# Patient Record
Sex: FEMALE | Race: WHITE | Employment: OTHER | URBAN - METROPOLITAN AREA
[De-identification: names, ages, dates, MRNs, and addresses within clinical notes are randomized per-mention and may not be internally consistent; named-entity substitution may affect disease eponyms.]

---

## 2018-08-13 ENCOUNTER — OFFICE VISIT (OUTPATIENT)
Dept: ORTHOPEDIC SURGERY | Age: 63
End: 2018-08-13

## 2018-08-13 VITALS
SYSTOLIC BLOOD PRESSURE: 131 MMHG | HEART RATE: 75 BPM | BODY MASS INDEX: 36.65 KG/M2 | HEIGHT: 65 IN | DIASTOLIC BLOOD PRESSURE: 86 MMHG | WEIGHT: 220 LBS

## 2018-08-13 DIAGNOSIS — S83.242A TEAR OF MEDIAL MENISCUS OF LEFT KNEE, UNSPECIFIED TEAR TYPE, UNSPECIFIED WHETHER OLD OR CURRENT TEAR, INITIAL ENCOUNTER: ICD-10-CM

## 2018-08-13 DIAGNOSIS — M25.562 LEFT KNEE PAIN, UNSPECIFIED CHRONICITY: Primary | ICD-10-CM

## 2018-08-13 PROCEDURE — 99204 OFFICE O/P NEW MOD 45 MIN: CPT | Performed by: ORTHOPAEDIC SURGERY

## 2018-08-13 PROCEDURE — G8417 CALC BMI ABV UP PARAM F/U: HCPCS | Performed by: ORTHOPAEDIC SURGERY

## 2018-08-13 PROCEDURE — 3017F COLORECTAL CA SCREEN DOC REV: CPT | Performed by: ORTHOPAEDIC SURGERY

## 2018-08-13 PROCEDURE — 4004F PT TOBACCO SCREEN RCVD TLK: CPT | Performed by: ORTHOPAEDIC SURGERY

## 2018-08-13 PROCEDURE — G8427 DOCREV CUR MEDS BY ELIG CLIN: HCPCS | Performed by: ORTHOPAEDIC SURGERY

## 2018-08-13 ASSESSMENT — ENCOUNTER SYMPTOMS
SORE THROAT: 1
ROS SKIN COMMENTS: BASAL CELL CANCER
BACK PAIN: 1
SINUS PAIN: 1

## 2018-08-13 NOTE — PROGRESS NOTES
Social History    Marital status:      Spouse name: N/A    Number of children: N/A    Years of education: N/A     Social History Main Topics    Smoking status: Current Some Day Smoker     Packs/day: 1.00     Types: Cigarettes    Smokeless tobacco: Never Used    Alcohol use No    Drug use: No    Sexual activity: Not Asked     Other Topics Concern    None     Social History Narrative    None       Current Outpatient Prescriptions   Medication Sig Dispense Refill    fluticasone (FLONASE) 50 MCG/ACT nasal spray USE 2 SPRAYS IN EACH NOSTRIL DAILY 1 Bottle 3    azithromycin (ZITHROMAX) 250 MG tablet Dispense one 5 day supply pack and take as directed 1 packet 0    methylPREDNISolone (MEDROL DOSEPACK) 4 MG tablet Take by mouth as directed. 1 Package 0    carvedilol (COREG) 12.5 MG tablet Take 12.5 mg by mouth 2 times daily (with meals).  amLODIPine (NORVASC) 5 MG tablet Take 5 mg by mouth daily.  ezetimibe (ZETIA) 10 MG tablet Take 10 mg by mouth daily.  omeprazole (PRILOSEC) 40 MG capsule Take 40 mg by mouth daily.  aspirin 81 MG tablet Take 81 mg by mouth daily.  S-Adenosylmethionine (EFREN-E) 400 MG TABS Take  by mouth.  naproxen (NAPROSYN) 375 MG tablet Take 1 tablet by mouth 3 times daily (with meals) for 7 days. 21 tablet 0     No current facility-administered medications for this visit. Allergies   Allergen Reactions    Codeine     Penicillins     Sulfa Antibiotics        Vital signs:  /86   Pulse 75   Ht 5' 5\" (1.651 m)   Wt 220 lb (99.8 kg)   BMI 36.61 kg/m²        Neuro: Alert & oriented x 3,  normal,  no focal deficits noted. Normal affect. Eyes: sclera clear  Ears: Normal external ear  Mouth:  No perioral lesions  Pulm: Respirations unlabored and regular  Pulse: Regular rate    Skin: Warm, well perfused        Left knee exam    Gait: No use of assistive devices. No antalgic gait. Alignment: Alignment appreciated.      Inspection/skin: AND left lateral    Impression: left knee mild patellofemoral osteoarthritis      Assessment: 57-year-old female with probable left knee medial meniscus tear. Plan: Diagnosis and treatment options were reviewed with the patient. At this time we're recommending an MRI to rule out a left knee medial meniscus tear and help guide our treatment plan. Follow up for MRI results and/or as needed. Luba Savage is in agreement with this plan. All questions were answered to patient's satisfaction and was encouraged to call with any further questions. Orders Placed This Encounter   Procedures    XR KNEE LEFT (MIN 4 VIEWS)     [id number]     Order Specific Question:   Reason for exam:     Answer:   Pain    XR KNEE RIGHT (3 VIEWS)     Order Specific Question:   Reason for exam:     Answer:   pain    MRI KNEE LEFT WO CONTRAST     Standing Status:   Future     Standing Expiration Date:   8/13/2019     Order Specific Question:   Reason for exam:     Answer:   MRI LEFT KNEE W/3D  R/O MENISCUS TEAR     Order Specific Question:   Reason for exam:     Answer:   [place] 8/16               Yanna Angel, [address]  Date:    8/13/2018      During this examination, Yanna GOLDSTEIN PA-C, functioned as a scribe for Dr. Bernardo Leal. The history taking and physical examination were performed by Dr. Bernardo Leal. All counseling during the appointment was performed between the patient and Dr. Bernardo Leal. 8/13/2018 1:31 PM      This dictation was performed with a verbal recognition program (DRAGON) and it was checked for errors. It is possible that there are still dictated errors within this office note. If so, please bring any errors to my attention for an addendum. All efforts were made to ensure that this office note is accurate.

## 2018-08-15 NOTE — PROGRESS NOTES
Date:  8/15/2018    Name:  Debby Ledezma  Address:  [address] [place] [address] [facility]    :  1955      Age:   57 y.o.    SSN:  [id number]      Medical Record Number:  [id number]    Reason for Visit:    Chief Complaint    Knee Pain (np left knee pain for 1 week)      DOS:2018     HPI: Debby Ledezma is a 57 y.o. female here today for evaluation of left knee. Previous patient of Dr. Marley Galarza. On 2018 was going from seated to standing or vise versa when she felt a sharp pain in the medial aspect of left knee. Since the initial onset has had progressively worsening left knee pain, but no locking or catching. Last night she twisted her left knee eliciting a sharp pain in the medial aspect. The pain wakes her up at night and stops her from doing her daily activities. Treatment has included ice, essential oil, and 800mg Advil q4h. No numbness or tingling in lower extremity. Multiple bilateral knee surgeries in the past including one for right knee mensicus tear. Pain Assessment  Location of Pain: Knee  Location Modifiers: Left  Severity of Pain: 6  Quality of Pain: Sharp, Dull, Aching (heavy)  Duration of Pain: Persistent  Frequency of Pain: Constant  Date Pain First Started: 18  Aggravating Factors: Walking (sitting, lifting)  Limiting Behavior: Yes  Relieving Factors: Rest  Result of Injury: No  Are there other pain locations you wish to document?: No  ROS: All systems reviewed on patient intake form. Pertinent items are noted in HPI. Past Medical History:   Diagnosis Date    Arthritis     CAD (coronary artery disease)     Cancer ([facility] [address]     skin    Hyperlipidemia     Hypertension         Past Surgical History:   Procedure Laterality Date    APPENDECTOMY      CHOLECYSTECTOMY      FOOT SURGERY      bilateral    HYSTERECTOMY      KNEE SURGERY      bilateral    LIVER SURGERY      NECK SURGERY      URETHRA SURGERY         No family history on file.     Social History Social History    Marital status:      Spouse name: N/A    Number of children: N/A    Years of education: N/A     Social History Main Topics    Smoking status: Current Some Day Smoker     Packs/day: 1.00     Types: Cigarettes    Smokeless tobacco: Never Used    Alcohol use No    Drug use: No    Sexual activity: Not Asked     Other Topics Concern    None     Social History Narrative    None       Current Outpatient Prescriptions   Medication Sig Dispense Refill    fluticasone (FLONASE) 50 MCG/ACT nasal spray USE 2 SPRAYS IN EACH NOSTRIL DAILY 1 Bottle 3    azithromycin (ZITHROMAX) 250 MG tablet Dispense one 5 day supply pack and take as directed 1 packet 0    methylPREDNISolone (MEDROL DOSEPACK) 4 MG tablet Take by mouth as directed. 1 Package 0    carvedilol (COREG) 12.5 MG tablet Take 12.5 mg by mouth 2 times daily (with meals).  amLODIPine (NORVASC) 5 MG tablet Take 5 mg by mouth daily.  ezetimibe (ZETIA) 10 MG tablet Take 10 mg by mouth daily.  omeprazole (PRILOSEC) 40 MG capsule Take 40 mg by mouth daily.  aspirin 81 MG tablet Take 81 mg by mouth daily.  S-Adenosylmethionine (EFREN-E) 400 MG TABS Take  by mouth.  naproxen (NAPROSYN) 375 MG tablet Take 1 tablet by mouth 3 times daily (with meals) for 7 days. 21 tablet 0     No current facility-administered medications for this visit. Allergies   Allergen Reactions    Codeine     Penicillins     Sulfa Antibiotics        Vital signs:  /86   Pulse 75   Ht 5' 5\" (1.651 m)   Wt 220 lb (99.8 kg)   BMI 36.61 kg/m²        Neuro: Alert & oriented x 3,  normal,  no focal deficits noted. Normal affect. Eyes: sclera clear  Ears: Normal external ear  Mouth:  No perioral lesions  Pulm: Respirations unlabored and regular  Pulse: Regular rate    Skin: Warm, well perfused        Left knee exam    Gait: No use of assistive devices. No antalgic gait. Alignment: Alignment appreciated.      Inspection/skin: AND left lateral    Impression: left knee mild patellofemoral osteoarthritis      Assessment: 20-year-old female with probable left knee medial meniscus tear. Plan: Diagnosis and treatment options were reviewed with the patient. At this time we're recommending an MRI to rule out a left knee medial meniscus tear and help guide our treatment plan. Follow up for MRI results and/or as needed. Raimundo Walter is in agreement with this plan. All questions were answered to patient's satisfaction and was encouraged to call with any further questions. Orders Placed This Encounter   Procedures    XR KNEE LEFT (MIN 4 VIEWS)     [id number]     Order Specific Question:   Reason for exam:     Answer:   Pain    XR KNEE RIGHT (3 VIEWS)     Order Specific Question:   Reason for exam:     Answer:   pain    MRI KNEE LEFT WO CONTRAST     Standing Status:   Future     Standing Expiration Date:   8/13/2019     Order Specific Question:   Reason for exam:     Answer:   MRI LEFT KNEE W/3D  R/O MENISCUS TEAR     Order Specific Question:   Reason for exam:     Answer:   Marybeth Daily 8/16               Demetrius Oliver, [address]  Date:    8/15/2018      During this examination, IDemetrius PA-C, functioned as a scribe for Dr. Cayetano Ortiz. The history taking and physical examination were performed by Dr. Cayetano Ortiz. All counseling during the appointment was performed between the patient and Dr. Cayetano Ortiz. 8/15/2018 1:32 PM      This dictation was performed with a verbal recognition program (DRAGON) and it was checked for errors. It is possible that there are still dictated errors within this office note. If so, please bring any errors to my attention for an addendum. All efforts were made to ensure that this office note is accurate. Greater than 50% of the visit was spent counseling the patient.     I personally reviewed the patient's pain scale, review of systems, family

## 2018-08-20 ENCOUNTER — OFFICE VISIT (OUTPATIENT)
Dept: ORTHOPEDIC SURGERY | Age: 63
End: 2018-08-20

## 2018-08-20 VITALS
BODY MASS INDEX: 36.65 KG/M2 | HEART RATE: 96 BPM | HEIGHT: 65 IN | SYSTOLIC BLOOD PRESSURE: 148 MMHG | WEIGHT: 220 LBS | DIASTOLIC BLOOD PRESSURE: 89 MMHG

## 2018-08-20 DIAGNOSIS — M17.12 PATELLOFEMORAL ARTHRITIS OF LEFT KNEE: Primary | ICD-10-CM

## 2018-08-20 DIAGNOSIS — T14.8XXA BONE BRUISE: ICD-10-CM

## 2018-08-20 PROCEDURE — G8427 DOCREV CUR MEDS BY ELIG CLIN: HCPCS | Performed by: ORTHOPAEDIC SURGERY

## 2018-08-20 PROCEDURE — L3170 FOOT PLAS HEEL STABI PRE OTS: HCPCS | Performed by: ORTHOPAEDIC SURGERY

## 2018-08-20 PROCEDURE — 3017F COLORECTAL CA SCREEN DOC REV: CPT | Performed by: ORTHOPAEDIC SURGERY

## 2018-08-20 PROCEDURE — G8417 CALC BMI ABV UP PARAM F/U: HCPCS | Performed by: ORTHOPAEDIC SURGERY

## 2018-08-20 PROCEDURE — 99214 OFFICE O/P EST MOD 30 MIN: CPT | Performed by: ORTHOPAEDIC SURGERY

## 2018-08-20 PROCEDURE — 4004F PT TOBACCO SCREEN RCVD TLK: CPT | Performed by: ORTHOPAEDIC SURGERY

## 2018-08-20 ASSESSMENT — ENCOUNTER SYMPTOMS
BACK PAIN: 1
SINUS PAIN: 1
ROS SKIN COMMENTS: BASAL CELL CANCER
SORE THROAT: 1

## 2018-08-20 NOTE — LETTER
Patient Name: Chandler Edward MRN: [id number]  DOS: 8/20/2018   Diagnosis:   1. Patellofemoral arthritis of left knee    2. Bone bruise                           Goal:  []Decrease Pain and/or Swelling [x]Increase ROM and/or Flexibility     [x]Increase Function                           [x]Increase Strength and/or Endurance   []Other   Evaluation:  [x]Evaluation and Treatment []KT-1000   []Isokinetic Exam   []Preoperative Eval    Recommended Modalities:  [x]Modalities of Choice      []HCVS            []Electrical Stimulation     [] Remove Dressing  []Ultrasound        []TENS/TNS    [] Lumbar Traction           [] Cervical Traction   []Phonophoresis         []Hot Pack/Cold Pack   []PT Treatment, Unlisted []Other:  Therapeutic Exercises:    []Isometrics    []Range of Motion []Progressive Exer. []Balance Coordination   []Flexibility  []ROM Limited  []Total Hip Replacement   []Passive  []ROM Full   []Total Knee Replacement  []Active Assisted    []Shoulder Impingement Prog  []Active   []Tennis Elbow Program   []Capsular Shift Regular        []Isokinetics      []Spine Program   []Straight Leg Raises  [] Gait    []Fixation                   [] Supine                                              [] Running   [] Extension   [] Prone   [] Throwing   [] Stabilization   [] AB    [] Swiss Opal Roller   [] AD      [] Spine Eval   [] Cervical Eval  [] Conditioning   [] Lumbar  [] Stationary Bike   [] Elbe Track   [] Lumbar Exer.  [] Stairmaster         [] Treadmill   [] Functional Cap [] Aquatic Prog.      [] Return to work    Treatment Program:  Frequency: [] 1x  [x] 2x  [] 3x  [] 4x  [] 5x week/month  Duration: [] 1  [] 2  [] 3  [x] 4  [] 5 week/month  Weight Bearing: [] Non  [] 1/4  [] 1/2  [] 3/4  [] Full  ROM: [] Restricted  [] Full  [] Follow established:        [x] Other: patella program

## 2018-08-21 RX ORDER — DICLOFENAC SODIUM 75 MG/1
75 TABLET, DELAYED RELEASE ORAL 2 TIMES DAILY
Qty: 60 TABLET | Refills: 2 | Status: SHIPPED | OUTPATIENT
Start: 2018-08-21 | End: 2019-03-01 | Stop reason: SDUPTHER

## 2018-08-21 NOTE — PROGRESS NOTES
negative hyperflexion test.  No bursal swelling is present. There is no pretibial edema. Pulses are symmetric. Sensation is intact to light-touch. The skin is warm dry and well perfused. No adenopathy is present. Right Knee Exam:       Alignment:      Normal      Patella tracking:  Normal     Inspection/Skin:     Normal    Effusion:      None. Palpation:     Minimal crepitus. Nontender. Range of Motion:      Full    Strength:      Normal    Ligamentous Testing:      Stable     Neurologic:      Sensation intact to light touch    Vascular:      Skin warm and well-perfused. Additional findings: Calf soft nontender    I reviewed the patient's MRI films as well as report. They show      CONCLUSION:    1. Microtrabecular contusion of peripheral medial tibial plateau. A 1 x 2cm area of partial    thickness chondral delamination of weightbearing medial femoral condyle. No meniscal tear. 2. Grade 4 chondromalacia of lateral trochlea with full thickness chondral loss. 3. Suspicious chondrocalcinosis.          Patient has a microtrabecular contusion/fracture of the medial tibial plateau associated with patellofemoral and medial compartment arthritis. Impression: Patellofemoral and medial compartment osteoarthritis with microtrabecular fracture medial tibial plateau and chondrocalcinosis      The etiology, natural history and treatment options for this disorder were discussed. The roles of activity modification, anti-inflammatory use, injections, bracing, physical therapy and surgical interventions were all described to the patient and questions were answered. Treatment today will be shoe insert, diclofenac 75 mg b.i.d. and physical therapy oral.    The patient was advised that NSAID-type medications have several potential side effects that include: gastrointestinal irritation including hemorrhage, renal injury, as well as an increased risk for heart attack and stroke.  The patient was asked to take the medication with food and to stop if there is any symptoms of GI upset, including heartburn, nausea, increased gas or diarrhea. I asked the patient to contact their medical provider for vomiting, abdominal pain or black/bloody stools. The patient should have renal function testing per his medical provider periodically if the medication is taken on a regular basis. The patient should be alert for any swelling in the lower extremities and should stop taking the medication immediately and contact their medical provider should this occur. In addition, the patient should stop taking the medication immediately and contact their medical provider should there be any shortness of breath, fatigue and be evaluated in an emergency facility for any chest pain. The patient expresses understanding of these issues and questions were answered. Follow-up evaluation in 1 month the patient may symptomatically. Patient's blood pressure was noted to be elevated on 2 separate measurements. Patient was directed to contact PCP for follow-up. Greater than 50% of the visit was spent counseling the patient. I personally reviewed the patient's pain scale, review of systems, family history, social history, past medical history, allergies and medications. 13 point review of systems was collected today and is included in the medical record. Letha Casarez MD  Sports Medicine, Knee and Shoulder Surgery    This dictation was performed with a verbal recognition program [facility]) and it was checked for errors. It is possible that there are still dictated errors within this office note. If so, please bring any errors to my attention for an addendum. All efforts were made to ensure that this office note is accurate.

## 2018-08-31 ENCOUNTER — HOSPITAL ENCOUNTER (OUTPATIENT)
Dept: PHYSICAL THERAPY | Age: 63
Setting detail: THERAPIES SERIES
Discharge: HOME | End: 2018-08-31
Payer: COMMERCIAL

## 2018-08-31 PROCEDURE — 97112 NEUROMUSCULAR REEDUCATION: CPT

## 2018-08-31 PROCEDURE — 97161 PT EVAL LOW COMPLEX 20 MIN: CPT

## 2018-08-31 PROCEDURE — G8978 MOBILITY CURRENT STATUS: HCPCS

## 2018-08-31 PROCEDURE — 97110 THERAPEUTIC EXERCISES: CPT

## 2018-08-31 PROCEDURE — G8979 MOBILITY GOAL STATUS: HCPCS

## 2018-08-31 ASSESSMENT — PAIN SCALES - GENERAL: PAINLEVEL_OUTOF10: 0

## 2018-08-31 ASSESSMENT — PAIN DESCRIPTION - LOCATION: LOCATION: KNEE

## 2018-08-31 ASSESSMENT — PAIN DESCRIPTION - PAIN TYPE: TYPE: ACUTE PAIN;CHRONIC PAIN

## 2018-08-31 NOTE — FLOWSHEET NOTE
Physical Therapy Daily Treatment Note    Date:  2018    Patient Name:  Flako Horton    :  1955  MRN: [id number]  Restrictions/Precautions:    Medical/Treatment Diagnosis Information:  · Diagnosis: L knee pain, bone bruise  Insurance/Certification information:  PT Insurance Information: [employer] [place]  Physician Information:  Referring Practitioner: Caleb Wu MD  Plan of care signed (Y/N):  N  Visit# / total visits:       G-Code (if applicable):         PT G-Codes  Functional Assessment Tool Used: LEFS  Score: 33/80  Functional Limitation: Mobility: Walking and moving around  Mobility: Walking and Moving Around Current Status (): At least 40 percent but less than 60 percent impaired, limited or restricted  Mobility: Walking and Moving Around Goal Status ():  At least 20 percent but less than 40 percent impaired, limited or restricted    Medicare Cap (if applicable):  n/a = total amount used, updated 2018    Time in:   8:00      Timed Treatment: 25 Total Treatment Time:  60  ________________________________________________________________________________________    Pain Level:    /10  SUBJECTIVE:  See initial eval    OBJECTIVE:     Exercise/Equipment Resistance/Repetitions Other comments   TG   NV    QS - all 3 phases   10x5\" B HEP   Bridge  10x HEP   clamshell 10x5\" B HEP   Prone triple extension NV    Prone hip IR/ER neuro re-ed NV    rockerboard NV    SLS with stacking NV    multihip machine NV                                                                                Other Therapeutic Activities:  Education regarding POC including demonstration with models of anatomy and physiology in order to maximize benefits of treatment; total neuro re-ed 10 minutes    Manual Treatments:     -- Consider STM/neural tracing L medial leg NV    Modalities:  --    Test/Measurements:  See initial eval       ASSESSMENT:   See initial eval      Treatment/Activity Tolerance:   [x] Patient

## 2018-08-31 NOTE — PROGRESS NOTES
Physical Therapy  Initial Assessment  Date: 2018  Patient Name: Alyssa Zamora  MRN: [id number]  : 1955          Restrictions   none per patient    Subjective   General  Chart Reviewed: Yes  Family / Caregiver Present: No  Referring Practitioner: Ariana Villegas MD  Referral Date : 18  Diagnosis: L knee pain, bone bruise  Follows Commands: Within Functional Limits  PT Visit Information  Onset Date: 18  PT Insurance Information: [employer] [place]  Subjective  Subjective: Pt reports that her knee pain started couple months ago, but then got worse on  when she went to a wedding and was standing up sitting down at [place]. Started hurting the evening after the wedding when she tried to go down the stairs and she couldn't. Went to orthopedic MD who ordered MRI which found hairline fracture and bone bruise and patellafemoral chondromalacia. She has had 4 knee surgeries (2 on each knee) to help with lateral dislocation, as well as 8+ surgeries on her feet. Knee hurts primarily with weight bearing, sitting into a chair (the act of moving), going down steps worse than up, but both are bad. Sarted taking Diclofenac a couple weeks ago and her pain and swelling has decreased. Prior to starting medication, pain would ache into thigh and down into her ankle. FROYLAN GARCIA feels weak occasionally as if it could give out on her.    Pain Screening  Patient Currently in Pain: Yes  Pain Assessment  Pain Assessment: 0-10  Pain Level: 0 (5/10 with going down stairs )  Pain Type: Acute pain;Chronic pain  Pain Location: Knee  Vital Signs  Patient Currently in Pain: Yes    Social/Functional History  Social/Functional History  Lives With: Spouse  Type of Home: House  Home Layout: One level  Home Access: Ramped entrance  ADL Assistance: [address]: Independent  Homemaking Responsibilities: Yes  Ambulation Assistance: Independent  Transfer Assistance: Independent  Active : Yes  Mode of Transportation: Car  Occupation: Retired  Leisure & Hobbies: Spends majority of her time devoted to her mother who is in a nursing home. PLOF - no regular exercise program   Objective     Observation/Palpation  Posture: Fair  Palpation: increased TTP along medial leg (thigh and calf), medial joint line  Observation: B hip ER in standing (with toe out), increased lumbar lordosis and poor abdominal support in standing; during ambulation, pt remains with relative hip ER, but with hip ADD/IR moment during stance phase B, B knee valgus during stance and femoral ambulation  Body Mechanics: (-) Trendelenberg B in SLS, but poor ankle support and knee IR/ADD valgus moment B     AROM RLE (degrees)  RLE AROM: WNL  AROM LLE (degrees)  LLE AROM : WNL    Strength RLE  Strength RLE: Exception  R Hip Flexion: 4-/5  R Hip Extension: 3+/5  R Hip ADduction: 4/5  R Hip Internal Rotation: 4-/5  R Hip External Rotation: 4-/5  R Knee Flexion: 4/5 (with pain)  R Knee Extension: 4+/5  R Ankle Dorsiflexion: 5/5  R Ankle Inversion: 5/5  R Ankle Eversion: 5/5  Strength LLE  Strength LLE: Exception  L Hip Flexion: 4-/5  L Hip Extension: 3+/5  L Hip ADduction: 4-/5  L Hip Internal Rotation: 3+/5  L Hip External Rotation: 3+/5  L Knee Flexion: 4/5  L Knee Extension: 4-/5 (with pain)  L Ankle Dorsiflexion: 4+/5  L Ankle Inversion: 4+/5  L Ankle Eversion: 4+/5  Strength Other  Other: lower abdominals 1/5 (trace)                    Balance  Sitting - Static: Good  Sitting - Dynamic: Fair;+  Standing - Static: Fair;+  Standing - Dynamic: Fair;-              Assessment   Conditions Requiring Skilled Therapeutic Intervention  Body structures, Functions, Activity limitations: Decreased ROM; Decreased strength;Decreased high-level IADLs;Decreased balance  Assessment: Pt is a 58 y/o female who presents with L knee pain secondary to impaired mechanics and muscle activation.  Recommend skilled, outpatient PT to address deficits and to attain below-stated

## 2018-09-06 ENCOUNTER — HOSPITAL ENCOUNTER (OUTPATIENT)
Dept: PHYSICAL THERAPY | Age: 63
Setting detail: THERAPIES SERIES
Discharge: HOME | End: 2018-09-06
Payer: COMMERCIAL

## 2018-09-06 PROCEDURE — 97110 THERAPEUTIC EXERCISES: CPT

## 2018-09-06 NOTE — PROGRESS NOTES
Physical Therapy  Cancellation/No-show Note  Patient Name:  Luba Savage  :  1955   Date:  2018  Cancels to date: 0  No-shows to date: 1    For today's appointment patient:  [] Cancelled  [] Rescheduled appointment  [x] No-show     Reason given by patient:  [] Patient ill  [] Conflicting appointment  [] No transportation    [] Conflict with work  [] No reason given  [] Other:     Comments:      Electronically signed by:  Rachael Momin PT

## 2018-09-11 ENCOUNTER — HOSPITAL ENCOUNTER (OUTPATIENT)
Dept: PHYSICAL THERAPY | Age: 63
Setting detail: THERAPIES SERIES
Discharge: HOME | End: 2018-09-11
Payer: COMMERCIAL

## 2018-09-17 ENCOUNTER — OFFICE VISIT (OUTPATIENT)
Dept: ORTHOPEDIC SURGERY | Age: 63
End: 2018-09-17

## 2018-09-17 VITALS
DIASTOLIC BLOOD PRESSURE: 89 MMHG | HEIGHT: 65 IN | BODY MASS INDEX: 36.65 KG/M2 | HEART RATE: 90 BPM | WEIGHT: 220 LBS | SYSTOLIC BLOOD PRESSURE: 130 MMHG

## 2018-09-17 DIAGNOSIS — M17.12 PATELLOFEMORAL ARTHRITIS OF LEFT KNEE: Primary | ICD-10-CM

## 2018-09-17 PROCEDURE — 3017F COLORECTAL CA SCREEN DOC REV: CPT | Performed by: ORTHOPAEDIC SURGERY

## 2018-09-17 PROCEDURE — 4004F PT TOBACCO SCREEN RCVD TLK: CPT | Performed by: ORTHOPAEDIC SURGERY

## 2018-09-17 PROCEDURE — G8417 CALC BMI ABV UP PARAM F/U: HCPCS | Performed by: ORTHOPAEDIC SURGERY

## 2018-09-17 PROCEDURE — G8427 DOCREV CUR MEDS BY ELIG CLIN: HCPCS | Performed by: ORTHOPAEDIC SURGERY

## 2018-09-17 PROCEDURE — 99213 OFFICE O/P EST LOW 20 MIN: CPT | Performed by: ORTHOPAEDIC SURGERY

## 2018-09-17 ASSESSMENT — ENCOUNTER SYMPTOMS
SORE THROAT: 1
ROS SKIN COMMENTS: BASAL CELL CANCER
BACK PAIN: 1
SINUS PAIN: 1

## 2018-09-17 NOTE — PROGRESS NOTES
54yo female comes in today for f/u of left knee medical compartment OA. She is still doing PT and taking the diclofenac. She states she is feeling better. There is mild crepitus bilaterally on exam today.      OA, left knee    -con't diclofenac  -con't PT  -f/u PRN
(ZITHROMAX) 250 MG tablet Dispense one 5 day supply pack and take as directed 1 packet 0    methylPREDNISolone (MEDROL DOSEPACK) 4 MG tablet Take by mouth as directed. 1 Package 0    carvedilol (COREG) 12.5 MG tablet Take 12.5 mg by mouth 2 times daily (with meals).  amLODIPine (NORVASC) 5 MG tablet Take 5 mg by mouth daily.  ezetimibe (ZETIA) 10 MG tablet Take 10 mg by mouth daily.  omeprazole (PRILOSEC) 40 MG capsule Take 40 mg by mouth daily.  aspirin 81 MG tablet Take 81 mg by mouth daily.  S-Adenosylmethionine (EFREN-E) 400 MG TABS Take  by mouth.  naproxen (NAPROSYN) 375 MG tablet Take 1 tablet by mouth 3 times daily (with meals) for 7 days. 21 tablet 0     No current facility-administered medications for this visit. Allergies   Allergen Reactions    Codeine     Penicillins     Sulfa Antibiotics        Vital signs:  /89   Pulse 90   Ht 5' 5\" (1.651 m)   Wt 220 lb (99.8 kg)   BMI 36.61 kg/m²        Neuro: Alert & oriented x 3,  normal,  no focal deficits noted. Normal affect. Eyes: sclera clear  Ears: Normal external ear  Mouth:  No perioral lesions  Pulm: Respirations unlabored and regular  Pulse: Regular rate and rhythm   Skin: Warm, well perfused      Left knee exam    Examination of the left knee shows normal alignment and full range of motion. The quadriceps are well-developed. Trace effusion is present. No soft tissue swelling is noted. The patient has no tenderness to palpation about the joint. There is a negative Geogrina sign. The Lachman sign is negative. The anterior and posterior drawer signs are negative. The cruciate and collateral ligaments are intact. The patient has a negative hyperflexion test.  No bursal swelling is present. There is mild crepitus. There is slight pain with compression of the patella. The patella apprehension sign is negative. Q angles are within normal limits. There is no pretibial edema.   Pulses are

## 2018-09-18 ENCOUNTER — HOSPITAL ENCOUNTER (OUTPATIENT)
Dept: PHYSICAL THERAPY | Age: 63
Setting detail: THERAPIES SERIES
Discharge: HOME | End: 2018-09-18
Payer: COMMERCIAL

## 2018-09-18 NOTE — PROGRESS NOTES
Physical Therapy  Cancellation/No-show Note  Patient Name:  Debra Garvin  :  1955   Date:  2018  Cancels to date: 2  No-shows to date: 1    For today's appointment patient:  [x] Cancelled  [] Rescheduled appointment  [] No-show     Reason given by patient:  [x] Patient ill  [] Conflicting appointment  [] No transportation    [] Conflict with work  [] No reason given  [] Other:     Comments:      Electronically signed by:  Anjali Russell PT

## 2018-09-20 ENCOUNTER — HOSPITAL ENCOUNTER (OUTPATIENT)
Dept: PHYSICAL THERAPY | Age: 63
Setting detail: THERAPIES SERIES
Discharge: HOME | End: 2018-09-20
Payer: COMMERCIAL

## 2018-09-20 NOTE — PROGRESS NOTES
Physical Therapy  Cancellation/No-show Note  Patient Name:  Gorge Rosales  :  1955   Date:  2018  Cancels to date: 3  No-shows to date: 1    For today's appointment patient:  [x] Cancelled  [] Rescheduled appointment  [] No-show     Reason given by patient:  [] Patient ill  [] Conflicting appointment  [] No transportation    [] Conflict with work  [] No reason given  [x] Other:     Comments:  Pt called to cancel remaining appointments due to her mother becoming very ill.      Electronically signed by:  Cyril Payan PT

## 2018-09-25 ENCOUNTER — APPOINTMENT (OUTPATIENT)
Dept: PHYSICAL THERAPY | Age: 63
End: 2018-09-25
Payer: COMMERCIAL

## 2018-09-27 ENCOUNTER — APPOINTMENT (OUTPATIENT)
Dept: PHYSICAL THERAPY | Age: 63
End: 2018-09-27
Payer: COMMERCIAL

## 2019-03-01 ENCOUNTER — OFFICE VISIT (OUTPATIENT)
Dept: ORTHOPEDIC SURGERY | Age: 64
End: 2019-03-01
Payer: MEDICARE

## 2019-03-01 VITALS
HEART RATE: 85 BPM | HEIGHT: 65 IN | WEIGHT: 230 LBS | BODY MASS INDEX: 38.32 KG/M2 | SYSTOLIC BLOOD PRESSURE: 139 MMHG | DIASTOLIC BLOOD PRESSURE: 85 MMHG

## 2019-03-01 DIAGNOSIS — M25.561 RIGHT KNEE PAIN, UNSPECIFIED CHRONICITY: Primary | ICD-10-CM

## 2019-03-01 PROCEDURE — G8400 PT W/DXA NO RESULTS DOC: HCPCS | Performed by: PHYSICIAN ASSISTANT

## 2019-03-01 PROCEDURE — 4040F PNEUMOC VAC/ADMIN/RCVD: CPT | Performed by: PHYSICIAN ASSISTANT

## 2019-03-01 PROCEDURE — 1101F PT FALLS ASSESS-DOCD LE1/YR: CPT | Performed by: PHYSICIAN ASSISTANT

## 2019-03-01 PROCEDURE — 1090F PRES/ABSN URINE INCON ASSESS: CPT | Performed by: PHYSICIAN ASSISTANT

## 2019-03-01 PROCEDURE — 3017F COLORECTAL CA SCREEN DOC REV: CPT | Performed by: PHYSICIAN ASSISTANT

## 2019-03-01 PROCEDURE — 4004F PT TOBACCO SCREEN RCVD TLK: CPT | Performed by: PHYSICIAN ASSISTANT

## 2019-03-01 PROCEDURE — G8417 CALC BMI ABV UP PARAM F/U: HCPCS | Performed by: PHYSICIAN ASSISTANT

## 2019-03-01 PROCEDURE — G8484 FLU IMMUNIZE NO ADMIN: HCPCS | Performed by: PHYSICIAN ASSISTANT

## 2019-03-01 PROCEDURE — G8427 DOCREV CUR MEDS BY ELIG CLIN: HCPCS | Performed by: PHYSICIAN ASSISTANT

## 2019-03-01 PROCEDURE — 1123F ACP DISCUSS/DSCN MKR DOCD: CPT | Performed by: PHYSICIAN ASSISTANT

## 2019-03-01 PROCEDURE — 99213 OFFICE O/P EST LOW 20 MIN: CPT | Performed by: PHYSICIAN ASSISTANT

## 2019-03-01 RX ORDER — DICLOFENAC SODIUM 75 MG/1
75 TABLET, DELAYED RELEASE ORAL 2 TIMES DAILY
Qty: 60 TABLET | Refills: 2 | Status: SHIPPED | OUTPATIENT
Start: 2019-03-01 | End: 2019-04-22

## 2019-03-01 ASSESSMENT — ENCOUNTER SYMPTOMS
ROS SKIN COMMENTS: BASAL CELL CANCER
BACK PAIN: 1

## 2019-03-05 ENCOUNTER — OFFICE VISIT (OUTPATIENT)
Dept: ORTHOPEDIC SURGERY | Age: 64
End: 2019-03-05
Payer: MEDICARE

## 2019-03-05 VITALS
HEART RATE: 78 BPM | WEIGHT: 230 LBS | BODY MASS INDEX: 38.32 KG/M2 | HEIGHT: 65 IN | SYSTOLIC BLOOD PRESSURE: 136 MMHG | DIASTOLIC BLOOD PRESSURE: 89 MMHG

## 2019-03-05 DIAGNOSIS — M17.11 PRIMARY OSTEOARTHRITIS OF RIGHT KNEE: ICD-10-CM

## 2019-03-05 DIAGNOSIS — M17.12 PATELLOFEMORAL ARTHRITIS OF LEFT KNEE: ICD-10-CM

## 2019-03-05 DIAGNOSIS — M25.562 LEFT KNEE PAIN, UNSPECIFIED CHRONICITY: Primary | ICD-10-CM

## 2019-03-05 PROCEDURE — G8484 FLU IMMUNIZE NO ADMIN: HCPCS | Performed by: ORTHOPAEDIC SURGERY

## 2019-03-05 PROCEDURE — 1090F PRES/ABSN URINE INCON ASSESS: CPT | Performed by: ORTHOPAEDIC SURGERY

## 2019-03-05 PROCEDURE — G8400 PT W/DXA NO RESULTS DOC: HCPCS | Performed by: ORTHOPAEDIC SURGERY

## 2019-03-05 PROCEDURE — 20610 DRAIN/INJ JOINT/BURSA W/O US: CPT | Performed by: ORTHOPAEDIC SURGERY

## 2019-03-05 PROCEDURE — 1101F PT FALLS ASSESS-DOCD LE1/YR: CPT | Performed by: ORTHOPAEDIC SURGERY

## 2019-03-05 PROCEDURE — G8417 CALC BMI ABV UP PARAM F/U: HCPCS | Performed by: ORTHOPAEDIC SURGERY

## 2019-03-05 PROCEDURE — 1123F ACP DISCUSS/DSCN MKR DOCD: CPT | Performed by: ORTHOPAEDIC SURGERY

## 2019-03-05 PROCEDURE — 3017F COLORECTAL CA SCREEN DOC REV: CPT | Performed by: ORTHOPAEDIC SURGERY

## 2019-03-05 PROCEDURE — 4004F PT TOBACCO SCREEN RCVD TLK: CPT | Performed by: ORTHOPAEDIC SURGERY

## 2019-03-05 PROCEDURE — 99213 OFFICE O/P EST LOW 20 MIN: CPT | Performed by: ORTHOPAEDIC SURGERY

## 2019-03-05 PROCEDURE — G8427 DOCREV CUR MEDS BY ELIG CLIN: HCPCS | Performed by: ORTHOPAEDIC SURGERY

## 2019-03-05 PROCEDURE — 4040F PNEUMOC VAC/ADMIN/RCVD: CPT | Performed by: ORTHOPAEDIC SURGERY

## 2019-03-05 ASSESSMENT — ENCOUNTER SYMPTOMS
ROS SKIN COMMENTS: BASAL CELL CANCER
BACK PAIN: 1

## 2019-03-12 ENCOUNTER — TELEPHONE (OUTPATIENT)
Dept: ORTHOPEDIC SURGERY | Age: 64
End: 2019-03-12

## 2019-03-12 DIAGNOSIS — M17.11 PRIMARY OSTEOARTHRITIS OF RIGHT KNEE: Primary | ICD-10-CM

## 2019-03-29 ENCOUNTER — HOSPITAL ENCOUNTER (OUTPATIENT)
Dept: PHYSICAL THERAPY | Age: 64
Setting detail: THERAPIES SERIES
Discharge: HOME | End: 2019-03-29
Payer: MEDICARE

## 2019-03-29 PROCEDURE — 97530 THERAPEUTIC ACTIVITIES: CPT

## 2019-03-29 PROCEDURE — 97162 PT EVAL MOD COMPLEX 30 MIN: CPT

## 2019-03-29 PROCEDURE — 97110 THERAPEUTIC EXERCISES: CPT

## 2019-04-01 ENCOUNTER — OFFICE VISIT (OUTPATIENT)
Dept: ORTHOPEDIC SURGERY | Age: 64
End: 2019-04-01
Payer: MEDICARE

## 2019-04-01 ENCOUNTER — HOSPITAL ENCOUNTER (OUTPATIENT)
Dept: PHYSICAL THERAPY | Age: 64
Setting detail: THERAPIES SERIES
Discharge: HOME | End: 2019-04-01
Payer: MEDICARE

## 2019-04-01 VITALS
HEIGHT: 65 IN | BODY MASS INDEX: 38.32 KG/M2 | HEART RATE: 85 BPM | WEIGHT: 230 LBS | DIASTOLIC BLOOD PRESSURE: 89 MMHG | SYSTOLIC BLOOD PRESSURE: 133 MMHG

## 2019-04-01 DIAGNOSIS — S83.241A ACUTE MEDIAL MENISCUS TEAR, RIGHT, INITIAL ENCOUNTER: ICD-10-CM

## 2019-04-01 DIAGNOSIS — M17.11 PRIMARY OSTEOARTHRITIS OF RIGHT KNEE: Primary | ICD-10-CM

## 2019-04-01 PROCEDURE — G8400 PT W/DXA NO RESULTS DOC: HCPCS | Performed by: ORTHOPAEDIC SURGERY

## 2019-04-01 PROCEDURE — 3017F COLORECTAL CA SCREEN DOC REV: CPT | Performed by: ORTHOPAEDIC SURGERY

## 2019-04-01 PROCEDURE — 97116 GAIT TRAINING THERAPY: CPT

## 2019-04-01 PROCEDURE — G8427 DOCREV CUR MEDS BY ELIG CLIN: HCPCS | Performed by: ORTHOPAEDIC SURGERY

## 2019-04-01 PROCEDURE — 4040F PNEUMOC VAC/ADMIN/RCVD: CPT | Performed by: ORTHOPAEDIC SURGERY

## 2019-04-01 PROCEDURE — 99213 OFFICE O/P EST LOW 20 MIN: CPT | Performed by: ORTHOPAEDIC SURGERY

## 2019-04-01 PROCEDURE — 1123F ACP DISCUSS/DSCN MKR DOCD: CPT | Performed by: ORTHOPAEDIC SURGERY

## 2019-04-01 PROCEDURE — 97110 THERAPEUTIC EXERCISES: CPT

## 2019-04-01 PROCEDURE — 1090F PRES/ABSN URINE INCON ASSESS: CPT | Performed by: ORTHOPAEDIC SURGERY

## 2019-04-01 PROCEDURE — G8417 CALC BMI ABV UP PARAM F/U: HCPCS | Performed by: ORTHOPAEDIC SURGERY

## 2019-04-01 PROCEDURE — 4004F PT TOBACCO SCREEN RCVD TLK: CPT | Performed by: ORTHOPAEDIC SURGERY

## 2019-04-01 ASSESSMENT — ENCOUNTER SYMPTOMS
ROS SKIN COMMENTS: BASAL CELL CANCER
BACK PAIN: 1

## 2019-04-01 NOTE — FLOWSHEET NOTE
Physical Therapy Daily Treatment Note    Date:  2019    Patient Name:  Jenaro Scott    :  1955  MRN: [id number]  Restrictions/Precautions:  universal  Medical/Treatment Diagnosis Information:  · Diagnosis: M17.11 OA of (R) knee  Insurance/Certification information:  PT Insurance Information: Medicare  Physician Information:  Referring Practitioner: Serina Chavez MD  Plan of care signed (Y/N): Y  Visit# / total visits:       G-Code (if applicable):         PT G-Codes  Functional Assessment Tool Used: LEFS  Score: ; 86% disability    Medicare Cap (if applicable):  $931 = total amount used, updated 2019    Time in:  7:25am     Timed Treatment: 40min. Total Treatment Time: 40min.  ________________________________________________________________________________________    Pain Level:    410 (R) knee  SUBJECTIVE:  Patient reports \"I had a lot of pain over the weekend, I bought some of that tape and a compression brace\". Reports compliance with HEP. OBJECTIVE:     Exercise/Equipment Resistance/Repetitions Other comments   TG warm up   x5min Level 4          Hook-lying TA   Until Achieved w/ BP cuff  [id number]   HEP   Bridge x10 HEP   SLR w/ quad set HEP   Supine Heel Slide HEP          Clamshell level 1   x6B           Hip 3 way     x10B 1#    Mini-squat x10                           Gait training w/ cane   x10min           TG   x5min      Other Therapeutic Activities:  role of PT, physiology of injury, plan of care, goals, HEP; patient verbalized and demonstrated good understanding      Manual Treatments:     --      Test/Measurements:  See eval       ASSESSMENT:   The patient performed above TE well with minimal cues. Difficulty with TA achievement. Adapted nicely to new TE added today. Patient reported at end of PT session \"It does feel better\". Treatment/Activity Tolerance:   ?Patient tolerated treatment well ? Patient limited by fatique  ? Patient limited by pain ?

## 2019-04-01 NOTE — PROGRESS NOTES
 Smokeless tobacco: Never Used   Substance and Sexual Activity    Alcohol use: No    Drug use: No    Sexual activity: None   Lifestyle    Physical activity:     Days per week: None     Minutes per session: None    Stress: None   Relationships    Social connections:     Talks on phone: None     Gets together: None     Attends Nondenominational service: None     Active member of club or organization: None     Attends meetings of clubs or organizations: None     Relationship status: None    Intimate partner violence:     Fear of current or ex partner: None     Emotionally abused: None     Physically abused: None     Forced sexual activity: None   Other Topics Concern    None   Social History Narrative    None       Current Outpatient Medications   Medication Sig Dispense Refill    diclofenac (VOLTAREN) 75 MG EC tablet Take 1 tablet by mouth 2 times daily 1 PO Q BID WITH FOOD 60 tablet 2    fluticasone (FLONASE) 50 MCG/ACT nasal spray USE 2 SPRAYS IN EACH NOSTRIL DAILY 1 Bottle 3    azithromycin (ZITHROMAX) 250 MG tablet Dispense one 5 day supply pack and take as directed 1 packet 0    methylPREDNISolone (MEDROL DOSEPACK) 4 MG tablet Take by mouth as directed. 1 Package 0    carvedilol (COREG) 12.5 MG tablet Take 12.5 mg by mouth 2 times daily (with meals).  amLODIPine (NORVASC) 5 MG tablet Take 5 mg by mouth daily.  ezetimibe (ZETIA) 10 MG tablet Take 10 mg by mouth daily.  omeprazole (PRILOSEC) 40 MG capsule Take 40 mg by mouth daily.  aspirin 81 MG tablet Take 81 mg by mouth daily.  S-Adenosylmethionine (EFREN-E) 400 MG TABS Take  by mouth.  naproxen (NAPROSYN) 375 MG tablet Take 1 tablet by mouth 3 times daily (with meals) for 7 days. 21 tablet 0     No current facility-administered medications for this visit.         Allergies   Allergen Reactions    Codeine     Penicillins     Sulfa Antibiotics        Vital signs:  /89   Pulse 85   Ht 5' 5\" (1.651 m)   Wt 230 lb (104.3 kg)   BMI 38.27 kg/m²        Neuro: Alert & oriented x 3,  normal,  no focal deficits noted. Normal affect. Eyes: sclera clear  Ears: Normal external ear  Mouth:  No perioral lesions  Pulm: Respirations unlabored and regular  Pulse: Regular rate and rhythm   Skin: Warm, well perfused      Constitutional: In no apparent distress. Normal affect. Alert and oriented X3 and is cooperative. RIGHT Knee Exam:    Gait: No use of assistive devices. No antalgic gait. Alignment: normal alignment. Inspection/skin: Skin is intact without erythema or ecchymosis. No gross deformity. Palpation: Mild patella crepitus. Marked medial joint line tenderness present. Mild lateral joint line tenderness present. Range of Motion: There is full range of motion. Strength: Normal quadriceps development. Effusion: No effusion or swelling present. Ligamentous stability: No cruciate or collateral ligament instability. Neurologic and vascular: Skin is warm and well-perfused. Sensation is intact to light-touch. Special tests: Negative Georgina sign. LEFT Knee Exam:    Gait: No use of assistive devices. No antalgic gait. Alignment: normal alignment. Inspection/skin: Skin is intact without erythema or ecchymosis. No gross deformity. Palpation: Mild patella crepitus. Mild medial and lateral joint line tenderness present. Range of Motion: There is full range of motion. Strength: Normal quadriceps development. Effusion: No effusion or swelling present. Ligamentous stability: No cruciate or collateral ligament instability. Neurologic and vascular: Skin is warm and well-perfused. Sensation is intact to light-touch. Special tests: Negative Georgina sign. Radiology:     No new imaging was obtained during today's visit. Radiographs from 3/1/2019 were re-reviewed today. MRI images from 3/7/2019 were re-reviewed today. Impression:   CONCLUSION:   1.  Trizonal radial flap tear posterior horn-body junction medial meniscus.  The tear may have    been debrided/repaired.  No unstable fragment. 2. Tricompartment osteoarthropathy.  Intermediate to high grade chondromalacia predominates    lateral patellar facet and trochlea.  Minimal marrow reaction. 3. Capsulosynovitis. 4. Enchondroma proximal fibula. Posterior endosteal scalloping. No pathologic fracture. 5. Please see above. Good preservation of joint space appreciated on radiographs. Assessment :  Primary osteoarthritis with medial meniscus tear, right knee. Impression:  Encounter Diagnoses   Name Primary?  Primary osteoarthritis of right knee Yes    Acute medial meniscus tear, right, initial encounter        Office Procedures:  No orders of the defined types were placed in this encounter. Plan: The patient has not seen improvement with the cortisone injection, activity modification, oral medications or physical therapy. Therefore, I think she would benefit from a right knee arthroscopy, medial menisectomy, synovectomy and chondroplasty. We will get her set up with this at her convenience. Ranjan Simpson is in agreement with this plan. All questions were answered to patient's satisfaction and was encouraged to call with any further questions. Neva Barry ATC, am scribing for and in the presence of Dr. Laina Delgado. 04/01/19 3:20 PM Artemio Richards ATC        I personally reviewed the patient's pain scale, review of systems, family history, social history, past medical history, allergies and medications. 13 point review of systems was collected and reviewed today. It is noncontributory. I personally performed the services described in this documentation and scribed by Artemio Richards ATC. Sheyla Dumont MD  Sports Medicine, Arthroscopic Knee and Shoulder Surgery    This dictation was performed with a verbal recognition program [facility]) and it was checked for errors.   It is possible that there are still dictated errors within this office note. If so, please bring any errors to my attention for an addendum. All efforts were made to ensure that this office note is accurate.

## 2019-04-03 ENCOUNTER — TELEPHONE (OUTPATIENT)
Dept: ORTHOPEDIC SURGERY | Age: 64
End: 2019-04-03

## 2019-04-16 ENCOUNTER — TELEPHONE (OUTPATIENT)
Dept: ORTHOPEDIC SURGERY | Age: 64
End: 2019-04-16

## 2019-04-18 NOTE — PROGRESS NOTES
[facility] ([facility]) [address] [facility], [address]    Acknowledgment of Informed Consent for Surgical or Medical Procedure and Sedation  I agree to allow doctor(s) Asia Troncoso and his/her associates or assistants, including residents and/or other qualified medical practitioner to perform the following medical treatment or procedure and to administer or direct the administration of sedation as necessary:  Procedure(s): RIGHT KNEE ARTHROSCOPY, MEDIAL MENISCUS EXCISION VERSUS REPAIR, SYNOVECTOMY CHONDROPLASTY  My doctor has explained the following regarding the proposed procedure:   the explanation of the procedure   the benefits of the procedure   the potential problems that might occur during recuperation   the risks and side effects of the procedure which could include but are not limited to severe blood loss, infection, stroke or death   the benefits, risks and side effect of alternative procedures including the consequences of declining this procedure or any alternative procedures   the likelihood of achieving satisfactory results. I acknowledge no guarantee or assurance has been made to me regarding the results. I understand that during the course of this treatment/procedure, unforeseen conditions can occur which require an additional or different procedure. I agree to allow my physician or assistants to perform such extension of the original procedure as they may find necessary. I understand that sedation will often result in temporary impairment of memory and fine motor skills and that sedation can occasionally progress to a state of deep sedation or general anesthesia. I understand the risks of anesthesia for surgery include, but are not limited to, sore throat, hoarseness, injury to face, mouth, or teeth; nausea; headache; injury to blood vessels or nerves; death, brain damage, or paralysis.     I understand that if I have a Limitation of Treatment order in effect during my hospitalization, the order may or may not be in effect during this procedure. I give my doctor permission to give me blood or blood products. I understand that there are risks with receiving blood such as hepatitis, AIDS, fever, or allergic reaction. I acknowledge that the risks, benefits, and alternatives of this treatment have been explained to me and that no express or implied warranty has been given by the hospital, any blood bank, or any person or entity as to the blood or blood components transfused. At the discretion of my doctor, I agree to allow observers, equipment/product representatives and allow photographing, and/or televising of the procedure, provided my name or identity is maintained confidentially. I agree the hospital may dispose of or use for scientific or educational purposes any tissue, fluid, or body parts which may be removed.     ________________________________Date________Time______ am/pm  ([employer] [facility])  Patient or Signature of Closest Relative or Legal Guardian    ________________________________Date________Time______am/pm      Page 1 of  1  Witness

## 2019-04-22 ENCOUNTER — TELEPHONE (OUTPATIENT)
Dept: ORTHOPEDIC SURGERY | Age: 64
End: 2019-04-22

## 2019-04-22 NOTE — PROGRESS NOTES
option #2 if you have not been preregistered yet. On the day of your procedure bring your insurance card and photo ID. You will be registered at your bedside once brought back to your room. 5. DO NOT EAT ANYTHING eight hours prior to surgery. May have 8 ounces of water 4 hours prior to surgery. 6. MEDICATIONS    Take the following medications with a SMALL sip of water: Norvasc, Coreg, Prilosec   Use your usual dose of inhalers the morning of surgery. BRING your rescue inhaler with you to hospital.    Anesthesia does NOT want you to take insulin the morning of surgery. They will control your blood sugar while you are at the hospital. Please contact your ordering physician for instructions regarding your insulin the night before your procedure. If you have an insulin pump, please keep it set on basal rate. 7. Do not swallow water when brushing teeth. No gum, candy, mints or ice chips. Refrain from smoking or at least decrease the amount. 8. Dress in loose, comfortable clothing appropriate for redressing after your procedure. Do not wear jewelry (including body piercings), make-up (especially NO eye make-up), fingernail polish (NO toenail polish if foot/leg surgery), lotion, powders or metal hairclips. 9. Dentures, glasses, or contacts will need to be removed before your procedure. Bring cases for your glasses, contacts, dentures, or hearing aids to protect them while you are in surgery. 10. If you use a CPAP, please bring it with you on the day of your procedure. 11. We recommend that valuable personal  belongings such as cash, cell phones, e-tablets or jewelry, be left at home during your stay. The hospital will not be responsible for valuables that are not secured in the hospital safe. However, if your insurance requires a co-pay, you may want to bring a method of payment, i.e. Check or credit card, if you wish to pay your co-pay the day of surgery.       12. If you are to stay overnight, you may bring a bag with personal items. Please have any large items you may need brought in by your family after your arrival to your hospital room. 15. If you have a Living Will or Durable Power of , please bring a copy on the day of your procedure. 15. With your permission, one family member may accompany you while you are being prepared for surgery. Once you are ready, additional family members may join you. HOW WE KEEP YOU SAFE and WORK TO PREVENT SURGICAL SITE INFECTIONS:  1. Health care workers should always check your ID bracelet to verify your name and birth date. You will be asked many times to state your name, date of birth, and allergies. 2. Health care workers should always clean their hands with soap or alcohol gel before providing care to you. It is okay to ask anyone if they cleaned their hands before they touch you. 3. You will be actively involved in verifying the type of procedure you are having and ensuring the correct surgical site. This will be confirmed multiple times prior to your procedure. Do NOT radha your surgery site UNLESS instructed to by your surgeon. 4. Do not shave or wax for 72 hours prior to procedure near your operative site. Shaving with a razor can irritate your skin and make it easier to develop an infection. On the day of your procedure, any hair that needs to be removed near the surgical site will be clipped by a healthcare worker using a special clippers designed to avoid skin irritation. 5. When you are in the operating room, your surgical site will be cleansed with a special soap, and in most cases, you will be given an antibiotic before the surgery begins. What to expect AFTER YOUR PROCEDURE:  1. Immediately following your procedure, your will be taken to the PACU for the first phase of your recovery.   Your nurse will help you recover from any potential side effects of anesthesia, such as extreme drowsiness, changes in your

## 2019-04-23 ENCOUNTER — ANESTHESIA EVENT (OUTPATIENT)
Dept: OPERATING ROOM | Age: 64
End: 2019-04-23
Payer: MEDICARE

## 2019-04-24 ENCOUNTER — APPOINTMENT (OUTPATIENT)
Dept: PHYSICAL THERAPY | Age: 64
End: 2019-04-24
Payer: MEDICARE

## 2019-04-24 ENCOUNTER — HOSPITAL ENCOUNTER (OUTPATIENT)
Age: 64
Setting detail: OUTPATIENT SURGERY
Discharge: HOME | End: 2019-04-24
Attending: ORTHOPAEDIC SURGERY | Admitting: ORTHOPAEDIC SURGERY
Payer: MEDICARE

## 2019-04-24 ENCOUNTER — ANESTHESIA (OUTPATIENT)
Dept: OPERATING ROOM | Age: 64
End: 2019-04-24
Payer: MEDICARE

## 2019-04-24 VITALS
RESPIRATION RATE: 16 BRPM | HEART RATE: 74 BPM | HEIGHT: 65 IN | WEIGHT: 230 LBS | BODY MASS INDEX: 38.32 KG/M2 | DIASTOLIC BLOOD PRESSURE: 87 MMHG | OXYGEN SATURATION: 94 % | TEMPERATURE: 97.3 F | SYSTOLIC BLOOD PRESSURE: 137 MMHG

## 2019-04-24 VITALS
RESPIRATION RATE: 11 BRPM | SYSTOLIC BLOOD PRESSURE: 100 MMHG | DIASTOLIC BLOOD PRESSURE: 51 MMHG | TEMPERATURE: 34.2 F | OXYGEN SATURATION: 93 %

## 2019-04-24 PROCEDURE — 3700000001 HC ADD 15 MINUTES (ANESTHESIA): Performed by: ORTHOPAEDIC SURGERY

## 2019-04-24 PROCEDURE — 6360000002 HC RX W HCPCS: Performed by: ANESTHESIOLOGY

## 2019-04-24 PROCEDURE — 3600000004 HC SURGERY LEVEL 4 BASE: Performed by: ORTHOPAEDIC SURGERY

## 2019-04-24 PROCEDURE — 6360000002 HC RX W HCPCS: Performed by: NURSE ANESTHETIST, CERTIFIED REGISTERED

## 2019-04-24 PROCEDURE — 3700000000 HC ANESTHESIA ATTENDED CARE: Performed by: ORTHOPAEDIC SURGERY

## 2019-04-24 PROCEDURE — 3600000014 HC SURGERY LEVEL 4 ADDTL 15MIN: Performed by: ORTHOPAEDIC SURGERY

## 2019-04-24 PROCEDURE — 2580000003 HC RX 258: Performed by: ORTHOPAEDIC SURGERY

## 2019-04-24 PROCEDURE — 2500000003 HC RX 250 WO HCPCS: Performed by: ORTHOPAEDIC SURGERY

## 2019-04-24 PROCEDURE — 7100000001 HC PACU RECOVERY - ADDTL 15 MIN: Performed by: ORTHOPAEDIC SURGERY

## 2019-04-24 PROCEDURE — 7100000011 HC PHASE II RECOVERY - ADDTL 15 MIN: Performed by: ORTHOPAEDIC SURGERY

## 2019-04-24 PROCEDURE — 6360000002 HC RX W HCPCS: Performed by: ORTHOPAEDIC SURGERY

## 2019-04-24 PROCEDURE — 2580000003 HC RX 258: Performed by: ANESTHESIOLOGY

## 2019-04-24 PROCEDURE — 7100000010 HC PHASE II RECOVERY - FIRST 15 MIN: Performed by: ORTHOPAEDIC SURGERY

## 2019-04-24 PROCEDURE — 2709999900 HC NON-CHARGEABLE SUPPLY: Performed by: ORTHOPAEDIC SURGERY

## 2019-04-24 PROCEDURE — 7100000000 HC PACU RECOVERY - FIRST 15 MIN: Performed by: ORTHOPAEDIC SURGERY

## 2019-04-24 PROCEDURE — 2720000010 HC SURG SUPPLY STERILE: Performed by: ORTHOPAEDIC SURGERY

## 2019-04-24 RX ORDER — ONDANSETRON 2 MG/ML
INJECTION INTRAMUSCULAR; INTRAVENOUS PRN
Status: DISCONTINUED | OUTPATIENT
Start: 2019-04-24 | End: 2019-04-24 | Stop reason: SDUPTHER

## 2019-04-24 RX ORDER — LIDOCAINE HYDROCHLORIDE 20 MG/ML
INJECTION, SOLUTION INTRAVENOUS PRN
Status: DISCONTINUED | OUTPATIENT
Start: 2019-04-24 | End: 2019-04-24 | Stop reason: SDUPTHER

## 2019-04-24 RX ORDER — DEXAMETHASONE SODIUM PHOSPHATE 4 MG/ML
INJECTION, SOLUTION INTRA-ARTICULAR; INTRALESIONAL; INTRAMUSCULAR; INTRAVENOUS; SOFT TISSUE PRN
Status: DISCONTINUED | OUTPATIENT
Start: 2019-04-24 | End: 2019-04-24 | Stop reason: SDUPTHER

## 2019-04-24 RX ORDER — MIDAZOLAM HYDROCHLORIDE 1 MG/ML
INJECTION INTRAMUSCULAR; INTRAVENOUS PRN
Status: DISCONTINUED | OUTPATIENT
Start: 2019-04-24 | End: 2019-04-24 | Stop reason: SDUPTHER

## 2019-04-24 RX ORDER — CLINDAMYCIN PHOSPHATE 900 MG/50ML
900 INJECTION INTRAVENOUS
Status: COMPLETED | OUTPATIENT
Start: 2019-04-24 | End: 2019-04-24

## 2019-04-24 RX ORDER — LABETALOL HYDROCHLORIDE 5 MG/ML
5 INJECTION, SOLUTION INTRAVENOUS EVERY 10 MIN PRN
Status: DISCONTINUED | OUTPATIENT
Start: 2019-04-24 | End: 2019-04-24 | Stop reason: HOSPADM

## 2019-04-24 RX ORDER — OXYCODONE HYDROCHLORIDE AND ACETAMINOPHEN 5; 325 MG/1; MG/1
2 TABLET ORAL PRN
Status: DISCONTINUED | OUTPATIENT
Start: 2019-04-24 | End: 2019-04-24 | Stop reason: HOSPADM

## 2019-04-24 RX ORDER — OXYCODONE HYDROCHLORIDE AND ACETAMINOPHEN 5; 325 MG/1; MG/1
1 TABLET ORAL PRN
Status: DISCONTINUED | OUTPATIENT
Start: 2019-04-24 | End: 2019-04-24 | Stop reason: HOSPADM

## 2019-04-24 RX ORDER — ROPIVACAINE HYDROCHLORIDE 5 MG/ML
INJECTION, SOLUTION EPIDURAL; INFILTRATION; PERINEURAL PRN
Status: DISCONTINUED | OUTPATIENT
Start: 2019-04-24 | End: 2019-04-24 | Stop reason: ALTCHOICE

## 2019-04-24 RX ORDER — SODIUM CHLORIDE, SODIUM LACTATE, POTASSIUM CHLORIDE, CALCIUM CHLORIDE 600; 310; 30; 20 MG/100ML; MG/100ML; MG/100ML; MG/100ML
INJECTION, SOLUTION INTRAVENOUS CONTINUOUS
Status: DISCONTINUED | OUTPATIENT
Start: 2019-04-24 | End: 2019-04-24 | Stop reason: HOSPADM

## 2019-04-24 RX ORDER — FENTANYL CITRATE 50 UG/ML
25 INJECTION, SOLUTION INTRAMUSCULAR; INTRAVENOUS EVERY 5 MIN PRN
Status: DISCONTINUED | OUTPATIENT
Start: 2019-04-24 | End: 2019-04-24 | Stop reason: HOSPADM

## 2019-04-24 RX ORDER — SODIUM CHLORIDE, SODIUM LACTATE, POTASSIUM CHLORIDE, AND CALCIUM CHLORIDE .6; .31; .03; .02 G/100ML; G/100ML; G/100ML; G/100ML
IRRIGANT IRRIGATION PRN
Status: DISCONTINUED | OUTPATIENT
Start: 2019-04-24 | End: 2019-04-24 | Stop reason: ALTCHOICE

## 2019-04-24 RX ORDER — FENTANYL CITRATE 50 UG/ML
INJECTION, SOLUTION INTRAMUSCULAR; INTRAVENOUS PRN
Status: DISCONTINUED | OUTPATIENT
Start: 2019-04-24 | End: 2019-04-24 | Stop reason: SDUPTHER

## 2019-04-24 RX ORDER — PROPOFOL 10 MG/ML
INJECTION, EMULSION INTRAVENOUS PRN
Status: DISCONTINUED | OUTPATIENT
Start: 2019-04-24 | End: 2019-04-24 | Stop reason: SDUPTHER

## 2019-04-24 RX ORDER — ONDANSETRON 2 MG/ML
4 INJECTION INTRAMUSCULAR; INTRAVENOUS
Status: DISCONTINUED | OUTPATIENT
Start: 2019-04-24 | End: 2019-04-24 | Stop reason: HOSPADM

## 2019-04-24 RX ORDER — IBUPROFEN 800 MG/1
800 TABLET ORAL PRN
COMMUNITY
Start: 2017-12-29 | End: 2020-10-16

## 2019-04-24 RX ORDER — FENTANYL CITRATE 50 UG/ML
50 INJECTION, SOLUTION INTRAMUSCULAR; INTRAVENOUS EVERY 5 MIN PRN
Status: DISCONTINUED | OUTPATIENT
Start: 2019-04-24 | End: 2019-04-24 | Stop reason: HOSPADM

## 2019-04-24 RX ORDER — HYDRALAZINE HYDROCHLORIDE 20 MG/ML
5 INJECTION INTRAMUSCULAR; INTRAVENOUS EVERY 10 MIN PRN
Status: DISCONTINUED | OUTPATIENT
Start: 2019-04-24 | End: 2019-04-24 | Stop reason: HOSPADM

## 2019-04-24 RX ADMIN — HYDROMORPHONE HYDROCHLORIDE 0.25 MG: 1 INJECTION, SOLUTION INTRAMUSCULAR; INTRAVENOUS; SUBCUTANEOUS at 10:40

## 2019-04-24 RX ADMIN — MIDAZOLAM HYDROCHLORIDE 2 MG: 2 INJECTION, SOLUTION INTRAMUSCULAR; INTRAVENOUS at 08:25

## 2019-04-24 RX ADMIN — DEXAMETHASONE SODIUM PHOSPHATE 4 MG: 4 INJECTION, SOLUTION INTRAMUSCULAR; INTRAVENOUS at 08:38

## 2019-04-24 RX ADMIN — SODIUM CHLORIDE, SODIUM LACTATE, POTASSIUM CHLORIDE, AND CALCIUM CHLORIDE: 600; 310; 30; 20 INJECTION, SOLUTION INTRAVENOUS at 09:32

## 2019-04-24 RX ADMIN — SODIUM CHLORIDE, SODIUM LACTATE, POTASSIUM CHLORIDE, AND CALCIUM CHLORIDE: 600; 310; 30; 20 INJECTION, SOLUTION INTRAVENOUS at 07:45

## 2019-04-24 RX ADMIN — LIDOCAINE HYDROCHLORIDE 50 MG: 20 INJECTION, SOLUTION INTRAVENOUS at 08:29

## 2019-04-24 RX ADMIN — HYDROMORPHONE HYDROCHLORIDE 0.25 MG: 1 INJECTION, SOLUTION INTRAMUSCULAR; INTRAVENOUS; SUBCUTANEOUS at 10:55

## 2019-04-24 RX ADMIN — FENTANYL CITRATE 50 MCG: 50 INJECTION INTRAMUSCULAR; INTRAVENOUS at 09:06

## 2019-04-24 RX ADMIN — ONDANSETRON 4 MG: 2 INJECTION INTRAMUSCULAR; INTRAVENOUS at 08:38

## 2019-04-24 RX ADMIN — CLINDAMYCIN PHOSPHATE 900 MG: 18 INJECTION, SOLUTION INTRAVENOUS at 08:26

## 2019-04-24 RX ADMIN — FENTANYL CITRATE 50 MCG: 50 INJECTION INTRAMUSCULAR; INTRAVENOUS at 08:29

## 2019-04-24 RX ADMIN — PROPOFOL 10 MG: 10 INJECTION, EMULSION INTRAVENOUS at 08:29

## 2019-04-24 ASSESSMENT — PULMONARY FUNCTION TESTS
PIF_VALUE: 11
PIF_VALUE: 0
PIF_VALUE: 11
PIF_VALUE: 11
PIF_VALUE: 3
PIF_VALUE: 11
PIF_VALUE: 11
PIF_VALUE: 8
PIF_VALUE: 19
PIF_VALUE: 12
PIF_VALUE: 14
PIF_VALUE: 11
PIF_VALUE: 20
PIF_VALUE: 19
PIF_VALUE: 20
PIF_VALUE: 11
PIF_VALUE: 20
PIF_VALUE: 11
PIF_VALUE: 5
PIF_VALUE: 12
PIF_VALUE: 11
PIF_VALUE: 20
PIF_VALUE: 11
PIF_VALUE: 12
PIF_VALUE: 3
PIF_VALUE: 11
PIF_VALUE: 11
PIF_VALUE: 6
PIF_VALUE: 21
PIF_VALUE: 11
PIF_VALUE: 13
PIF_VALUE: 10
PIF_VALUE: 13
PIF_VALUE: 11
PIF_VALUE: 2
PIF_VALUE: 11
PIF_VALUE: 12
PIF_VALUE: 10
PIF_VALUE: 5
PIF_VALUE: 2
PIF_VALUE: 2
PIF_VALUE: 10
PIF_VALUE: 21
PIF_VALUE: 11
PIF_VALUE: 8
PIF_VALUE: 2
PIF_VALUE: 13
PIF_VALUE: 20
PIF_VALUE: 10
PIF_VALUE: 12
PIF_VALUE: 11
PIF_VALUE: 11
PIF_VALUE: 10
PIF_VALUE: 2
PIF_VALUE: 11
PIF_VALUE: 10
PIF_VALUE: 11
PIF_VALUE: 10
PIF_VALUE: 11
PIF_VALUE: 4
PIF_VALUE: 11
PIF_VALUE: 2
PIF_VALUE: 9
PIF_VALUE: 10
PIF_VALUE: 11
PIF_VALUE: 15
PIF_VALUE: 1
PIF_VALUE: 4
PIF_VALUE: 13
PIF_VALUE: 19
PIF_VALUE: 20
PIF_VALUE: 11
PIF_VALUE: 6
PIF_VALUE: 11
PIF_VALUE: 11

## 2019-04-24 ASSESSMENT — PAIN DESCRIPTION - DESCRIPTORS
DESCRIPTORS: ACHING;CONSTANT;HEADACHE
DESCRIPTORS: ACHING;THROBBING
DESCRIPTORS: ACHING;THROBBING

## 2019-04-24 ASSESSMENT — PAIN SCALES - GENERAL
PAINLEVEL_OUTOF10: 0
PAINLEVEL_OUTOF10: 5
PAINLEVEL_OUTOF10: 6
PAINLEVEL_OUTOF10: 3

## 2019-04-24 ASSESSMENT — LIFESTYLE VARIABLES: SMOKING_STATUS: 0

## 2019-04-24 ASSESSMENT — PAIN - FUNCTIONAL ASSESSMENT
PAIN_FUNCTIONAL_ASSESSMENT: 0-10
PAIN_FUNCTIONAL_ASSESSMENT: PREVENTS OR INTERFERES SOME ACTIVE ACTIVITIES AND ADLS

## 2019-04-24 ASSESSMENT — PAIN DESCRIPTION - PAIN TYPE
TYPE: ACUTE PAIN;SURGICAL PAIN
TYPE: ACUTE PAIN;SURGICAL PAIN

## 2019-04-24 ASSESSMENT — PAIN DESCRIPTION - ORIENTATION
ORIENTATION: RIGHT
ORIENTATION: RIGHT

## 2019-04-24 ASSESSMENT — PAIN DESCRIPTION - PROGRESSION: CLINICAL_PROGRESSION: GRADUALLY WORSENING

## 2019-04-24 ASSESSMENT — PAIN DESCRIPTION - LOCATION
LOCATION: KNEE
LOCATION: KNEE

## 2019-04-24 ASSESSMENT — PAIN DESCRIPTION - ONSET: ONSET: GRADUAL

## 2019-04-24 ASSESSMENT — PAIN DESCRIPTION - FREQUENCY: FREQUENCY: CONTINUOUS

## 2019-04-24 NOTE — OP NOTE
[address]                [address]                                OPERATIVE REPORT    PATIENT NAME: Annalise Zambrano                   :        1955  MED REC NO:   [id number]                          ROOM:  ACCOUNT NO:   [de-identified]                           ADMIT DATE: 2019  PROVIDER:     Mary Ellen Bay MD    DATE OF PROCEDURE:  2019    OPERATIONS:  Right knee arthroscopy, removal of multiple cartilaginous  loose bodies, major synovectomy, medial and lateral anterior  compartments, abrasion chondroplasty, intercondylar notch, medial  femoral condyle, partial medial and lateral meniscectomies. SURGEON:  Mary Ellen Bay MD.    ASSISTANT:  [employer]ile [employer] and [address], [place]. ANESTHESIA:  General.    POSTOPERATIVE DIAGNOSES:  Osteoarthritis, multiple loose bodies,  synovitis, medial and lateral meniscus tears. POSTOPERATIVE DIAGNOSES:  Osteoarthritis, multiple loose bodies,  synovitis, medial and lateral meniscus tears. PREPARATION:  Chloraprep. INDICATIONS:  This is a 63-year-lady with right knee pain, which has  been progressive. She had associated locking and catching episodes, and  MRI evidence of arthritis with loose bodies and synovitis, as well as  osteophyte formation. She presents for arthroscopic evaluation and  debridement. The risks and benefits of surgery, as well as nonsurgical  alternatives were discussed in detail with the patient. Because of her  mechanical symptoms, we agreed to proceed with the surgery. OPERATIVE PROCEDURE:   The patient's leg was marked in the holding area. She was taken to the operating room, and after induction of general  anesthesia, a tourniquet was placed on her right thigh. The right leg  was prepped and draped in a sterile fashion. A timeout was performed  and the OR team agreed that the right knee was the operative site.   The  leg was

## 2019-04-24 NOTE — BRIEF OP NOTE
Brief Postoperative Note  ______________________________________________________________    Patient: Gris Peraza  YOB: 1955  MRN: [id number]  Date of Procedure: 4/24/2019    Pre-Op Diagnosis: MEDIAL MENISCUS TEAR    Post-Op Diagnosis: Same       Procedure(s):  RIGHT KNEE ARTHROSCOPY, MEDIAL AND LATERAL MENISCUS EXCISION, SYNOVECTOMY CHONDROPLASTY, REMOVAL OF LOOSE BODIES    Anesthesia: General    Surgeon(s):   Teri Willett MD    Assistant: Shoaib Rivers SA; June Chand PA-C    Estimated Blood Loss (mL): less than 50     Complications: None    Specimens:   * No specimens in log *    Implants:  * No implants in log *      Drains: * No LDAs found *    Findings: please see operative note    Kathryn Garcia PA-C  Date: 4/24/2019  Time: 8:33 AM

## 2019-04-24 NOTE — PROGRESS NOTES
PACU Transfer to [facility]    Vitals:    04/24/19 1121   BP: 136/69   Pulse: 71   Resp: 15   Temp: 97 °F (36.1 °C)   SpO2: 93%         Intake/Output Summary (Last 24 hours) at 4/24/2019 1131  Last data filed at 4/24/2019 1121  Gross per 24 hour   Intake 1279 ml   Output --   Net 1279 ml       Pain assessment:  present - adequately treated  Pain Level: 3    Patient transferred to care of ARPAN RN.    4/24/2019 11:31 AM

## 2019-04-24 NOTE — FLOWSHEET NOTE
On bedpan, attempting to void without success. Taken via stretcher to BR. Able to ambulate with assistance to toilet. Voided large amount without issue.

## 2019-04-24 NOTE — ANESTHESIA PRE PROCEDURE
Department of Anesthesiology  Preprocedure Note       Name:  Pipe Gregory   Age:  70 y.o.  :  1955                                          MRN:  [id number]         Date:  2019      Surgeon: Yair Mcqueen): Odette Pineda MD    Procedure: RIGHT KNEE ARTHROSCOPY, MEDIAL MENISCUS EXCISION VERSUS REPAIR, SYNOVECTOMY CHONDROPLASTY (Right )    Medications prior to admission:   Prior to Admission medications    Medication Sig Start Date End Date Taking? Authorizing Provider   Multiple Vitamins-Minerals (MULTIVITAMIN ADULT PO) Take 1 tablet by mouth daily   Yes Historical Provider, MD   ibuprofen (ADVIL;MOTRIN) 800 MG tablet Take 800 mg by mouth as needed 17  Yes Historical Provider, MD   carvedilol (COREG) 12.5 MG tablet Take 12.5 mg by mouth 2 times daily (with meals). Yes Historical Provider, MD   amLODIPine (NORVASC) 5 MG tablet Take 5 mg by mouth daily. Yes Historical Provider, MD   omeprazole (PRILOSEC) 40 MG capsule Take 40 mg by mouth daily. Yes Historical Provider, MD   S-Adenosylmethionine (EFREN-E) 400 MG TABS Take  by mouth. Yes Historical Provider, MD   fluticasone (FLONASE) 50 MCG/ACT nasal spray USE 2 SPRAYS IN [facility] NOSTRIL DAILY 5/22/15   Nazia Booth MD   aspirin 81 MG tablet Take 81 mg by mouth daily. Historical Provider, MD       Current medications:    Current Facility-Administered Medications   Medication Dose Route Frequency Provider Last Rate Last Dose    clindamycin (CLEOCIN) 900 mg in dextrose 5 % 50 mL IVPB  900 mg Intravenous On Call to Charlotte Cantu MD        lactated ringers infusion   Intravenous Continuous Blaze Russell MD           Allergies:     Allergies   Allergen Reactions    Latex Rash    Adhesive Tape      Band aids blisters skin    Codeine     Nitrofurantoin Itching    Penicillins Swelling     \"Throat closes\"    Sulfa Antibiotics     Tramadol Nausea Only       Problem List:    Patient Active Problem List   Diagnosis Code    RDW 15.0 01/11/2013     01/11/2013       CMP:   Lab Results   Component Value Date     01/11/2013    K 3.8 01/11/2013     01/11/2013    CO2 24 01/11/2013    BUN 18 01/11/2013    CREATININE 1.6 01/11/2013    GFRAA 42 01/11/2013    AGRATIO 1.3 01/11/2013    GLUCOSE 88 01/11/2013    PROT 7.5 01/11/2013    CALCIUM 10.2 01/11/2013    BILITOT 0.30 01/11/2013    ALKPHOS 83 01/11/2013    AST 23 01/11/2013    ALT 17 01/11/2013       POC Tests: No results for input(s): POCGLU, POCNA, POCK, POCCL, POCBUN, POCHEMO, POCHCT in the last 72 hours. Coags:   Lab Results   Component Value Date    PROTIME 10.9 01/11/2013    INR 0.95 01/11/2013    APTT 33.1 01/11/2013       HCG (If Applicable): No results found for: PREGTESTUR, PREGSERUM, HCG, HCGQUANT     ABGs: No results found for: PHART, PO2ART, DXN7CNM, FUA9EYO, BEART, L0XDFMKM     Type & Screen (If Applicable):  No results found for: LABABO, LABRH    Anesthesia Evaluation    Airway: Mallampati: II  TM distance: >3 FB   Neck ROM: full  Mouth opening: > = 3 FB Dental: normal exam         Pulmonary: breath sounds clear to auscultation      (-) COPD, asthma, sleep apnea and not a current smoker                          ROS comment: Quit smoking 1 month ago  Denies cough   Cardiovascular:    (+) hypertension:, past MI: no interval change, CAD:,     (-) CABG/stent, dysrhythmias,  angina,  CHF, orthopnea, PND and  MONDRAGON    ECG reviewed  Rhythm: regular  Rate: normal           Beta Blocker:  Dose within 24 Hrs      ROS comment: Cardiologist last seen in december     Neuro/Psych:      (-) seizures, TIA and CVA           GI/Hepatic/Renal:   (+) morbid obesity     (-) GERD, hepatitis, liver disease and no renal disease       Endo/Other:    (+) no malignancy/cancer.     (-) diabetes mellitus, hyperthyroidism, blood dyscrasia, no malignancy/cancer                ROS comment: Basal cell and squamous skin ca Abdominal:           Vascular:     - DVT and PE. Anesthesia Plan      general     ASA 3       Induction: intravenous. MIPS: Postoperative opioids intended and Prophylactic antiemetics administered. Anesthetic plan and risks discussed with patient. Plan discussed with CRNA.                   Netta Rangel MD   4/24/2019

## 2019-04-24 NOTE — H&P
Nuria Meredith    [id number]    [facility] [employer] Same Day Surgery Update H & P  Department of General Surgery   Surgical Service   Pre-operative History and Physical  Last H & P within the last 30 days. DIAGNOSIS:   MEDIAL MENISCUS TEAR    PROCEDURE:  VT KNEE SCOPE,MED OR LAT MENIS REPAIR [00202] (RIGHT KNEE ARTHROSCOPY, MEDIAL MENISCUS EXCISION VERSUS REPAIR, SYNOVECTOMY CHONDROPLASTY)     HISTORY OF PRESENT ILLNESS:    Patient with chronic right knee pain, swelling and instability. The symptoms have been recalcitrant to conservative treatment and the patient presents today for the above procedure.      Past Medical History:        Diagnosis Date    Arthritis     CAD (coronary artery disease)     Cancer ([facility] [address]     skin    Hyperlipidemia     Hypertension     MI, old      Past Surgical History:        Procedure Laterality Date    APPENDECTOMY      CHOLECYSTECTOMY      FOOT SURGERY      bilateral    HYSTERECTOMY      KNEE SURGERY      bilateral    LIVER SURGERY      cyst removal    NECK SURGERY      URETHRA SURGERY       Past Social History:  Social History     Socioeconomic History    Marital status:      Spouse name: None    Number of children: None    Years of education: None    Highest education level: None   Occupational History    None   Social Needs    Financial resource strain: None    Food insecurity:     Worry: None     Inability: None    Transportation needs:     Medical: None     Non-medical: None   Tobacco Use    Smoking status: Current Some Day Smoker     Packs/day: 0.25     Types: Cigarettes    Smokeless tobacco: Never Used    Tobacco comment: quit March 4, 2019   Substance and Sexual Activity    Alcohol use: No    Drug use: No    Sexual activity: None   Lifestyle    Physical activity:     Days per week: None     Minutes per session: None    Stress: None   Relationships    Social connections:     Talks on phone: None     Gets together: None     Attends Latter day service: None     Active member of club or organization: None     Attends meetings of clubs or organizations: None     Relationship status: None    Intimate partner violence:     Fear of current or ex partner: None     Emotionally abused: None     Physically abused: None     Forced sexual activity: None   Other Topics Concern    None   Social History Narrative    None         Medications Prior to Admission:      Prior to Admission medications    Medication Sig Start Date End Date Taking? Authorizing Provider   carvedilol (COREG) 12.5 MG tablet Take 12.5 mg by mouth 2 times daily (with meals). Yes Historical Provider, MD   amLODIPine (NORVASC) 5 MG tablet Take 5 mg by mouth daily. Yes Historical Provider, MD   omeprazole (PRILOSEC) 40 MG capsule Take 40 mg by mouth daily. Yes Historical Provider, MD   S-Adenosylmethionine (EFREN-E) 400 MG TABS Take  by mouth. Yes Historical Provider, MD   fluticasone (FLONASE) 50 MCG/ACT nasal spray USE 2 SPRAYS IN EACH NOSTRIL DAILY 5/22/15   Koko Em MD   ezetimibe (ZETIA) 10 MG tablet Take 10 mg by mouth daily. Historical Provider, MD   aspirin 81 MG tablet Take 81 mg by mouth daily. Historical Provider, MD         Allergies:  Codeine; Penicillins; and Sulfa antibiotics    PHYSICAL EXAM:      /85   Pulse 72   Temp 97.7 °F (36.5 °C) (Oral)   Resp 18   Ht 5' 5\" (1.651 m)   Wt 230 lb (104.3 kg)   SpO2 96%   BMI 38.27 kg/m²      Heart:  Regular rate and rhythm, No murmur noted    Lungs: No increased work of breathing, good air exchange, clear to ausculation bilaterally     Abdomen:  Soft, non-distended, non-tender, normal active bowel sounds, no masses palpated    ASSESSMENT AND PLAN:    1. Patient seen and focused exam done today- no new changes since last physical exam on 4/2/19    2. Access to ancillary services are available per request of the provider.     HUBERT Webber - CNP     4/24/2019

## 2019-04-24 NOTE — PROGRESS NOTES
Patient received from the OR to pAcu #8 posr5 right knee surgery of Dr. Ivis Ye. Placed on PACU monitoring equipment. Report given per CRNA. Per report, patient was stable during the procedure. On arrival, OA removed, following commands, denies pain.

## 2019-04-24 NOTE — PROGRESS NOTES
Ambulatory Surgery/Procedure Discharge Note    Vitals:    04/24/19 1140   BP: (!) 176/87   Pulse: 74   Resp: 16   Temp: 97.3 °F (36.3 °C)   SpO2: 94%       In: 1279 [P.O.:120; I.V.:1159]  Out: -     Restroom use offered before discharge. Yes    Pain assessment:  present - adequately treated  Pain Level: 3  Returned to sds post pacu fully alert and awake right leg dressing clean dry and intact with ice bag in place written discharge instructions given to pt and family with verbal explsanstion        Patient discharged to home/self care.  Patient discharged via wheel chair by transporter to waiting family/S.O.       4/24/2019 12:29 PM

## 2019-04-26 ENCOUNTER — APPOINTMENT (OUTPATIENT)
Dept: PHYSICAL THERAPY | Age: 64
End: 2019-04-26
Payer: MEDICARE

## 2019-04-26 ENCOUNTER — OFFICE VISIT (OUTPATIENT)
Dept: ORTHOPEDIC SURGERY | Age: 64
End: 2019-04-26

## 2019-04-26 ENCOUNTER — HOSPITAL ENCOUNTER (OUTPATIENT)
Dept: PHYSICAL THERAPY | Age: 64
Setting detail: THERAPIES SERIES
Discharge: HOME | End: 2019-04-26
Payer: MEDICARE

## 2019-04-26 VITALS
SYSTOLIC BLOOD PRESSURE: 122 MMHG | DIASTOLIC BLOOD PRESSURE: 82 MMHG | BODY MASS INDEX: 38.32 KG/M2 | HEIGHT: 65 IN | WEIGHT: 230 LBS | HEART RATE: 80 BPM

## 2019-04-26 DIAGNOSIS — Z98.890 S/P ARTHROSCOPIC KNEE SURGERY: Primary | ICD-10-CM

## 2019-04-26 PROCEDURE — 97161 PT EVAL LOW COMPLEX 20 MIN: CPT

## 2019-04-26 PROCEDURE — 97110 THERAPEUTIC EXERCISES: CPT

## 2019-04-26 PROCEDURE — 99024 POSTOP FOLLOW-UP VISIT: CPT | Performed by: ORTHOPAEDIC SURGERY

## 2019-04-26 PROCEDURE — 97016 VASOPNEUMATIC DEVICE THERAPY: CPT

## 2019-04-26 ASSESSMENT — ENCOUNTER SYMPTOMS
BACK PAIN: 1
ROS SKIN COMMENTS: BASAL CELL CANCER

## 2019-04-26 NOTE — PROGRESS NOTES
Chief Complaint  Post-Op Check (right knee. s/p 1 day knee scope. )      History of Present Illness:  Ranjan Simpson is a pleasant 70 y.o. female 1 day status post right knee arthroscopy - removal of multiple cartilaginous loose bodies, major synovectomy, medial and lateral anterior compartments, abrasion chondroplasty, intercondylar notch, medial femoral condyle, partial medial and lateral meniscectomies on 4/24/2019. She is doing well. She reports she doesn't need the pain medication anymore and ibuprofen works well. No fever or chills reports. No setbacks reported. Medical History:  Patient's medications, allergies, past medical, surgical, social and family histories were reviewed and updated as appropriate. Pertinent items are noted in HPI  Review of systems reviewed from Patient History Form dated on 4/26/2019 and available in the patient's chart under the Media tab. Vital Signs:  Vitals:    04/26/19 1203   BP: 122/82   Pulse: 80         Neuro: Alert & oriented x 3,  normal,  no focal deficits noted. Normal affect. Eyes: sclera clear  Ears: Normal external ear  Mouth:  No perioral lesions  Pulm: Respirations unlabored and regular  Pulse: Regular rate and rhythm   Skin: Warm, well perfused      Constitutional: In no apparent distress. Normal affect. Alert and oriented X3 and is cooperative. RIGHT knee exam    Gait: No use of assistive devices. No antalgic gait. Alignment: normal alignment. Inspection/skin: Sutures intact. Incisions healing well. No indication of infection. No dehiscence. No drainage. No diffuse erythema. Palpation: Non tender to light touch    Range of Motion: There is full range of motion. Strength: Normal quadriceps development. Effusion: Minimal effusion    Ligamentous stability: No gross instability. Neurologic and vascular:  Calf soft and nontender. Skin is warm and well-perfused. Sensation is intact to light-touch.          LEFT knee exam    Gait: No use of assistive devices. No antalgic gait. Alignment: normal alignment. Inspection/skin: Skin is intact without erythema or ecchymosis. No gross deformity. Palpation: mild crepitus. no joint line tenderness present. Range of Motion: There is full range of motion. Strength: Normal quadriceps development. Effusion: No effusion or swelling present. Ligamentous stability: No cruciate or collateral ligament instability. Neurologic and vascular: Skin is warm and well-perfused. Sensation is intact to light-touch. Radiology:     No new XR obtained today         Assessment :  68yo female 1 days status post right knee arthroscopy. Impression:  Encounter Diagnosis   Name Primary?  S/P arthroscopic knee surgery Yes       Office Procedures:  No orders of the defined types were placed in this encounter. Plan: Continue physical therapy - she would like to go to [facility]. Sutures can be taken out in 1 week from today at physical therapy. Continue taking aspirin until up and walking around normally. Followup with us in 4 weeks or sooner if needed. Cassandra Olivas is in agreement with this plan. All questions were answered to patient's satisfaction and was encouraged to call with any further questions. [facility]  4/26/2019       During this examination, I, [address] [place] [address] PANorbert, functioned as a scribe for Dr. Clarice See. The history taking and physical examination were performed by Dr. Clarice See. All counseling during the appointment was performed between the patient and Dr. Clarice See. 4/26/2019 1:13 PM      This dictation was performed with a verbal recognition program (DRAGON) and it was checked for errors. It is possible that there are still dictated errors within this office note. If so, please bring any errors to my attention for an addendum. All efforts were made to ensure that this office note is accurate.               I personally reviewed the patient's pain scale, review of systems, family history, social history, past medical history, allergies and medications. 13 point review of systems was collected and reviewed today. It is noncontributory. I personally performed the services described in this documentation and scribed by Wilton Marks MD  Sports Medicine, Arthroscopic Knee and Shoulder Surgery    This dictation was performed with a verbal recognition program [facility]) and it was checked for errors. It is possible that there are still dictated errors within this office note. If so, please bring any errors to my attention for an addendum. All efforts were made to ensure that this office note is accurate.

## 2019-04-26 NOTE — PROGRESS NOTES
Review of Systems   Cardiovascular:        High blood pressure    Gastrointestinal:        Liver issues  Hemorrhoids    Genitourinary: Positive for frequency. Kidney stones   Loss of urine / incontinence    Musculoskeletal: Positive for back pain, gait problem and joint swelling. Right knee pain    Skin:        Basal cell cancer    Neurological:        Neuropathy    All other systems reviewed and are negative.

## 2019-04-26 NOTE — PLAN OF CARE
The [address] [place] [address]  Phone [phone]  Fax [phone]                                                       Physical Therapy Certification    Dear Referring Practitioner: Severiano Huxley, MD,    We had the pleasure of evaluating the following patient for physical therapy services at [address] [place] [address]. A summary of our findings can be found in the initial assessment below. This includes our plan of care. If you have any questions or concerns regarding these findings, please do not hesitate to contact me at the office phone number checked above. Thank you for the referral.       Physician Signature:_______________________________Date:__________________  By signing above (or electronic signature), therapists plan is approved by physician      Patient: Janna Cid   : 1955   MRN: [id number]  Referring Physician: Referring Practitioner: Severiano Huxley, MD      Evaluation Date: 2019      Medical Diagnosis Information:  Diagnosis: F23.571P (ICD-10-CM) - Acute medial meniscus tear   Treatment Diagnosis: M25.561 R knee pain, M25.661 R knee stiffness, M25.461 R knee effusion                                         Insurance information: PT Insurance Information: Medicare     Precautions/ Contra-indications: Multiple knee surgeries 2 R Knee and 2 L knee   Latex Allergy:  [x]NO      []YES  Preferred Language for Healthcare:   [x]English       []other:    SUBJECTIVE: Patient arrived to therapy s/p R knee menisectomy 19. Patient states that she has a chronic history of knee pain however most recent symptoms started 2019 with no known cause. Symptoms did not improve causing her to have surgery on her R knee. Pt denies experiencing dizziness, nausea or lightheadedness. States that she is not taking pain medication at this time. Relevant Medical History:Multiple knee surgeries   Functional Disability Index:PT G-Codes  Functional Assessment Tool Used: Lower extremity functional index  Score: 22/80 = 72% deficit    Pain Scale: 6-7/10  Easing factors: Taking Advil, icing  Provocative factors: knee extenstion     Type: []Constant   [x]Intermittent  []Radiating [x]Localized []other:     Numbness/Tingling: Denies     Occupation/School:Retired     Living Status/Prior Level of Function: Independent with ADLs and IADLs,     OBJECTIVE:      ROM PROM AROM Overpressure Comment    L R L R L R    Flexion   123 degrees AAROM with heel slide 92 degrees      Extension   0 degrees -4 degrees                              Strength L R Comment   Quad Activation WNL Decrease activation at medial quad d/t acute post op    Hamstring      Gastroc      Hip  flexion 4-/5 3+/5    Hip abd                      Special Test Results/Comment   Marni's calf squeeze Negative for s/s of DVT          Mild swelling noted at R joint line compared to L    Reflexes/Sensation:    [x]Dermatomes/Myotomes intact except for decrease sensation at joint line and incision sites d/t acute post op    []Reflexes equal and normal bilaterally   []Other:    Joint mobility: Not assessed d/t acute post op    []Normal    []Hypo   []Hyper    Palpation: Decrease sensation at R knee joint line    Functional Mobility/Transfers: Assistance of bilat crutches required     Posture: WBAT on R, unable to obtain terminal knee extension    Bandages/Dressings/Incisions: Post operative dressing removed. Incisions closed with stitches. No active exudate or s/s of infection noted     Gait: Decrease weight bearing on R with knee flexion at weight acceptance. Bilat axilla crutches                       [x] Patient history, allergies, meds reviewed. Medical chart reviewed. See intake form.      Review Of Systems (ROS):  [x]Performed Review of systems (Integumentary, CardioPulmonary, Neurological) by intake and Impairments:     []Noted lumbar/proximal hip/LE hypomobility   [x]Decreased LE functional ROM   [x]Decreased core/proximal hip strength and neuromuscular control   [x]Decreased LE functional strength   [x]Reduced balance/proprioceptive control   []other:      Functional Activity Limitations (from functional questionnaire and intake)   [x]Reduced ability to tolerate prolonged functional positions   [x]Reduced ability or difficulty with changes of positions or transfers between positions   [x]Reduced ability to maintain good posture and demonstrate good body mechanics with sitting, bending, and lifting   [x]Reduced ability to sleep   [x] Reduced ability or tolerance with driving and/or computer work   [x]Reduced ability to perform lifting, carrying tasks   [x]Reduced ability to squat   [x]Reduced ability to forward bend   [x]Reduced ability to ambulate prolonged functional periods/distances/surfaces   [x]Reduced ability to ascend/descend stairs   [x]Reduced ability to run, hop or jump   []other:     Participation Restrictions   [x]Reduced participation in self care activities   [x]Reduced participation in home management activities   [x]Reduced participation in work activities   []Reduced participation in social activities. [x]Reduced participation in sport activities. Classification :    [x]Signs/symptoms consistent with post-surgical status including decreased ROM, strength and function.    []Signs/symptoms consistent with joint sprain/strain   []Signs/symptoms consistent with patella-femoral syndrome   []Signs/symptoms consistent with knee OA/hip OA   []Signs/symptoms consistent with internal derangement of knee/Hip   []Signs/symptoms consistent with functional hip weakness/NMR control      []Signs/symptoms consistent with tendinitis/tendinosis    []signs/symptoms consistent with pathology which may benefit from Dry needling      []other:      Prognosis/Rehab Potential: []Excellent   [x]Good    []Fair   []Poor    Tolerance of evaluation/treatment:    []Excellent   [x]Good    []Fair   []Poor    Physical Therapy Evaluation Complexity Justification  [x] A history of present problem with:  [] no personal factors and/or comorbidities that impact the plan of care;  [x]1-2 personal factors and/or comorbidities that impact the plan of care  []3 personal factors and/or comorbidities that impact the plan of care  [x] An examination of body systems using standardized tests and measures addressing any of the following: body structures and functions (impairments), activity limitations, and/or participation restrictions;:  [x] a total of 1-2 or more elements   [] a total of 3 or more elements   [] a total of 4 or more elements   [x] A clinical presentation with:  [x] stable and/or uncomplicated characteristics   [] evolving clinical presentation with changing characteristics  [] unstable and unpredictable characteristics;   [x] Clinical decision making of [x] low, [] moderate, [] high complexity using standardized patient assessment instrument and/or measurable assessment of functional outcome. [x] EVAL (LOW) 45976 (typically 20 minutes face-to-face)  [] EVAL (MOD) 90714 (typically 30 minutes face-to-face)  [] EVAL (HIGH) 04769 (typically 45 minutes face-to-face)  [] RE-EVAL       PLAN  Frequency/Duration:  2 days per week for 4-6 Weeks:  Interventions:  [x]  Therapeutic exercise including: strength training, ROM, for Lower extremity and core   [x]  NMR activation and proprioception for LE, Glutes and Core   [x]  Manual therapy as indicated for LE, Hip and spine to include: Dry Needling/IASTM, STM, PROM, Gr I-IV mobilizations, manipulation. [x] Modalities as needed that may include: thermal agents, E-stim, Biofeedback, US, iontophoresis as indicated  [x] Patient education on joint protection, postural re-education, activity modification, progression of HEP.     HEP instruction: (see scanned forms)    GOALS:  Patient stated goal: Return to walking pain free without assistive device and working out. Therapist goals for Patient:   Short Term Goals: To be achieved in: 2 weeks  1. Independent in HEP and progression per patient tolerance, in order to prevent re-injury. 2. Patient will have a decrease in pain to facilitate improvement in movement, function, and ADLs as indicated by Functional Deficits. 3. Ambulate over 500 ft with proper gait sequencing, no c/o pain or assistive device required. Long Term Goals: To be achieved in: 6 weeks  1. Disability index score of 20% or less for the LEFS to assist with reaching prior level of function. 2. Patient will demonstrate increased AROM to 0-123 to allow for proper joint functioning as indicated by patients Functional Deficits. 3. Patient will demonstrate an increase in Strength to good proximal hip strength and control in LE to allow for proper functional mobility as indicated by patients Functional Deficits. 4. Patient will return to all functional activities without increased symptoms or restriction.    5. Patient will be able to walk        Electronically signed by:  Aranza Vincent PT

## 2019-04-26 NOTE — FLOWSHEET NOTE
equal and normal bilaterally              []Other:     Joint mobility: Not assessed d/t acute post op               []Normal               []Hypo              []Hyper     Palpation: Decrease sensation at R knee joint line     Functional Mobility/Transfers: Assistance of bilat crutches required      Posture: WBAT on R, unable to obtain terminal knee extension     Bandages/Dressings/Incisions: Post operative dressing removed. Incisions closed with stitches. No active exudate or s/s of infection noted      Gait: Decrease weight bearing on R with knee flexion at weight acceptance. Bilat axilla crutches      RESTRICTIONS/PRECAUTIONS:  Post operative restrictions for R knee medial menisectomy. Multiple knee surgeries bilat. Exercises/Interventions:   Exercise/Equipment Resistance/Repetitions Other comments   Stretching     Hamstring 30\"hx3 R Seated EOT   Calf stretch 30\"hx3 R Seated with towel   SLR     Supine 1x10 R flexion         Isometrics     Quad sets 10\"hx10 R Towel under knee        Patellar Glides     Medial     Superior     Inferior          ROM     Heel slides 10\"hx10 R    Hang Weights     Passive     Active     Weight Shift     Ankle Pumps Reviewed for HEP          Therapeutic Exercise and NMR EXR  [x] (48347) Provided verbal/tactile cueing for activities related to strengthening, flexibility, endurance, ROM for improvements in LE, proximal hip, and core control with self care, mobility, lifting, ambulation.  [] (89319) Provided verbal/tactile cueing for activities related to improving balance, coordination, kinesthetic sense, posture, motor skill, proprioception  to assist with LE, proximal hip, and core control in self care, mobility, lifting, ambulation and eccentric single leg control.      NMR and Therapeutic Activities:    [] (09817 or 92908) Provided verbal/tactile cueing for activities related to improving balance, coordination, kinesthetic sense, posture, motor skill, proprioception and motor activation to allow for proper function of core, proximal hip and LE with self care and ADLs  [] (70502) Gait Re-education- Provided training and instruction to the patient for proper LE, core and proximal hip recruitment and positioning and eccentric body weight control with ambulation re-education including up and down stairs     Home Exercise Program:    [x] (10437) Reviewed/Progressed HEP activities related to strengthening, flexibility, endurance, ROM of core, proximal hip and LE for functional self-care, mobility, lifting and ambulation/stair navigation   [] (69378)Reviewed/Progressed HEP activities related to improving balance, coordination, kinesthetic sense, posture, motor skill, proprioception of core, proximal hip and LE for self care, mobility, lifting, and ambulation/stair navigation      Manual Treatments:  PROM / STM / Oscillations-Mobs:  G-I, II, III, IV (PA's, Inf., Post.)  [] (77556) Provided manual therapy to mobilize LE, proximal hip and/or LS spine soft tissue/joints for the purpose of modulating pain, promoting relaxation,  increasing ROM, reducing/eliminating soft tissue swelling/inflammation/restriction, improving soft tissue extensibility and allowing for proper ROM for normal function with self care, mobility, lifting and ambulation. Modalities:  Game ready 15'    Charges: 11:50-12:44  Timed Code Treatment Minutes: 39   Total Treatment Minutes: 54       [x] EVAL (LOW) 77048 (typically 20 minutes face-to-face)  [] EVAL (MOD) 59458 (typically 30 minutes face-to-face)  [] EVAL (HIGH) 41586 (typically 45 minutes face-to-face)  [] RE-EVAL   [x] XK(81809) x  1   [] IONTO  [] NMR (25357) x      [x] VASO  [] Manual (34305) x       [] Other:  [] TA x       [] Mech Traction (92387)  [] ES(attended) (91957)      [] ES (un) (02717):     GOALS: 4/26  Patient stated goal: Return to walking pain free without assistive device and working out.      Therapist goals for Patient:   Short Term Goals:  To be achieved in: 2 weeks  1. Independent in HEP and progression per patient tolerance, in order to prevent re-injury. 2. Patient will have a decrease in pain to facilitate improvement in movement, function, and ADLs as indicated by Functional Deficits. 3. Ambulate over 500 ft with proper gait sequencing, no c/o pain or assistive device required.      Long Term Goals: To be achieved in: 6 weeks  1. Disability index score of 20% or less for the LEFS to assist with reaching prior level of function. 2. Patient will demonstrate increased AROM to 0-123 to allow for proper joint functioning as indicated by patients Functional Deficits. 3. Patient will demonstrate an increase in Strength to good proximal hip strength and control in LE to allow for proper functional mobility as indicated by patients Functional Deficits. 4. Patient will return to all functional activities without increased symptoms or restriction. 5. Patient will be able to walk                   Progression Towards Functional goals:  [] Patient is progressing as expected towards functional goals listed. [] Progression is slowed due to complexities listed. [] Progression has been slowed due to co-morbidities. [x] Plan just implemented, too soon to assess goals progression  [] Other:     ASSESSMENT:  Patient had difficulty maintaining knee extension and wanted to rest R LE into mild flexion and external rotation. Stiffness improved with completion of exercises. Demonstrated verbal understanding of post operative precautions. Treatment/Activity Tolerance:  [x] Patient tolerated treatment well [] Patient limited by fatique  [] Patient limited by pain  [] Patient limited by other medical complications  [] Other:     Patient education: Educated pt on precautions, s/s of infection, use of crutches with gait, stair negotiation, use of ice/ elevation for swelling, importance of HEP with emphasis on knee extension.  Pt instructed by MD to not shower until stitches are removed. Prognosis: [x] Good [] Fair  [] Poor    Patient Requires Follow-up: [x] Yes  [] No    PLAN: MD requested that pt have stitches removed by 5/1/19 and will follow up with MD 4 weeks post op. Patient to complete therapy at [facility] d/t closer to home.   [] Continue per plan of care [] Alter current plan (see comments)  [x] Plan of care initiated [] Hold pending MD visit [] Discharge    Electronically signed by: Carlyle Ames PT, DPT    *If patient does not return for further follow ups after this date. Please consider this as the patients discharge from physical therapy.

## 2019-05-01 ENCOUNTER — APPOINTMENT (OUTPATIENT)
Dept: PHYSICAL THERAPY | Age: 64
End: 2019-05-01
Payer: MEDICARE

## 2019-05-01 ENCOUNTER — HOSPITAL ENCOUNTER (OUTPATIENT)
Dept: PHYSICAL THERAPY | Age: 64
Setting detail: THERAPIES SERIES
Discharge: HOME | End: 2019-05-01
Payer: MEDICARE

## 2019-05-01 PROCEDURE — 97140 MANUAL THERAPY 1/> REGIONS: CPT

## 2019-05-01 PROCEDURE — 97110 THERAPEUTIC EXERCISES: CPT

## 2019-05-01 PROCEDURE — 97161 PT EVAL LOW COMPLEX 20 MIN: CPT

## 2019-05-01 NOTE — FLOWSHEET NOTE
Physical Therapy Daily Treatment Note    Date:  2019    Patient Name:  Dashawn Suero    :  1955  MRN: [id number]  Restrictions/Precautions:    Medical/Treatment Diagnosis Information:  · Diagnosis: M17.11 OA of (R) knee  Insurance/Certification information:  PT Insurance Information: [employer]  Physician Information:  Referring Practitioner: Carolyn Lopez MD  Plan of care signed (Y/N):  N/A  Visit# / total visits:       G-Code (if applicable):          CL    Medicare Cap (if applicable):   = total amount used, updated 2019    Time in:   1300      Timed Treatment: 25 Total Treatment Time:  60  ________________________________________________________________________________________    Pain Level:   0-1 /10  SUBJECTIVE:  See eval    OBJECTIVE:     Exercise/Equipment Resistance/Repetitions Other comments     QS RLE x15 HEP   heelslide x5' RLE HEP   Nustep  6', seat 10                                                                                                                         Other Therapeutic Activities:  Pt educated on POC, HEP, etiology of meniscectomy. Manual Treatments:   AP R tibiofemoral jt  STM R quads        Modalities:      Test/Measurements:         ASSESSMENT:         Treatment/Activity Tolerance:   ?Patient tolerated treatment well ? Patient limited by fatique  ? Patient limited by pain ? Patient limited by other medical complications  ? Other:     Goals:    Short term goals  Time Frame for Short term goals: 4 weeks  Short term goal 1: patient to be independent with HEP to self-manage symptoms. Short term goal 2: patient to improve (R) LE AROM to equal to LLE  Short term goal 3: patient to improve (R) LE strength by at least +1/2 mm grade to improve stabilization strength  Short term goal 4: patient to improve LEFS score by at least 15 points demonstrating improved participation in everyday activity.   Short term goal 5: patient to consistently report pain peaking at less than 3/10           Plan: x Continue per plan of care ? Alter current plan (see comments)   x Plan of care initiated ? Hold pending MD visit ?  Discharge      Plan for Next Session:  Progress per pt flor and POC    Re-Certification Due Date:     6/1/2019    Signature:  Luis Manuel Raza, PT, DPT #[id number]

## 2019-05-01 NOTE — PROGRESS NOTES
Physical Therapy  Initial Assessment  Date: 2019  Patient Name: Chai Ron  MRN: [id number]  : 1955     Treatment Diagnosis: weakness, decreased knee ROM, decreased gait      Subjective   General  Chart Reviewed: Yes  Patient assessed for rehabilitation services?: Yes  Additional Pertinent Hx: R knee meniscectomy 2019  Family / Caregiver Present: No  Referring Practitioner: Alex Louie MD  Referral Date : 19  Diagnosis: M17.11 OA of (R) knee  Follows Commands: Within Functional Limits  General Comment  Comments: PLOF: working out 3-4 times per week. PT Visit Information  Onset Date: 19  PT Insurance Information: [employer]  Total # of Visits Approved: 10(8 left until progress note due)  Progress Note Due Date: (8th visit)  Subjective  Subjective: Per eval on 3/29/2019- \"Patient reports \"my right knee started hurting on 19 very badly with swelling, I have been on and off a crutch since then\". \"I saw the doctor on 3/1/19 and he put me on medication and wanted to do an injection\". \"he did the injection on 3/5/19, it helped for a little bit\". \"I have been on and off crutches since I hurt it\". \"I have used ice and biofreeze and the medication he gave me, and none of them help\". Reports straightenening her knee all the way hurts, putting full weight on right leg increases pain. Reports no known injury to cause knee pain, just woke with pain. Reports she was working out 3-4 days per week walking about 40 min and doing leg strengthening exercises. Denies numbness/tingling\". .. Today, pt reports numbess in between sutures at site of surgery but denies other N/T in BLEs. Pt reports 2/10 pain in R knee with amb and \"occasional stabbing pain on inside of knee that can happen at any time. \" Pt reports, \"The doctor cleaned up the middle meniscus, cleaned up the arthritis on the underside of the kneecap, and then got some little floater that was in there. \" Pt reports current HEP to be quad sets, SLRs, heel slides, gastroc stretch and hamstring stretch. Pain Screening  Patient Currently in Pain: Denies  Vital Signs  Patient Currently in Pain: Denies      Objective     Observation/Palpation  Posture: Fair  Palpation: (R) knee: point tenderness medial joint line  Observation: Gait: increased lateral sway, decreased stance time on (R) LE, slower bubba, antalgic RLE, mild Trendelenberg B  Body Mechanics: Mini squat: quad dominant, mild pain reported post to R knee cap during ascent  Edema: mild swelling noted around ant joint line R knee    AROM RLE (degrees)  RLE General AROM: -4 to 104  AROM LLE (degrees)  LLE General AROM: 0-122  Joint Mobility  ROM RLE: dec tibiofemoral joint mobility AP, dec patellofemoral joint mobility with mild pain around med/lat aspect patella  ROM LLE: tibiofemoral joint mobility WFL    Strength RLE  R Hip Flexion: 4-/5  R Hip ABduction: 4-/5  R Hip ADduction: 3+/5  R Knee Flexion: 3+/5  R Knee Extension: 3+/5  R Ankle Dorsiflexion: 4-/5  R Ankle Plantar flexion: 4-/5  Strength LLE  L Hip Flexion: 4-/5  L Hip ABduction: 4/5  L Hip ADduction: 4/5  L Knee Flexion: 4/5  L Knee Extension: 4/5  L Ankle Dorsiflexion: 4/5  L Ankle Plantar Flexion: 4/5     Additional Measures  Flexibility: HS length WFL 90/90 B  Sensation  Overall Sensation Status: WFL(intact to light touch sensation BLEs)    Assessment   Conditions Requiring Skilled Therapeutic Intervention  Body structures, Functions, Activity limitations: Decreased functional mobility ; Decreased ADL status; Decreased ROM; Decreased strength;Decreased balance; Increased Pain  Assessment: The patient is a 70yo female referred to PT due to (R) knee OA and pain. The patient demonstrated dec hip strength, dec knee ROM, inc pain, mild B Trendelenberg, dec balance, and altered squat mechanics upon assessment. Pt would benefit from continued OPPT 2x/wk for 4 wks to address above impairments.     Treatment Diagnosis: weakness, decreased knee ROM, decreased gait  Prognosis: Good  Decision Making: Low Complexity  Patient Education: role of PT, physiology of injury, plan of care, goals, HEP; patient verbalized and demonstrated good understanding. REQUIRES PT FOLLOW UP: Yes         Plan   Plan  Times per week: 2x/week  Plan weeks: 4 wks  Current Treatment Recommendations: Strengthening, Gait Training, Patient/Caregiver Education & Training, Aquatics, ROM, Stair training, Balance Training, Neuromuscular Re-education, Pain Management, Endurance Training, Functional Mobility Training, Manual Therapy - Soft Tissue Mobilization, Home Exercise Program, Safety Education & Training, Transfer Training, Manual Therapy - Joint Manipulation    OutComes Score  LEFS Total Score: 17     Goals  Short term goals  Time Frame for Short term goals: 4 weeks  Short term goal 1: patient to be independent with HEP to self-manage symptoms. Short term goal 2: patient to improve (R) LE AROM to equal to LLE  Short term goal 3: patient to improve (R) LE strength by at least +1/2 mm grade to improve stabilization strength  Short term goal 4: patient to improve LEFS score by at least 15 points demonstrating improved participation in everyday activity.   Short term goal 5: patient to consistently report pain peaking at less than 3/10  Patient Goals   Patient goals : \"to get this knee better\"       Therapy Time   Individual Concurrent Group Co-treatment   Time In 1300         Time Out 1400         Minutes 60         Timed Code Treatment Minutes: 25 Minutes(35 for eval)       Faustino Richey, PT, DPT #[id number]

## 2019-05-03 ENCOUNTER — HOSPITAL ENCOUNTER (OUTPATIENT)
Dept: PHYSICAL THERAPY | Age: 64
Setting detail: THERAPIES SERIES
Discharge: HOME | End: 2019-05-03
Payer: MEDICARE

## 2019-05-03 ENCOUNTER — APPOINTMENT (OUTPATIENT)
Dept: PHYSICAL THERAPY | Age: 64
End: 2019-05-03
Payer: MEDICARE

## 2019-05-03 PROCEDURE — 97530 THERAPEUTIC ACTIVITIES: CPT

## 2019-05-03 PROCEDURE — 97110 THERAPEUTIC EXERCISES: CPT

## 2019-05-03 NOTE — FLOWSHEET NOTE
Physical Therapy Daily Treatment Note    Date:  5/3/2019    Patient Name:  Nino Banda    :  1955  MRN: [id number]  Restrictions/Precautions:    Medical/Treatment Diagnosis Information:  ·   Diagnosis: M17.11 OA of (R) knee  Insurance/Certification information:    [employer]  Physician Information:    Filomena Staley MD  Plan of care signed (Y/N):  N/A  Visit# / total visits:  2     G-Code (if applicable):             Medicare Cap (if applicable):   = total amount used, updated 5/3/2019    Time in:  12:00pm     Timed Treatment: 25 Total Treatment Time:  35  ______________________________________________________________________________________    Pain Level:   0-1 /10  SUBJECTIVE:  The patient reports \"I am nervous to get these out\". Reports she has been compliant with HEP. OBJECTIVE:     Exercise/Equipment Resistance/Repetitions Other comments   QS RLE x15 HEP   heelslide x5' RLE HEP   Nustep  x10min  Level 3     TG  x5min                                                                                                                  Other Therapeutic Activities:  Suture removal    Manual Treatments:   AP R tibiofemoral jt  STM R quads        Modalities:      Test/Measurements:         ASSESSMENT:    The patient performed above TE well with no increase in pain. The patient's sutures were removed and steri-strips were placed on knee. Patient was informed to allow these to fall off. Advised to continue with HEP. No pain reported at end of PT session. Treatment/Activity Tolerance:   ?Patient tolerated treatment well ? Patient limited by fatique  ? Patient limited by pain ? Patient limited by other medical complications  ? Other:     Goals:    Short term goals  Time Frame for Short term goals: 4 weeks  Short term goal 1: patient to be independent with HEP to self-manage symptoms.   Short term goal 2: patient to improve (R) LE AROM to equal to LLE  Short term goal 3: patient to improve (R) LE strength by at least +1/2 mm grade to improve stabilization strength  Short term goal 4: patient to improve LEFS score by at least 15 points demonstrating improved participation in everyday activity. Short term goal 5: patient to consistently report pain peaking at less than 3/10           Plan: x Continue per plan of care ? Alter current plan (see comments)   Plan of care initiated ? Hold pending MD visit ?  Discharge      Plan for Next Session:  Progress per pt flor and POC    Re-Certification Due Date:     6/1/2019    Signature:  Gary Penaloza, PT, DPT

## 2019-05-06 ENCOUNTER — HOSPITAL ENCOUNTER (OUTPATIENT)
Dept: PHYSICAL THERAPY | Age: 64
Setting detail: THERAPIES SERIES
Discharge: HOME | End: 2019-05-06
Payer: MEDICARE

## 2019-05-06 PROCEDURE — 97110 THERAPEUTIC EXERCISES: CPT

## 2019-05-06 NOTE — FLOWSHEET NOTE
Physical Therapy Daily Treatment Note    Date:  2019    Patient Name:  Danelle Schultz    :  1955  MRN: [id number]  Restrictions/Precautions:    Medical/Treatment Diagnosis Information:  ·   Diagnosis: M17.11 OA of (R) knee  Insurance/Certification information:    [employer]  Physician Information:    María Garcia MD  Plan of care signed (Y/N):  N/A  Visit# / total visits:  3/8     G-Code (if applicable):             Medicare Cap (if applicable):   = total amount used, updated 2019    Time in:  10:15am     Timed Treatment: 40 Total Treatment Time:  40  ______________________________________________________________________________________    Pain Level:   0/10  SUBJECTIVE:  The patient reports \"On Saturday the steri strip came off and was bleeding so I went to the pharmacy and they said to cover it up with a band-aid\". \"it just seemed like every time I bent my knee it was bleeding\". \"It is doing fine this morning though\". Reports she has been compliant with HEP. OBJECTIVE:     Exercise/Equipment Resistance/Repetitions Other comments   Nustep  x10min  Level 3     TG  x5min At end of session          QS RLE   5 sec x10    QS SLR (R) x15    Bridge  1x15x3 sec hold    Heel Slides   x10             Hip ABD    x10 15#           Mini Squat   x15    Rockerboard  F/b x20  Balance x30sec. Other Therapeutic Activities:      Manual Treatments:       Modalities:      Test/Measurements:    AROM RLE (degrees)  RLE General AROM: -4 to 108* (was -4 to 104)  AROM LLE (degrees)  LLE General AROM: 0-122           ASSESSMENT:    The patient performed above TE well with no increase in pain. The patient adapted nicely to new TE added today. Advised to continue with HEP. No pain reported at end of PT session. Treatment/Activity Tolerance:   ?Patient tolerated treatment well ? Patient limited by fatique  ? Patient limited by pain ?  Patient limited by other medical complications  ? Other:     Goals:    Short term goals  Time Frame for Short term goals: 4 weeks  Short term goal 1: patient to be independent with HEP to self-manage symptoms. Short term goal 2: patient to improve (R) LE AROM to equal to LLE  Short term goal 3: patient to improve (R) LE strength by at least +1/2 mm grade to improve stabilization strength  Short term goal 4: patient to improve LEFS score by at least 15 points demonstrating improved participation in everyday activity. Short term goal 5: patient to consistently report pain peaking at less than 3/10           Plan: x Continue per plan of care ? Alter current plan (see comments)   Plan of care initiated ? Hold pending MD visit ?  Discharge      Plan for Next Session:  Progress per pt flor and POC    Re-Certification Due Date:     6/1/2019    Signature:  Nathalia Christiansen, PT, DPT

## 2019-05-08 ENCOUNTER — APPOINTMENT (OUTPATIENT)
Dept: PHYSICAL THERAPY | Age: 64
End: 2019-05-08
Payer: MEDICARE

## 2019-05-08 ENCOUNTER — HOSPITAL ENCOUNTER (OUTPATIENT)
Dept: PHYSICAL THERAPY | Age: 64
Setting detail: THERAPIES SERIES
Discharge: HOME | End: 2019-05-08
Payer: MEDICARE

## 2019-05-08 PROCEDURE — 97112 NEUROMUSCULAR REEDUCATION: CPT

## 2019-05-08 PROCEDURE — 97110 THERAPEUTIC EXERCISES: CPT

## 2019-05-08 NOTE — FLOWSHEET NOTE
Physical Therapy Daily Treatment Note    Date:  2019    Patient Name:  Renetta Brewer    :  1955  MRN: [id number]  Restrictions/Precautions:    Medical/Treatment Diagnosis Information:  ·   Diagnosis: M17.11 OA of (R) knee  Insurance/Certification information:    [employer]  Physician Information:    Maria Teresa Padgett MD  Plan of care signed (Y/N):  N/A  Visit# / total visits:       G-Code (if applicable):             Medicare Cap (if applicable):   = total amount used, updated 2019    Time in:  11:30am     Timed Treatment: 30min Total Treatment Time:  30min.  ______________________________________________________________________________________    Pain Level:   1-2/10  SUBJECTIVE:  The patient reports \"I feel pretty good, just a little pain\". Reports she has been compliant with HEP. OBJECTIVE:     Exercise/Equipment Resistance/Repetitions Other comments   Nustep  x5min  Level 3     TG  x5min At end of session          QS RLE      QS SLR (R)    Bridge     Heel Slides           Standing hip 3 way     x15 (B) 2#    Standing HS curl x15 (B) 2#    Hip ABD    x10 15#           Mini Squat   x15    Rockerboard  F/b,s/s x20  Balance x60sec. ea f/b,s/s                                       Other Therapeutic Activities:      Manual Treatments:       Modalities:      Test/Measurements:    AROM RLE (degrees)  RLE General AROM: -4 to 108* (was -4 to 104)  AROM LLE (degrees)  LLE General AROM: 0-122           ASSESSMENT:    The patient performed above TE well with no increase in pain. The patient continues to adapt nicely to added challenges. Advised to continue with HEP. No pain reported at end of PT session. Treatment/Activity Tolerance:   ?Patient tolerated treatment well ? Patient limited by fatique  ? Patient limited by pain ? Patient limited by other medical complications  ?  Other:     Goals:    Short term goals  Time Frame for Short term goals: 4 weeks  Short term goal 1: patient to be independent with HEP to self-manage symptoms. Short term goal 2: patient to improve (R) LE AROM to equal to LLE  Short term goal 3: patient to improve (R) LE strength by at least +1/2 mm grade to improve stabilization strength  Short term goal 4: patient to improve LEFS score by at least 15 points demonstrating improved participation in everyday activity. Short term goal 5: patient to consistently report pain peaking at less than 3/10           Plan: x Continue per plan of care ? Alter current plan (see comments)   Plan of care initiated ? Hold pending MD visit ?  Discharge      Plan for Next Session:  Progress per pt flor and POC    Re-Certification Due Date:     6/1/2019    Signature:  Susie Valle, PT, DPT

## 2019-05-10 ENCOUNTER — APPOINTMENT (OUTPATIENT)
Dept: PHYSICAL THERAPY | Age: 64
End: 2019-05-10
Payer: MEDICARE

## 2019-05-13 ENCOUNTER — APPOINTMENT (OUTPATIENT)
Dept: PHYSICAL THERAPY | Age: 64
End: 2019-05-13
Payer: MEDICARE

## 2019-05-15 ENCOUNTER — HOSPITAL ENCOUNTER (OUTPATIENT)
Dept: PHYSICAL THERAPY | Age: 64
Setting detail: THERAPIES SERIES
Discharge: HOME | End: 2019-05-15
Payer: MEDICARE

## 2019-05-15 ENCOUNTER — APPOINTMENT (OUTPATIENT)
Dept: PHYSICAL THERAPY | Age: 64
End: 2019-05-15
Payer: MEDICARE

## 2019-05-15 PROCEDURE — 97140 MANUAL THERAPY 1/> REGIONS: CPT

## 2019-05-15 PROCEDURE — 97110 THERAPEUTIC EXERCISES: CPT

## 2019-05-15 NOTE — FLOWSHEET NOTE
Physical Therapy Daily Treatment Note    Date:  5/15/2019    Patient Name:  Benjamín Romano    :  1955  MRN: [id number]  Restrictions/Precautions:    Medical/Treatment Diagnosis Information:  ·   Diagnosis: M17.11 OA of (R) knee  Insurance/Certification information:    [employer]  Physician Information:    Elo Granados MD  Plan of care signed (Y/N):  N/A  Visit# / total visits:       G-Code (if applicable):             Medicare Cap (if applicable):   = total amount used, updated 5/15/2019    Time in:  7:30am     Timed Treatment: 30min Total Treatment Time:  30min.  ______________________________________________________________________________________    Pain Level:   0/10  SUBJECTIVE:  The patient reports no pain today. Main c/o pain under knee cap when it hurts. OBJECTIVE:     Exercise/Equipment Resistance/Repetitions Other comments   Nustep  x5min  Level 3     TG  x6 min At end of session   Clamshells 1   6x5 secs  HEP video    QS RLE      QS SLR (R)    Bridge  10x5 sec hold   Heel Slides with foot on TB x20         Standing hip 3 way     2x15 (B) 2#    Standing HS curl x15 (B) 2#    Hip ABD    x10 15#           Mini Squat       Rockerboard  F/b,s/s x20  Balance x60sec. ea f/b,s/s                                       Other Therapeutic Activities:      Manual Treatments:   AP R tibiofemoral jt  STM R quads    ART R ITB followed by manual stretching and long axis hip IR/ER  Inferior patellar mobs gr 2-3      Modalities:      Test/Measurements:    AROM RLE (degrees)  RLE General AROM: -4 to 108* (was -4 to 104)  AROM LLE (degrees)  LLE General AROM: 0-122           ASSESSMENT:    Significant R gluteal weakness and ITB tension, decreased patellar mobility inferior. Manual therapy to release ITB and improve quad function yielded good outcomes with decreased pain in the knee with flexion. Recommend continued manual therapy to improve R LE movement and knee flexion.       Treatment/Activity Tolerance:   ?Patient tolerated treatment well ? Patient limited by fatique  ? Patient limited by pain ? Patient limited by other medical complications  ? Other:     Goals:    Short term goals  Time Frame for Short term goals: 4 weeks  Short term goal 1: patient to be independent with HEP to self-manage symptoms. Short term goal 2: patient to improve (R) LE AROM to equal to LLE  Short term goal 3: patient to improve (R) LE strength by at least +1/2 mm grade to improve stabilization strength  Short term goal 4: patient to improve LEFS score by at least 15 points demonstrating improved participation in everyday activity. Short term goal 5: patient to consistently report pain peaking at less than 3/10           Plan: x Continue per plan of care ? Alter current plan (see comments)   Plan of care initiated ? Hold pending MD visit ?  Discharge      Plan for Next Session:  Progress per pt flor and POC    Re-Certification Due Date:     6/1/2019    Signature:  Michelle Zimmer, PT, DPT

## 2019-05-17 ENCOUNTER — HOSPITAL ENCOUNTER (OUTPATIENT)
Dept: PHYSICAL THERAPY | Age: 64
Setting detail: THERAPIES SERIES
Discharge: HOME | End: 2019-05-17
Payer: MEDICARE

## 2019-05-17 PROCEDURE — 97140 MANUAL THERAPY 1/> REGIONS: CPT

## 2019-05-17 PROCEDURE — 97110 THERAPEUTIC EXERCISES: CPT

## 2019-05-17 NOTE — FLOWSHEET NOTE
Physical Therapy Daily Treatment Note    Date:  2019    Patient Name:  Cassandra Olivas    :  1955  MRN: [id number]  Restrictions/Precautions:    Medical/Treatment Diagnosis Information:  ·   Diagnosis: M17.11 OA of (R) knee  Insurance/Certification information:    [employer]  Physician Information:    Rayma Brittle MD  Plan of care signed (Y/N):  N/A  Visit# / total visits:  8     G-Code (if applicable):             Time in: 1:00     Timed Treatment: 30min Total Treatment Time:  30min.  ______________________________________________________________________________________    Pain Level:   0/10  SUBJECTIVE:  The patient reports significant motion gains after last session manual therapy. Significantly decreased pain with walking. OBJECTIVE:     Exercise/Equipment Resistance/Repetitions Other comments   Nustep  x5min  Level 3     TG  x6 min At end of session   Graeme 1    HEP video    QS RLE      QS SLR (R)    Bridge     Heel Slides with foot on TB x20         Standing hip 3 way         Standing HS curl     Hip ABD    x10 15#           Mini Squat       Rockerboard  F/b,s/s x60 secs  Balance x60sec. ea f/b,s/s                                       Other Therapeutic Activities:      Manual Treatments:   AP R tibiofemoral jt  STM R quads and patellar tendon  ART R medial quad    ART R ITB followed by manual stretching and long axis hip IR/ER  Inferior patellar mobs gr 2-3      Modalities:      Test/Measurements:    AROM RLE (degrees)  RLE General AROM: -4 to 120* (was -4 to 104)  AROM LLE (degrees)  LLE General AROM: 0-122           ASSESSMENT:    Significant R gluteal weakness and ITB tension, decreased patellar mobility inferior but improved from last session. Manual therapy to release ITB and improve quad function yielded good outcomes with decreased pain in the knee with flexion again today.   Recommend continued manual therapy to improve R LE movement and knee flexion and progress standing therex nV. Treatment/Activity Tolerance:   ?Patient tolerated treatment well ? Patient limited by fatique  ? Patient limited by pain ? Patient limited by other medical complications  ? Other:     Goals:    Short term goals  Time Frame for Short term goals: 4 weeks  Short term goal 1: patient to be independent with HEP to self-manage symptoms. Short term goal 2: patient to improve (R) LE AROM to equal to LLE  Short term goal 3: patient to improve (R) LE strength by at least +1/2 mm grade to improve stabilization strength  Short term goal 4: patient to improve LEFS score by at least 15 points demonstrating improved participation in everyday activity. Short term goal 5: patient to consistently report pain peaking at less than 3/10           Plan: x Continue per plan of care ? Alter current plan (see comments)   Plan of care initiated ? Hold pending MD visit ?  Discharge      Plan for Next Session:  Progress per pt flor and POC    Re-Certification Due Date:     6/1/2019    Signature:  Loreta Hines, PT

## 2019-05-20 ENCOUNTER — OFFICE VISIT (OUTPATIENT)
Dept: ORTHOPEDIC SURGERY | Age: 64
End: 2019-05-20

## 2019-05-20 ENCOUNTER — HOSPITAL ENCOUNTER (OUTPATIENT)
Dept: PHYSICAL THERAPY | Age: 64
Setting detail: THERAPIES SERIES
Discharge: HOME | End: 2019-05-20
Payer: MEDICARE

## 2019-05-20 VITALS
DIASTOLIC BLOOD PRESSURE: 97 MMHG | HEIGHT: 65 IN | WEIGHT: 230 LBS | HEART RATE: 83 BPM | SYSTOLIC BLOOD PRESSURE: 132 MMHG | BODY MASS INDEX: 38.32 KG/M2

## 2019-05-20 DIAGNOSIS — Z98.890 S/P ARTHROSCOPIC KNEE SURGERY: Primary | ICD-10-CM

## 2019-05-20 PROCEDURE — 97140 MANUAL THERAPY 1/> REGIONS: CPT

## 2019-05-20 PROCEDURE — 97110 THERAPEUTIC EXERCISES: CPT

## 2019-05-20 PROCEDURE — 99024 POSTOP FOLLOW-UP VISIT: CPT | Performed by: ORTHOPAEDIC SURGERY

## 2019-05-20 ASSESSMENT — ENCOUNTER SYMPTOMS
ROS SKIN COMMENTS: BASAL CELL CANCER
BACK PAIN: 1

## 2019-05-20 NOTE — PROGRESS NOTES
The patient is seen 3 weeks following her arthroscopy with removal loose bodies. She continues to make progress. Overall has some occasional anterior knee soreness as well as some medial sided soreness with prolonged walking. She reports she is doing well with her therapy.     Pain Assessment  Location of Pain: Knee  Location Modifiers: Right  Severity of Pain: 2  Quality of Pain: Sharp  Frequency of Pain: Intermittent  Aggravating Factors: Bending  Limiting Behavior: Yes  Relieving Factors: Rest  Result of Injury: No  Work-Related Injury: No  Are there other pain locations you wish to document?: No    Past Medical History:   Diagnosis Date    Arthritis     CAD (coronary artery disease)     Cancer ([facility] [address]     skin    Hyperlipidemia     Hypertension     MI, old         Past Surgical History:   Procedure Laterality Date    APPENDECTOMY      CHOLECYSTECTOMY      FOOT SURGERY      bilateral    HYSTERECTOMY      KNEE ARTHROSCOPY Right 4/24/2019    RIGHT KNEE ARTHROSCOPY,, SYNOVECTOMY CHONDROPLASTY,MEDIAL AND LATERAL MENESECTOMY, REMOVAL LOOSE BODIES performed by Nereyda Watson MD at [facility]      bilateral    LIVER SURGERY      cyst removal    NECK SURGERY      URETHRA SURGERY         Family History   Problem Relation Age of Onset    Breast Cancer Mother     Cancer Mother         Bone    Diabetes Mother     Heart Disease Mother         CHF   [de-identified] Arthritis Mother     Heart Attack Father     Prostate Cancer Brother     Colon Cancer Brother         One Brother with Kidney CA and One with Bladder CA    Cancer Maternal Grandmother        Social History     Socioeconomic History    Marital status:      Spouse name: None    Number of children: None    Years of education: None    Highest education level: None   Occupational History    None   Social Needs    Financial resource strain: None    Food insecurity:     Worry: None     Inability: None    Transportation needs:

## 2019-05-20 NOTE — FLOWSHEET NOTE
Physical Therapy Daily Treatment Note    Date:  2019    Patient Name:  Brit Dorman    :  1955  MRN: [id number]  Restrictions/Precautions:    Medical/Treatment Diagnosis Information:  ·   Diagnosis: M17.11 OA of (R) knee  Insurance/Certification information:    [employer]  Physician Information:    Mali Alvarenga MD  Plan of care signed (Y/N):  N/A  Visit# / total visits:  7/8     G-Code (if applicable):             Time in: 10:50am     Timed Treatment: 40min Total Treatment Time:  40min.  ______________________________________________________________________________________    Pain Level:   0/10  SUBJECTIVE:  The patient reports \"when it does hurt, it hurts under my knee cap\". Reports compliance with HEP. OBJECTIVE:     Exercise/Equipment Resistance/Repetitions Other comments      SciFit x7min  Level 1.2 At beginning and end of session   At end of session   Graeme 1    HEP video    QS RLE      QS SLR (R)    Bridge     Heel Slides with foot on TB x20         Standing hip 3 way     x15B 2#    Standing HS curl x15B 2#    Hip ABD    x10 15#           Mini Squat   Unable due to pain    Rockerboard      Balance Airex  Tandem [id number] (B) ea                                  Other Therapeutic Activities:      Manual Treatments:   AP R tibiofemoral jt  STM R quads and patellar tendon  ART R medial quad    ART R ITB followed by manual stretching and long axis hip IR/ER  Inferior patellar mobs gr 2-3      Modalities:      Test/Measurements:               ASSESSMENT:   The patient performed above TE well with no increase in pain except with attempted mini-squat that subsided with stopping the exercise. The patient responded well to manual therapy performed again today. Continues with knee pain under knee cap at times. Recommend continued manual therapy to improve R LE movement and knee flexion and progress standing therex NV.          Treatment/Activity Tolerance:   ?Patient tolerated treatment

## 2019-05-21 ENCOUNTER — APPOINTMENT (OUTPATIENT)
Dept: PHYSICAL THERAPY | Age: 64
End: 2019-05-21
Payer: MEDICARE

## 2019-05-23 ENCOUNTER — APPOINTMENT (OUTPATIENT)
Dept: PHYSICAL THERAPY | Age: 64
End: 2019-05-23
Payer: MEDICARE

## 2019-05-28 ENCOUNTER — HOSPITAL ENCOUNTER (OUTPATIENT)
Dept: PHYSICAL THERAPY | Age: 64
Setting detail: THERAPIES SERIES
Discharge: HOME | End: 2019-05-28
Payer: MEDICARE

## 2019-05-29 ENCOUNTER — HOSPITAL ENCOUNTER (OUTPATIENT)
Dept: PHYSICAL THERAPY | Age: 64
Setting detail: THERAPIES SERIES
Discharge: HOME | End: 2019-05-29
Payer: MEDICARE

## 2019-05-29 PROCEDURE — 97140 MANUAL THERAPY 1/> REGIONS: CPT

## 2019-05-29 NOTE — FLOWSHEET NOTE
Physical Therapy Daily Treatment Note    Date:  2019    Patient Name:  Gris Peraza  \"Clari\"  :  1955  MRN: [id number]  Restrictions/Precautions:    Medical/Treatment Diagnosis Information:  ·   Diagnosis: M17.11 OA of (R) knee  Insurance/Certification information:    [employer]  Physician Information:    Rodrick Dorman MD  Plan of care signed (Y/N):  N/A  Visit# / total visits:       G-Code (if applicable): Functional Assessment Tool Used: LEFS   19  35/80; 56% disability   At initial evaluation:  ; 86% disability        Time in: 10:50am     Timed Treatment: 40min Total Treatment Time:  40min.  ______________________________________________________________________________________    Pain Level:   0/10 at rest, 6-7/10 with bending/squatting    SUBJECTIVE:  The patient reports \"when it does hurt, it hurts under my knee cap\". Dr. Ivis Ye reports 'too much swelling' and continue with therapy. Bending and squatting with the knees is when it hurts the most, especially in and out of the tub. In and out of the car has improved significantly with therapy.       OBJECTIVE:     Exercise/Equipment Resistance/Repetitions Other comments   Nustep          TG  x6 minAt end of session   Graeme 1    HEP video    QS RLE   10x5 secs after posterior mobs   QS SLR (R)    Bridge     Heel Slides with foot on TB x20         Standing hip 3 way         Standing HS curl     Hip ABD               Mini Squat       Rockerboard      Balance                                   Other Therapeutic Activities:      Manual Treatments:   EOB R tibiofemoral jt IR mobs gr 3-4  STM R quads and patellar tendon  ART R medial quad and ITB followed by manual stretching and long axis hip IR/ER  Inferior patellar mobs gr 3-4   Posterior femoral mobs with wedge gr 3-4   Quad muscle bending    Modalities:      Test/Measurements:    AROM RLE (degrees)  RLE General AROM: 0 to 120* (was -4 to 104)  AROM LLE
complications  ? Other:     Goals:    Short term goals  Time Frame for Short term goals: 4 weeks  Short term goal 1: patient to be independent with HEP to self-manage symptoms (met)  Short term goal 2: patient to improve (R) LE AROM to equal to LLE (met with manual therapy but still presents with deficit)  Short term goal 3: patient to improve (R) LE strength by at least +1/2 mm grade to improve stabilization strength (improved)  Short term goal 4: patient to improve LEFS score by at least 15 points demonstrating improved participation in everyday activity (met)  Short term goal 5: patient to consistently report pain peaking at less than 3/10 (met)          Plan: x Continue per plan of care ? Alter current plan (see comments)   Plan of care initiated ? Hold pending MD visit ?  Discharge      Plan for Next Session:  Progress per pt flor and POC    Re-Certification Due Date:     6/1/2019    Signature:  Armen Ricardo PT

## 2019-05-29 NOTE — PROGRESS NOTES
Outpatient Physical Therapy  Phone: [phone] Fax: [phone]     To: Dori Blood       From: Colton Sales PT     Patient: Danelle Schultz       : 1955  Diagnosis: R K' OA      Date: 2019  Treatment Diagnosis:      Physical Therapy Progress/Discharge Note    Total Visits to date:     Cancels/No-shows to date:      Plan of Care/Treatment to date:  [x] Therapeutic Exercise    [] Modalities:  [x] Therapeutic Activity     [] Ultrasound   [] Electrical Stimulation   [x] Gait Training      [] Cervical Traction   [] Lumbar Traction  [x] Neuromuscular Re-education  [] Cold/hotpack  [] Iontophoresis  [x] Instruction in HEP      Other:  [x] Manual Therapy       []                                 [] Aquatic Therapy       []                               ? Progress towards goals:  See progress note    Frequency/Duration:  # Days per week: [] 1 day # Weeks: [] 1 week [x] 4 weeks      [x] 2 days? [] 2 weeks [] 5 weeks     [] 3 days   [] 3 weeks [] 6 weeks     Rehab Potential: [] Excellent [x] Good [] Fair  [] Poor     Goal Status:  [] Achieved [x] Partially Achieved  [] Not Achieved     Patient Status: [x] Continue per initial plan of Care     [] Patient now discharged     [x] Additional visits requested, Please re-certify for additional visits:      Requested frequency/duration: 2 X/week for 4 weeks    Electronically signed by:  Colton Sales PT    If you have any questions or concerns, please don't hesitate to call.   Thank you for your referral.    Physician Signature:________________________________Date:__________________  By signing above, therapists plan is approved by physician

## 2019-05-30 ENCOUNTER — APPOINTMENT (OUTPATIENT)
Dept: PHYSICAL THERAPY | Age: 64
End: 2019-05-30
Payer: MEDICARE

## 2019-05-31 ENCOUNTER — HOSPITAL ENCOUNTER (OUTPATIENT)
Dept: PHYSICAL THERAPY | Age: 64
Setting detail: THERAPIES SERIES
Discharge: HOME | End: 2019-05-31
Payer: MEDICARE

## 2019-05-31 PROCEDURE — 97110 THERAPEUTIC EXERCISES: CPT

## 2019-05-31 NOTE — FLOWSHEET NOTE
Physical Therapy Daily Treatment Note    Date:  2019    Patient Name:  Dar Goel  \"Clari\"  :  1955  MRN: [id number]  Restrictions/Precautions:    Medical/Treatment Diagnosis Information:  ·   Diagnosis: M17.11 OA of (R) knee  Insurance/Certification information:    [employer]  Physician Information:    Mohsen Braxton MD  Plan of care signed (Y/N):  N/A  Visit# / total visits:        G-Code (if applicable): Functional Assessment Tool Used: LEFS   19  35/80; 56% disability   At initial evaluation:  ; 86% disability    Time in: 10:50am     Timed Treatment: 40min Total Treatment Time:  40min.  ______________________________________________________________________________________    Pain Level:   0/10 at rest, 6-7/10 with bending/squatting    SUBJECTIVE:        OBJECTIVE:     Exercise/Equipment Resistance/Repetitions Other comments   Nustep          TG  x6 minAt end of session   Clamshells 1    HEP video    QS RLE   10x5 secs in long sitting   QS SLR (R)    Bridge  10x5 secs HEP    Heel Slides with foot on TB          Standing hip 3 way         Standing HS curl     Hip ABD   Hip ext    10# 2x10  40# x10    gastroc stretching   3x30 secs R    Mini Squat       Rockerboard  x1 min F/B M/L  x1 min rocking each     Balance                                   Other Therapeutic Activities:      Manual Treatments:   Posterior femoral mobs with wedge gr 3-4     Modalities:      Test/Measurements:    AROM RLE (degrees)  RLE General AROM: 0 to 120* (was -4 to 104)  AROM LLE (degrees)  LLE General AROM: 0-122       ASSESSMENT:   Continue to progress gluteal strengthening in standing and functional movement training. Treatment/Activity Tolerance:   ?Patient tolerated treatment well ? Patient limited by fatique  ? Patient limited by pain ? Patient limited by other medical complications  ?  Other:     Goals:    Short term goals  Time Frame for Short term goals: 4 weeks  Short term

## 2019-06-03 ENCOUNTER — HOSPITAL ENCOUNTER (OUTPATIENT)
Dept: PHYSICAL THERAPY | Age: 64
Setting detail: THERAPIES SERIES
Discharge: HOME | End: 2019-06-03
Payer: MEDICARE

## 2019-06-03 PROCEDURE — 97110 THERAPEUTIC EXERCISES: CPT

## 2019-06-03 PROCEDURE — 97140 MANUAL THERAPY 1/> REGIONS: CPT

## 2019-06-03 NOTE — FLOWSHEET NOTE
Physical Therapy Daily Treatment Note    Date:  6/3/2019    Patient Name:  Jenaro Scott  \"Clari\"  :  1955  MRN: [id number]  Restrictions/Precautions:  universal  Medical/Treatment Diagnosis Information:  ·   Diagnosis: M17.11 OA of (R) knee  Insurance/Certification information: [employer]  Physician Information:  Serina Chavez MD  Plan of care signed (Y/N):  N/A  Visit# / total visits:        G-Code (if applicable): Functional Assessment Tool Used: LEFS   19  35/80; 56% disability   At initial evaluation:  ; 86% disability    Time in: 7:35am     Timed Treatment: 30min Total Treatment Time:  30min.  ______________________________________________________________________________________    Pain Level:   1/10 at rest, 5/10 with bending/squatting    SUBJECTIVE:  Patient reports \"I do feel like I am improving, I am tightening my thigh muscle with every step like Levander Klinefelter told me to\". \"I sat down on a stool this morning in the shower and I had a stabbing pain under the kneecap\". Reports compliance with HEP.         OBJECTIVE:     Exercise/Equipment Resistance/Repetitions Other comments   SciFit  Level 1 x5min        TG  At end of session   Graeme 1    HEP video    QS RLE   10x5 secs in long sitting   QS SLR (R) 5x5 secs in long sitting   Bridge  10x5 secs HEP    Heel Slides with foot on TB     Step ups w/ glute/quad squeeze x10 R ea (front/lateral)    Standing hip 3 way     3# x20B    Standing HS curl     Hip ABD   Hip ext        Gastroc stretching   3x30 secs R    Mini Squat       Rockerboard      Balance                                   Other Therapeutic Activities:      Manual Treatments:   STM R quads and patellar tendonInferior patellar mobs gr 3-4 Posterior femoral mobs with wedge gr 3-4   Scar massage to tolerance    Modalities: --    Test/Measurements:    AROM RLE (degrees)  RLE General AROM: 0 to 120* (was -4 to 104)  AROM LLE (degrees)  LLE General AROM: 0-122 ASSESSMENT:   The patient is progressing well. The patient performed above TE well with no increase in pain. Responded well to manual therapy and stretching. Continue to progress gluteal strengthening in standing and functional movement training. No increase in pain at end of PT session. Treatment/Activity Tolerance:   ?Patient tolerated treatment well ? Patient limited by fatique  ? Patient limited by pain ? Patient limited by other medical complications  ? Other:     Goals:    Short term goals  Time Frame for Short term goals: 4 weeks  Short term goal 1: patient to be independent with HEP to self-manage symptoms. Short term goal 2: patient to improve (R) LE AROM to equal to LLE  Short term goal 3: patient to improve (R) LE strength by at least +1/2 mm grade to improve stabilization strength  Short term goal 4: patient to improve LEFS score by at least 15 points demonstrating improved participation in everyday activity. Short term goal 5: patient to consistently report pain peaking at less than 3/10           Plan: x Continue per plan of care ? Alter current plan (see comments)   Plan of care initiated ? Hold pending MD visit ?  Discharge      Plan for Next Session:  Progress per pt flor and POC    Re-Certification Due Date:   Visit 16    Signature:  Aj Ball PT

## 2019-06-05 ENCOUNTER — HOSPITAL ENCOUNTER (OUTPATIENT)
Dept: PHYSICAL THERAPY | Age: 64
Setting detail: THERAPIES SERIES
Discharge: HOME | End: 2019-06-05
Payer: MEDICARE

## 2019-06-05 PROCEDURE — 97140 MANUAL THERAPY 1/> REGIONS: CPT

## 2019-06-05 PROCEDURE — 97110 THERAPEUTIC EXERCISES: CPT

## 2019-06-05 NOTE — FLOWSHEET NOTE
Physical Therapy Daily Treatment Note    Date:  2019    Patient Name:  Lakeisha Clayton  \"Clari\"  :  1955  MRN: [id number]  Restrictions/Precautions:  universal  Medical/Treatment Diagnosis Information:  ·   Diagnosis: M17.11 OA of (R) knee  Insurance/Certification information: [employer]  Physician Information:  Robert Shepard MD  Plan of care signed (Y/N):  N/A  Visit# / total visits:  8/8 3/8      G-Code (if applicable): Functional Assessment Tool Used: LEFS   19  35/80; 56% disability   At initial evaluation:  ; 86% disability    Time in: 2:00     Timed Treatment: 30min Total Treatment Time:  30min.  ______________________________________________________________________________________    Pain Level:   1/10 at rest, 5/10 with bending/squatting    SUBJECTIVE:     Pt reports mobility at the knee continues to improve and it 'has not zinged me one time today'. OBJECTIVE:     Exercise/Equipment Resistance/Repetitions Other comments   SciFit          TG  x6 minAt end of session   Clamshells 1    HEP video    QS RLE   10x5 secs in long sitting   QS SLR (R)    Bridge  10x5 secs HEP    Heel Slides with foot on TB     Step ups w/ glute/quad squeeze    Standing hip 3 way         Standing HS curl     Hip ABD   Hip ext        Gastroc stretching   3x30 secs R    Mini Squat       Rockerboard  x1 min F/B M/L  x1 min rocking each     Balance     SC terminal knee extension   10x5 sec holds     Prone triple extension 10x  Produced slight pain in knee cap                   Other Therapeutic Activities:      Manual Treatments:   STM R quads and patellar tendonInferior patellar mobs gr 3-4 Posterior femoral mobs with wedge gr 3-4       Modalities: --    Test/Measurements:    AROM RLE (degrees)  RLE General AROM: 0 to 120* (was -4 to 104)  AROM LLE (degrees)  LLE General AROM: 0-122       ASSESSMENT:   The patient is progressing well.   The patient performed above TE well with minimal increase in

## 2019-06-11 ENCOUNTER — HOSPITAL ENCOUNTER (OUTPATIENT)
Dept: PHYSICAL THERAPY | Age: 64
Setting detail: THERAPIES SERIES
Discharge: HOME | End: 2019-06-11
Payer: MEDICARE

## 2019-06-11 PROCEDURE — 97110 THERAPEUTIC EXERCISES: CPT

## 2019-06-11 NOTE — FLOWSHEET NOTE
Physical Therapy Daily Treatment Note    Date:  2019    Patient Name:  Ranjan Simpson  \"Clrai\"  :  1955  MRN: [id number]  Restrictions/Precautions:  universal  Medical/Treatment Diagnosis Information:  ·   Diagnosis: M17.11 OA of (R) knee  Insurance/Certification information: [employer]  Physician Information:  Jt Orellana MD  Plan of care signed (Y/N):  N/A  Visit# / total visits:        G-Code (if applicable): Functional Assessment Tool Used: LEFS   19  35/80; 56% disability   At initial evaluation:  ; 86% disability    Time in: 10:30     Timed Treatment: 30min Total Treatment Time:  30min.  ______________________________________________________________________________________    Pain Level:   0/10 at rest, 5/10 with bending/squatting    SUBJECTIVE:         OBJECTIVE:     Exercise/Equipment Resistance/Repetitions Other comments   SciFit     HR x15 HEP    TG  x6 minAt end of session   Graeme 1    HEP video    QS RLE      QS SLR (R)    Bridge  HEP    Heel Slides with foot on TB     Step ups w/ glute/quad squeeze    Standing hip 3 way         Standing HS curl     Hip ABD   Hip ext        Gastroc stretching   3x30 secs R    Mini Squat   x15    Rockerboard  x1 min F/B M/L  x1 min rocking each     Balance     SC terminal knee extension   10x5 sec holds     Prone triple extension  Produced slight pain in knee cap   Stacking with MB 2x30 secs     FSU  6\" x15 R      Other Therapeutic Activities:      Manual Treatments:         Modalities: --    Test/Measurements:    AROM RLE (degrees)  RLE General AROM: 0 to 120* (was -4 to 104)  AROM LLE (degrees)  LLE General AROM: 0-122       ASSESSMENT:   The patient is progressing well. The patient performed above TE well with minimal increase in pain that pt stated was in the knee cap but eventually subsided with rest.  Continue to progress gluteal strengthening in standing and functional movement training.   No increase in pain at end of PT session. Treatment/Activity Tolerance:   ?Patient tolerated treatment well ? Patient limited by fatique  ? Patient limited by pain ? Patient limited by other medical complications  ? Other:     Goals:    Short term goals  Time Frame for Short term goals: 4 weeks  Short term goal 1: patient to be independent with HEP to self-manage symptoms. Short term goal 2: patient to improve (R) LE AROM to equal to LLE  Short term goal 3: patient to improve (R) LE strength by at least +1/2 mm grade to improve stabilization strength  Short term goal 4: patient to improve LEFS score by at least 15 points demonstrating improved participation in everyday activity. Short term goal 5: patient to consistently report pain peaking at less than 3/10           Plan: x Continue per plan of care ? Alter current plan (see comments)   Plan of care initiated ? Hold pending MD visit ?  Discharge      Plan for Next Session:  Progress per pt flor and POC    Re-Certification Due Date:   Visit 16    Signature:  Zach Blakely, PT

## 2019-06-14 ENCOUNTER — HOSPITAL ENCOUNTER (OUTPATIENT)
Dept: PHYSICAL THERAPY | Age: 64
Setting detail: THERAPIES SERIES
Discharge: HOME | End: 2019-06-14
Payer: MEDICARE

## 2019-06-14 PROCEDURE — 97112 NEUROMUSCULAR REEDUCATION: CPT

## 2019-06-14 PROCEDURE — 97110 THERAPEUTIC EXERCISES: CPT

## 2019-06-14 NOTE — FLOWSHEET NOTE
Physical Therapy Daily Treatment Note    Date:  2019    Patient Name:  Yoselin Collins  \"Clari\"  :  1955  MRN: [id number]  Restrictions/Precautions:  universal  Medical/Treatment Diagnosis Information:  ·   Diagnosis: M17.11 OA of (R) knee  Insurance/Certification information: [employer]  Physician Information:  Mario Trujillo MD  Plan of care signed (Y/N):  N/A  Visit# / total visits:        G-Code (if applicable): Functional Assessment Tool Used: LEFS   19  35/80; 56% disability   At initial evaluation:  ; 86% disability    Time in: 11:00     Timed Treatment: 35min Total Treatment Time:  35min.  ______________________________________________________________________________________    Pain Level:   0/10 at rest, 3/10 with bending/squatting    SUBJECTIVE: Patient reports \"it feels great unless bending, squatting, or crossing ankles\". Reports compliance with HEP. OBJECTIVE:     Exercise/Equipment Resistance/Repetitions Other comments       HR x15 HEP    TG  x6 minAt end of session   Graeme 1    HEP video    QS RLE      QS SLR (R)    Bridge  HEP    Heel Slides with foot on TB     Step ups w/ glute/quad squeeze    Standing hip 3 way         Standing HS curl     Hip ABD   Hip ext        Gastroc stretching   3x30 secs R    Mini Squat   x15    Rockerboard  x20 F/B M/L  x1 min balance each     Balance     SC terminal knee extension   10x5 sec holds     Prone triple extension  Produced slight pain in knee cap   Stacking with MB 2x30 secs     LSU 8\" x15 (R)    FSU  8\" x15 (R)      Other Therapeutic Activities:      Manual Treatments:         Modalities: --    Test/Measurements:    AROM RLE (degrees)  RLE General AROM: 0 to 120* (was -4 to 104)  AROM LLE (degrees)  LLE General AROM: 0-122       ASSESSMENT:  The patient is progressing well.   The patient performed above TE well with some discomfort \"behind the knee cap\" that subsides with rest.  Continue to progress gluteal strengthening in standing and functional movement training. No increase in pain at end of PT session. No complaints of pain at end of session. Treatment/Activity Tolerance:   ?Patient tolerated treatment well ? Patient limited by fatique  ? Patient limited by pain ? Patient limited by other medical complications  ? Other:     Goals:    Short term goals  Time Frame for Short term goals: 4 weeks  Short term goal 1: patient to be independent with HEP to self-manage symptoms. Short term goal 2: patient to improve (R) LE AROM to equal to LLE  Short term goal 3: patient to improve (R) LE strength by at least +1/2 mm grade to improve stabilization strength  Short term goal 4: patient to improve LEFS score by at least 15 points demonstrating improved participation in everyday activity. Short term goal 5: patient to consistently report pain peaking at less than 3/10           Plan: x Continue per plan of care ? Alter current plan (see comments)   Plan of care initiated ? Hold pending MD visit ?  Discharge      Plan for Next Session:  Progress per pt flor and POC    Re-Certification Due Date:   Visit 16    Signature:  Huma Fulton PT

## 2019-06-17 ENCOUNTER — APPOINTMENT (OUTPATIENT)
Dept: SPEECH THERAPY | Age: 64
End: 2019-06-17
Payer: MEDICARE

## 2019-06-17 ENCOUNTER — APPOINTMENT (OUTPATIENT)
Dept: OCCUPATIONAL THERAPY | Age: 64
End: 2019-06-17
Payer: MEDICARE

## 2019-06-18 ENCOUNTER — HOSPITAL ENCOUNTER (OUTPATIENT)
Dept: PHYSICAL THERAPY | Age: 64
Setting detail: THERAPIES SERIES
Discharge: HOME | End: 2019-06-18
Payer: MEDICARE

## 2019-06-18 ENCOUNTER — APPOINTMENT (OUTPATIENT)
Dept: SPEECH THERAPY | Age: 64
End: 2019-06-18
Payer: MEDICARE

## 2019-06-18 PROCEDURE — 97112 NEUROMUSCULAR REEDUCATION: CPT

## 2019-06-18 PROCEDURE — 97110 THERAPEUTIC EXERCISES: CPT

## 2019-06-20 ENCOUNTER — APPOINTMENT (OUTPATIENT)
Dept: PHYSICAL THERAPY | Age: 64
End: 2019-06-20
Payer: MEDICARE

## 2019-06-25 ENCOUNTER — APPOINTMENT (OUTPATIENT)
Dept: PHYSICAL THERAPY | Age: 64
End: 2019-06-25
Payer: MEDICARE

## 2019-06-27 ENCOUNTER — APPOINTMENT (OUTPATIENT)
Dept: PHYSICAL THERAPY | Age: 64
End: 2019-06-27
Payer: MEDICARE

## 2019-07-01 ENCOUNTER — OFFICE VISIT (OUTPATIENT)
Dept: ORTHOPEDIC SURGERY | Age: 64
End: 2019-07-01

## 2019-07-01 VITALS
DIASTOLIC BLOOD PRESSURE: 89 MMHG | HEIGHT: 65 IN | SYSTOLIC BLOOD PRESSURE: 130 MMHG | WEIGHT: 229 LBS | BODY MASS INDEX: 38.15 KG/M2 | HEART RATE: 88 BPM

## 2019-07-01 DIAGNOSIS — Z98.890 S/P ARTHROSCOPIC KNEE SURGERY: Primary | ICD-10-CM

## 2019-07-01 PROCEDURE — 99024 POSTOP FOLLOW-UP VISIT: CPT | Performed by: ORTHOPAEDIC SURGERY

## 2019-07-01 NOTE — PROGRESS NOTES
The patient is seen 2 months s/p arthroscopy with removal loose bodies, medial and lateral meniscectomies. She continues to make progress, she said about \"95% of her stiffness is gone\". .  Overall has some occasional anterior knee soreness as well as some medial sided soreness with prolonged walking or certain movements. Overall she is very happy. She has been working with physical therapy and is happy with her progress.     Pain Assessment  Location of Pain: Knee  Location Modifiers: Right  Severity of Pain: 3  Quality of Pain: Sharp  Duration of Pain: A few minutes  Frequency of Pain: Intermittent  Aggravating Factors: Bending, Stairs  Limiting Behavior: Some  Relieving Factors: Rest  Result of Injury: No  Work-Related Injury: No  Are there other pain locations you wish to document?: No    Past Medical History:   Diagnosis Date    Arthritis     CAD (coronary artery disease)     Cancer ([facility] [address]     skin    Hyperlipidemia     Hypertension     MI, old         Past Surgical History:   Procedure Laterality Date    APPENDECTOMY      CHOLECYSTECTOMY      FOOT SURGERY      bilateral    HYSTERECTOMY      KNEE ARTHROSCOPY Right 4/24/2019    RIGHT KNEE ARTHROSCOPY,, SYNOVECTOMY CHONDROPLASTY,MEDIAL AND LATERAL MENESECTOMY, REMOVAL LOOSE BODIES performed by Talia Feliciano MD at [facility]      bilateral    LIVER SURGERY      cyst removal    NECK SURGERY      URETHRA SURGERY         Family History   Problem Relation Age of Onset    Breast Cancer Mother     Cancer Mother         Bone    Diabetes Mother     Heart Disease Mother         CHF   Renetta Imam Arthritis Mother     Heart Attack Father     Prostate Cancer Brother     Colon Cancer Brother         One Brother with Kidney CA and One with Bladder CA    Cancer Maternal Grandmother        Social History     Socioeconomic History    Marital status:      Spouse name: None    Number of children: None    Years of education: None    Highest education level: None   Occupational History    None   Social Needs    Financial resource strain: None    Food insecurity:     Worry: None     Inability: None    Transportation needs:     Medical: None     Non-medical: None   Tobacco Use    Smoking status: Current Some Day Smoker     Packs/day: 0.25     Types: Cigarettes    Smokeless tobacco: Never Used    Tobacco comment: quit March 4, 2019   Substance and Sexual Activity    Alcohol use: No    Drug use: No    Sexual activity: None   Lifestyle    Physical activity:     Days per week: None     Minutes per session: None    Stress: None   Relationships    Social connections:     Talks on phone: None     Gets together: None     Attends Evangelical service: None     Active member of club or organization: None     Attends meetings of clubs or organizations: None     Relationship status: None    Intimate partner violence:     Fear of current or ex partner: None     Emotionally abused: None     Physically abused: None     Forced sexual activity: None   Other Topics Concern    None   Social History Narrative    None       Current Outpatient Medications   Medication Sig Dispense Refill    Multiple Vitamins-Minerals (MULTIVITAMIN ADULT PO) Take 1 tablet by mouth daily      ibuprofen (ADVIL;MOTRIN) 800 MG tablet Take 800 mg by mouth as needed      fluticasone (FLONASE) 50 MCG/ACT nasal spray USE 2 SPRAYS IN EACH NOSTRIL DAILY 1 Bottle 3    carvedilol (COREG) 12.5 MG tablet Take 12.5 mg by mouth 2 times daily (with meals).  amLODIPine (NORVASC) 5 MG tablet Take 5 mg by mouth daily.  omeprazole (PRILOSEC) 40 MG capsule Take 40 mg by mouth daily.  aspirin 81 MG tablet Take 81 mg by mouth daily.  S-Adenosylmethionine (EFREN-E) 400 MG TABS Take  by mouth. No current facility-administered medications for this visit.         Allergies   Allergen Reactions    Latex Rash    Adhesive Tape      Band aids blisters skin    Codeine     Nitrofurantoin Itching    Penicillins Swelling     \"Throat closes\"    Sulfa Antibiotics     Tramadol Nausea Only       Vital signs:  /89   Pulse 88   Ht 5' 5\" (1.651 m)   Wt 229 lb (103.9 kg)   BMI 38.11 kg/m²      Constitution: Patient cooperative with examination today. Well-developed, well-nourished in no acute distress. Neuro: Alert & oriented x 3,  no focal motor or sensory deficits noted. Eyes: sclera clear, atraumatic  Ears: Normal external ear  Mouth:  No perioral lesions  Pulm: Respirations unlabored and regular  Pulse: Extremities well-perfused. 2+ peripheral pulses   Skin: Warm, no ulcerations      Right knee exam shows well-healed surgical incision. Calf soft nontender. Negative Homans sign. Mild effusion. ROM 0-120. Negative McMurrays. Impression: Patient doing well postop. We'll continue rehabilitation and recommended continue strengthening, flexibility and low impact aerobic exercise program.  I would like her to follow-up in 2 months for a recheck. A new referral for PT was placed in the computer today. She is in agreement with the plan. Dakotah Castillo. Myah Myrick, [address] [facility] and [address] [facility]     The encounter with Julius Mcleod was supervised by Dr. Mckenzie Biswas who personally examined the patient and reviewed the plan. This dictation was performed with a verbal recognition program (DRAGON) and it was checked for errors. It is possible that there are still dictated errors within this office note. If so, please bring any errors to my attention for an addendum. All efforts were made to ensure that this office note is accurate. I supervised my sports medicine fellow in the evaluation and development of a treatment plan  for this patient. I personally interviewed the patient and performed the physical examination. In addition, I discussed the diagnosis and treatment options.   All of the patient's questions were

## 2019-09-16 ENCOUNTER — OFFICE VISIT (OUTPATIENT)
Dept: ORTHOPEDIC SURGERY | Age: 64
End: 2019-09-16
Payer: MEDICARE

## 2019-09-16 VITALS
BODY MASS INDEX: 38.15 KG/M2 | SYSTOLIC BLOOD PRESSURE: 136 MMHG | WEIGHT: 229 LBS | DIASTOLIC BLOOD PRESSURE: 89 MMHG | HEART RATE: 80 BPM | HEIGHT: 65 IN

## 2019-09-16 DIAGNOSIS — Z98.890 S/P ARTHROSCOPIC KNEE SURGERY: ICD-10-CM

## 2019-09-16 DIAGNOSIS — M17.11 PRIMARY OSTEOARTHRITIS OF RIGHT KNEE: Primary | ICD-10-CM

## 2019-09-16 PROCEDURE — 4004F PT TOBACCO SCREEN RCVD TLK: CPT | Performed by: ORTHOPAEDIC SURGERY

## 2019-09-16 PROCEDURE — 1123F ACP DISCUSS/DSCN MKR DOCD: CPT | Performed by: ORTHOPAEDIC SURGERY

## 2019-09-16 PROCEDURE — G8427 DOCREV CUR MEDS BY ELIG CLIN: HCPCS | Performed by: ORTHOPAEDIC SURGERY

## 2019-09-16 PROCEDURE — 1090F PRES/ABSN URINE INCON ASSESS: CPT | Performed by: ORTHOPAEDIC SURGERY

## 2019-09-16 PROCEDURE — 20610 DRAIN/INJ JOINT/BURSA W/O US: CPT | Performed by: ORTHOPAEDIC SURGERY

## 2019-09-16 PROCEDURE — G8400 PT W/DXA NO RESULTS DOC: HCPCS | Performed by: ORTHOPAEDIC SURGERY

## 2019-09-16 PROCEDURE — G8417 CALC BMI ABV UP PARAM F/U: HCPCS | Performed by: ORTHOPAEDIC SURGERY

## 2019-09-16 PROCEDURE — 3017F COLORECTAL CA SCREEN DOC REV: CPT | Performed by: ORTHOPAEDIC SURGERY

## 2019-09-16 PROCEDURE — 4040F PNEUMOC VAC/ADMIN/RCVD: CPT | Performed by: ORTHOPAEDIC SURGERY

## 2019-09-16 PROCEDURE — 99213 OFFICE O/P EST LOW 20 MIN: CPT | Performed by: ORTHOPAEDIC SURGERY

## 2019-10-21 ENCOUNTER — HOSPITAL ENCOUNTER (OUTPATIENT)
Dept: GENERAL RADIOLOGY | Age: 64
Discharge: HOME | End: 2019-10-21
Payer: MEDICARE

## 2019-10-21 ENCOUNTER — HOSPITAL ENCOUNTER (OUTPATIENT)
Age: 64
Discharge: HOME | End: 2019-10-21
Payer: MEDICARE

## 2019-10-21 DIAGNOSIS — N20.0 CALCULUS OF KIDNEY: ICD-10-CM

## 2019-10-21 DIAGNOSIS — N32.81 OVERACTIVE BLADDER: ICD-10-CM

## 2019-10-21 PROCEDURE — 74018 RADEX ABDOMEN 1 VIEW: CPT

## 2019-11-18 ENCOUNTER — OFFICE VISIT (OUTPATIENT)
Dept: ORTHOPEDIC SURGERY | Age: 64
End: 2019-11-18
Payer: MEDICARE

## 2019-11-18 ENCOUNTER — HOSPITAL ENCOUNTER (OUTPATIENT)
Age: 64
Discharge: HOME | End: 2019-11-18
Payer: MEDICARE

## 2019-11-18 ENCOUNTER — HOSPITAL ENCOUNTER (OUTPATIENT)
Dept: GENERAL RADIOLOGY | Age: 64
Discharge: HOME | End: 2019-11-18
Payer: MEDICARE

## 2019-11-18 VITALS
HEART RATE: 88 BPM | DIASTOLIC BLOOD PRESSURE: 88 MMHG | SYSTOLIC BLOOD PRESSURE: 136 MMHG | WEIGHT: 229.06 LBS | HEIGHT: 65 IN | BODY MASS INDEX: 38.16 KG/M2

## 2019-11-18 DIAGNOSIS — Z98.890 S/P ARTHROSCOPIC KNEE SURGERY: ICD-10-CM

## 2019-11-18 DIAGNOSIS — M17.11 PRIMARY OSTEOARTHRITIS OF RIGHT KNEE: Primary | ICD-10-CM

## 2019-11-18 DIAGNOSIS — N20.0 RENAL CALCULUS, RIGHT: ICD-10-CM

## 2019-11-18 PROCEDURE — G8400 PT W/DXA NO RESULTS DOC: HCPCS | Performed by: ORTHOPAEDIC SURGERY

## 2019-11-18 PROCEDURE — 3017F COLORECTAL CA SCREEN DOC REV: CPT | Performed by: ORTHOPAEDIC SURGERY

## 2019-11-18 PROCEDURE — G8427 DOCREV CUR MEDS BY ELIG CLIN: HCPCS | Performed by: ORTHOPAEDIC SURGERY

## 2019-11-18 PROCEDURE — G8417 CALC BMI ABV UP PARAM F/U: HCPCS | Performed by: ORTHOPAEDIC SURGERY

## 2019-11-18 PROCEDURE — 1090F PRES/ABSN URINE INCON ASSESS: CPT | Performed by: ORTHOPAEDIC SURGERY

## 2019-11-18 PROCEDURE — 74018 RADEX ABDOMEN 1 VIEW: CPT

## 2019-11-18 PROCEDURE — G8484 FLU IMMUNIZE NO ADMIN: HCPCS | Performed by: ORTHOPAEDIC SURGERY

## 2019-11-18 PROCEDURE — 4004F PT TOBACCO SCREEN RCVD TLK: CPT | Performed by: ORTHOPAEDIC SURGERY

## 2019-11-18 PROCEDURE — 4040F PNEUMOC VAC/ADMIN/RCVD: CPT | Performed by: ORTHOPAEDIC SURGERY

## 2019-11-18 PROCEDURE — 99213 OFFICE O/P EST LOW 20 MIN: CPT | Performed by: ORTHOPAEDIC SURGERY

## 2019-11-18 PROCEDURE — 1123F ACP DISCUSS/DSCN MKR DOCD: CPT | Performed by: ORTHOPAEDIC SURGERY

## 2020-05-14 ENCOUNTER — TELEPHONE (OUTPATIENT)
Dept: ORTHOPEDIC SURGERY | Age: 65
End: 2020-05-14

## 2020-05-14 RX ORDER — DICLOFENAC SODIUM 75 MG/1
75 TABLET, DELAYED RELEASE ORAL 2 TIMES DAILY
Qty: 60 TABLET | Refills: 0 | Status: SHIPPED | OUTPATIENT
Start: 2020-05-14 | End: 2020-11-03 | Stop reason: ALTCHOICE

## 2020-06-16 ENCOUNTER — OFFICE VISIT (OUTPATIENT)
Dept: ORTHOPEDIC SURGERY | Age: 65
End: 2020-06-16
Payer: MEDICARE

## 2020-06-16 VITALS — HEIGHT: 65 IN | WEIGHT: 229 LBS | TEMPERATURE: 98.1 F | BODY MASS INDEX: 38.15 KG/M2

## 2020-06-16 PROCEDURE — 99213 OFFICE O/P EST LOW 20 MIN: CPT | Performed by: ORTHOPAEDIC SURGERY

## 2020-06-16 PROCEDURE — 20610 DRAIN/INJ JOINT/BURSA W/O US: CPT | Performed by: ORTHOPAEDIC SURGERY

## 2020-06-16 PROCEDURE — 1123F ACP DISCUSS/DSCN MKR DOCD: CPT | Performed by: ORTHOPAEDIC SURGERY

## 2020-06-16 PROCEDURE — G8417 CALC BMI ABV UP PARAM F/U: HCPCS | Performed by: ORTHOPAEDIC SURGERY

## 2020-06-16 PROCEDURE — 3017F COLORECTAL CA SCREEN DOC REV: CPT | Performed by: ORTHOPAEDIC SURGERY

## 2020-06-16 PROCEDURE — G8427 DOCREV CUR MEDS BY ELIG CLIN: HCPCS | Performed by: ORTHOPAEDIC SURGERY

## 2020-06-16 PROCEDURE — 4004F PT TOBACCO SCREEN RCVD TLK: CPT | Performed by: ORTHOPAEDIC SURGERY

## 2020-06-16 PROCEDURE — 4040F PNEUMOC VAC/ADMIN/RCVD: CPT | Performed by: ORTHOPAEDIC SURGERY

## 2020-06-16 PROCEDURE — G8400 PT W/DXA NO RESULTS DOC: HCPCS | Performed by: ORTHOPAEDIC SURGERY

## 2020-06-16 PROCEDURE — 1090F PRES/ABSN URINE INCON ASSESS: CPT | Performed by: ORTHOPAEDIC SURGERY

## 2020-06-16 RX ORDER — METHYLPREDNISOLONE ACETATE 40 MG/ML
40 INJECTION, SUSPENSION INTRA-ARTICULAR; INTRALESIONAL; INTRAMUSCULAR; SOFT TISSUE ONCE
Status: COMPLETED | OUTPATIENT
Start: 2020-06-16 | End: 2020-06-16

## 2020-06-16 RX ORDER — MELOXICAM 15 MG/1
15 TABLET ORAL DAILY
Qty: 30 TABLET | Refills: 2 | Status: SHIPPED | OUTPATIENT
Start: 2020-06-16 | End: 2020-10-16

## 2020-06-16 RX ADMIN — METHYLPREDNISOLONE ACETATE 40 MG: 40 INJECTION, SUSPENSION INTRA-ARTICULAR; INTRALESIONAL; INTRAMUSCULAR; SOFT TISSUE at 15:31

## 2020-06-16 NOTE — PROGRESS NOTES
Socioeconomic History    Marital status:      Spouse name: Not on file    Number of children: Not on file    Years of education: Not on file    Highest education level: Not on file   Occupational History    Not on file   Social Needs    Financial resource strain: Not on file    Food insecurity     Worry: Not on file     Inability: Not on file    Transportation needs     Medical: Not on file     Non-medical: Not on file   Tobacco Use    Smoking status: Current Some Day Smoker     Packs/day: 0.25     Types: Cigarettes    Smokeless tobacco: Never Used    Tobacco comment: quit March 4, 2019   Substance and Sexual Activity    Alcohol use: No    Drug use: No    Sexual activity: Not on file   Lifestyle    Physical activity     Days per week: Not on file     Minutes per session: Not on file    Stress: Not on file   Relationships    Social connections     Talks on phone: Not on file     Gets together: Not on file     Attends Church service: Not on file     Active member of club or organization: Not on file     Attends meetings of clubs or organizations: Not on file     Relationship status: Not on file    Intimate partner violence     Fear of current or ex partner: Not on file     Emotionally abused: Not on file     Physically abused: Not on file     Forced sexual activity: Not on file   Other Topics Concern    Not on file   Social History Narrative    Not on file       Current Outpatient Medications   Medication Sig Dispense Refill    diclofenac (VOLTAREN) 75 MG EC tablet Take 1 tablet by mouth 2 times daily 1 PO Q BID WITH FOOD 60 tablet 0    mirabegron (MYRBETRIQ) 25 MG TB24 Take by mouth      Multiple Vitamins-Minerals (MULTIVITAMIN ADULT PO) Take 1 tablet by mouth daily      ibuprofen (ADVIL;MOTRIN) 800 MG tablet Take 800 mg by mouth as needed      fluticasone (FLONASE) 50 MCG/ACT nasal spray USE 2 SPRAYS IN EACH NOSTRIL DAILY 1 Bottle 3    carvedilol (COREG) 12.5 MG tablet Take erythema or ecchymosis. No gross deformity. Palpation: Crepitus present. Nontender along joint line. No pain with compression of patella. Nontender to light touch. Range of Motion: Full ROM. Strength: 5/5 quad strength    Effusion: No apparent effusion. Ligamentous stability: Stable to valgus and varus stress at 0° and 30°. Solid endpoint with Lachman's. Negative posterior and anterior drawer signs. Patella tracking: Smooth translation of patella. Special tests: Negative Georgina sign. Patella apprehension sign negative. Neurologic and vascular: Skin is warm and well-perfused. Distally neurovascularly intact. Additional findings: Calf soft nontender. Sensation is intact to light-touch. No pretibial edema. Radiology:     Pertinent imaging reviewed. Radiographs were obtained and reviewed in the office; 4 views: bilateral PA, bilateral [place], bilateral Merchants AND bilateral lateral    Impression: Advanced medial compartment arthritis of right knee         Assessment :  Osteoarthritis of bilateral knee    Impression:  Encounter Diagnosis   Name Primary?  Pain in both knees, unspecified chronicity Yes       Office Procedures:  Orders Placed This Encounter   Procedures    XR KNEE LEFT (MIN 4 VIEWS)     Standing Status:   Future     Number of Occurrences:   1     Standing Expiration Date:   6/15/2021     Order Specific Question:   Reason for exam:     Answer:   knee pain    XR KNEE RIGHT (MIN 4 VIEWS)     Standing Status:   Future     Number of Occurrences:   1     Standing Expiration Date:   6/15/2021     Order Specific Question:   Reason for exam:     Answer:   knee pain         Plan: Pertinent imaging was reviewed. The etiology, natural history, and treatment options for the disorder were discussed.   The roles of activity medication, antiinflammatories, injections, bracing, physical therapy, and surgical interventions were all described to the patient and questions were answered. I believe she is a candidate for repeat intraarticular cortisone injection in her right knee as it has provided good relief in the past. She wishes to proceed with this entity. I will also give her a prescription for Meloxicam for her pain and inflammation, discontinue Diclofenac. The patient was advised that NSAID-type medications have several potential side effects that include: gastrointestinal irritation including hemorrhage, renal injury, as well as an increased risk for heart attack and stroke. The patient was asked to take the medication with food and to stop if there is any symptoms of GI upset, including heartburn, nausea, increased gas or diarrhea. I asked the patient to contact their medical provider for vomiting, abdominal pain or black/bloody stools. The patient should have renal function testing per his medical provider periodically if the medication is taken on a regular basis. The patient should be alert for any swelling in the lower extremities and should stop taking the medication immediately and contact their medical provider should this occur. In addition, the patient should stop taking the medication immediately and contact their medical provider should there be any shortness of breath, fatigue and be evaluated in an emergency facility for any chest pain. The patient expresses understanding of these issues and questions were answered. I will see her back if symptoms persist or worsen. Roma Powell is in agreement with this plan. All questions were answered to patient's satisfaction and was encouraged to call with any further questions. The indications and risks of steroid injection as well as treatment alternatives were discussed with the patient who consented to the procedure. Under sterile conditions and after informed consent was obtained the patient was given an injection into the RIGHT knee.  2cc 40 mg of Depo-Medrol and 4 mL of 1% lidocaine were placed in the

## 2020-07-01 ENCOUNTER — TELEPHONE (OUTPATIENT)
Dept: ORTHOPEDIC SURGERY | Age: 65
End: 2020-07-01

## 2020-07-01 RX ORDER — DICLOFENAC SODIUM 75 MG/1
75 TABLET, DELAYED RELEASE ORAL 2 TIMES DAILY
Qty: 60 TABLET | Refills: 2 | Status: SHIPPED | OUTPATIENT
Start: 2020-07-01 | End: 2020-10-16

## 2020-08-31 PROBLEM — K76.89 LIVER CYST: Status: ACTIVE | Noted: 2017-04-21

## 2020-08-31 PROBLEM — R10.11 RUQ PAIN: Status: ACTIVE | Noted: 2020-08-31

## 2020-08-31 PROBLEM — N99.3 VAGINAL VAULT PROLAPSE AFTER HYSTERECTOMY: Status: ACTIVE | Noted: 2017-11-02

## 2020-08-31 PROBLEM — Z12.11 COLON CANCER SCREENING: Status: ACTIVE | Noted: 2020-08-31

## 2020-08-31 PROBLEM — N81.5 VAGINAL ENTEROCELE: Status: ACTIVE | Noted: 2017-11-02

## 2020-08-31 PROBLEM — M79.2 NEURITIS: Status: ACTIVE | Noted: 2018-06-05

## 2020-08-31 PROBLEM — N81.11 CYSTOCELE, MIDLINE: Status: ACTIVE | Noted: 2017-11-02

## 2020-09-01 ENCOUNTER — OFFICE VISIT (OUTPATIENT)
Dept: UROGYNECOLOGY | Age: 65
End: 2020-09-01
Payer: MEDICARE

## 2020-09-01 VITALS
TEMPERATURE: 98 F | HEART RATE: 75 BPM | DIASTOLIC BLOOD PRESSURE: 95 MMHG | SYSTOLIC BLOOD PRESSURE: 174 MMHG | OXYGEN SATURATION: 95 % | RESPIRATION RATE: 16 BRPM

## 2020-09-01 PROCEDURE — 0509F URINE INCON PLAN DOCD: CPT | Performed by: OBSTETRICS & GYNECOLOGY

## 2020-09-01 PROCEDURE — 3017F COLORECTAL CA SCREEN DOC REV: CPT | Performed by: OBSTETRICS & GYNECOLOGY

## 2020-09-01 PROCEDURE — 99214 OFFICE O/P EST MOD 30 MIN: CPT | Performed by: OBSTETRICS & GYNECOLOGY

## 2020-09-01 PROCEDURE — 4004F PT TOBACCO SCREEN RCVD TLK: CPT | Performed by: OBSTETRICS & GYNECOLOGY

## 2020-09-01 PROCEDURE — G8400 PT W/DXA NO RESULTS DOC: HCPCS | Performed by: OBSTETRICS & GYNECOLOGY

## 2020-09-01 PROCEDURE — G8427 DOCREV CUR MEDS BY ELIG CLIN: HCPCS | Performed by: OBSTETRICS & GYNECOLOGY

## 2020-09-01 PROCEDURE — 1123F ACP DISCUSS/DSCN MKR DOCD: CPT | Performed by: OBSTETRICS & GYNECOLOGY

## 2020-09-01 PROCEDURE — 4040F PNEUMOC VAC/ADMIN/RCVD: CPT | Performed by: OBSTETRICS & GYNECOLOGY

## 2020-09-01 PROCEDURE — G8417 CALC BMI ABV UP PARAM F/U: HCPCS | Performed by: OBSTETRICS & GYNECOLOGY

## 2020-09-01 PROCEDURE — 1090F PRES/ABSN URINE INCON ASSESS: CPT | Performed by: OBSTETRICS & GYNECOLOGY

## 2020-09-01 RX ORDER — METHOCARBAMOL 500 MG/1
500 TABLET, FILM COATED ORAL 3 TIMES DAILY PRN
COMMUNITY
Start: 2020-08-10 | End: 2020-10-16

## 2020-09-01 RX ORDER — GABAPENTIN 300 MG/1
CAPSULE ORAL
COMMUNITY
Start: 2020-07-26 | End: 2021-10-18

## 2020-09-01 ASSESSMENT — ENCOUNTER SYMPTOMS
BACK PAIN: 1
SINUS PRESSURE: 1

## 2020-09-01 NOTE — PROGRESS NOTES
9/1/2020       HPI:     Name: Anayeli Chavez  YOB: 1955    CC: Patient is a 75 y.o. presenting for evaluation of stress incontinence  and urge incontinence . HPI: How long have you had this problem? Over a year  Please rate the severity of your problem: moderately severe  Anything make it better? mybetriq    Ob/Gyn History:    OB History   No obstetric history on file. Past Medical History:   Past Medical History:   Diagnosis Date    Acute laryngitis     Acute lymphadenitis of face, head and neck     Arthritis     CAD (coronary artery disease)     Calculus of kidney     Cancer (HCC)     skin    Cystocele, midline     Gastro-esophageal reflux disease without esophagitis     Generalized anxiety disorder     Hyperlipidemia     Hypertension     Liver cyst     MI, old     Neuritis     Nocturia     Osteoarthritis     Otalgia     Perennial allergic rhinitis     Pharyngeal inflammation     Urgency of urination     Urinary frequency     Urinary incontinence     Vaginal enterocele     Vaginal vault 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      Past Surgical History:   Past Surgical History:   Procedure Laterality Date    APPENDECTOMY      CHOLECYSTECTOMY      ELBOW DEBRIDEMENT      FOOT SURGERY      bilateral    HYSTERECTOMY      HYSTERECTOMY, VAGINAL      KNEE ARTHROSCOPY Right 4/24/2019    RIGHT KNEE ARTHROSCOPY,, SYNOVECTOMY CHONDROPLASTY,MEDIAL AND LATERAL MENESECTOMY, REMOVAL LOOSE BODIES performed by Lesly Mckeon MD at [facility]      bilateral    LIVER SURGERY      cyst removal    NECK SURGERY      TONSILLECTOMY      URETHRA SURGERY      US BREAST LESION REMOVAL LEFT       Allergies:    Allergies   Allergen Reactions    Latex Rash    Adhesive Tape      Band aids blisters skin    Codeine     Nitrofurantoin Itching    Penicillins Swelling     \"Throat closes\"    Sulfa Antibiotics     Tramadol Nausea Only     Current Medications:  Current Outpatient Medications   Medication Sig Dispense Refill    gabapentin (NEURONTIN) 300 MG capsule TAKE 1 CAPSULE BY MOUTH THREE TIMES DAILY FOR 30 DAYS      methocarbamol (ROBAXIN) 500 MG tablet Take 500 mg by mouth 3 times daily as needed      ciclopirox (PENLAC) 8 % solution APPLY TOPICALLY ONE APPLICATION TO THE AFFECTED AREA DAILY      diclofenac (VOLTAREN) 75 MG EC tablet Take 1 tablet by mouth 2 times daily 1 PO Q BID WITH FOOD 60 tablet 0    mirabegron (MYRBETRIQ) 25 MG TB24 Take by mouth      Multiple Vitamins-Minerals (MULTIVITAMIN ADULT PO) Take 1 tablet by mouth daily      ibuprofen (ADVIL;MOTRIN) 800 MG tablet Take 800 mg by mouth as needed      fluticasone (FLONASE) 50 MCG/ACT nasal spray USE 2 SPRAYS IN EACH NOSTRIL DAILY 1 Bottle 3    carvedilol (COREG) 12.5 MG tablet Take 12.5 mg by mouth 2 times daily (with meals).  amLODIPine (NORVASC) 5 MG tablet Take 5 mg by mouth daily.  omeprazole (PRILOSEC) 40 MG capsule Take 40 mg by mouth daily.  aspirin 81 MG tablet Take 81 mg by mouth daily.  S-Adenosylmethionine (EFREN-E) 400 MG TABS Take  by mouth.  diclofenac (VOLTAREN) 75 MG EC tablet Take 1 tablet by mouth 2 times daily 1 PO Q BID WITH FOOD (Patient not taking: Reported on 9/1/2020) 60 tablet 2    meloxicam (MOBIC) 15 MG tablet Take 1 tablet by mouth daily (Patient not taking: Reported on 9/1/2020) 30 tablet 2     No current facility-administered medications for this visit.       Social History:   Social History     Socioeconomic History    Marital status:      Spouse name: Not on file    Number of children: Not on file    Years of education: Not on file    Highest education level: Not on file   Occupational History    Not on file   Social Needs    Financial resource strain: Not on file    Food insecurity     Worry: Not on file     Inability: Not on file    Transportation needs     Medical: Not on file     Non-medical: Not on file   Tobacco Use    Smoking status: Current Some Day Smoker     Packs/day: 0.25     Types: Cigarettes    Smokeless tobacco: Never Used    Tobacco comment: quit March 4, 2019   Substance and Sexual Activity    Alcohol use: No    Drug use: No    Sexual activity: Not Currently   Lifestyle    Physical activity     Days per week: Not on file     Minutes per session: Not on file    Stress: Not on file   Relationships    Social connections     Talks on phone: Not on file     Gets together: Not on file     Attends Zoroastrian service: Not on file     Active member of club or organization: Not on file     Attends meetings of clubs or organizations: Not on file     Relationship status: Not on file    Intimate partner violence     Fear of current or ex partner: Not on file     Emotionally abused: Not on file     Physically abused: Not on file     Forced sexual activity: Not on file   Other Topics Concern    Not on file   Social History Narrative    Not on file     Family History:   Family History   Problem Relation Age of Onset    Breast Cancer Mother     Cancer Mother         Bone    Diabetes Mother     Heart Disease Mother         CHF    Arthritis Mother     Heart Attack Father     Prostate Cancer Brother     Colon Cancer Brother         One Brother with Kidney CA and One with Bladder CA    Cancer Maternal Grandmother      Review of System   Review of Systems   HENT: Positive for sinus pressure, sneezing and tinnitus. Musculoskeletal: Positive for arthralgias, back pain and joint swelling. Allergic/Immunologic: Positive for environmental allergies. Objective:     Vitals  Vitals:    09/01/20 1005   BP: (!) 174/95   Pulse: 75   Resp: 16   Temp: 98 °F (36.7 °C)   SpO2: 95%     Physical Exam  Physical Exam  HENT:      Head: Normocephalic and atraumatic. Eyes:      Conjunctiva/sclera: Conjunctivae normal.   Neck:      Musculoskeletal: Normal range of motion and neck supple.    Pulmonary:      Effort: Pulmonary effort is normal. Abdominal:      Palpations: Abdomen is soft. Skin:     General: Skin is warm and dry. Neurological:      Mental Status: She is alert and oriented to person, place, and time. No results found for this visit on 09/01/20. Assessment/Plan:     Gillian Sky is a 75 y.o. female with   1. Cystocele, midline    2. Vaginal enterocele    3. Vaginal vault 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    4. Urinary urgency    5. Urinary frequency    6. Urge incontinence      She is doing well on the myrbetriq and will continue with it. Her BP is up today but she checks it at home and it has been normal.  She will continue to take her Bp at home and let me know if thee is any problem. No orders of the defined types were placed in this encounter. No orders of the defined types were placed in this encounter.       Adalid Boss

## 2020-09-30 PROBLEM — Z12.11 COLON CANCER SCREENING: Status: RESOLVED | Noted: 2020-08-31 | Resolved: 2020-09-30

## 2020-10-16 ENCOUNTER — HOSPITAL ENCOUNTER (OUTPATIENT)
Dept: MRI IMAGING | Age: 65
Discharge: HOME | End: 2020-10-16
Payer: MEDICARE

## 2020-10-16 ENCOUNTER — OFFICE VISIT (OUTPATIENT)
Dept: ORTHOPEDIC SURGERY | Age: 65
End: 2020-10-16
Payer: MEDICARE

## 2020-10-16 VITALS — BODY MASS INDEX: 38.15 KG/M2 | WEIGHT: 229 LBS | HEIGHT: 65 IN

## 2020-10-16 PROCEDURE — 4040F PNEUMOC VAC/ADMIN/RCVD: CPT | Performed by: PHYSICIAN ASSISTANT

## 2020-10-16 PROCEDURE — 4004F PT TOBACCO SCREEN RCVD TLK: CPT | Performed by: PHYSICIAN ASSISTANT

## 2020-10-16 PROCEDURE — 73221 MRI JOINT UPR EXTREM W/O DYE: CPT

## 2020-10-16 PROCEDURE — G8428 CUR MEDS NOT DOCUMENT: HCPCS | Performed by: PHYSICIAN ASSISTANT

## 2020-10-16 PROCEDURE — G8417 CALC BMI ABV UP PARAM F/U: HCPCS | Performed by: PHYSICIAN ASSISTANT

## 2020-10-16 PROCEDURE — G8484 FLU IMMUNIZE NO ADMIN: HCPCS | Performed by: PHYSICIAN ASSISTANT

## 2020-10-16 PROCEDURE — 1123F ACP DISCUSS/DSCN MKR DOCD: CPT | Performed by: PHYSICIAN ASSISTANT

## 2020-10-16 PROCEDURE — 99213 OFFICE O/P EST LOW 20 MIN: CPT | Performed by: PHYSICIAN ASSISTANT

## 2020-10-16 PROCEDURE — 3017F COLORECTAL CA SCREEN DOC REV: CPT | Performed by: PHYSICIAN ASSISTANT

## 2020-10-16 PROCEDURE — L3670 SO ACRO/CLAV CAN WEB PRE OTS: HCPCS | Performed by: PHYSICIAN ASSISTANT

## 2020-10-16 PROCEDURE — 1090F PRES/ABSN URINE INCON ASSESS: CPT | Performed by: PHYSICIAN ASSISTANT

## 2020-10-16 PROCEDURE — G8400 PT W/DXA NO RESULTS DOC: HCPCS | Performed by: PHYSICIAN ASSISTANT

## 2020-10-16 NOTE — PROGRESS NOTES
Chief Complaint    Shoulder Injury (RIGHT global shoulder pain increased about 2 days after fall 3 weeks ago; chiropractic treatments not helpful; pain with movements away from body)      History of Present Illness:  Aneita Buerger is a 75 y.o. female who presents today for orthopedic consultation for right shoulder injury. Patient's primary care physician is Dr. Crista Escobedo. Patient states that approximately 3 weeks ago while putting the bed frame together she tripped over part of the bed frame falling in between the bed and a dresser impacting her right shoulder and arm. She thinks that she impacted her elbow first and then shoulder into the dresser. She states that she had minimal pain initially but after 2 to 3 days she found that the pain has increased to the point where she is now having moderate pain to occasional severe pain with any attempted range of motion of the right shoulder. She is right-hand dominant and finds simple things like combing her hair she needs to assist the right arm with her left. She is uncomfortable at night sleeping secondary to pain. Pain is noted over the anterior lateral aspect of the right shoulder. She had no previous right shoulder injuries or surgery. Pain Assessment  Location of Pain: Shoulder  Location Modifiers: Right  Severity of Pain: 6  Quality of Pain: Sharp, Aching  Duration of Pain: Persistent  Frequency of Pain: Constant  Aggravating Factors: Bending, Stretching, Straightening  Limiting Behavior: Yes  Relieving Factors: Rest, Nsaids  Result of Injury: Yes  Work-Related Injury: No    Medical History:  Patient's medications, allergies, past medical, surgical, social and family histories were reviewed and updated as appropriate. Review of Systems:  Relevant 12 point review of systems dated 10/16/2020 was reviewed and are available in the patient's chart under the media tab.     Vital Signs:  Ht 5' 5\" (1.651 m)   Wt 229 lb (103.9 kg)   BMI 38.11 kg/m² General Exam:   Constitutional: Patient is adequately groomed with no evidence of malnutrition  DTRs: Deep tendon reflexes are intact  Mental Status: The patient is oriented to time, place and person. The patient's mood and affect are appropriate. Lymphatic: The lymphatic examination bilaterally reveals all areas to be without enlargement or induration. Vascular: Examination reveals no swelling or calf tenderness. Peripheral pulses are palpable and 2+. Neurological: The patient has good coordination. There is no weakness or sensory deficit. Right shoulder Examination:    Inspection:  No rashes, scars, or lesions. No deformity or atrophy. Palpation: She is tender to palpation over the bicipital groove into the lateral aspect of the right shoulder    Range of Motion: Very limited range of motion in any plane secondary to severe pain    Strength: Right shoulder. Biceps and triceps strength is 3/5. Special Tests: Unable to test secondary to pain. Positive drop arm    Skin: There are no rashes, ulcerations or lesions. Additional Examinations:         Contralateral Exam: Examination of the right shoulder reveals no atrophy or deformity. Skin is warm and dry. Range of motion is within normal limits. There is no focal tenderness with palpation. No AC joint tenderness. Negative Neer and Iglesias-Jorge exams. Strength is graded 5/5 throughout. Neck: Examination of the neck does not show any tenderness, deformity or injury. Range of motion is unremarkable. There is no gross instability. There are no rashes, ulcerations or lesions. Strength and tone are normal.    Radiology:     X-rays obtained and reviewed in office:  Views 3 views including AP, Y, axillary  Location right shoulder  Impression there is a well-maintained glenohumeral joint with minimal arthritic changes. No fractures or dislocations. Impression: Rotator cuff tear right shoulder  Encounter Diagnoses   Name Primary?     Acute pain of right shoulder     Strain of right rotator cuff capsule, initial encounter Yes       Office Procedures:  Orders Placed This Encounter   Procedures    XR SHOULDER RIGHT (MIN 2 VIEWS)     Standing Status:   Future     Number of Occurrences:   1     Standing Expiration Date:   10/16/2021    MRI SHOULDER RIGHT WO CONTRAST     Standing Status:   Future     Standing Expiration Date:   10/16/2021     Scheduling Instructions:      BRIANNE       [id number]            PATIENT TO CALL AND SCHEDULE     Order Specific Question:   Reason for exam:     Answer:   EVALUATE FOR RTC TEAR    Breg Sling Shot 2 Shoulder Sling     Patient was prescribed a Breg Sling Shot II Shoulder Brace. The right shoulder will require stabilization / immobilization from this orthosis. The orthosis will assist in protecting the affected area, provide functional support and facilitate healing. The device was ordered and fit on 10/16/20. The patient was educated and fit by a healthcare professional with expert knowledge and specialization in brace application while under the direct supervision of the treating physician. Verbal and written instructions for the use of and application of this item were provided. They were instructed to contact the office immediately should the brace result in increased pain, decreased sensation, increased swelling or worsening of the condition. Treatment Plan: Today we discussed the diagnosis and treatment plan with the patient. Due to her severe pain and decreased range of motion we are going to obtain an MRI of the right shoulder to rule out patient is large rotator cuff tear. We also placed her into a shoulder immobilizer that she is to wear at all times other than bathing. She will follow-up after the MRI for review of the results and for future treatment plans.

## 2020-10-20 ENCOUNTER — TELEPHONE (OUTPATIENT)
Dept: ORTHOPEDIC SURGERY | Age: 65
End: 2020-10-20

## 2020-10-20 ENCOUNTER — OFFICE VISIT (OUTPATIENT)
Dept: ORTHOPEDIC SURGERY | Age: 65
End: 2020-10-20
Payer: MEDICARE

## 2020-10-20 PROCEDURE — 99214 OFFICE O/P EST MOD 30 MIN: CPT | Performed by: PHYSICIAN ASSISTANT

## 2020-10-20 PROCEDURE — G8484 FLU IMMUNIZE NO ADMIN: HCPCS | Performed by: PHYSICIAN ASSISTANT

## 2020-10-20 PROCEDURE — G8427 DOCREV CUR MEDS BY ELIG CLIN: HCPCS | Performed by: PHYSICIAN ASSISTANT

## 2020-10-20 PROCEDURE — 4004F PT TOBACCO SCREEN RCVD TLK: CPT | Performed by: PHYSICIAN ASSISTANT

## 2020-10-20 PROCEDURE — G8400 PT W/DXA NO RESULTS DOC: HCPCS | Performed by: PHYSICIAN ASSISTANT

## 2020-10-20 PROCEDURE — 4040F PNEUMOC VAC/ADMIN/RCVD: CPT | Performed by: PHYSICIAN ASSISTANT

## 2020-10-20 PROCEDURE — 1123F ACP DISCUSS/DSCN MKR DOCD: CPT | Performed by: PHYSICIAN ASSISTANT

## 2020-10-20 PROCEDURE — 1090F PRES/ABSN URINE INCON ASSESS: CPT | Performed by: PHYSICIAN ASSISTANT

## 2020-10-20 PROCEDURE — 3017F COLORECTAL CA SCREEN DOC REV: CPT | Performed by: PHYSICIAN ASSISTANT

## 2020-10-20 PROCEDURE — G8417 CALC BMI ABV UP PARAM F/U: HCPCS | Performed by: PHYSICIAN ASSISTANT

## 2020-10-20 NOTE — PROGRESS NOTES
Chief Complaint    Results (RIGHT shoulder MRI Results)      History of Present Illness:  Page Mckeon is a 75 y.o. female who presents today for for follow-up visit concerning her right shoulder. Patient is here for MRI results. Patient states that approximately 4 weeks ago while putting the bed frame together she tripped over part of the bed frame falling in between the bed and a dresser impacting her right shoulder and arm. She thinks that she impacted her elbow first and then shoulder into the dresser. She states that she had minimal pain initially but after 2 to 3 days she found that the pain has increased to the point where she is now having moderate pain to occasional severe pain with any attempted range of motion of the right shoulder. She is right-hand dominant and finds simple things like combing her hair she needs to assist the right arm with her left. She is uncomfortable at night sleeping secondary to pain. Pain is noted over the anterior lateral aspect of the right shoulder. She had no previous right shoulder injuries or surgery. Patient states that since her last visit she has not had any improvement. Continues to complain of pain and weakness with overhead activities. Pain Assessment  Location of Pain: Shoulder  Location Modifiers: Right  Severity of Pain: 2  Quality of Pain: Dull  Duration of Pain: Persistent  Frequency of Pain: Intermittent  Aggravating Factors: Bending, Stretching, Straightening  Limiting Behavior: Some  Relieving Factors: Rest    Medical History:  Patient's medications, allergies, past medical, surgical, social and family histories were reviewed and updated as appropriate. Review of Systems:  Relevant 12 point review of systems dated 10/16/2020 was reviewed and are available in the patient's chart under the media tab. Vital Signs: There were no vitals taken for this visit.     General Exam:   Constitutional: Patient is adequately groomed with no evidence of PROVIDED HISTORY:    Reason for exam:->EVALUATE FOR RTC TEAR    Reason for Exam: Falling injury three weeks ago. Severe pain. Right shoulder    RCT due to trauma. Acuity: Acute    Type of Exam: Initial         FINDINGS:    ROTATOR CUFF: Distal supraspinatus and infraspinatus tendinosis. Full-thickness tear of the supraspinatus with torn fibers retracted about 1.4    cm.  Some of the posterior most fibers of the supraspinatus may still be    intact.  Infraspinatus, teres minor, and subscapularis are intact.  No    muscular atrophy.  Low-grade strain of the subscapularis.         BICEPS TENDON: Intact.         LABRUM: Circumferential fraying and tearing of the labrum most prominent    posteriorly.  Findings are likely chronic and degenerative in etiology.         GLENOHUMERAL JOINT: Small joint effusion.  Mild synovitis.  No    intra-articular body.  Small marginal osteophytes.  No focal high-grade    chondral loss.         AC JOINT AND ACROMIOCLAVICULAR ARCH: Mild acromioclavicular degenerative    changes.  Mild subacromial subdeltoid bursitis.         BONE MARROW: No acute fracture or suspicious marrow replacing lesion.  Cystic    changes the greater tuberosity.         OUTLET SPACES: The suprascapular notch and quadrilateral space are without    obstructing or space occupying lesions.              Impression    Distal supraspinatus and infraspinatus tendinosis.         Full-thickness tear of the supraspinatus with torn fibers retracted about 1.4    cm.  Some of the posterior most fibers of the supraspinatus may still be    intact.  No muscular atrophy.         Low-grade strain of the subscapularis.         Small glenohumeral joint effusion mild synovitis.         Mild acromioclavicular degenerative changes.         Mild subacromial subdeltoid bursitis. Impression: Right shoulder full-thickness rotator cuff tear, impingement syndrome, and labral fraying. No diagnosis found.     Office Procedures:  No orders of the defined types were placed in this encounter. Treatment Plan: Patient is scheduled for a right shoulder video arthroscopy with subacromial decompression, labral debridement, and rotator cuff repair    We discussed shoulder arthroscopy surgery in detail. We talked about the arthroscopic nature of the procedure, the portals utilized and what can be done through these portals. We also discussed concerns regarding surgery, specifically including infection, deep vein thrombosis, pulmonary dystrophy, arthrofibrosis, delayed rehabilitation, etc.  We also discussed the possibility of an interscalene block and that anesthesia personnel would talk to them about this procedure and any concerns in that regard. We also touched briefly on the degree of rotator cuff damage that can be treated and labral damage that can be treated and how it is always possible that the injury is more severe than expected on clinical examination by MRI. The reality is that if there is a full thickness rotator cuff tear, the treatment is dramatically different and the rehabilitation much slower. The same would be true of labral pathology, whether it be an anterior labral tear or a slap lesion that would potentially require repair versus a lesion that can just be debrided. We also discussed prophylaxis in terms of positioning carefully intravenous antibiotics preoperative, etc. All questions were answered from the patient. Patient is fairly sensitive to narcotic pain medication. She states that she cannot take Percocet but would like some Zofran postoperatively in case she gets nauseated. Also muscle relaxers worked fairly well on her and we will provide her with a prescription for Zanaflex at the time of surgery.

## 2020-10-20 NOTE — LETTER
CONSENT TO SURGICAL OR MEDICAL PROCEDURE    PATIENTS NAMES: Yin Come 1955  75 y.o. [phone] (home)   DATE/TIME: 10/20/2020 11:04 AM    1) I consent that Dr. Sherly Morris perform one or more surgical and or medical procedures on the above named patient at: [facility]/[place] [address] to treat the condition(s) which appear indicated by the diagnostic studies already preformed. I have been told in general terms the nature and purpose of the procedure(s) and what the procedure(s) is/are expected to accomplish. They procedure(s) are as follows:   RIGHT SHOULDER ARTHROSCOPY:  SUBACROMIAL DECOMPRESSION, LABRAL DEBRIDEMENT AND ROTATOR CUFF TEAR  2) It has been explained to me by the informing physician that during the course of any surgical or medical procedure unforeseen condition(s) may be revealed that necessitate an extension of the original procedure(s) or a different procedure(s) than set forth in Paragraph 1. I therefore consent that the above named physician perform such additional or different procedure(s) as are necessary or desirable in the exercise of his professional judgement. 3) I have been made aware by the informing physician of certain reasonably known risks that are associated with the procedure(s) set forth in Paragraph 1.  I understand the reasonably known risk to be: Including but not limited to: CVA, infection, M.I., Phlebitis, Cardiac Arrest and Pulmonary Embolism, Loss of Circulation, Nerve Injury, Delayed Healing, Recurrence, Loss of extremity/digit, R.S.D., Screw breakage, Arthritis, Pain, Swelling, Stiffness, Failure of Prothesis, Fracture, Leg length discrepancy, Wound complication/non-healing, need for further surgery and persistent pain.   4) I have also been informed by the informing physician that there are other risks, from both known and unknown causes, that are attendant to the performance of any surgical or medical procedure(s). I am aware that the practice of surgery and medicine is not an exact science, and I acknowledge that no guarantees have been made to me concerning the results of the procedure(s). 5) I consent to the taking of photographs before, during and after the procedure(s) for scientific and educational purposes. I also understand that medical students and residents may participate in the procedure(s) set forth in Paragraph 1, and I consent to their participation under the supervision of the above named physician. 6) I consent to the administration of anesthesia and to the use of such anesthetics as may be deemed advisable by the anesthesiologist engaged to administer anesthesia. 7) I certify that I have read and understand this consent to the surgical or medical procedure(s); that all the information contained herein was disclosed to me by the informing physician prior to my signing; that all blanks or statements requiring insertions or completion were filled in and inapplicable paragraphs, if any, were stricken before I signed; and that all questions asked by me about the procedure(s) have been fully answered by the informing physician in a satisfactory manner.     Would you like to schedule an appointment with Dr. Alcon Avila prior to surgery:   YES  / NO  ______Initial    ________________________________                           _______________________________  Signature of patient                                                                  Witness           Aurora Dougherty PA-C / SAAD Peña M.D.   ________________________________                           ________________________________  Informing Physician Assistant                                                                 Physician (Print)    If patient is unable to sign or is a minor, complete one of the following: A) Patient is a minor ______________ years of age. B) Patient is unable to sign because_________________________________________________    The undersigned represents that he or she is duly authorized to execute to this consent for and on the behalf of the above named patient.    ________________________________             __________________________________________  Witness                                                                         Parent/Spouse/Guardian/Other:_________________    Medical Record#  Insurance  Smartphone:  Yes   Or   No  Email:                       You have signed a consent to have a total joint replacement surgery performed. Before you can proceed with surgery the following things must be done. Please use this form as a checklist.      _____   Please schedule your Physical Therapy functional evaluation. (Allowed locations are listed on the order)    _____   Please take your lab orders and get your blood work done at a [facility]. (If needed, please use the web site to find the location closest to you:  [employer]/[email or web address]) . You do not need an appointment and you do not need to fast.    _____  If you did not register in the office, you will need to go to the website for [employer] ([email or web address]) to complete registration and the medical questionnaire prior to your physical therapy evaluation. Do not schedule an appointment with your primary care physician until you have a surgery date. This pre-op history and physical has to be within 30 days of the surgery. _____  CT Scan. We will schedule this once your surgery has been scheduled. _____  Information about the pre-op class, your CT scan, your post-op appointment and cold therapy options will be in your surgery packet that will be mailed to you after you are scheduled for surgery. Once you have completed both the labs and the Physical Therapy evaluation please call Radha Ann @ [phone] to let her know. Once verification of the PT Evaluation and completed labs has been determined you will be called and set up for surgery. This may take 1-2 days to check results and return your phone call. You will not be called about lab results if everything is normal.    Please make sure you have not blocked our number. Radha Mar will call from [phone] / [phone]. Also, please make sure your voice mail is not full.

## 2020-10-22 NOTE — PROGRESS NOTES
Aneita Buerger    Age 75 y.o.    female    1955    N [id number]    10/30/2020  Arrival Time_____________  OR Time____________[address]     Procedure(s):  VIDEO ARTHROSCOPY RIGHT SHOULDER SUBACROMIAL DECOMPRESSION LABRAL DEBRIDEMENT ROTATOR CUFF TEAR                      General    Surgeon(s):  Teresa Godoy, MD       Phone [phone] ([place])     [address]  Cell Work  _________________________________________________________________  _________________________________________________________________  _________________________________________________________________  _________________________________________________________________  _________________________________________________________________      PCP _____________________________ Phone_________________       H&P__________________Bringing    Chart            Epic  DOS     Called_______  EKG__________________Bringing    Chart            Epic  DOS     Called_______  LAB__________________ Bringing    Chart            Epic  DOS     Called_______  Cardiac Clearance_______Bringing    Chart            Epic      DOS       Called_______    Cardiologist________________________ Phone___________________________      ? Scientologist concerns / Waiver on Chart            PAT Communications_____________  ? Pre-op Instructions Given [place]          ______________________________  ? Directions to Surgery Center                          ______________________________  ? Transportation Home_______________      _______________________________  ?  Crutches/Walker__________________        _______________________________      ________Pre-op Orders   _______Transcribed    _______Wt.  ________Pharmacy          _______SCD  ______VTE     ______Beta Blocker  ________Consent             ________TED Maryfrances Green

## 2020-10-26 ENCOUNTER — OFFICE VISIT (OUTPATIENT)
Dept: PRIMARY CARE CLINIC | Age: 65
End: 2020-10-26
Payer: MEDICARE

## 2020-10-26 PROCEDURE — G8428 CUR MEDS NOT DOCUMENT: HCPCS | Performed by: NURSE PRACTITIONER

## 2020-10-26 PROCEDURE — G8417 CALC BMI ABV UP PARAM F/U: HCPCS | Performed by: NURSE PRACTITIONER

## 2020-10-26 PROCEDURE — 99211 OFF/OP EST MAY X REQ PHY/QHP: CPT | Performed by: NURSE PRACTITIONER

## 2020-10-26 NOTE — PATIENT INSTRUCTIONS
Advance Care Planning  People with COVID-19 may have no symptoms, mild symptoms, such as fever, cough, and shortness of breath or they may have more severe illness, developing severe and fatal pneumonia. As a result, Advance Care Planning with attention to naming a health care decision maker (someone you trust to make healthcare decisions for you if you could not speak for yourself) and sharing other health care preferences is important BEFORE a possible health crisis. Please contact your Primary Care Provider to discuss Advance Care Planning. Preventing the Spread of Coronavirus Disease 2019 in Homes and Residential Communities  For the most recent information go to [email or web address]    Prevention steps for People with confirmed or suspected COVID-19 (including persons under investigation) who do not need to be hospitalized  and   People with confirmed COVID-19 who were hospitalized and determined to be medically stable to go home    Your healthcare provider and public health staff will evaluate whether you can be cared for at home. If it is determined that you do not need to be hospitalized and can be isolated at home, you will be monitored by staff from your local or state health department. You should follow the prevention steps below until a healthcare provider or local or state health department says you can return to your normal activities. Stay home except to get medical care  People who are mildly ill with COVID-19 are able to isolate at home during their illness. You should restrict activities outside your home, except for getting medical care. Do not go to work, school, or public areas. Avoid using public transportation, ride-sharing, or taxis. Separate yourself from other people and animals in your home  People: As much as possible, you should stay in a specific room and away from other people in your home.  Also, you should use a separate bathroom, if available. Animals: You should restrict contact with pets and other animals while you are sick with COVID-19, just like you would around other people. Although there have not been reports of pets or other animals becoming sick with COVID-19, it is still recommended that people sick with COVID-19 limit contact with animals until more information is known about the virus. When possible, have another member of your household care for your animals while you are sick. If you are sick with COVID-19, avoid contact with your pet, including petting, snuggling, being kissed or licked, and sharing food. If you must care for your pet or be around animals while you are sick, wash your hands before and after you interact with pets and wear a facemask. Call ahead before visiting your doctor  If you have a medical appointment, call the healthcare provider and tell them that you have or may have COVID-19. This will help the healthcare providers office take steps to keep other people from getting infected or exposed. Wear a facemask  You should wear a facemask when you are around other people (e.g., sharing a room or vehicle) or pets and before you enter a healthcare providers office. If you are not able to wear a facemask (for example, because it causes trouble breathing), then people who live with you should not stay in the same room with you, or they should wear a facemask if they enter your room. Cover your coughs and sneezes  Cover your mouth and nose with a tissue when you cough or sneeze. Throw used tissues in a lined trash can. Immediately wash your hands with soap and water for at least 20 seconds or, if soap and water are not available, clean your hands with an alcohol-based hand  that contains at least 60% alcohol.   Clean your hands often  Wash your hands often with soap and water for at least 20 seconds, especially after blowing your nose, coughing, or sneezing; going to the bathroom; and have a medical emergency and need to call 911, notify the dispatch personnel that you have, or are being evaluated for COVID-19. If possible, put on a facemask before emergency medical services arrive. Discontinuing home isolation  Patients with confirmed COVID-19 should remain under home isolation precautions until the risk of secondary transmission to others is thought to be low. The decision to discontinue home isolation precautions should be made on a case-by-case basis, in consultation with healthcare providers and state and local health departments.

## 2020-10-26 NOTE — PROGRESS NOTES
Ailyn Puga received a viral test for COVID-19. They were educated on isolation and quarantine as appropriate. For any symptoms, they were directed to seek care from their PCP, given contact information to establish with a doctor, directed to an urgent care or the emergency room.

## 2020-10-27 LAB — SARS-COV-2, PCR: NOT DETECTED

## 2020-10-27 NOTE — RESULT ENCOUNTER NOTE

## 2020-10-29 ENCOUNTER — ANESTHESIA EVENT (OUTPATIENT)
Dept: OPERATING ROOM | Age: 65
End: 2020-10-29
Payer: MEDICARE

## 2020-10-29 ENCOUNTER — TELEPHONE (OUTPATIENT)
Dept: ORTHOPEDIC SURGERY | Age: 65
End: 2020-10-29

## 2020-10-29 NOTE — ANESTHESIA PRE PROCEDURE
Department of Anesthesiology  Preprocedure Note       Name:  Hany German   Age:  75 y.o.  :  1955                                          MRN:  [id number]         Date:  10/30/2020      Surgeon: Bridger Swain MD    Procedure:  VIDEO ARTHROSCOPY RIGHT SHOULDER SUBACROMIAL DECOMPRESSION LABRAL DEBRIDEMENT ROTATOR CUFF TEAR    HPI:  75 y.o. female who states that while putting the bed frame together she tripped over part of the bed frame falling in between the bed and a dresser impacting her right shoulder and arm. She thinks that she impacted her elbow first and then shoulder into the dresser. She states that she had minimal pain initially but after 2 to 3 days she found that the pain has increased to the point where she is now having moderate pain to occasional severe pain with any attempted range of motion of the right shoulder. She is right-hand dominant and finds simple things like combing her hair she needs to assist the right arm with her left. She is uncomfortable at night sleeping secondary to pain. Pain is noted over the anterior lateral aspect of the right shoulder. She had no previous right shoulder injuries or surgery. Continues to complain of pain and weakness with overhead activities     MRI of Right Shoulder:  Right shoulder full-thickness rotator cuff tear, impingement syndrome, and labral fraying. ECHO:  EF 53%, grade 1 DD. GLS -21%.  mildly elevated CVP    Medications prior to admission:    gabapentin (NEURONTIN) 300 MG  TAKE 1 CAPSULE BY MOUTH THREE TIMES DAILY FOR 30 DAYS   ciclopirox (PENLAC) 8 % solution APPLY TOPICALLY ONE APPLICATION TO THE AFFECTED AREA DAILY   mirabegron (MYRBETRIQ) 25 MG   Take 1 tablet by mouth daily   diclofenac (VOLTAREN) 75 MG EC t  Take 1 tablet by mouth 2 times daily 1 PO Q BID WITH FOOD   Multiple Vitamins-Minerals   Take 1 tablet by mouth daily   fluticasone (FLONASE)  nasal spray  daily as needed    carvedilol (COREG) 12.5 MG t  Take 12.5 mg by mouth 2 times daily (with meals). amLODIPine (NORVASC) 5 MG  Take 5 mg by mouth daily. omeprazole (PRILOSEC) 40 MG   Take 40 mg by mouth daily. aspirin 81 MG tablet Take 81 mg by mouth daily. S-Adenosylmethionine (EFREN-E) 400 MG  Take  by mouth.      Allergies:     Adhesive Tape      Band aids blisters skin    Codeine     Food      Black walnut    Nitrofurantoin Itching    Penicillins Swelling     \"Throat closes\"    Sulfa Antibiotics     Tramadol Nausea Only     Problem List:     Pharyngeal inflammation    Acute lymphadenitis of face, head and neck    Perennial allergic rhinitis    Acute laryngitis    Primary osteoarthritis of right knee    Calculus of kidney    Cystocele, midline    Essential (primary) hypertension    Eustachian tube dysfunction, bilateral    Gastro-esophageal reflux disease without esophagitis    Generalized anxiety disorder    Hyperlipidemia    Liver cyst    Neuritis    Old myocardial infarction    Otalgia, right    Tinnitus of vascular origin    Vaginal enterocele    RUQ pain    Tinnitus, bilateral    Tobacco use    Vaginal vault 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    Urinary incontinence     Past Medical History:     Acute lymphadenitis of face, head and neck     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    Arthritis     Calculus of kidney     Cancer (HCC)     skin    Cystocele, midline     Gastro-esophageal reflux disease without esophagitis     Hypertension     Liver cyst     Neuritis     Nocturia     Osteoarthritis     Otalgia     Perennial allergic rhinitis     Urinary frequency     Vaginal vault 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      Past Surgical History:     APPENDECTOMY      CHOLECYSTECTOMY      ELBOW DEBRIDEMENT      FOOT SURGERY      bilateral    HYSTERECTOMY, VAGINAL      KNEE ARTHROSCOPY Right 4/24/2019    RIGHT KNEE ARTHROSCOPY,, SYNOVECTOMY CHONDROPLASTY,MEDIAL AND LATERAL MENESECTOMY, REMOVAL LOOSE BODIES performed [facility] L[facility] Carrion [facility] at [facility] 1      bilateral    LIVER SURGERY      cyst removal    NECK SURGERY      TONSILLECTOMY      TUBAL LIGATION      URETHRA SURGERY      US BREAST LESION REMOVAL LEFT       Social History:     Smoking status: Current Every Day Smoker     Packs/day: 0.25     Types: Cigarettes    Smokeless tobacco: Never Used    Tobacco comment: quit March 4, 2019   Substance Use Topics    Alcohol use: No     Vital Signs (Current):    BP: 170/94  Pulse: 79    Resp: 18  SpO2: 97    Temp: 96 °F (35.6 °C)    Height: 5' 5\" (1.651 m)  (10/30/20)  Weight: 230 lb (104.3 kg)  (10/30/20)    BMI: 38.4            BP Readings from Last 3 Encounters:   09/01/20 (!) 174/95   11/18/19 136/88   09/16/19 136/89     NPO Status: >8 hrs                        BMI:   Wt Readings from Last 3 Encounters:   10/16/20 229 lb (103.9 kg)   06/16/20 229 lb (103.9 kg)   11/18/19 229 lb 0.9 oz (103.9 kg)     Body mass index is 38.11 kg/m². CBC:    HGB 13.8 01/11/2013    HCT 40.7 01/11/2013     01/11/2013     CMP:    CO2 24 01/11/2013    BUN 18 01/11/2013    CREATININE 1.6 01/11/2013    GFRAA 42 01/11/2013    GLUCOSE 88 01/11/2013     COVID-19 Screening (If Applicable):   Lab Results   Component Value Date    COVID19 Not Detected 10/26/2020     Anesthesia Evaluation  Patient summary reviewed and Nursing notes reviewed no history of anesthetic complications:   Airway: Mallampati: II  TM distance: >3 FB   Neck ROM: full  Comment:  Thick neck girth  Mouth opening: > = 3 FB Dental:          Pulmonary: breath sounds clear to auscultation      (-) COPD, asthma, recent URI, sleep apnea, wheezes and not a current smoker                           Cardiovascular:  Exercise tolerance: good (>4 METS),   (+) hypertension:,     (-) past MI,  angina,  MONDRAGON and murmur      Rhythm: regular  Rate: normal  Echocardiogram reviewed                  Neuro/Psych:   (+) psychiatric history:depression/anxiety GI/Hepatic/Renal:   (+) GERD:, renal disease: CRI and kidney stones, morbid obesity          Endo/Other:    (+) : arthritis: OA., .    (-) diabetes mellitus, hypothyroidism               Abdominal:   (+) obese,         Vascular:     - DVT and PE. Anesthesia Plan      general     ASA 3     (IS Br Pl Block(s) for post-op pain management per surgeon request.)  Induction: intravenous. MIPS: Prophylactic antiemetics administered. Anesthetic plan and risks discussed with patient. Plan discussed with CRNA.             Sweta Davila MD

## 2020-10-30 ENCOUNTER — ANESTHESIA (OUTPATIENT)
Dept: OPERATING ROOM | Age: 65
End: 2020-10-30
Payer: MEDICARE

## 2020-10-30 ENCOUNTER — HOSPITAL ENCOUNTER (OUTPATIENT)
Age: 65
Setting detail: OUTPATIENT SURGERY
Discharge: HOME | End: 2020-10-30
Attending: ORTHOPAEDIC SURGERY | Admitting: ORTHOPAEDIC SURGERY
Payer: MEDICARE

## 2020-10-30 VITALS
TEMPERATURE: 98.6 F | SYSTOLIC BLOOD PRESSURE: 132 MMHG | OXYGEN SATURATION: 97 % | RESPIRATION RATE: 17 BRPM | DIASTOLIC BLOOD PRESSURE: 56 MMHG

## 2020-10-30 VITALS
TEMPERATURE: 97 F | RESPIRATION RATE: 16 BRPM | HEART RATE: 80 BPM | OXYGEN SATURATION: 97 % | HEIGHT: 65 IN | BODY MASS INDEX: 38.32 KG/M2 | SYSTOLIC BLOOD PRESSURE: 153 MMHG | DIASTOLIC BLOOD PRESSURE: 80 MMHG | WEIGHT: 230 LBS

## 2020-10-30 PROCEDURE — 2580000003 HC RX 258: Performed by: ANESTHESIOLOGY

## 2020-10-30 PROCEDURE — 2500000003 HC RX 250 WO HCPCS: Performed by: ORTHOPAEDIC SURGERY

## 2020-10-30 PROCEDURE — C1762 CONN TISS, HUMAN(INC FASCIA): HCPCS | Performed by: ORTHOPAEDIC SURGERY

## 2020-10-30 PROCEDURE — 2580000003 HC RX 258: Performed by: ORTHOPAEDIC SURGERY

## 2020-10-30 PROCEDURE — 64415 NJX AA&/STRD BRCH PLXS IMG: CPT | Performed by: ANESTHESIOLOGY

## 2020-10-30 PROCEDURE — 7100000011 HC PHASE II RECOVERY - ADDTL 15 MIN: Performed by: ORTHOPAEDIC SURGERY

## 2020-10-30 PROCEDURE — 2709999900 HC NON-CHARGEABLE SUPPLY: Performed by: ORTHOPAEDIC SURGERY

## 2020-10-30 PROCEDURE — 7100000010 HC PHASE II RECOVERY - FIRST 15 MIN: Performed by: ORTHOPAEDIC SURGERY

## 2020-10-30 PROCEDURE — 3600000004 HC SURGERY LEVEL 4 BASE: Performed by: ORTHOPAEDIC SURGERY

## 2020-10-30 PROCEDURE — 6360000002 HC RX W HCPCS: Performed by: NURSE ANESTHETIST, CERTIFIED REGISTERED

## 2020-10-30 PROCEDURE — C1713 ANCHOR/SCREW BN/BN,TIS/BN: HCPCS | Performed by: ORTHOPAEDIC SURGERY

## 2020-10-30 PROCEDURE — 3700000000 HC ANESTHESIA ATTENDED CARE: Performed by: ORTHOPAEDIC SURGERY

## 2020-10-30 PROCEDURE — 3600000014 HC SURGERY LEVEL 4 ADDTL 15MIN: Performed by: ORTHOPAEDIC SURGERY

## 2020-10-30 PROCEDURE — 3700000001 HC ADD 15 MINUTES (ANESTHESIA): Performed by: ORTHOPAEDIC SURGERY

## 2020-10-30 PROCEDURE — 6360000002 HC RX W HCPCS: Performed by: ANESTHESIOLOGY

## 2020-10-30 PROCEDURE — 2500000003 HC RX 250 WO HCPCS: Performed by: NURSE ANESTHETIST, CERTIFIED REGISTERED

## 2020-10-30 PROCEDURE — 2720000010 HC SURG SUPPLY STERILE: Performed by: ORTHOPAEDIC SURGERY

## 2020-10-30 PROCEDURE — 6360000002 HC RX W HCPCS: Performed by: ORTHOPAEDIC SURGERY

## 2020-10-30 PROCEDURE — 7100000000 HC PACU RECOVERY - FIRST 15 MIN: Performed by: ORTHOPAEDIC SURGERY

## 2020-10-30 PROCEDURE — 2500000003 HC RX 250 WO HCPCS: Performed by: ANESTHESIOLOGY

## 2020-10-30 PROCEDURE — 7100000001 HC PACU RECOVERY - ADDTL 15 MIN: Performed by: ORTHOPAEDIC SURGERY

## 2020-10-30 PROCEDURE — 6370000000 HC RX 637 (ALT 250 FOR IP): Performed by: ANESTHESIOLOGY

## 2020-10-30 DEVICE — MULTIFIX S-ULTRA 5.5MM KNOTLESS ANCHOR
Type: IMPLANTABLE DEVICE | Site: SHOULDER | Status: FUNCTIONAL
Brand: MULTIFIX

## 2020-10-30 DEVICE — HEALICOIL REGENESORB 5.5 MM SUTURE                                    ANCHOR WITH ONE ULTRATAPE SUTURE                                    BLUE AND ONE NO.2 ULTRABRAID SUTURE
Type: IMPLANTABLE DEVICE | Site: SHOULDER | Status: FUNCTIONAL
Brand: HEALICOIL REGENESORB

## 2020-10-30 DEVICE — IMPLANTABLE DEVICE
Type: IMPLANTABLE DEVICE | Site: SHOULDER | Status: FUNCTIONAL
Brand: BIOINDUCTIVE IMPLANT WITH ARTHROSCOPIC DELIVERY SYSTEM - LARGE

## 2020-10-30 DEVICE — Z INACTIVE USE 2600835 ANCHOR TEND 8 FOR REGENETEN BIOINDUCTIVE IMPL SYS: Type: IMPLANTABLE DEVICE | Site: SHOULDER | Status: FUNCTIONAL

## 2020-10-30 DEVICE — BONE ANCHORS 3 WITH ARTHROSCOPIC DELIVERY SYSTEM ADVANCED
Type: IMPLANTABLE DEVICE | Site: SHOULDER | Status: FUNCTIONAL
Brand: BONE ANCHORS WITH ARTHROSCOPIC DELIVERY SYSTEM - ADVANCED

## 2020-10-30 RX ORDER — MAGNESIUM HYDROXIDE 1200 MG/15ML
LIQUID ORAL CONTINUOUS PRN
Status: COMPLETED | OUTPATIENT
Start: 2020-10-30 | End: 2020-10-30

## 2020-10-30 RX ORDER — SODIUM CHLORIDE, SODIUM LACTATE, POTASSIUM CHLORIDE, AND CALCIUM CHLORIDE .6; .31; .03; .02 G/100ML; G/100ML; G/100ML; G/100ML
IRRIGANT IRRIGATION PRN
Status: DISCONTINUED | OUTPATIENT
Start: 2020-10-30 | End: 2020-10-30 | Stop reason: ALTCHOICE

## 2020-10-30 RX ORDER — BUPIVACAINE HYDROCHLORIDE AND EPINEPHRINE 5; 5 MG/ML; UG/ML
INJECTION, SOLUTION EPIDURAL; INTRACAUDAL; PERINEURAL PRN
Status: DISCONTINUED | OUTPATIENT
Start: 2020-10-30 | End: 2020-10-30 | Stop reason: SDUPTHER

## 2020-10-30 RX ORDER — HYDRALAZINE HYDROCHLORIDE 20 MG/ML
5 INJECTION INTRAMUSCULAR; INTRAVENOUS EVERY 10 MIN PRN
Status: DISCONTINUED | OUTPATIENT
Start: 2020-10-30 | End: 2020-10-30 | Stop reason: HOSPADM

## 2020-10-30 RX ORDER — SODIUM CHLORIDE, SODIUM LACTATE, POTASSIUM CHLORIDE, CALCIUM CHLORIDE 600; 310; 30; 20 MG/100ML; MG/100ML; MG/100ML; MG/100ML
INJECTION, SOLUTION INTRAVENOUS CONTINUOUS
Status: DISCONTINUED | OUTPATIENT
Start: 2020-10-30 | End: 2020-10-30 | Stop reason: HOSPADM

## 2020-10-30 RX ORDER — SODIUM CHLORIDE 0.9 % (FLUSH) 0.9 %
10 SYRINGE (ML) INJECTION EVERY 12 HOURS SCHEDULED
Status: DISCONTINUED | OUTPATIENT
Start: 2020-10-30 | End: 2020-10-30 | Stop reason: HOSPADM

## 2020-10-30 RX ORDER — ONDANSETRON 4 MG/1
4 TABLET, FILM COATED ORAL 3 TIMES DAILY PRN
Qty: 15 TABLET | Refills: 0 | Status: SHIPPED | OUTPATIENT
Start: 2020-10-30 | End: 2021-05-06

## 2020-10-30 RX ORDER — EPHEDRINE SULFATE 50 MG/ML
INJECTION INTRAVENOUS PRN
Status: DISCONTINUED | OUTPATIENT
Start: 2020-10-30 | End: 2020-10-30 | Stop reason: SDUPTHER

## 2020-10-30 RX ORDER — MIDAZOLAM HYDROCHLORIDE 1 MG/ML
INJECTION INTRAMUSCULAR; INTRAVENOUS
Status: COMPLETED
Start: 2020-10-30 | End: 2020-10-30

## 2020-10-30 RX ORDER — BUPIVACAINE HYDROCHLORIDE 5 MG/ML
INJECTION, SOLUTION EPIDURAL; INTRACAUDAL PRN
Status: DISCONTINUED | OUTPATIENT
Start: 2020-10-30 | End: 2020-10-30 | Stop reason: ALTCHOICE

## 2020-10-30 RX ORDER — PROMETHAZINE HYDROCHLORIDE 25 MG/ML
6.25 INJECTION, SOLUTION INTRAMUSCULAR; INTRAVENOUS
Status: DISCONTINUED | OUTPATIENT
Start: 2020-10-30 | End: 2020-10-30 | Stop reason: HOSPADM

## 2020-10-30 RX ORDER — OXYCODONE HYDROCHLORIDE AND ACETAMINOPHEN 5; 325 MG/1; MG/1
1 TABLET ORAL EVERY 6 HOURS PRN
Qty: 28 TABLET | Refills: 0 | Status: SHIPPED | OUTPATIENT
Start: 2020-10-30 | End: 2020-11-03 | Stop reason: SINTOL

## 2020-10-30 RX ORDER — ROCURONIUM BROMIDE 10 MG/ML
INJECTION, SOLUTION INTRAVENOUS PRN
Status: DISCONTINUED | OUTPATIENT
Start: 2020-10-30 | End: 2020-10-30 | Stop reason: SDUPTHER

## 2020-10-30 RX ORDER — ONDANSETRON 2 MG/ML
INJECTION INTRAMUSCULAR; INTRAVENOUS PRN
Status: DISCONTINUED | OUTPATIENT
Start: 2020-10-30 | End: 2020-10-30 | Stop reason: SDUPTHER

## 2020-10-30 RX ORDER — OXYCODONE HYDROCHLORIDE AND ACETAMINOPHEN 5; 325 MG/1; MG/1
2 TABLET ORAL PRN
Status: COMPLETED | OUTPATIENT
Start: 2020-10-30 | End: 2020-10-30

## 2020-10-30 RX ORDER — FENTANYL CITRATE 50 UG/ML
INJECTION, SOLUTION INTRAMUSCULAR; INTRAVENOUS PRN
Status: DISCONTINUED | OUTPATIENT
Start: 2020-10-30 | End: 2020-10-30 | Stop reason: SDUPTHER

## 2020-10-30 RX ORDER — LABETALOL HYDROCHLORIDE 5 MG/ML
INJECTION, SOLUTION INTRAVENOUS PRN
Status: DISCONTINUED | OUTPATIENT
Start: 2020-10-30 | End: 2020-10-30 | Stop reason: SDUPTHER

## 2020-10-30 RX ORDER — TIZANIDINE 4 MG/1
4 TABLET ORAL 3 TIMES DAILY
Qty: 30 TABLET | Refills: 1 | Status: SHIPPED | OUTPATIENT
Start: 2020-10-30 | End: 2020-11-24 | Stop reason: SDUPTHER

## 2020-10-30 RX ORDER — DEXAMETHASONE SODIUM PHOSPHATE 10 MG/ML
INJECTION INTRAMUSCULAR; INTRAVENOUS PRN
Status: DISCONTINUED | OUTPATIENT
Start: 2020-10-30 | End: 2020-10-30 | Stop reason: SDUPTHER

## 2020-10-30 RX ORDER — SODIUM CHLORIDE 0.9 % (FLUSH) 0.9 %
10 SYRINGE (ML) INJECTION PRN
Status: DISCONTINUED | OUTPATIENT
Start: 2020-10-30 | End: 2020-10-30 | Stop reason: HOSPADM

## 2020-10-30 RX ORDER — MIDAZOLAM HYDROCHLORIDE 1 MG/ML
INJECTION INTRAMUSCULAR; INTRAVENOUS PRN
Status: DISCONTINUED | OUTPATIENT
Start: 2020-10-30 | End: 2020-10-30 | Stop reason: SDUPTHER

## 2020-10-30 RX ORDER — LIDOCAINE HYDROCHLORIDE 10 MG/ML
0.3 INJECTION, SOLUTION EPIDURAL; INFILTRATION; INTRACAUDAL; PERINEURAL
Status: DISCONTINUED | OUTPATIENT
Start: 2020-10-30 | End: 2020-10-30 | Stop reason: HOSPADM

## 2020-10-30 RX ORDER — OXYCODONE HYDROCHLORIDE AND ACETAMINOPHEN 5; 325 MG/1; MG/1
1 TABLET ORAL PRN
Status: COMPLETED | OUTPATIENT
Start: 2020-10-30 | End: 2020-10-30

## 2020-10-30 RX ORDER — LIDOCAINE HYDROCHLORIDE 20 MG/ML
INJECTION, SOLUTION EPIDURAL; INFILTRATION; INTRACAUDAL; PERINEURAL PRN
Status: DISCONTINUED | OUTPATIENT
Start: 2020-10-30 | End: 2020-10-30 | Stop reason: SDUPTHER

## 2020-10-30 RX ORDER — FENTANYL CITRATE 50 UG/ML
25 INJECTION, SOLUTION INTRAMUSCULAR; INTRAVENOUS EVERY 5 MIN PRN
Status: DISCONTINUED | OUTPATIENT
Start: 2020-10-30 | End: 2020-10-30 | Stop reason: HOSPADM

## 2020-10-30 RX ORDER — ONDANSETRON 2 MG/ML
4 INJECTION INTRAMUSCULAR; INTRAVENOUS
Status: DISCONTINUED | OUTPATIENT
Start: 2020-10-30 | End: 2020-10-30 | Stop reason: HOSPADM

## 2020-10-30 RX ORDER — MEPERIDINE HYDROCHLORIDE 50 MG/ML
12.5 INJECTION INTRAMUSCULAR; INTRAVENOUS; SUBCUTANEOUS EVERY 5 MIN PRN
Status: DISCONTINUED | OUTPATIENT
Start: 2020-10-30 | End: 2020-10-30 | Stop reason: HOSPADM

## 2020-10-30 RX ORDER — PROPOFOL 10 MG/ML
INJECTION, EMULSION INTRAVENOUS PRN
Status: DISCONTINUED | OUTPATIENT
Start: 2020-10-30 | End: 2020-10-30 | Stop reason: SDUPTHER

## 2020-10-30 RX ADMIN — HYDROMORPHONE HYDROCHLORIDE 0.5 MG: 1 INJECTION, SOLUTION INTRAMUSCULAR; INTRAVENOUS; SUBCUTANEOUS at 13:18

## 2020-10-30 RX ADMIN — FENTANYL CITRATE 100 MCG: 50 INJECTION INTRAMUSCULAR; INTRAVENOUS at 10:57

## 2020-10-30 RX ADMIN — EPHEDRINE SULFATE 10 MG: 50 INJECTION INTRAVENOUS at 12:17

## 2020-10-30 RX ADMIN — CEFAZOLIN SODIUM 2 G: 10 INJECTION, POWDER, FOR SOLUTION INTRAVENOUS at 11:22

## 2020-10-30 RX ADMIN — BUPIVACAINE HYDROCHLORIDE AND EPINEPHRINE 10 ML: 5; 5 INJECTION, SOLUTION EPIDURAL; INTRACAUDAL; PERINEURAL at 11:00

## 2020-10-30 RX ADMIN — FENTANYL CITRATE 100 MCG: 50 INJECTION INTRAMUSCULAR; INTRAVENOUS at 11:26

## 2020-10-30 RX ADMIN — LIDOCAINE HYDROCHLORIDE 60 MG: 20 INJECTION, SOLUTION EPIDURAL; INFILTRATION; INTRACAUDAL; PERINEURAL at 11:25

## 2020-10-30 RX ADMIN — PROPOFOL 200 MG: 10 INJECTION, EMULSION INTRAVENOUS at 11:25

## 2020-10-30 RX ADMIN — MIDAZOLAM HYDROCHLORIDE 2 MG: 2 INJECTION, SOLUTION INTRAMUSCULAR; INTRAVENOUS at 10:57

## 2020-10-30 RX ADMIN — OXYCODONE HYDROCHLORIDE AND ACETAMINOPHEN 1 TABLET: 5; 325 TABLET ORAL at 13:53

## 2020-10-30 RX ADMIN — BUPIVACAINE HYDROCHLORIDE AND EPINEPHRINE 5 ML: 5; 5 INJECTION, SOLUTION EPIDURAL; INTRACAUDAL; PERINEURAL at 10:58

## 2020-10-30 RX ADMIN — BUPIVACAINE HYDROCHLORIDE AND EPINEPHRINE 10 ML: 5; 5 INJECTION, SOLUTION EPIDURAL; INTRACAUDAL; PERINEURAL at 10:59

## 2020-10-30 RX ADMIN — FENTANYL CITRATE 100 MCG: 50 INJECTION INTRAMUSCULAR; INTRAVENOUS at 11:23

## 2020-10-30 RX ADMIN — ROCURONIUM BROMIDE 50 MG: 10 SOLUTION INTRAVENOUS at 11:25

## 2020-10-30 RX ADMIN — SODIUM CHLORIDE, POTASSIUM CHLORIDE, SODIUM LACTATE AND CALCIUM CHLORIDE: 600; 310; 30; 20 INJECTION, SOLUTION INTRAVENOUS at 13:52

## 2020-10-30 RX ADMIN — DEXAMETHASONE SODIUM PHOSPHATE 10 MG: 10 INJECTION INTRAMUSCULAR; INTRAVENOUS at 11:35

## 2020-10-30 RX ADMIN — ONDANSETRON 4 MG: 2 INJECTION INTRAMUSCULAR; INTRAVENOUS at 11:35

## 2020-10-30 RX ADMIN — SUGAMMADEX 200 MG: 100 INJECTION, SOLUTION INTRAVENOUS at 12:49

## 2020-10-30 RX ADMIN — SODIUM CHLORIDE, POTASSIUM CHLORIDE, SODIUM LACTATE AND CALCIUM CHLORIDE: 600; 310; 30; 20 INJECTION, SOLUTION INTRAVENOUS at 10:55

## 2020-10-30 RX ADMIN — LABETALOL HYDROCHLORIDE 5 MG: 5 INJECTION, SOLUTION INTRAVENOUS at 11:58

## 2020-10-30 RX ADMIN — BUPIVACAINE HYDROCHLORIDE AND EPINEPHRINE 5 ML: 5; 5 INJECTION, SOLUTION EPIDURAL; INTRACAUDAL; PERINEURAL at 11:01

## 2020-10-30 RX ADMIN — HYDROMORPHONE HYDROCHLORIDE 0.5 MG: 1 INJECTION, SOLUTION INTRAMUSCULAR; INTRAVENOUS; SUBCUTANEOUS at 13:28

## 2020-10-30 RX ADMIN — MIDAZOLAM HYDROCHLORIDE 2 MG: 2 INJECTION, SOLUTION INTRAMUSCULAR; INTRAVENOUS at 11:20

## 2020-10-30 RX ADMIN — HYDROMORPHONE HYDROCHLORIDE 0.5 MG: 1 INJECTION, SOLUTION INTRAMUSCULAR; INTRAVENOUS; SUBCUTANEOUS at 13:48

## 2020-10-30 ASSESSMENT — PAIN SCALES - GENERAL
PAINLEVEL_OUTOF10: 8
PAINLEVEL_OUTOF10: 2
PAINLEVEL_OUTOF10: 6
PAINLEVEL_OUTOF10: 8
PAINLEVEL_OUTOF10: 9
PAINLEVEL_OUTOF10: 8
PAINLEVEL_OUTOF10: 6
PAINLEVEL_OUTOF10: 8
PAINLEVEL_OUTOF10: 6
PAINLEVEL_OUTOF10: 5

## 2020-10-30 ASSESSMENT — PULMONARY FUNCTION TESTS
PIF_VALUE: 3
PIF_VALUE: 20
PIF_VALUE: 22
PIF_VALUE: 23
PIF_VALUE: 21
PIF_VALUE: 22
PIF_VALUE: 21
PIF_VALUE: 22
PIF_VALUE: 19
PIF_VALUE: 23
PIF_VALUE: 13
PIF_VALUE: 22
PIF_VALUE: 20
PIF_VALUE: 21
PIF_VALUE: 22
PIF_VALUE: 21
PIF_VALUE: 20
PIF_VALUE: 22
PIF_VALUE: 20
PIF_VALUE: 22
PIF_VALUE: 2
PIF_VALUE: 19
PIF_VALUE: 21
PIF_VALUE: 9
PIF_VALUE: 23
PIF_VALUE: 22
PIF_VALUE: 3
PIF_VALUE: 22
PIF_VALUE: 22
PIF_VALUE: 1
PIF_VALUE: 22
PIF_VALUE: 21
PIF_VALUE: 22
PIF_VALUE: 22
PIF_VALUE: 26
PIF_VALUE: 22
PIF_VALUE: 24
PIF_VALUE: 22
PIF_VALUE: 2
PIF_VALUE: 2
PIF_VALUE: 21
PIF_VALUE: 22
PIF_VALUE: 17
PIF_VALUE: 21
PIF_VALUE: 22
PIF_VALUE: 23
PIF_VALUE: 23
PIF_VALUE: 22
PIF_VALUE: 22
PIF_VALUE: 21
PIF_VALUE: 22
PIF_VALUE: 18
PIF_VALUE: 22
PIF_VALUE: 22
PIF_VALUE: 21
PIF_VALUE: 22
PIF_VALUE: 22
PIF_VALUE: 16
PIF_VALUE: 22
PIF_VALUE: 22
PIF_VALUE: 2
PIF_VALUE: 22
PIF_VALUE: 1
PIF_VALUE: 18
PIF_VALUE: 22
PIF_VALUE: 1
PIF_VALUE: 22
PIF_VALUE: 15
PIF_VALUE: 22
PIF_VALUE: 18
PIF_VALUE: 19
PIF_VALUE: 1
PIF_VALUE: 22
PIF_VALUE: 21
PIF_VALUE: 1
PIF_VALUE: 20
PIF_VALUE: 22
PIF_VALUE: 35

## 2020-10-30 ASSESSMENT — PAIN - FUNCTIONAL ASSESSMENT: PAIN_FUNCTIONAL_ASSESSMENT: 0-10

## 2020-10-30 ASSESSMENT — LIFESTYLE VARIABLES: SMOKING_STATUS: 0

## 2020-10-30 ASSESSMENT — PAIN DESCRIPTION - DESCRIPTORS: DESCRIPTORS: ACHING;DULL

## 2020-10-30 NOTE — BRIEF OP NOTE
Brief Postoperative Note      Patient: Joanne Calzada  YOB: 1955  MRN: [id number]    Date of Procedure: 10/30/2020    Pre-Op Diagnosis: Right shoulder impingement syndrome, rotator cuff tear, labral tear. Post-Op Diagnosis: Same       Procedure(s):  VIDEO ARTHROSCOPY RIGHT SHOULDER SUBACROMIAL DECOMPRESSION LABRAL DEBRIDEMENT ROTATOR CUFF TEAR    Surgeon(s):  Tony Morris MD    Assistant:  Surgical Assistant: Maryjane Biswas  Physician Assistant: Minnie Lemus PA-C    Anesthesia: General    Estimated Blood Loss (mL): less than 50     Complications: None    Specimens:   * No specimens in log *    Implants:  Implant Name Type Inv.  Item Serial No.  Lot No. LRB No. Used Action   ANCHOR SUT [employer]AL[employer] RSB SA 4.9WY W/1 Fastener ANCHOR S[employer] SA 0.8VB W/1  SMITH AND NEPHEW: ENDOSCOPY [id number] Right 3 Implanted   ANCHOR MULTFX-S 5.5MM KNOTLSS Fastener ANCHOR MULTFX-S 5.5MM KNOTLSS  SMITH AND NEPHEW: ENDOSCOPY [id number] Right 1 Implanted   ANCHOR MULTFX-S 5.5MM KNOTLSS Fastener ANCHOR MULTFX-S 5.5MM KNOTLSS  SMITH AND NEPHEW: ENDOSCOPY [id number] Right 1 Implanted   ANCHOR MULTFX-S 5.5MM KNOTLSS Fastener ANCHOR MULTFX-S 5.5MM KNOTLSS  SMITH AND NEPHEW: ENDOSCOPY [id number] Right 1 Implanted   GRAFT BIO TENDON LG W/DELIVERY DEVICE Bone/Graft/Tissue/Human/Synth GRAFT BIO TENDON LG W/DELIVERY DEVICE  PAT[employer] NEPHEW  Right 1 Implanted   ANCHOR BONE 3 W/DEL SYS Fastener ANCHOR BONE 3 W/DEL SYS  PATEL AND NEPHEW [id number] Right 1 Implanted   STAPLE FIX RM TENDON Fastener STAPLE FIX RM TENDON  PATEL AND NEPHEW: ENDOSCOPY [id number] Right 1 Implanted         Drains: * No LDAs found *    Findings: 4 weeks abduction pillow, 8 weeks passive    Electronically signed by Tony Morris MD on 10/30/2020 at 1:08 PM

## 2020-10-30 NOTE — OP NOTE
[facility]                 [facility] Center[facility]ynaldo[facility]                                OPERATIVE REPORT    PATIENT NAME: Familia Grayson                   :        1955  MED REC NO:   [id number]                          ROOM:  ACCOUNT NO:   [de-identified]                           ADMIT DATE: 10/30/2020  PROVIDER:     Garrison España MD    DATE OF PROCEDURE:  10/30/2020    PREOPERATIVE DIAGNOSES:  Right shoulder impingement syndrome, rotator  cuff tear and labral tear. POSTOPERATIVE DIAGNOSES:  Right shoulder impingement syndrome, rotator  cuff tear and labral tear. PROCEDURES PERFORMED:  Right shoulder diagnostic video arthroscopy,  arthroscopic subacromial decompression, arthroscopic labral debridement  and mini-open rotator cuff repair. PRIMARY SURGEON:  Garrison España MD    FIRST ASSISTANT:  Binh Montesinos PA-C    ANESTHESIA:  General with interscalene nerve block. COMPLICATIONS:  None apparent. ESTIMATED BLOOD LOSS:  Less than 50 mL. INDICATIONS FOR SURGERY:  The patient is a 66-year-old female who has a  long history of right shoulder pain refractory to conservative  management affecting her activities of daily living. She was apprised  of risks, benefits and alternatives of the surgery. All questions were  answered. The patient wished to proceed with surgery. OPERATIVE FINDINGS:  Full-thickness supraspinatus rotator cuff tear  involving of the infraspinatus. It was a very macerated tear. We were  able to repair this with three medial and three lateral anchors. We  also because of the maceration of the tendon, we did use a REGENETEN  augmentation patch. It was suggested that the patient be in an  abduction pillow for four weeks and eight weeks passive range of motion.     DETAILS OF THE PROCEDURE:  The patient was brought to the operating room  and after administration of general anesthesia and interscalene nerve  block, she was placed on the OR table in a beach-chair position. The  right shoulder and upper extremity were prepped and draped in a normal  sterile fashion. A standard three-portal arthroscopy was performed  using a posterior scope portal and a lateral work portal.  The  glenohumeral joint was thoroughly evaluated. The patient did have  moderate glenohumeral arthritis with grade 2 and 3 chondral changes  throughout the humeral head and glenoid. There was a circumferential  labral tear, which was type 1 in nature and this was trimmed back to a  stable rim using a 4.0 shaver as well as an ArthroCare radiofrequency  device. There was a full-thickness supraspinatus rotator cuff tear  extending into the infraspinatus. The teres minor and subscapularis  were intact. The biceps anchor was firmly attached to the supraglenoid  tubercle with no evidence of SLAP lesions. The subscapularis tendon was  intact. The superior middle and inferior glenohumeral ligament  complexes were intact. There was no evidence of instability. Next, the subacromial space was entered using a posterior scope portal  and a lateral work portal.  Subtotal bursectomy and CA ligament release  was performed using an ArthroCare radiofrequency device. A 5.5 mm ami  was used to perform an anteroinferior acromioplasty, flattening the type  3 acromion into a type 1 acromion. Next, a small incision was made over the anterolateral aspect of the  shoulder through the skin and subcutaneous tissue down to the deltoid  fascia. The deltoid was split in the directions of its fibers and held  open using self-retaining retractors. The rotator cuff tear was clearly  visualized. There was a very macerated tear. We did debride portions  of the edge of the tear. The greater tuberosity footprint was  decorticated using a 5.5 mm ami. Next, three medially placed [employer]hew HEALICOIL suture anchors  were deployed in the greater tuberosity footprint.   The

## 2020-10-30 NOTE — ANESTHESIA PROCEDURE NOTES
Peripheral Block    Patient location during procedure: pre-op  Start time: 10/30/2020 10:56 AM  End time: 10/30/2020 11:02 AM  Staffing  Anesthesiologist: Adrienne Robbins MD  Performed: anesthesiologist   Preanesthetic Checklist  Completed: patient identified, site marked, surgical consent, pre-op evaluation, timeout performed, IV checked, risks and benefits discussed, monitors and equipment checked, anesthesia consent given, oxygen available and patient being monitored  Peripheral Block  Patient position: sitting  Prep: ChloraPrep  Patient monitoring: continuous pulse ox and IV access  Block type: Brachial plexus  Laterality: right  Injection technique: single-shot  Procedures: ultrasound guided  Interscalene  Provider prep: sterile gloves and mask  Needle  Needle gauge: 22 G  Needle length: 10 cm  Needle localization: ultrasound guidance  Test dose: negative  Assessment  Injection assessment: negative aspiration for heme, no paresthesia on injection and local visualized surrounding nerve on ultrasound  Paresthesia pain: none  Slow fractionated injection: yes  Hemodynamics: stable  Additional Notes  Pt. agrees to risks, benefits and alternatives to block  Block performed at the request of the surgeon for post-operative pain management   Immediately prior to procedure a \"time out\" was called to verify the correct patient, procedure, equipment, support staff. Site/side marked as required  Side: right  Site/Approach: Lateral neck in the IS Groove at the C5/6 Level  Position: Semi-Fowlers/Sitting  Sedation: Midazolam 2 mg  +  Fentanyl  100  mcg  IV  Local Anesthetic Dose:  2% Lido  3 ml  Aseptic technique: prepped with chlorhexidine  Ultrasound:   yes, see attached image  Local Anesthetic: 0.5% Bupivacaine with Epi 1:200K  Amount: 25 ml  in 5 ml increments after negative aspiration each time plus 5 ml sc in the deltopectoral groove (30 ml total). Easy injection w/o resistance and w/o pain/paresthesias.  Pt tolerated procedure well. No complications.   Reason for block: post-op pain management and at surgeon's request

## 2020-10-30 NOTE — PROGRESS NOTES
Discharge instructions reviewed with patient/responsible adult and understanding verbalized. Discharge instructions signed and copies given. Patient discharged home with belongings. No n/v. Pain5/10 tolerable.

## 2020-10-30 NOTE — ANESTHESIA POSTPROCEDURE EVALUATION
Department of Anesthesiology  Postprocedure Note    Patient: Tesfaye Yarbrough  MRN: [id number]  YOB: 1955  Date of evaluation: 10/30/2020  Time:  5:40 PM     Procedure Summary     Date:  10/30/20 Room / Location:  [address] [place] [address] [facility]    Anesthesia Start:  1122 Anesthesia Stop:  1301    Procedure:  VIDEO ARTHROSCOPY RIGHT SHOULDER SUBACROMIAL DECOMPRESSION LABRAL DEBRIDEMENT ROTATOR CUFF TEAR (Right Shoulder) Diagnosis:       Complete tear of right rotator cuff, unspecified whether traumatic      (RIGHT SHOULDER ROTATOR CUFF TEAR)    Surgeon:  Val Finn MD Responsible Provider:  Adrienne Robbins MD    Anesthesia Type:  general ASA Status:  3          Anesthesia Type: general    Sahra Phase I: Sahra Score: 5    Sahra Phase II: Sahra Score: 10    Last vitals: Reviewed and per EMR flowsheets. Anesthesia Post Evaluation    Patient location during evaluation: PACU  Patient participation: complete - patient participated  Level of consciousness: awake and alert  Airway patency: patent  Nausea & Vomiting: no nausea and no vomiting  Complications: no  Cardiovascular status: blood pressure returned to baseline  Respiratory status: acceptable  Hydration status: euvolemic  Comments: VSS on transfer to phase 2 recovery. No anesthetic complications.

## 2020-10-31 RX ORDER — TRAMADOL HYDROCHLORIDE 50 MG/1
50 TABLET ORAL EVERY 4 HOURS PRN
Qty: 42 TABLET | Refills: 0 | Status: SHIPPED | OUTPATIENT
Start: 2020-10-31 | End: 2020-11-07

## 2020-11-02 ENCOUNTER — VIRTUAL VISIT (OUTPATIENT)
Dept: ORTHOPEDIC SURGERY | Age: 65
End: 2020-11-02

## 2020-11-02 PROCEDURE — 99024 POSTOP FOLLOW-UP VISIT: CPT | Performed by: PHYSICIAN ASSISTANT

## 2020-11-02 NOTE — PROGRESS NOTES
Juan Shook is a 75 y.o. female being evaluated by a Virtual Visit (video visit) encounter to address concerns as mentioned above. A caregiver was present when appropriate. Due to this being a TeleHealth encounter (During THWKP-64 public health emergency), evaluation of the following organ systems was limited: Vitals/Constitutional/EENT/Resp/CV/GI//MS/Neuro/Skin/Heme-Lymph-Imm. Pursuant to the emergency declaration under the [address] [place] [address] and the [employer] Act, this Virtual Visit was conducted with patient's (and/or legal guardian's) consent, to reduce the patient's risk of exposure to COVID-19 and provide necessary medical care. The patient (and/or legal guardian) has also been advised to contact this office for worsening conditions or problems, and seek emergency medical treatment and/or call 911 if deemed necessary. Services were provided through a video synchronous discussion virtually to substitute for in-person clinic visit. Patient's location was at their home. --Glenis Jose PA-C on 11/2/2020 at 4:29 PM    An electronic signature was used to authenticate this note. History of present illness: The patient returns today for their first postoperative visit after shoulder arthroscopy. Pain control has been satisfactory with oral medications. There have been no fevers or chills. Patient will be 4 weeks pillow 8 weeks passive    Physical examination: inspection reveals expected swelling. Incisions are clean, dry, and intact. No signs of infection. Range of motion limited by pain and swelling. The patient is neurovascularly intact. Assessment/plan:  The patient is doing well after shoulder arthroscopy. Surgical findings were reviewed and interoperative pictures were reviewed with the patient.  I have recommended ice, judicious use of NSAIDs with GI precautions, and physical therapy. They can begin to wean themselves out of their sling. We will see the patient back in 1 weeks. They've verbalized good understanding of the plan.

## 2020-11-03 ENCOUNTER — OFFICE VISIT (OUTPATIENT)
Dept: ORTHOPEDIC SURGERY | Age: 65
End: 2020-11-03

## 2020-11-03 PROCEDURE — 99024 POSTOP FOLLOW-UP VISIT: CPT | Performed by: PHYSICIAN ASSISTANT

## 2020-11-03 RX ORDER — DICLOFENAC SODIUM 75 MG/1
75 TABLET, DELAYED RELEASE ORAL 2 TIMES DAILY
Qty: 60 TABLET | Refills: 3 | Status: SHIPPED | OUTPATIENT
Start: 2020-11-03 | End: 2021-03-29

## 2020-11-03 NOTE — PROGRESS NOTES
History of present illness: The patient returns today for their first postoperative visit after shoulder arthroscopy. Pain control has been satisfactory with oral medications. There have been no fevers or chills. Patient will be 4 weeks pillow 8 weeks passive. We did do a virtual visit yesterday but patient was unable to show me her incision. Brought her in to examine the incision and readjust her sling. She was also having trouble with sling position. Pain Assessment  Location of Pain: Shoulder  Location Modifiers: Right  Severity of Pain: 2  Quality of Pain: Dull, Aching  Duration of Pain: Persistent  Frequency of Pain: Intermittent  Aggravating Factors: Bending, Stretching, Straightening  Limiting Behavior: Some  Relieving Factors: Ice, Rest, Other (Comment)]    Physical examination: inspection reveals expected swelling. Incisions are clean, dry, and intact. No signs of infection. Range of motion limited by pain and swelling. The patient is neurovascularly intact. Assessment/plan:  The patient is doing well after shoulder arthroscopy. Surgical findings were reviewed and interoperative pictures were reviewed with the patient. I have recommended ice, judicious use of NSAIDs with GI precautions, and physical therapy. Patient will be 4 weeks pillow 8 weeks sling. We will see the patient back in 3 weeks. They've verbalized good understanding of the plan. Patient does have trouble with pain medication. Causes her to have a lot of nausea and headaches.   We did switch her to Ultram yesterday and she is doing much better on Ultram.

## 2020-11-06 ENCOUNTER — HOSPITAL ENCOUNTER (OUTPATIENT)
Dept: PHYSICAL THERAPY | Age: 65
Setting detail: THERAPIES SERIES
Discharge: HOME | End: 2020-11-06
Payer: MEDICARE

## 2020-11-06 PROCEDURE — 97161 PT EVAL LOW COMPLEX 20 MIN: CPT | Performed by: PHYSICAL THERAPIST

## 2020-11-06 PROCEDURE — 97110 THERAPEUTIC EXERCISES: CPT | Performed by: PHYSICAL THERAPIST

## 2020-11-06 PROCEDURE — 97140 MANUAL THERAPY 1/> REGIONS: CPT | Performed by: PHYSICAL THERAPIST

## 2020-11-06 NOTE — FLOWSHEET NOTE
[facility],  [address] [place]  Phone: [phone]  Fax [phone]        Date:  2020    Patient Name:  Yin Fontana    :  1955  MRN: [id number]  Restrictions/Precautions:    Medical/Treatment Diagnosis Information:  · Diagnosis: Z98.890 s/p rtc repair, S46.011D right complete, traumatic RTC, M75.41 right RTC impingement    DOS:  10/30/2020  s/p SAD, labral debridement, Mini open RTC repair of supra and infra with regeneten patch. · Treatment Diagnosis: right shoulder pain M25.511, right RTC tear M35.421  Insurance/Certification information:  PT Insurance Information: [facility]  Physician Information:  Referring Practitioner: Dr. Sherly Morris  Has the plan of care been signed (Y/N):        []  Yes  [x]  No     Date of Patient follow up with Physician:       Is this a Progress Report:     []  Yes  [x]  No        If Yes:  Date Range for reporting period:  Beginnin20  Ending;     Progress report will be due (10 Rx or 30 days whichever is less):        Recertification will be due (POC Duration  / 90 days whichever is less):       Visit # Insurance Allowable Auth Required   In-person 1 bmn []  Yes [x]  No    Telehealth   []  Yes []  No    Total          Therapy Diagnosis/Practice Pattern:I      Number of Comorbidities:  []0     [x]1-2    []3+    Latex Allergy:  [x]NO      []YES  Preferred Language for Healthcare:   [x]English       []other:      SUBJECTIVE:  See eval    OBJECTIVE: See eval   Observation:    Test measurements:    Right 20       Current  Shoulder Flex 20    Shoulder Abd      Shoulder ER 20    Shoulder IR      Elbow flex 125    Elbow ext.   10           Strength  Right    Shoulder Flex n/t    Shoulder Scap n/t    Shoulder ER n/t    Shoulder IR n/t           Pain scale 6/ 10    Quick Dash 53= 93%        RESTRICTIONS/PRECAUTIONS: 4 weeks pillow/8 PROM     Exercises/Interventions: Therapeutic Ex (22631) Sets/sec Reps Notes/CUES         SBS :05 X 10 hpe   shrug   x10 hep   Passive ER  X 10 hep   Passive elbow flex  X 10 hep   Active wrist flex/ ext  X 10 Hep   Fingertip touch  X 10 hep               Reviewed showering/ pendulum  x handout   Reviewed sleep, dressing, sling don/doff  X  handout         Manual Intervention (66729)      Joint oscillations  4 min    STM of biceps  4 min                            NMR re-education (09948)   CUES NEEDED                                                         Therapeutic Activity (08778)                                            Therapeutic Exercise and NMR EXR  [x] (15796) Provided verbal/tactile cueing for activities related to strengthening, flexibility, endurance, ROM  for improvements in scapular, scapulothoracic and UE control with self care, reaching, carrying, lifting, house/yardwork, driving/computer work.    [] (88577) Provided verbal/tactile cueing for activities related to improving balance, coordination, kinesthetic sense, posture, motor skill, proprioception  to assist with  scapular, scapulothoracic and UE control with self care, reaching, carrying, lifting, house/yardwork, driving/computer work. Therapeutic Activities:    [] (37440 or 67000) Provided verbal/tactile cueing for activities related to improving balance, coordination, kinesthetic sense, posture, motor skill, proprioception and motor activation to allow for proper function of scapular, scapulothoracic and UE control with self care, carrying, lifting, driving/computer work.      Home Exercise Program:    [x] (17444) Reviewed/Progressed HEP activities related to strengthening, flexibility, endurance, ROM of scapular, scapulothoracic and UE control with self care, reaching, carrying, lifting, house/yardwork, driving/computer work  [] (64157) Reviewed/Progressed HEP activities related to improving balance, coordination, kinesthetic sense, posture, motor skill, proprioception of scapular, scapulothoracic and UE control with self care, reaching, carrying, lifting, house/yardwork, driving/computer work      Manual Treatments:  PROM / STM / Oscillations-Mobs:  G-I, II, III, IV (Donavan, Inf., Post.)  [x] (20002) Provided manual therapy to mobilize soft tissue/joints of cervical/CT, scapular GHJ and UE for the purpose of modulating pain, promoting relaxation,  increasing ROM, reducing/eliminating soft tissue swelling/inflammation/restriction, improving soft tissue extensibility and allowing for proper ROM for normal function with self care, reaching, carrying, lifting, house/yardwork, driving/computer work    Modalities:  N/a    Charges:  Timed Code Treatment Minutes: 30   Total Treatment Minutes: 45       [x] EVAL (LOW) 14444   [] EVAL (MOD) 90912   [] EVAL (HIGH) 82993   [] RE-EVAL     [x] MA(25199) x  1   [] IONTO  [] NMR (39603) x     [] VASO  [] Manual (10894) x      [] Other:  [] TA x      [] Mech Traction (14506)  [] ES(attended) (04599)      [] ES (un) (65406):     GOALS:  Patient stated goal: Going to [employer]ist, daily activities. [] Progressing: [] Met: [] Not Met: [] Adjusted    Therapist goals for Patient:   Short Term Goals: To be achieved in: 2 weeks  1. Independent in HEP and progression per patient tolerance, in order to prevent re-injury. [] Progressing: [] Met: [] Not Met: [] Adjusted  2. Patient will have a decrease in pain to facilitate improvement in movement, function, and ADLs as indicated by Functional Deficits. [] Progressing: [] Met: [] Not Met: [] Adjusted    Long Term Goals: To be achieved in: 16 weeks  1. Disability index score of 35% or less for the [facility] to assist with reaching prior level of function. [] Progressing: [] Met: [] Not Met: [] Adjusted  2. Patient will demonstrate increased AROM to 150 flex, 90 ER and 70 IR to allow for proper joint functioning as indicated by patients Functional Deficits.    [] Progressing: [] Met: [] Not Met: [] Adjusted  3. Patient will demonstrate an increase in Strength to 4+/5 throughout right UE to allow for proper functional mobility as indicated by patients Functional Deficits. [] Progressing: [] Met: [] Not Met: [] Adjusted  4. Patient will return to sleeping in bed without increased symptoms or restriction. [] Progressing: [] Met: [] Not Met: [] Adjusted  5. Able to drive, dress, cook, and perform light household chores. [] Progressing: [] Met: [] Not Met: [] Adjusted     Progression Towards Functional goals:  [] Patient is progressing as expected towards functional goals listed. [] Progression is slowed due to complexities listed. [] Progression has been slowed due to co-morbidities. [x] Plan just implemented, too soon to assess goals progression  [] Other:     ASSESSMENT:  See eval    Overall Progression Towards Functional goals/ Treatment Progress Update:  [] Patient is progressing as expected towards functional goals listed. [] Progression is slowed due to complexities/Impairments listed. [] Progression has been slowed due to co-morbidities.   [x] Plan just implemented, too soon to assess goals progression <30days   [] Goals require adjustment due to lack of progress  [] Patient is not progressing as expected and requires additional follow up with physician  [] Other    Prognosis for POC: [x] Good [] Fair  [] Poor      Patient requires continued skilled intervention: [x] Yes  [] No    Treatment/Activity Tolerance:  [x] Patient able to complete treatment  [] Patient limited by fatigue  [] Patient limited by pain    [] Patient limited by other medical complications  [] Other:           PLAN: See eval  [] Continue per plan of care [] Alter current plan (see comments above)  [x] Plan of care initiated [] Hold pending MD visit [] Discharge      Electronically signed by:  Elicia Sharpe PT    Note: If patient does not return for scheduled/ recommended follow up visits, this note will serve as a discharge from care along with most recent update on progress.

## 2020-11-06 NOTE — PLAN OF CARE
Pt tried to rest  And ice. Pt is right handed. Pt couldn't brush hair. It was hard to dress. She was able to sleep. Pt saw Dr and had MRI. Pt had surgery 10/ 30/ 20. Outpt. Pt's pain has been under control since Monday. Pt is sleeping in recliner. Pt has intermittent tingling in fingertips. Pt is taking tramadol. Pt has pain with icing. She feels it is too bulky and too cold. She says it spasms with ice. Relevant Medical History: Has had cervical spine surgery: fusion of 3-5. No previous shoulder surgery. Functional Disability Index:  Quick Dash  53= 93%     Pain Scale: 6/10  Easing factors: meds, rest.     Provocative factors: sleeping, standing, kneeling, overhead movement, self care, position changes, squatting, reaching, sitting, driving, liftin. Type: []Constant   []Intermittent  []Radiating []Localized []other:     Numbness/Tingling: n/a    Occupation/School: retired    Living Status/Prior Level of Function: Independent with ADLs and IADLs    OBJECTIVE:     ROM Left Right   Shoulder Flex  20   Shoulder Abd     Shoulder ER  20   Shoulder IR     Elbow flex  125   Elbow ext. 10        Strength  Left Right   Shoulder Flex  n/t   Shoulder Scap  n/t   Shoulder ER  n/t   Shoulder IR  n/t        Pain scale  6/ 10   Quick Dash  53= 93%     Reflexes/Sensation:    [x]Dermatomes/Myotomes intact    []Reflexes equal and normal bilaterally   []Other:    Joint mobility:    []Normal    [x]Hypo   []Hyper    Palpation: diffuse tenderness from surgery. Pt had relief with MA of biceps    Functional Mobility/Transfers: Needs assistance with supine to sit transfer    Posture: guarded    Bandages/Dressings/Incisions: intact with steri strips. No redness or drainage    Gait: (include devices/WB status): WNL    Orthopedic Special Tests: good capillary refills                       [x] Patient history, allergies, meds reviewed. Medical chart reviewed. See intake form.      Review Of Systems (ROS):  [x]Performed Review of systems (Integumentary, CardioPulmonary, Neurological) by intake and observation. Intake form has been scanned into medical record. Patient has been instructed to contact their primary care physician regarding ROS issues if not already being addressed at this time. Co-morbidities/Complexities (which will affect course of rehabilitation):   []None           Arthritic conditions   []Rheumatoid arthritis (M05.9)  [x]Osteoarthritis (M19.91)   Cardiovascular conditions   []Hypertension (I10)  []Hyperlipidemia (E78.5)  []Angina pectoris (I20)  []Atherosclerosis (I70)   Musculoskeletal conditions   []Disc pathology   []Congenital spine pathologies   []Prior surgical intervention  []Osteoporosis (M81.8)  []Osteopenia (M85.8)   Endocrine conditions   []Hypothyroid (E03.9)  []Hyperthyroid Gastrointestinal conditions   []Constipation (V53.62)   Metabolic conditions   []Morbid obesity (E66.01)  []Diabetes type 1(E10.65) or 2 (E11.65)   []Neuropathy (G60.9)     Pulmonary conditions   []Asthma (J45)  []Coughing   []COPD (J44.9)   Psychological Disorders  []Anxiety (F41.9)  []Depression (F32.9)   []Other:   []Other:          Barriers to/and or personal factors that will affect rehab potential:              []Age  []Sex              []Motivation/Lack of Motivation                        []Co-Morbidities              []Cognitive Function, education/learning barriers              []Environmental, home barriers              []profession/work barriers  []past PT/medical experience  []other:  Justification: should progress well     Falls Risk Assessment (30 days):   [x] Falls Risk assessed and no intervention required.   [] Falls Risk assessed and Patient requires intervention due to being higher risk   TUG score (>12s at risk):     [] Falls education provided, including       G-Codes:    Quick Dash  93%     ASSESSMENT:   Functional Impairments   [x]Noted spinal or UE joint hypomobility   []Noted spinal or UE joint hypermobility   [x]Decreased UE functional ROM   [x]Decreased UE functional strength   [x]Abnormal reflexes/sensation/myotomal/dermatomal deficits   [x]Decreased RC/scapular/core strength and neuromuscular control   []other:      Functional Activity Limitations (from functional questionnaire and intake)   []Reduced ability to tolerate prolonged functional positions   [x]Reduced ability or difficulty with changes of positions or transfers between positions   [x]Reduced ability to maintain good posture and demonstrate good body mechanics with sitting, bending, and lifting   [x] Reduced ability or tolerance with driving and/or computer work   [x]Reduced ability to sleep   [x]Reduced ability to perform lifting, reaching, carrying tasks   [x]Reduced ability to tolerate impact through UE   [x]Reduced ability to reach behind back   [x]Reduced ability to  or hold objects   [x]Reduced ability to throw or toss an object   []other:    Participation Restrictions   [x]Reduced participation in self care activities   [x]Reduced participation in home management activities   []Reduced participation in work activities   [x]Reduced participation in social activities. []Reduced participation in sport/recreation activities. Classification:   [x]Signs/symptoms consistent with post-surgical status including decreased ROM, strength and function.   []Signs/symptoms consistent with joint sprain/strain   []Signs/symptoms consistent with shoulder impingement   []Signs/symptoms consistent with shoulder/elbow/wrist tendinopathy   []Signs/symptoms consistent with Rotator cuff tear   []Signs/symptoms consistent with labral tear   []Signs/symptoms consistent with postural dysfunction    []Signs/symptoms consistent with Glenohumeral IR Deficit - <45 degrees   []Signs/symptoms consistent with facet dysfunction of cervical/thoracic spine    []Signs/symptoms consistent with pathology which may benefit from Dry needling []other:     Prognosis/Rehab Potential:      []Excellent   [x]Good    []Fair   []Poor    Tolerance of evaluation/treatment:    []Excellent   []Good    [x]Fair   []Poor  Physical Therapy Evaluation Complexity Justification  [x] A history of present problem with:  [] no personal factors and/or comorbidities that impact the plan of care;  [x]1-2 personal factors and/or comorbidities that impact the plan of care  []3 personal factors and/or comorbidities that impact the plan of care  [x] An examination of body systems using standardized tests and measures addressing any of the following: body structures and functions (impairments), activity limitations, and/or participation restrictions;:  [] a total of 1-2 or more elements   [x] a total of 3 or more elements   [] a total of 4 or more elements   [x] A clinical presentation with:  [x] stable and/or uncomplicated characteristics   [] evolving clinical presentation with changing characteristics  [] unstable and unpredictable characteristics;   [x] Clinical decision making of [x] low, [] moderate, [] high complexity using standardized patient assessment instrument and/or measurable assessment of functional outcome. [x] EVAL (LOW) 97170 (typically 20 minutes face-to-face)  [] EVAL (MOD) 04446 (typically 30 minutes face-to-face)  [] EVAL (HIGH) 09650 (typically 45 minutes face-to-face)  [] RE-EVAL       PLAN:  Frequency/Duration:  1 days per week for 8 Weeks, then 2 x week for 8 weeks. INTERVENTIONS:  [x] Therapeutic exercise including: strength training, ROM, for Upper extremity and core   [x]  NMR activation and proprioception for UE, scap and Core   [x] Manual therapy as indicated for shoulder, scapula and spine to include: Dry Needling/IASTM, STM, PROM, Gr I-IV mobilizations, manipulation.    [x] Modalities as needed that may include: thermal agents, E-stim, Biofeedback, US, iontophoresis as indicated  [x] Patient education on joint protection, postural re-education, activity modification, progression of HEP. HEP instruction: (see scanned forms)    GOALS:  Patient stated goal: Going to Shinto, daily activities. [] Progressing: [] Met: [] Not Met: [] Adjusted    Therapist goals for Patient:   Short Term Goals: To be achieved in: 2 weeks  1. Independent in HEP and progression per patient tolerance, in order to prevent re-injury. [] Progressing: [] Met: [] Not Met: [] Adjusted  2. Patient will have a decrease in pain to facilitate improvement in movement, function, and ADLs as indicated by Functional Deficits. [] Progressing: [] Met: [] Not Met: [] Adjusted    Long Term Goals: To be achieved in: 16 weeks  1. Disability index score of 35% or less for the [facility] to assist with reaching prior level of function. [] Progressing: [] Met: [] Not Met: [] Adjusted  2. Patient will demonstrate increased AROM to 150 flex, 90 ER and 70 IR to allow for proper joint functioning as indicated by patients Functional Deficits. [] Progressing: [] Met: [] Not Met: [] Adjusted  3. Patient will demonstrate an increase in Strength to 4+/5 throughout right UE to allow for proper functional mobility as indicated by patients Functional Deficits. [] Progressing: [] Met: [] Not Met: [] Adjusted  4. Patient will return to sleeping in bed without increased symptoms or restriction. [] Progressing: [] Met: [] Not Met: [] Adjusted  5. Able to drive, dress, cook, and perform light household chores.      [] Progressing: [] Met: [] Not Met: [] Adjusted       Electronically signed by:  Slim Jefferson PT

## 2020-11-13 ENCOUNTER — HOSPITAL ENCOUNTER (OUTPATIENT)
Dept: PHYSICAL THERAPY | Age: 65
Setting detail: THERAPIES SERIES
Discharge: HOME | End: 2020-11-13
Payer: MEDICARE

## 2020-11-13 PROCEDURE — 97110 THERAPEUTIC EXERCISES: CPT | Performed by: PHYSICAL THERAPIST

## 2020-11-13 PROCEDURE — 97140 MANUAL THERAPY 1/> REGIONS: CPT | Performed by: PHYSICAL THERAPIST

## 2020-11-13 NOTE — FLOWSHEET NOTE
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 · Treatment Diagnosis: right shoulder pain M25.511, right RTC tear Z94.362  Insurance/Certification information:  PT Insurance Information:  W Melvin Sheriff  Physician Information:  Referring Practitioner: Dr. Levi Lima  Has the plan of care been signed (Y/N):        []  Yes  [x]  No     Date of Patient follow up with Physician:       Is this a Progress Report:     []  Yes  [x]  No        If Yes:  Date Range for reporting period:  Beginnin20  Ending;     Progress report will be due (10 Rx or 30 days whichever is less):        Recertification will be due (POC Duration  / 90 days whichever is less):       Visit # Insurance Allowable Auth Required   In-person 2 bmn []  Yes [x]  No    Telehealth   []  Yes []  No    Total          Therapy Diagnosis/Practice Pattern:I      Number of Comorbidities:  []0     [x]1-2    []3+    Latex Allergy:  [x]NO      []YES  Preferred Language for Healthcare:   [x]English       []other:      SUBJECTIVE:  Pt has been sleeping in recliner. Pt is able to do HEP. Pt is not taking any medicine. She is taking tylenol PM at night. OBJECTIVE: See eval   Observation:    Test measurements:    Right 20       Current  Shoulder Flex 20    Shoulder Abd      Shoulder ER 20    Shoulder IR      Elbow flex 125    Elbow ext.   10           Strength  Right    Shoulder Flex n/t    Shoulder Scap n/t    Shoulder ER n/t    Shoulder IR n/t           Pain scale 6/ 10    Quick Dash 53= 93%        RESTRICTIONS/PRECAUTIONS: 4 weeks pillow/8 PROM     Exercises/Interventions:     Therapeutic Ex (91981) Sets/sec Reps Notes/CUES         SBS :05 X 10 hpe   shrug   x10 hep   Passive ER  X 10 hep   Passive elbow flex  X 10 hep   Table slides  X 10    pendulum  X 10           Manual Intervention (98697)      Joint oscillations/  PROM  8  min    STM of biceps  5 min                            NMR re-education (45764)   CUES NEEDED                                                         Therapeutic Activity (70101)                                            Therapeutic Exercise and NMR EXR  [x] (75944) Provided verbal/tactile cueing for activities related to strengthening, flexibility, endurance, ROM  for improvements in scapular, scapulothoracic and UE control with self care, reaching, carrying, lifting, house/yardwork, driving/computer work.    [] (22999) Provided verbal/tactile cueing for activities related to improving balance, coordination, kinesthetic sense, posture, motor skill, proprioception  to assist with  scapular, scapulothoracic and UE control with self care, reaching, carrying, lifting, house/yardwork, driving/computer work. Therapeutic Activities:    [] (57042 or 93643) Provided verbal/tactile cueing for activities related to improving balance, coordination, kinesthetic sense, posture, motor skill, proprioception and motor activation to allow for proper function of scapular, scapulothoracic and UE control with self care, carrying, lifting, driving/computer work.      Home Exercise Program:    [x] (09826) Reviewed/Progressed HEP activities related to strengthening, flexibility, endurance, ROM of scapular, scapulothoracic and UE control with self care, reaching, carrying, lifting, house/yardwork, driving/computer work  [] (93964) Reviewed/Progressed HEP activities related to improving balance, coordination, kinesthetic sense, posture, motor skill, proprioception of scapular, scapulothoracic and UE control with self care, reaching, carrying, lifting, house/yardwork, driving/computer work      Manual Treatments:  PROM / STM / Oscillations-Mobs:  G-I, II, III, IV (PA's, Inf., Post.)  [x] (73681) Provided manual therapy to mobilize soft tissue/joints of cervical/CT, scapular GHJ and UE for the purpose of modulating pain, promoting relaxation,  increasing ROM, reducing/eliminating soft tissue swelling/inflammation/restriction, improving soft tissue extensibility and allowing for proper ROM for normal function with self care, reaching, carrying, lifting, house/yardwork, driving/computer work    Modalities:  N/a    Charges:  Timed Code Treatment Minutes: 35   Total Treatment Minutes: 35       [] EVAL (LOW) 71612   [] EVAL (MOD) 64795   [] EVAL (HIGH) 23079   [] RE-EVAL     [x] VR(10108) x  1   [] IONTO  [] NMR (49381) x     [] VASO  [x] Manual (73711) x 1      [] Other:  [] TA x      [] Mech Traction (07172)  [] ES(attended) (57235)      [] ES (un) (19911):     GOALS:  Patient stated goal: Going to Taoism, daily activities. [] Progressing: [] Met: [] Not Met: [] Adjusted    Therapist goals for Patient:   Short Term Goals: To be achieved in: 2 weeks  1. Independent in HEP and progression per patient tolerance, in order to prevent re-injury. [] Progressing: [] Met: [] Not Met: [] Adjusted  2. Patient will have a decrease in pain to facilitate improvement in movement, function, and ADLs as indicated by Functional Deficits. [] Progressing: [] Met: [] Not Met: [] Adjusted    Long Term Goals: To be achieved in: 16 weeks  1. Disability index score of 35% or less for the [facility] to assist with reaching prior level of function. [] Progressing: [] Met: [] Not Met: [] Adjusted  2. Patient will demonstrate increased AROM to 150 flex, 90 ER and 70 IR to allow for proper joint functioning as indicated by patients Functional Deficits. [] Progressing: [] Met: [] Not Met: [] Adjusted  3. Patient will demonstrate an increase in Strength to 4+/5 throughout right UE to allow for proper functional mobility as indicated by patients Functional Deficits. [] Progressing: [] Met: [] Not Met: [] Adjusted  4. Patient will return to sleeping in bed without increased symptoms or restriction. [] Progressing: [] Met: [] Not Met: [] Adjusted  5. Able to drive, dress, cook, and perform light household chores. [] Progressing: [] Met: [] Not Met: [] Adjusted     Progression Towards Functional goals:  [] Patient is progressing as expected towards functional goals listed. [] Progression is slowed due to complexities listed. [] Progression has been slowed due to co-morbidities. [x] Plan just implemented, too soon to assess goals progression  [] Other:     ASSESSMENT:  Pt has significantly less mm guarding this visit. Overall Progression Towards Functional goals/ Treatment Progress Update:  [] Patient is progressing as expected towards functional goals listed. [] Progression is slowed due to complexities/Impairments listed. [] Progression has been slowed due to co-morbidities.   [x] Plan just implemented, too soon to assess goals progression <30days   [] Goals require adjustment due to lack of progress  [] Patient is not progressing as expected and requires additional follow up with physician  [] Other    Prognosis for POC: [x] Good [] Fair  [] Poor      Patient requires continued skilled intervention: [x] Yes  [] No    Treatment/Activity Tolerance:  [x] Patient able to complete treatment  [] Patient limited by fatigue  [] Patient limited by pain    [] Patient limited by other medical complications  [] Other:           PLAN: See eval  [x] Continue per plan of care [] Alter current plan (see comments above)  [] Plan of care initiated [] Hold pending MD visit [] Discharge      Electronically signed by:  Jimbo Luke PT    Note: If patient does not return for scheduled/ recommended follow up visits, this note will serve as a discharge from care along with most recent update on progress.

## 2020-11-20 ENCOUNTER — HOSPITAL ENCOUNTER (OUTPATIENT)
Dept: PHYSICAL THERAPY | Age: 65
Setting detail: THERAPIES SERIES
Discharge: HOME | End: 2020-11-20
Payer: MEDICARE

## 2020-11-20 PROCEDURE — 97110 THERAPEUTIC EXERCISES: CPT | Performed by: PHYSICAL THERAPIST

## 2020-11-20 PROCEDURE — 97140 MANUAL THERAPY 1/> REGIONS: CPT | Performed by: PHYSICAL THERAPIST

## 2020-11-20 NOTE — FLOWSHEET NOTE
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 · Treatment Diagnosis: right shoulder pain M25.511, right RTC tear X23.814  Insurance/Certification information:  PT Insurance Information: [facility]  Physician Information:  Referring Practitioner: Dr. Nakia Ferris  Has the plan of care been signed (Y/N):        []  Yes  [x]  No     Date of Patient follow up with Physician:       Is this a Progress Report:     []  Yes  [x]  No        If Yes:  Date Range for reporting period:  Beginnin20  Ending;     Progress report will be due (10 Rx or 30 days whichever is less):        Recertification will be due (POC Duration  / 90 days whichever is less):       Visit # Insurance Allowable Auth Required   In-person 3 bmn []  Yes [x]  No    Telehealth   []  Yes []  No    Total          Therapy Diagnosis/Practice Pattern:I      Number of Comorbidities:  []0     [x]1-2    []3+    Latex Allergy:  [x]NO      []YES  Preferred Language for Healthcare:   [x]English       []other:      SUBJECTIVE:  Pt is three weeks post op. Pt is sleeping in recliner. She is not taking meds. She has very little c/o pain. Her discomfort is in the biceps. OBJECTIVE: See eval   Observation:    Test measurements:    Right 20       Current  Shoulder Flex 20 132   Shoulder Abd      Shoulder ER 20 45   Shoulder IR      Elbow flex 125    Elbow ext.   10           Strength  Right    Shoulder Flex n/t    Shoulder Scap n/t    Shoulder ER n/t    Shoulder IR n/t           Pain scale 6/ 10    Quick Dash 53= 93%        RESTRICTIONS/PRECAUTIONS: 4 weeks pillow/8 PROM     Exercises/Interventions:     Therapeutic Ex (82995) Sets/sec Reps Notes/CUES         SBS :05 X 10 hpe   shrug   x10 hep   Passive ER  X 10 hep   Passive elbow flex  X 10 hep   Table slides  X 10    pendulum  X 10           Manual Intervention (56018)      Joint oscillations/  PROM  8  min    STM of biceps  5 min                            NMR re-education (46586)   CUES NEEDED                                                         Therapeutic Activity (41247)                                            Therapeutic Exercise and NMR EXR  [x] (59885) Provided verbal/tactile cueing for activities related to strengthening, flexibility, endurance, ROM  for improvements in scapular, scapulothoracic and UE control with self care, reaching, carrying, lifting, house/yardwork, driving/computer work.    [] (86737) Provided verbal/tactile cueing for activities related to improving balance, coordination, kinesthetic sense, posture, motor skill, proprioception  to assist with  scapular, scapulothoracic and UE control with self care, reaching, carrying, lifting, house/yardwork, driving/computer work. Therapeutic Activities:    [] (76434 or 38684) Provided verbal/tactile cueing for activities related to improving balance, coordination, kinesthetic sense, posture, motor skill, proprioception and motor activation to allow for proper function of scapular, scapulothoracic and UE control with self care, carrying, lifting, driving/computer work.      Home Exercise Program:    [x] (46032) Reviewed/Progressed HEP activities related to strengthening, flexibility, endurance, ROM of scapular, scapulothoracic and UE control with self care, reaching, carrying, lifting, house/yardwork, driving/computer work  [] (08445) Reviewed/Progressed HEP activities related to improving balance, coordination, kinesthetic sense, posture, motor skill, proprioception of scapular, scapulothoracic and UE control with self care, reaching, carrying, lifting, house/yardwork, driving/computer work      Manual Treatments:  PROM / STM / Oscillations-Mobs:  G-I, II, III, IV (Donavan, Inf., Post.)  [x] (19050) Provided manual therapy to mobilize soft tissue/joints of cervical/CT, scapular GHJ and UE for the purpose of modulating pain, promoting relaxation,  increasing ROM, reducing/eliminating soft tissue swelling/inflammation/restriction, improving soft tissue extensibility and allowing for proper ROM for normal function with self care, reaching, carrying, lifting, house/yardwork, driving/computer work    Modalities:  N/a    Charges:  Timed Code Treatment Minutes: 35   Total Treatment Minutes: 35       [] EVAL (LOW) 89687   [] EVAL (MOD) 54121   [] EVAL (HIGH) 70639   [] RE-EVAL     [x] NX(48012) x  1   [] IONTO  [] NMR (97716) x     [] VASO  [x] Manual (32075) x 1      [] Other:  [] TA x      [] Mech Traction (72730)  [] ES(attended) (98661)      [] ES (un) (52120):     GOALS:  Patient stated goal: Going to [employer], daily activities. [] Progressing: [] Met: [] Not Met: [] Adjusted    Therapist goals for Patient:   Short Term Goals: To be achieved in: 2 weeks  1. Independent in HEP and progression per patient tolerance, in order to prevent re-injury. [] Progressing: [] Met: [] Not Met: [] Adjusted  2. Patient will have a decrease in pain to facilitate improvement in movement, function, and ADLs as indicated by Functional Deficits. [x] Progressing: [] Met: [] Not Met: [] Adjusted    Long Term Goals: To be achieved in: 16 weeks  1. Disability index score of 35% or less for the [facility] to assist with reaching prior level of function. [] Progressing: [] Met: [] Not Met: [] Adjusted  2. Patient will demonstrate increased AROM to 150 flex, 90 ER and 70 IR to allow for proper joint functioning as indicated by patients Functional Deficits.    [x] Progressing: [] Met: [] Not Met: [] Adjusted  3. Patient will demonstrate an increase in Strength to 4+/5 throughout right UE to allow for proper functional mobility as indicated by patients Functional Deficits. [] Progressing: [] Met: [] Not Met: [] Adjusted  4. Patient will return to sleeping in bed without increased symptoms or restriction. [] Progressing: [] Met: [] Not Met: [] Adjusted  5. Able to drive, dress, cook, and perform light household chores. [] Progressing: [] Met: [] Not Met: [] Adjusted     Progression Towards Functional goals:  [x] Patient is progressing as expected towards functional goals listed. [] Progression is slowed due to complexities listed. [] Progression has been slowed due to co-morbidities. [] Plan just implemented, too soon to assess goals progression  [] Other:     ASSESSMENT:  PT demonstrates great progress in PROM. Overall Progression Towards Functional goals/ Treatment Progress Update:  [x] Patient is progressing as expected towards functional goals listed. [] Progression is slowed due to complexities/Impairments listed. [] Progression has been slowed due to co-morbidities.   [] Plan just implemented, too soon to assess goals progression <30days   [] Goals require adjustment due to lack of progress  [] Patient is not progressing as expected and requires additional follow up with physician  [] Other    Prognosis for POC: [x] Good [] Fair  [] Poor      Patient requires continued skilled intervention: [x] Yes  [] No    Treatment/Activity Tolerance:  [x] Patient able to complete treatment  [] Patient limited by fatigue  [] Patient limited by pain    [] Patient limited by other medical complications  [] Other:           PLAN: See eval  [x] Continue per plan of care [] Alter current plan (see comments above)  [] Plan of care initiated [] Hold pending MD visit [] Discharge      Electronically signed by:  Elda Laureano PT    Note: If patient does not return for scheduled/ recommended follow up visits, this note will serve as a discharge from care along with most recent update on progress.

## 2020-11-24 ENCOUNTER — OFFICE VISIT (OUTPATIENT)
Dept: ORTHOPEDIC SURGERY | Age: 65
End: 2020-11-24

## 2020-11-24 PROCEDURE — 99024 POSTOP FOLLOW-UP VISIT: CPT | Performed by: PHYSICIAN ASSISTANT

## 2020-11-24 RX ORDER — TIZANIDINE 4 MG/1
4 TABLET ORAL 3 TIMES DAILY
Qty: 30 TABLET | Refills: 1 | Status: SHIPPED | OUTPATIENT
Start: 2020-11-24 | End: 2021-05-06

## 2020-11-24 NOTE — PROGRESS NOTES
History of present illness: The patient returns today for their second postoperative visit after shoulder arthroscopy. Pain control has been satisfactory with oral medications. There have been no fevers or chills. Patient will be 4 weeks pillow 8 weeks passive. Patient is currently 3.5 weeks postop    Pain Assessment  Location of Pain: Shoulder  Location Modifiers: Right  Severity of Pain: 5  Quality of Pain: Dull, Aching  Duration of Pain: Persistent  Frequency of Pain: Constant  Aggravating Factors: Bending, Stretching, Straightening  Limiting Behavior: Some  Relieving Factors: Rest, Other (Comment)]    Physical examination: inspection reveals expected swelling. Incisions are clean, dry, and intact. No signs of infection. Passive range of motion 160 degrees of forward flexion and 60 degrees of external rotation. The patient is neurovascularly intact. Assessment/plan:  The patient is doing well after shoulder arthroscopy. Surgical findings were reviewed and interoperative pictures were reviewed with the patient. I have recommended ice, judicious use of NSAIDs with GI precautions, and physical therapy. Patient will be 4 weeks pillow 8 weeks sling. On Friday patient can discontinue the pillow but should continue in her sling. They've verbalized good understanding of the plan. Patient does have trouble with pain medication. Causes her to have a lot of nausea and headaches. Patient has used Zanaflex in the past.  We will send a new prescription in for her.

## 2020-11-27 ENCOUNTER — HOSPITAL ENCOUNTER (OUTPATIENT)
Dept: PHYSICAL THERAPY | Age: 65
Setting detail: THERAPIES SERIES
Discharge: HOME | End: 2020-11-27
Payer: MEDICARE

## 2020-11-27 PROCEDURE — 97110 THERAPEUTIC EXERCISES: CPT | Performed by: PHYSICAL THERAPIST

## 2020-11-27 PROCEDURE — 97140 MANUAL THERAPY 1/> REGIONS: CPT | Performed by: PHYSICAL THERAPIST

## 2020-11-27 NOTE — FLOWSHEET NOTE
n/t    Shoulder IR n/t           Pain scale 6/ 10 1/10    Quick Dash 53= 93%        RESTRICTIONS/PRECAUTIONS: 4 weeks pillow/8 PROM     Exercises/Interventions:     Therapeutic Ex (05799) Sets/sec Reps Notes/CUES         SBS :05 X 10 hpe   shrug   x10 hep   Passive ER  X 10 hep   Active elbow flex  X 10 hep   Table slides  X 10    pendulum  X 10     abd iso at wall 10x :05 Submax, pain free               Manual Intervention (82818)      Joint oscillations/  PROM  8  min    STM of biceps  5 min                            NMR re-education (76328)   CUES NEEDED                                   Therapeutic Exercise and NMR EXR  [x] (68759) Provided verbal/tactile cueing for activities related to strengthening, flexibility, endurance, ROM  for improvements in scapular, scapulothoracic and UE control with self care, reaching, carrying, lifting, house/yardwork, driving/computer work.    [] (78298) Provided verbal/tactile cueing for activities related to improving balance, coordination, kinesthetic sense, posture, motor skill, proprioception  to assist with  scapular, scapulothoracic and UE control with self care, reaching, carrying, lifting, house/yardwork, driving/computer work. Therapeutic Activities:    [] (02159 or 21834) Provided verbal/tactile cueing for activities related to improving balance, coordination, kinesthetic sense, posture, motor skill, proprioception and motor activation to allow for proper function of scapular, scapulothoracic and UE control with self care, carrying, lifting, driving/computer work.      Home Exercise Program:    [x] (07737) Reviewed/Progressed HEP activities related to strengthening, flexibility, endurance, ROM of scapular, scapulothoracic and UE control with self care, reaching, carrying, lifting, house/yardwork, driving/computer work  [] (11086) Reviewed/Progressed HEP activities related to improving balance, coordination, kinesthetic sense, posture, motor skill, proprioception of Patient will demonstrate an increase in Strength to 4+/5 throughout right UE to allow for proper functional mobility as indicated by patients Functional Deficits. [] Progressing: [] Met: [] Not Met: [] Adjusted  4. Patient will return to sleeping in bed without increased symptoms or restriction. [] Progressing: [] Met: [] Not Met: [] Adjusted  5. Able to drive, dress, cook, and perform light household chores. [] Progressing: [] Met: [] Not Met: [] Adjusted     Progression Towards Functional goals:  [x] Patient is progressing as expected towards functional goals listed. [] Progression is slowed due to complexities listed. [] Progression has been slowed due to co-morbidities. [] Plan just implemented, too soon to assess goals progression  [] Other:     ASSESSMENT:  PT demonstrates great progress in PROM. Overall Progression Towards Functional goals/ Treatment Progress Update:  [x] Patient is progressing as expected towards functional goals listed. [] Progression is slowed due to complexities/Impairments listed. [] Progression has been slowed due to co-morbidities.   [] Plan just implemented, too soon to assess goals progression <30days   [] Goals require adjustment due to lack of progress  [] Patient is not progressing as expected and requires additional follow up with physician  [] Other    Prognosis for POC: [x] Good [] Fair  [] Poor      Patient requires continued skilled intervention: [x] Yes  [] No    Treatment/Activity Tolerance:  [x] Patient able to complete treatment  [] Patient limited by fatigue  [] Patient limited by pain    [] Patient limited by other medical complications  [] Other:           PLAN: See eval  [x] Continue per plan of care [] Alter current plan (see comments above)  [] Plan of care initiated [] Hold pending MD visit [] Discharge      Electronically signed by:  Alex Higginbotham PT    Note: If patient does not return for scheduled/ recommended follow up visits, this note

## 2020-12-04 ENCOUNTER — HOSPITAL ENCOUNTER (OUTPATIENT)
Dept: PHYSICAL THERAPY | Age: 65
Setting detail: THERAPIES SERIES
Discharge: HOME | End: 2020-12-04
Payer: MEDICARE

## 2020-12-04 PROCEDURE — 97110 THERAPEUTIC EXERCISES: CPT | Performed by: PHYSICAL THERAPIST

## 2020-12-04 PROCEDURE — 97140 MANUAL THERAPY 1/> REGIONS: CPT | Performed by: PHYSICAL THERAPIST

## 2020-12-04 NOTE — FLOWSHEET NOTE
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 · Treatment Diagnosis: right shoulder pain M25.511, right RTC tear F19.576  Insurance/Certification information:  PT Insurance Information: [facility]  Physician Information:  Referring Practitioner: Dr. Nakia Ferris  Has the plan of care been signed (Y/N):        []  Yes  [x]  No     Date of Patient follow up with Physician:       Is this a Progress Report:     []  Yes  [x]  No        If Yes:  Date Range for reporting period:  Beginnin20  Ending;     Progress report will be due (10 Rx or 30 days whichever is less):        Recertification will be due (POC Duration  / 90 days whichever is less):       Visit # Insurance Allowable Auth Required   In-person 5 bmn []  Yes [x]  No    Telehealth   []  Yes []  No    Total          Therapy Diagnosis/Practice Pattern:I      Number of Comorbidities:  []0     [x]1-2    []3+    Latex Allergy:  [x]NO      []YES  Preferred Language for Healthcare:   [x]English       []other:      SUBJECTIVE:  Patient reports hard to dry herself off. Arm aches when in sling, added pillow into sling to add discomfort. Doing HEP. Ice rarely. Sleeping poorly. OBJECTIVE: See eval   Observation:    Test measurements:    Right 20       Current  Shoulder Flex 20 132   Shoulder Abd      Shoulder ER 20 45   Shoulder IR      Elbow flex 125    Elbow ext.   10           Strength  Right    Shoulder Flex n/t    Shoulder Scap n/t    Shoulder ER n/t Shoulder IR n/t           Pain scale 6/ 10 1/10    Quick Dash 53= 93%        RESTRICTIONS/PRECAUTIONS: 4 weeks pillow/8 PROM     Exercises/Interventions:     Therapeutic Ex (11458) Sets/sec Reps Notes/CUES         SBS :05 X 10 hpe   shrug   x10 hep   Passive ER Cane  X 10 hep   Active elbow flex  X 10 hep   Table slides  X 10    pendulum  X 10     abd iso at wall 10x :05 Submax, pain free         Patient education on sleeping positions  X 5 minutes    Manual Intervention (01259)      Joint oscillations/  PROM  8  min    STM of biceps  5 min                            NMR re-education (81263)   CUES NEEDED                                   Therapeutic Exercise and NMR EXR  [x] (68083) Provided verbal/tactile cueing for activities related to strengthening, flexibility, endurance, ROM  for improvements in scapular, scapulothoracic and UE control with self care, reaching, carrying, lifting, house/yardwork, driving/computer work.    [] (69444) Provided verbal/tactile cueing for activities related to improving balance, coordination, kinesthetic sense, posture, motor skill, proprioception  to assist with  scapular, scapulothoracic and UE control with self care, reaching, carrying, lifting, house/yardwork, driving/computer work. Therapeutic Activities:    [] (60022 or 05464) Provided verbal/tactile cueing for activities related to improving balance, coordination, kinesthetic sense, posture, motor skill, proprioception and motor activation to allow for proper function of scapular, scapulothoracic and UE control with self care, carrying, lifting, driving/computer work.      Home Exercise Program:    [x] (06978) Reviewed/Progressed HEP activities related to strengthening, flexibility, endurance, ROM of scapular, scapulothoracic and UE control with self care, reaching, carrying, lifting, house/yardwork, driving/computer work  [] (51636) Reviewed/Progressed HEP activities related to improving balance, coordination, kinesthetic sense, posture, motor skill, proprioception of scapular, scapulothoracic and UE control with self care, reaching, carrying, lifting, house/yardwork, driving/computer work      Manual Treatments:  PROM / STM / Oscillations-Mobs:  G-I, II, III, IV (PA's, Inf., Post.)  [x] (45385) Provided manual therapy to mobilize soft tissue/joints of cervical/CT, scapular GHJ and UE for the purpose of modulating pain, promoting relaxation,  increasing ROM, reducing/eliminating soft tissue swelling/inflammation/restriction, improving soft tissue extensibility and allowing for proper ROM for normal function with self care, reaching, carrying, lifting, house/yardwork, driving/computer work    Modalities:  N/a    Charges:  Timed Code Treatment Minutes: 35   Total Treatment Minutes: 35       [] EVAL (LOW) 99174   [] EVAL (MOD) 29662   [] EVAL (HIGH) 29007   [] RE-EVAL     [x] DQ(15648) x  1   [] IONTO  [] NMR (59036) x     [] VASO  [x] Manual (54423) x 1      [] Other:  [] TA x      [] Mech Traction (29700)  [] ES(attended) (73575)      [] ES (un) (71460):     GOALS:  Patient stated goal: Going to [employer], daily activities. [] Progressing: [] Met: [] Not Met: [] Adjusted    Therapist goals for Patient:   Short Term Goals: To be achieved in: 2 weeks  1. Independent in HEP and progression per patient tolerance, in order to prevent re-injury. [] Progressing: [] Met: [] Not Met: [] Adjusted  2. Patient will have a decrease in pain to facilitate improvement in movement, function, and ADLs as indicated by Functional Deficits. [x] Progressing: [] Met: [] Not Met: [] Adjusted    Long Term Goals: To be achieved in: 16 weeks  1. Disability index score of 35% or less for the [facility] to assist with reaching prior level of function. [] Progressing: [] Met: [] Not Met: [] Adjusted  2.  Patient will demonstrate increased AROM to 150 flex, 90 ER and 70 IR to allow for proper joint functioning as indicated by patients Functional Deficits. [x] Progressing: [] Met: [] Not Met: [] Adjusted  3. Patient will demonstrate an increase in Strength to 4+/5 throughout right UE to allow for proper functional mobility as indicated by patients Functional Deficits. [] Progressing: [] Met: [] Not Met: [] Adjusted  4. Patient will return to sleeping in bed without increased symptoms or restriction. [] Progressing: [] Met: [] Not Met: [] Adjusted  5. Able to drive, dress, cook, and perform light household chores. [] Progressing: [] Met: [] Not Met: [] Adjusted     Progression Towards Functional goals:  [x] Patient is progressing as expected towards functional goals listed. [] Progression is slowed due to complexities listed. [] Progression has been slowed due to co-morbidities. [] Plan just implemented, too soon to assess goals progression  [] Other:     ASSESSMENT:  Spent time discussing safe sleeping positions with elevation and protecting shoulder from dropping into posterior direction. Sling adjusted. Overall Progression Towards Functional goals/ Treatment Progress Update:  [x] Patient is progressing as expected towards functional goals listed. [] Progression is slowed due to complexities/Impairments listed. [] Progression has been slowed due to co-morbidities.   [] Plan just implemented, too soon to assess goals progression <30days   [] Goals require adjustment due to lack of progress  [] Patient is not progressing as expected and requires additional follow up with physician  [] Other    Prognosis for POC: [x] Good [] Fair  [] Poor      Patient requires continued skilled intervention: [x] Yes  [] No    Treatment/Activity Tolerance:  [x] Patient able to complete treatment  [] Patient limited by fatigue  [] Patient limited by pain    [] Patient limited by other medical complications  [] Other:           PLAN: See eval  [x] Continue per plan of care [] Alter current plan (see comments above)  [] Plan of care initiated [] Hold pending MD visit [] Discharge      Electronically signed by:  Obi Navarro PT    Note: If patient does not return for scheduled/ recommended follow up visits, this note will serve as a discharge from care along with most recent update on progress.

## 2020-12-11 ENCOUNTER — HOSPITAL ENCOUNTER (OUTPATIENT)
Dept: PHYSICAL THERAPY | Age: 65
Setting detail: THERAPIES SERIES
Discharge: HOME | End: 2020-12-11
Payer: MEDICARE

## 2020-12-11 PROCEDURE — 97110 THERAPEUTIC EXERCISES: CPT | Performed by: PHYSICAL THERAPIST

## 2020-12-11 PROCEDURE — 97140 MANUAL THERAPY 1/> REGIONS: CPT | Performed by: PHYSICAL THERAPIST

## 2020-12-11 NOTE — FLOWSHEET NOTE
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 · Treatment Diagnosis: right shoulder pain M25.511, right RTC tear I88.018  Insurance/Certification information:  PT Insurance Information:  [employer]  Physician Information:  Referring Practitioner: Dr. Alma Delia Hernandez  Has the plan of care been signed (Y/N):        []  Yes  [x]  No     Date of Patient follow up with Physician: 20      Is this a Progress Report:     []  Yes  [x]  No        If Yes:  Date Range for reporting period:  Beginnin20  Ending;     Progress report will be due (10 Rx or 30 days whichever is less):        Recertification will be due (POC Duration  / 90 days whichever is less):       Visit # Insurance Allowable Auth Required   In-person 6 bmn []  Yes [x]  No    Telehealth   []  Yes []  No    Total          Therapy Diagnosis/Practice Pattern:I      Number of Comorbidities:  []0     [x]1-2    []3+    Latex Allergy:  [x]NO      []YES  Preferred Language for Healthcare:   [x]English       []other:      SUBJECTIVE:  Pt is 6 weeks post op. Pt has been sleeping in bed the last three days as she cannot get rest in recliner. Pt is going to [place] with two friends. Pt only take tylenol PM in the evening. OBJECTIVE: See eval   Observation:    Test measurements:    Right 20       Current  Shoulder Flex 20 140   Shoulder Abd      Shoulder ER 20 45   Shoulder IR      Elbow flex 125    Elbow ext.   10           Strength  Right    Shoulder Flex n/t    Shoulder Scap n/t    Shoulder ER n/t    Shoulder IR n/t           Pain scale 6/ 10 1/10    Quick Dash 53= 93%        RESTRICTIONS/PRECAUTIONS: 4 weeks pillow/8 PROM     Exercises/Interventions:     Therapeutic Ex (73536) Sets/sec Reps Notes/CUES         SBS :05 X 10 hpe   shrug   x10 hep   Cane press/  Cane flex X 10  X 5     Passive ER Cane  X 10 hep   Active elbow flex  X 10 hep   Table slides  X 10    pendulum  X 10     abd iso at wall 10x :05 Submax, pain free         Manual Intervention (21368)      Joint oscillations/  PROM  8  min    STM of biceps  5 min                            NMR re-education (48002)   CUES NEEDED                                   Therapeutic Exercise and NMR EXR  [x] (19960) Provided verbal/tactile cueing for activities related to strengthening, flexibility, endurance, ROM  for improvements in scapular, scapulothoracic and UE control with self care, reaching, carrying, lifting, house/yardwork, driving/computer work.    [] (12657) Provided verbal/tactile cueing for activities related to improving balance, coordination, kinesthetic sense, posture, motor skill, proprioception  to assist with  scapular, scapulothoracic and UE control with self care, reaching, carrying, lifting, house/yardwork, driving/computer work. Therapeutic Activities:    [] (86084 or 24765) Provided verbal/tactile cueing for activities related to improving balance, coordination, kinesthetic sense, posture, motor skill, proprioception and motor activation to allow for proper function of scapular, scapulothoracic and UE control with self care, carrying, lifting, driving/computer work.      Home Exercise Program:    [x] (86460) Reviewed/Progressed HEP activities related to strengthening, flexibility, endurance, ROM of scapular, scapulothoracic and UE control with self care, reaching, carrying, lifting, house/yardwork, driving/computer work  [] (50285) Reviewed/Progressed HEP activities related to improving balance, coordination, kinesthetic sense, posture, motor skill, proprioception of scapular, scapulothoracic and UE control with self care, reaching, carrying, lifting, house/yardwork, driving/computer work      Manual Treatments:  PROM / STM / Oscillations-Mobs:  G-I, II, III, IV (PA's, Inf., Post.)  [x] (99945) Provided manual therapy to mobilize soft tissue/joints of cervical/CT, scapular GHJ and UE for the purpose of modulating pain, promoting relaxation,  increasing ROM, reducing/eliminating soft tissue swelling/inflammation/restriction, improving soft tissue extensibility and allowing for proper ROM for normal function with self care, reaching, carrying, lifting, house/yardwork, driving/computer work    Modalities:  N/a    Charges:  Timed Code Treatment Minutes: 35   Total Treatment Minutes: 35       [] EVAL (LOW) 63069   [] EVAL (MOD) 08488   [] EVAL (HIGH) 56796   [] RE-EVAL     [x] BF(15714) x  1   [] IONTO  [] NMR (49350) x     [] VASO  [x] Manual (67721) x 1      [] Other:  [] TA x      [] Mech Traction (34301)  [] ES(attended) (23894)      [] ES (un) (43656):     GOALS:  Patient stated goal: Going to [employer], daily activities. [] Progressing: [] Met: [] Not Met: [] Adjusted    Therapist goals for Patient:   Short Term Goals: To be achieved in: 2 weeks  1. Independent in HEP and progression per patient tolerance, in order to prevent re-injury. [] Progressing: [] Met: [] Not Met: [] Adjusted  2. Patient will have a decrease in pain to facilitate improvement in movement, function, and ADLs as indicated by Functional Deficits. [x] Progressing: [] Met: [] Not Met: [] Adjusted    Long Term Goals: To be achieved in: 16 weeks  1. Disability index score of 35% or less for the [facility] to assist with reaching prior level of function. [] Progressing: [] Met: [] Not Met: [] Adjusted  2.  Patient will demonstrate increased AROM to 150 flex, 90 ER and 70 IR to allow for proper joint functioning as indicated by Elicia Sharpe, PT    Note: If patient does not return for scheduled/ recommended follow up visits, this note will serve as a discharge from care along with most recent update on progress.

## 2020-12-18 ENCOUNTER — APPOINTMENT (OUTPATIENT)
Dept: PHYSICAL THERAPY | Age: 65
End: 2020-12-18
Payer: MEDICARE

## 2020-12-21 ENCOUNTER — HOSPITAL ENCOUNTER (OUTPATIENT)
Dept: PHYSICAL THERAPY | Age: 65
Setting detail: THERAPIES SERIES
Discharge: HOME | End: 2020-12-21
Payer: MEDICARE

## 2020-12-21 PROCEDURE — 97140 MANUAL THERAPY 1/> REGIONS: CPT | Performed by: PHYSICAL THERAPIST

## 2020-12-21 PROCEDURE — 97110 THERAPEUTIC EXERCISES: CPT | Performed by: PHYSICAL THERAPIST

## 2020-12-21 NOTE — FLOWSHEET NOTE
[facility], [address] [place]  Phone: [phone]  Fax [phone]        Date:  2020    Patient Name:  Loli Munoz    :  1955  MRN: [id number]  Restrictions/Precautions:    Medical/Treatment Diagnosis Information:  · Diagnosis: Z98.890 s/p rtc repair, S46.011D right complete, traumatic RTC, M75.41 right RTC impingement    DOS:  10/30/2020  s/p SAD, labral debridement, Mini open RTC repair of supra and infra with regeneten patch. · Treatment Diagnosis: right shoulder pain M25.511, right RTC tear V87.703  Insurance/Certification information:  PT Insurance Information: [facility]  Physician Information:  Referring Practitioner: Dr. Nakia Ferris  Has the plan of care been signed (Y/N):        []  Yes  [x]  No     Date of Patient follow up with Physician: 20      Is this a Progress Report:     []  Yes  [x]  No        If Yes:  Date Range for reporting period:  Beginnin20  Ending;     Progress report will be due (10 Rx or 30 days whichever is less):        Recertification will be due (POC Duration  / 90 days whichever is less):       Visit # Insurance Allowable Auth Required   In-person 7 bmn []  Yes [x]  No    Telehealth   []  Yes []  No    Total          Therapy Diagnosis/Practice Pattern:I      Number of Comorbidities:  []0     [x]1-2    []3+    Latex Allergy:  [x]NO      []YES  Preferred Language for Healthcare:   [x]English       []other:      SUBJECTIVE:  Pt is 7.5 weeks post op. Pt stopped using sling AMA while sleeping. Pt is also wrapping presents AMA. Pt has c/o discomfort in upper trap. Pt is sore after signing paper work today to close [employer]. OBJECTIVE: See eval   Observation:    Test measurements:    Right 20       Current  Shoulder Flex 20 145   Shoulder Abd      Shoulder ER 20 65   Shoulder IR      Elbow flex 125    Elbow ext.   10           Strength related to improving balance, coordination, kinesthetic sense, posture, motor skill, proprioception of scapular, scapulothoracic and UE control with self care, reaching, carrying, lifting, house/yardwork, driving/computer work      Manual Treatments:  PROM / STM / Oscillations-Mobs:  G-I, II, III, IV (PA's, Inf., Post.)  [x] (33989) Provided manual therapy to mobilize soft tissue/joints of cervical/CT, scapular GHJ and UE for the purpose of modulating pain, promoting relaxation,  increasing ROM, reducing/eliminating soft tissue swelling/inflammation/restriction, improving soft tissue extensibility and allowing for proper ROM for normal function with self care, reaching, carrying, lifting, house/yardwork, driving/computer work    Modalities:  N/a    Charges:  Timed Code Treatment Minutes: 35   Total Treatment Minutes: 35       [] EVAL (LOW) 99478   [] EVAL (MOD) 34356   [] EVAL (HIGH) 29474   [] RE-EVAL     [x] AO(72823) x  1   [] IONTO  [] NMR (81161) x     [] VASO  [x] Manual (79175) x 1      [] Other:  [] TA x      [] Mech Traction (41492)  [] ES(attended) (81992)      [] ES (un) (88474):     GOALS:  Patient stated goal: Going to [employer], daily activities. [] Progressing: [] Met: [] Not Met: [] Adjusted    Therapist goals for Patient:   Short Term Goals: To be achieved in: 2 weeks  1. Independent in HEP and progression per patient tolerance, in order to prevent re-injury. [] Progressing: [] Met: [] Not Met: [] Adjusted  2. Patient will have a decrease in pain to facilitate improvement in movement, function, and ADLs as indicated by Functional Deficits. [x] Progressing: [] Met: [] Not Met: [] Adjusted    Long Term Goals: To be achieved in: 16 weeks  1. Disability index score of 35% or less for the [facility] to assist with reaching prior level of function. [] Progressing: [] Met: [] Not Met: [] Adjusted  2.  Patient will demonstrate increased AROM to 150 flex, 90 ER and 70 IR to allow for proper joint functioning as indicated by patients Functional Deficits. [x] Progressing: [] Met: [] Not Met: [] Adjusted  3. Patient will demonstrate an increase in Strength to 4+/5 throughout right UE to allow for proper functional mobility as indicated by patients Functional Deficits. [] Progressing: [] Met: [] Not Met: [] Adjusted  4. Patient will return to sleeping in bed without increased symptoms or restriction. [] Progressing: [] Met: [] Not Met: [] Adjusted  5. Able to drive, dress, cook, and perform light household chores. [] Progressing: [] Met: [] Not Met: [] Adjusted     Progression Towards Functional goals:  [x] Patient is progressing as expected towards functional goals listed. [] Progression is slowed due to complexities listed. [] Progression has been slowed due to co-morbidities. [] Plan just implemented, too soon to assess goals progression  [] Other:     ASSESSMENT:  Pt to start phase 2 next visit. Overall Progression Towards Functional goals/ Treatment Progress Update:  [x] Patient is progressing as expected towards functional goals listed. [] Progression is slowed due to complexities/Impairments listed. [] Progression has been slowed due to co-morbidities.   [] Plan just implemented, too soon to assess goals progression <30days   [] Goals require adjustment due to lack of progress  [] Patient is not progressing as expected and requires additional follow up with physician  [] Other    Prognosis for POC: [x] Good [] Fair  [] Poor      Patient requires continued skilled intervention: [x] Yes  [] No    Treatment/Activity Tolerance:  [x] Patient able to complete treatment  [] Patient limited by fatigue  [] Patient limited by pain    [] Patient limited by other medical complications  [] Other:           PLAN: See eval  [x] Continue per plan of care [] Alter current plan (see comments above)  [] Plan of care initiated [] Hold pending MD visit [] Discharge      Electronically signed by:  Dirk Cast Alisson PT    Note: If patient does not return for scheduled/ recommended follow up visits, this note will serve as a discharge from care along with most recent update on progress.

## 2020-12-22 ENCOUNTER — OFFICE VISIT (OUTPATIENT)
Dept: ORTHOPEDIC SURGERY | Age: 65
End: 2020-12-22

## 2020-12-22 PROCEDURE — 99024 POSTOP FOLLOW-UP VISIT: CPT | Performed by: PHYSICIAN ASSISTANT

## 2020-12-22 NOTE — PROGRESS NOTES
History of present illness: The patient returns today for their postoperative visit after shoulder arthroscopy and mini open rotator cuff repair. She is 8 weeks postop/8 weeks passive. Pain control has been satisfactory with oral medications. There have been no fevers or chills. Pain Assessment  Location of Pain: Shoulder  Location Modifiers: Right  Severity of Pain: 0]    Physical examination: Inspection reveals expected swelling. Incisions are clean, dry, and intact. No signs of infection. Passively she has about 150 degrees of forward elevation with 50 degrees of external rotation. Strength is intact. . Neurovascular exam is intact. Assessment/plan: The patient is doing well after shoulder arthroscopy and rotator cuff repair. We will go ahead and discontinue the sling today. She can go ahead and begin phase 1 through phase 3 strengthening. She does not need a refill of her pain medication.   We will see her back in 4 weeks for repeat clinical exam and functional assessment

## 2020-12-28 ENCOUNTER — HOSPITAL ENCOUNTER (OUTPATIENT)
Dept: PHYSICAL THERAPY | Age: 65
Setting detail: THERAPIES SERIES
Discharge: HOME | End: 2020-12-28
Payer: MEDICARE

## 2020-12-28 PROCEDURE — 97110 THERAPEUTIC EXERCISES: CPT | Performed by: PHYSICAL THERAPIST

## 2020-12-28 PROCEDURE — 97140 MANUAL THERAPY 1/> REGIONS: CPT | Performed by: PHYSICAL THERAPIST

## 2020-12-28 NOTE — FLOWSHEET NOTE
[facility], [address]  Phone: [phone]  Fax [phone]        Date:  2020    Patient Name:  Brandon Pastrana    :  1955  MRN: [id number]  Restrictions/Precautions:    Medical/Treatment Diagnosis Information:  · Diagnosis: Z98.890 s/p rtc repair, S46.011D right complete, traumatic RTC, M75.41 right RTC impingement    DOS:  10/30/2020  s/p SAD, labral debridement, Mini open RTC repair of supra and infra with regeneten patch. · Treatment Diagnosis: right shoulder pain M25.511, right RTC tear K05.429  Insurance/Certification information:  PT Insurance Information: [facility]  Physician Information:  Referring Practitioner: Dr. Hesham Naranjo  Has the plan of care been signed (Y/N):        []  Yes  [x]  No     Date of Patient follow up with Physician: 20      Is this a Progress Report:     []  Yes  [x]  No        If Yes:  Date Range for reporting period:  Beginnin20  Ending;     Progress report will be due (10 Rx or 30 days whichever is less):        Recertification will be due (POC Duration  / 90 days whichever is less):       Visit # Insurance Allowable Auth Required   In-person 8 bmn []  Yes [x]  No    Telehealth   []  Yes []  No    Total          Therapy Diagnosis/Practice Pattern:I      Number of Comorbidities:  []0     [x]1-2    []3+    Latex Allergy:  [x]NO      []YES  Preferred Language for Healthcare:   [x]English       []other:      SUBJECTIVE:  Pt is 8.5 weeks post op. Pt thinks she overdid it at [facility]. She was sore on Friday and Saturday. Pt did rest, and she had little pain on . Pt is sleeping in bed. Pt is currently taking Advil and tylenol for shoulder. No N/T.      OBJECTIVE: See eval   Observation:    Test measurements:    Right 20       Current  Shoulder Flex 20 145   Shoulder Abd      Shoulder ER 20 65   Shoulder IR      Elbow flex 125    Elbow ext. 10           Strength  Right    Shoulder Flex n/t    Shoulder Scap n/t    Shoulder ER n/t    Shoulder IR n/t           Pain scale 6/ 10 3/10    Quick Dash 53= 93%        RESTRICTIONS/PRECAUTIONS: 4 weeks pillow/8 PROM     Exercises/Interventions:     Therapeutic Ex (78864) Sets/sec Reps Notes/CUES         SBS :05 X 10 hpe   shrug   x10 hep   Cane press/  Cane flex X 10  X 10  hep   Passive ER Cane  X 10 hep   Active elbow flex 2# 2 X 10 hep   No $  2 x 10 hep   Upper trap s/  Levator s :20 3x hep   Table slides  X 10 hep   pendulum  X 20     abd iso at wall 10x :05 Submax, pain free   submax ER and IR iso doorway :05 X 10  hep   pulley  X 15 SB ball roll  X 20    TB Row  nv Finisher  nv    Manual Intervention (33884)      Joint oscillations/  PROM  8  min    STM of biceps  5 min                            NMR re-education (58530)   CUES NEEDED                                   Therapeutic Exercise and NMR EXR  [x] (89485) Provided verbal/tactile cueing for activities related to strengthening, flexibility, endurance, ROM  for improvements in scapular, scapulothoracic and UE control with self care, reaching, carrying, lifting, house/yardwork, driving/computer work.    [] (53042) Provided verbal/tactile cueing for activities related to improving balance, coordination, kinesthetic sense, posture, motor skill, proprioception  to assist with  scapular, scapulothoracic and UE control with self care, reaching, carrying, lifting, house/yardwork, driving/computer work. Therapeutic Activities:    [] (26431 or 38906) Provided verbal/tactile cueing for activities related to improving balance, coordination, kinesthetic sense, posture, motor skill, proprioception and motor activation to allow for proper function of scapular, scapulothoracic and UE control with self care, carrying, lifting, driving/computer work.      Home Exercise Program:    [x] (23492) Reviewed/Progressed HEP activities related to strengthening, flexibility, endurance, ROM of scapular, scapulothoracic and UE control with self care, reaching, carrying, lifting, house/yardwork, driving/computer work  [] (40683) Reviewed/Progressed HEP activities related to improving balance, coordination, kinesthetic sense, posture, motor skill, proprioception of scapular, scapulothoracic and UE control with self care, reaching, carrying, lifting, house/yardwork, driving/computer work      Manual Treatments:  PROM / STM / Oscillations-Mobs:  G-I, II, III, IV (PA's, Inf., Post.)  [x] (46386) Provided manual therapy to mobilize soft tissue/joints of cervical/CT, scapular GHJ and UE for the purpose of modulating pain, promoting relaxation,  increasing ROM, reducing/eliminating soft tissue swelling/inflammation/restriction, improving soft tissue extensibility and allowing for proper ROM for normal function with self care, reaching, carrying, lifting, house/yardwork, driving/computer work    Modalities:  N/a    Charges:  Timed Code Treatment Minutes: 42   Total Treatment Minutes: 42       [] EVAL (LOW) 06655   [] EVAL (MOD) 89461   [] EVAL (HIGH) 07973   [] RE-EVAL     [x] CB(66670) x  2  [] IONTO  [] NMR (57482) x     [] VASO  [x] Manual (74642) x 1      [] Other:  [] TA x      [] Mech Traction (97773)  [] ES(attended) (86444)      [] ES (un) (88121):     GOALS:  Patient stated goal: Going to [employer], daily activities. [] Progressing: [] Met: [] Not Met: [] Adjusted    Therapist goals for Patient:   Short Term Goals: To be achieved in: 2 weeks  1. Independent in HEP and progression per patient tolerance, in order to prevent re-injury. [] Progressing: [] Met: [] Not Met: [] Adjusted  2. Patient will have a decrease in pain to facilitate improvement in movement, function, and ADLs as indicated by Functional Deficits. [x] Progressing: [] Met: [] Not Met: [] Adjusted    Long Term Goals: To be achieved in: 16 weeks  1.  Disability index score of 35% or less for the [facility] to assist with limited by other medical complications  [] Other:           PLAN: See eval  [x] Continue per plan of care [] Alter current plan (see comments above)  [] Plan of care initiated [] Hold pending MD visit [] Discharge      Electronically signed by:  Lisa Mcclain PT    Note: If patient does not return for scheduled/ recommended follow up visits, this note will serve as a discharge from care along with most recent update on progress.

## 2020-12-31 ENCOUNTER — APPOINTMENT (OUTPATIENT)
Dept: PHYSICAL THERAPY | Age: 65
End: 2020-12-31
Payer: MEDICARE

## 2021-01-04 ENCOUNTER — HOSPITAL ENCOUNTER (OUTPATIENT)
Dept: PHYSICAL THERAPY | Age: 66
Setting detail: THERAPIES SERIES
Discharge: HOME | End: 2021-01-04
Payer: MEDICARE

## 2021-01-04 PROCEDURE — 97110 THERAPEUTIC EXERCISES: CPT | Performed by: PHYSICAL THERAPIST

## 2021-01-04 PROCEDURE — 97140 MANUAL THERAPY 1/> REGIONS: CPT | Performed by: PHYSICAL THERAPIST

## 2021-01-04 NOTE — FLOWSHEET NOTE
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 · Treatment Diagnosis: right shoulder pain M25.511, right RTC tear K41.331  Insurance/Certification information:  PT Insurance Information: [facility]  Physician Information:  Referring Practitioner: Dr. Lynn Knowles  Has the plan of care been signed (Y/N):        []  Yes  [x]  No     Date of Patient follow up with Physician: 20      Is this a Progress Report:     []  Yes  [x]  No        If Yes:  Date Range for reporting period:  Beginnin20  Ending;     Progress report will be due (10 Rx or 30 days whichever is less):        Recertification will be due (POC Duration  / 90 days whichever is less):       Visit # Insurance Allowable Auth Required   In-person  in  bmn []  Yes [x]  No    Telehealth   []  Yes []  No    Total          Therapy Diagnosis/Practice Pattern:I      Number of Comorbidities:  []0     [x]1-2    []3+    Latex Allergy:  [x]NO      []YES  Preferred Language for Healthcare:   [x]English       []other:      SUBJECTIVE:  Pt is 9.5 weeks post op. Pt mopped her kitchen and dining room on Thursday. It was sore afterward. Pt is able to reach the back of her head. OBJECTIVE: See eval   Observation:    Test measurements:    Right 20       Current  Shoulder Flex 20 145   Shoulder Abd      Shoulder ER 20 71   Shoulder IR   68   Elbow flex 125    Elbow ext.   10           Strength  Right    Shoulder Flex n/t    Shoulder Scap n/t    Shoulder ER n/t    Shoulder IR n/t           Pain scale 6/ 10 3/10    Quick Dash 53= 93%        RESTRICTIONS/PRECAUTIONS: 4 weeks pillow/8 PROM     Exercises/Interventions:     Therapeutic Ex (08665) Sets/sec Reps Notes/CUES         SBS/  Shrug X 10 X 10 hpe         Cane press/  Cane flex X 10  X 10  hep   Passive ER Cane  X 10 hep   Serratus punch  X 20    Supine R/S 2 directions  X 20     SL ERC  2 x 10     Active elbow flex 2# 2 X 10 hep   No $ YL  2 x 10 hep   hep   Table slides  X 10 hep      abd iso at wall 10x :05 Submax, pain free   submax ER and IR iso doorway :05 X 10  hep   pulley  X 20 SB ball roll  X 20 SB lift off :05 X 10  TB Row GR  X 20  Finisher 4#  x 20  3 exercises   Manual Intervention (68614)      Joint oscillations/  PROM  8  min    STM of biceps  5 min                            NMR re-education (13113)   CUES NEEDED                                   Therapeutic Exercise and NMR EXR  [x] (31712) Provided verbal/tactile cueing for activities related to strengthening, flexibility, endurance, ROM  for improvements in scapular, scapulothoracic and UE control with self care, reaching, carrying, lifting, house/yardwork, driving/computer work.    [] (27846) Provided verbal/tactile cueing for activities related to improving balance, coordination, kinesthetic sense, posture, motor skill, proprioception  to assist with  scapular, scapulothoracic and UE control with self care, reaching, carrying, lifting, house/yardwork, driving/computer work. Therapeutic Activities:    [] (48074 or 95587) Provided verbal/tactile cueing for activities related to improving balance, coordination, kinesthetic sense, posture, motor skill, proprioception and motor activation to allow for proper function of scapular, scapulothoracic and UE control with self care, carrying, lifting, driving/computer work.      Home Exercise Program:    [x] (57013) Reviewed/Progressed HEP activities related to strengthening, flexibility, endurance, ROM of scapular, scapulothoracic and UE control with self care, reaching, carrying, lifting, house/yardwork, driving/computer work  [] (43277) Reviewed/Progressed HEP activities related to improving balance, coordination, kinesthetic sense, posture, motor skill, proprioception of scapular, scapulothoracic and UE control with self care, reaching, carrying, lifting, house/yardwork, driving/computer work      Manual Treatments:  PROM / STM / Oscillations-Mobs:  G-I, II, III, IV (PA's, Inf., Post.)  [x] (72051) Provided manual therapy to mobilize soft tissue/joints of cervical/CT, scapular GHJ and UE for the purpose of modulating pain, promoting relaxation,  increasing ROM, reducing/eliminating soft tissue swelling/inflammation/restriction, improving soft tissue extensibility and allowing for proper ROM for normal function with self care, reaching, carrying, lifting, house/yardwork, driving/computer work    Modalities:  ice    Charges:  Timed Code Treatment Minutes: 40   Total Treatment Minutes: 50       [] EVAL (LOW) 94258   [] EVAL (MOD) 53483   [] EVAL (HIGH) 20639   [] RE-EVAL     [x] JX(01870) x  2  [] IONTO  [] NMR (12918) x     [] VASO  [x] Manual (46897) x 1      [x] Other:  [] TA x      [] Mech Traction (14396)  [] ES(attended) (66236)      [] ES (un) (36774):     GOALS:  Patient stated goal: Going to [employer], daily activities. [] Progressing: [] Met: [] Not Met: [] Adjusted    Therapist goals for Patient:   Short Term Goals: To be achieved in: 2 weeks  1. Independent in HEP and progression per patient tolerance, in order to prevent re-injury. [] Progressing: [] Met: [] Not Met: [] Adjusted  2. Patient will have a decrease in pain to facilitate improvement in movement, function, and ADLs as indicated by Functional Deficits. [x] Progressing: [] Met: [] Not Met: [] Adjusted    Long Term Goals: To be achieved in: 16 weeks  1.  Disability index score of 35% or less for the [facility] to assist with reaching prior level of function. [] Progressing: [] Met: [] Not Met: [] Adjusted  2. Patient will demonstrate increased AROM to 150 flex, 90 ER and 70 IR to allow for proper joint functioning as indicated by patients Functional Deficits. [x] Progressing: [] Met: [] Not Met: [] Adjusted  3. Patient will demonstrate an increase in Strength to 4+/5 throughout right UE to allow for proper functional mobility as indicated by patients Functional Deficits. [] Progressing: [] Met: [] Not Met: [] Adjusted  4. Patient will return to sleeping in bed without increased symptoms or restriction. [] Progressing: [] Met: [] Not Met: [] Adjusted  5. Able to drive, dress, cook, and perform light household chores. [] Progressing: [] Met: [] Not Met: [] Adjusted     Progression Towards Functional goals:  [x] Patient is progressing as expected towards functional goals listed. [] Progression is slowed due to complexities listed. [] Progression has been slowed due to co-morbidities. [] Plan just implemented, too soon to assess goals progression  [] Other:     ASSESSMENT:  Pt reported quivering of muscles today. Pt responds well to verbal cuing. Overall Progression Towards Functional goals/ Treatment Progress Update:  [x] Patient is progressing as expected towards functional goals listed. [] Progression is slowed due to complexities/Impairments listed. [] Progression has been slowed due to co-morbidities.   [] Plan just implemented, too soon to assess goals progression <30days   [] Goals require adjustment due to lack of progress  [] Patient is not progressing as expected and requires additional follow up with physician  [] Other    Prognosis for POC: [x] Good [] Fair  [] Poor      Patient requires continued skilled intervention: [x] Yes  [] No    Treatment/Activity Tolerance:  [x] Patient able to complete treatment  [] Patient limited by fatigue  [] Patient limited by pain    [] Patient limited by other medical complications  [] Other: PLAN: See eval  [x] Continue per plan of care [] Alter current plan (see comments above)  [] Plan of care initiated [] Hold pending MD visit [] Discharge      Electronically signed by:  Stacie Leon PT    Note: If patient does not return for scheduled/ recommended follow up visits, this note will serve as a discharge from care along with most recent update on progress.

## 2021-01-06 ENCOUNTER — HOSPITAL ENCOUNTER (OUTPATIENT)
Dept: PHYSICAL THERAPY | Age: 66
Setting detail: THERAPIES SERIES
Discharge: HOME | End: 2021-01-06
Payer: MEDICARE

## 2021-01-06 PROCEDURE — 97110 THERAPEUTIC EXERCISES: CPT | Performed by: PHYSICAL THERAPIST

## 2021-01-06 PROCEDURE — 97140 MANUAL THERAPY 1/> REGIONS: CPT | Performed by: PHYSICAL THERAPIST

## 2021-01-06 NOTE — FLOWSHEET NOTE
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 · Treatment Diagnosis: right shoulder pain M25.511, right RTC tear Q26.133  Insurance/Certification information:  PT Insurance Information: [facility]  Physician Information:  Referring Practitioner: Dr. Paris Pastor  Has the plan of care been signed (Y/N):        []  Yes  [x]  No     Date of Patient follow up with Physician: 20      Is this a Progress Report:     [x]  Yes  []  No        If Yes:  Date Range for reporting period:  Beginnin20  Endin20    Progress report will be due (10 Rx or 30 days whichever is less):        Recertification will be due (POC Duration  / 90 days whichever is less):       Visit # Insurance Allowable Auth Required   In-person 10/ 8 in  bmn []  Yes [x]  No    Telehealth   []  Yes []  No    Total          Therapy Diagnosis/Practice Pattern:I      Number of Comorbidities:  []0     [x]1-2    []3+    Latex Allergy:  [x]NO      []YES  Preferred Language for Healthcare:   [x]English       []other:      SUBJECTIVE:  Pt is 10 weeks post op. Pt reports that styling her hair is easier. She vacuumed yesterday using her left hand. Reported increased soreness after last visit. Pt cont to sleep well. Pt is not taking any meds for her shoulder.      OBJECTIVE: See eval   Observation:    Test measurements:    Right 20       Current  Shoulder Flex 20 145   Shoulder Abd      Shoulder ER 20 71   Shoulder IR   68   Elbow flex 125    Elbow ext.  10           Strength  Right    Shoulder Flex n/t n/t   Shoulder Scap n/t    Shoulder ER n/t n/t   Shoulder IR n/t n/t          Pain scale 6/ 10 0/10    Quick Dash 53= 93% 30= 43%       RESTRICTIONS/PRECAUTIONS: 4 weeks pillow/8 PROM     Exercises/Interventions:     Therapeutic Ex (98222) Sets/sec Reps Notes/CUES         SBS/  Shrug X 10 X 10 hpe         Cane press/  Cane flex X 10  X 10  hep   Passive ER Cane  X 10 hep      Supine R/S 2 directions  X 20        Active elbow flex 2# 2 X 10 hep   No $  2 x 10 hep   hep   Table slides  X 10 hep      abd iso at wall 10x :05 Submax, pain free   submax ER and IR iso doorway :05 X 10  hep   pulley  X 20 SB ball roll  X 20 SB lift off TB Row GR  X 20  Finisher 3 exercises   Manual Intervention (30318)      Joint oscillations/  PROM  8  min    STM of biceps  5 min                            NMR re-education (26322)   CUES NEEDED                                   Therapeutic Exercise and NMR EXR  [x] (15498) Provided verbal/tactile cueing for activities related to strengthening, flexibility, endurance, ROM  for improvements in scapular, scapulothoracic and UE control with self care, reaching, carrying, lifting, house/yardwork, driving/computer work.    [] (59090) Provided verbal/tactile cueing for activities related to improving balance, coordination, kinesthetic sense, posture, motor skill, proprioception  to assist with  scapular, scapulothoracic and UE control with self care, reaching, carrying, lifting, house/yardwork, driving/computer work. Therapeutic Activities:    [] (39539 or 49983) Provided verbal/tactile cueing for activities related to improving balance, coordination, kinesthetic sense, posture, motor skill, proprioception and motor activation to allow for proper function of scapular, scapulothoracic and UE control with self care, carrying, lifting, driving/computer work.      Home Exercise Program:    [x] (01639) Reviewed/Progressed HEP activities related to strengthening, flexibility, endurance, ROM of scapular, scapulothoracic and UE control with self care, reaching, carrying, lifting, house/yardwork, driving/computer work  [] (96205) Reviewed/Progressed HEP activities related to improving balance, coordination, kinesthetic sense, posture, motor skill, proprioception of scapular, scapulothoracic and UE control with self care, reaching, carrying, lifting, house/yardwork, driving/computer work      Manual Treatments:  PROM / STM / Oscillations-Mobs:  G-I, II, III, IV (PA's, Inf., Post.)  [x] (27212) Provided manual therapy to mobilize soft tissue/joints of cervical/CT, scapular GHJ and UE for the purpose of modulating pain, promoting relaxation,  increasing ROM, reducing/eliminating soft tissue swelling/inflammation/restriction, improving soft tissue extensibility and allowing for proper ROM for normal function with self care, reaching, carrying, lifting, house/yardwork, driving/computer work    Modalities:  ice    Charges:  Timed Code Treatment Minutes: 40   Total Treatment Minutes: 50       [] EVAL (LOW) 31769   [] EVAL (MOD) 77726   [] EVAL (HIGH) 26009   [] RE-EVAL     [x] SI(75861) x  2  [] IONTO  [] NMR (04114) x     [] VASO  [x] Manual (91363) x 1      [x] Other:  [] TA x      [] Mech Traction (52512)  [] ES(attended) (89296)      [] ES (un) (80276):     GOALS:  Patient stated goal: Going to [employer], daily activities. [] Progressing: [] Met: [] Not Met: [] Adjusted    Therapist goals for Patient:   Short Term Goals: To be achieved in: 2 weeks  1. Independent in HEP and progression per patient tolerance, in order to prevent re-injury. [] Progressing: [] Met: [] Not Met: [] Adjusted  2. Patient will have a decrease in pain to facilitate improvement in movement, function, and ADLs as indicated by Functional Deficits. [x] Progressing: [] Met: [] Not Met: [] Adjusted    Long Term Goals: To be achieved in: 16 weeks  1.  Disability index score of 35% or less for the [facility] to assist with reaching prior level of function. [x] Progressing: [] Met: [] Not Met: [] Adjusted  2. Patient will demonstrate increased AROM to 150 flex, 90 ER and 70 IR to allow for proper joint functioning as indicated by patients Functional Deficits. [x] Progressing: [] Met: [] Not Met: [] Adjusted  3. Patient will demonstrate an increase in Strength to 4+/5 throughout right UE to allow for proper functional mobility as indicated by patients Functional Deficits. [] Progressing: [] Met: [] Not Met: [] Adjusted  4. Patient will return to sleeping in bed without increased symptoms or restriction. [x] Progressing: [] Met: [] Not Met: [] Adjusted  5. Able to drive, dress, cook, and perform light household chores. [x] Progressing: [] Met: [] Not Met: [] Adjusted     Progression Towards Functional goals:  [x] Patient is progressing as expected towards functional goals listed. [] Progression is slowed due to complexities listed. [] Progression has been slowed due to co-morbidities. [] Plan just implemented, too soon to assess goals progression  [] Other:     ASSESSMENT:  Decreased some of the exercises from last visit due to increase soreness. Pt was able to complete all requested exercises comfortably. Overall Progression Towards Functional goals/ Treatment Progress Update:  [x] Patient is progressing as expected towards functional goals listed. [] Progression is slowed due to complexities/Impairments listed. [] Progression has been slowed due to co-morbidities.   [] Plan just implemented, too soon to assess goals progression <30days   [] Goals require adjustment due to lack of progress  [] Patient is not progressing as expected and requires additional follow up with physician  [] Other    Prognosis for POC: [x] Good [] Fair  [] Poor      Patient requires continued skilled intervention: [x] Yes  [] No    Treatment/Activity Tolerance:  [x] Patient able to complete treatment  [] Patient limited by fatigue  [] Patient limited by pain    [] Patient limited by other medical complications  [] Other:           PLAN: See eval  [x] Continue per plan of care [] Alter current plan (see comments above)  [] Plan of care initiated [] Hold pending MD visit [] Discharge      Electronically signed by:  Diego Longoria PT    Note: If patient does not return for scheduled/ recommended follow up visits, this note will serve as a discharge from care along with most recent update on progress.

## 2021-01-11 ENCOUNTER — HOSPITAL ENCOUNTER (OUTPATIENT)
Dept: PHYSICAL THERAPY | Age: 66
Setting detail: THERAPIES SERIES
Discharge: HOME | End: 2021-01-11
Payer: MEDICARE

## 2021-01-11 PROCEDURE — 97140 MANUAL THERAPY 1/> REGIONS: CPT | Performed by: PHYSICAL THERAPIST

## 2021-01-11 PROCEDURE — 97110 THERAPEUTIC EXERCISES: CPT | Performed by: PHYSICAL THERAPIST

## 2021-01-11 NOTE — FLOWSHEET NOTE
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 · Treatment Diagnosis: right shoulder pain M25.511, right RTC tear F24.056  Insurance/Certification information:  PT Insurance Information: [facility]  Physician Information:  Referring Practitioner: Dr. Fatima   Has the plan of care been signed (Y/N):        []  Yes  [x]  No     Date of Patient follow up with Physician: 20      Is this a Progress Report:     [x]  Yes  []  No        If Yes:  Date Range for reporting period:  Beginnin20  Endin20    Progress report will be due (10 Rx or 30 days whichever is less):        Recertification will be due (POC Duration  / 90 days whichever is less):       Visit # Insurance Allowable Auth Required   In-person  in  bmn []  Yes [x]  No    Telehealth   []  Yes []  No    Total          Therapy Diagnosis/Practice Pattern:I      Number of Comorbidities:  []0     [x]1-2    []3+    Latex Allergy:  [x]NO      []YES  Preferred Language for Healthcare:   [x]English       []other:      SUBJECTIVE:  Pt is 10.5 weeks post op. Pt went to visit brother. She drove 8 hours. She had muscle pain by the time she got there. Pt is sleeping well. Pt is dressing easier. Pt is not taking any meds for her shoulder, except Advil prn.     OBJECTIVE: See eval   Observation:    Test measurements:    Right 20       Current  Shoulder Flex 20 145   Shoulder Abd      Shoulder ER 20 71   Shoulder IR   68   Elbow flex 125 Elbow ext. 10           Strength  Right    Shoulder Flex n/t n/t   Shoulder Scap n/t    Shoulder ER n/t n/t   Shoulder IR n/t n/t          Pain scale 6/ 10 0/10    Quick Dash 53= 93% 30= 43%       RESTRICTIONS/PRECAUTIONS: 4 weeks pillow/8 PROM     Exercises/Interventions:     Therapeutic Ex (39741) Sets/sec Reps Notes/CUES         hpe         Cane press/  Cane flex X 10  X 10  hep   hep      Supine R/S 2 directions 2# X 20     Supine post cap s :20 3x    SL ERC  2 x 10     Active elbow flex 2# 2 X 10 hep   No $ YL  2 x 10 hep   hep   Table slides  X 10 hep   abd iso at wall 10x :05 Submax, pain free   submax ER and IR iso doorway :05 X 10  hep   pulley  X 20 SB ball roll  X 20 SB lift off :05 X 10  TB Row GR  X 20  Finisher 3 exercisesModified towel s IR :10 5x Manual Intervention (56478)      Joint oscillations/  PROM  6  min    STM of biceps  5 min                            NMR re-education (16668)   CUES NEEDED                                   Therapeutic Exercise and NMR EXR  [x] (82740) Provided verbal/tactile cueing for activities related to strengthening, flexibility, endurance, ROM  for improvements in scapular, scapulothoracic and UE control with self care, reaching, carrying, lifting, house/yardwork, driving/computer work.    [] (84543) Provided verbal/tactile cueing for activities related to improving balance, coordination, kinesthetic sense, posture, motor skill, proprioception  to assist with  scapular, scapulothoracic and UE control with self care, reaching, carrying, lifting, house/yardwork, driving/computer work. Therapeutic Activities:    [] (44654 or 35548) Provided verbal/tactile cueing for activities related to improving balance, coordination, kinesthetic sense, posture, motor skill, proprioception and motor activation to allow for proper function of scapular, scapulothoracic and UE control with self care, carrying, lifting, driving/computer work.      Home Exercise Program:    [x] (34475) Reviewed/Progressed HEP activities related to strengthening, flexibility, endurance, ROM of scapular, scapulothoracic and UE control with self care, reaching, carrying, lifting, house/yardwork, driving/computer work  [] (34074) Reviewed/Progressed HEP activities related to improving balance, coordination, kinesthetic sense, posture, motor skill, proprioception of scapular, scapulothoracic and UE control with self care, reaching, carrying, lifting, house/yardwork, driving/computer work      Manual Treatments:  PROM / STM / Oscillations-Mobs:  G-I, II, III, IV (PA's, Inf., Post.)  [x] (89103) Provided manual therapy to mobilize soft tissue/joints of cervical/CT, scapular GHJ and UE for the purpose of modulating pain, promoting relaxation,  increasing ROM, reducing/eliminating soft tissue swelling/inflammation/restriction, improving soft tissue extensibility and allowing for proper ROM for normal function with self care, reaching, carrying, lifting, house/yardwork, driving/computer work    Modalities:  ice    Charges:  Timed Code Treatment Minutes: 40   Total Treatment Minutes: 50       [] EVAL (LOW) 14549   [] EVAL (MOD) 12282   [] EVAL (HIGH) 30496   [] RE-EVAL     [x] GO(20014) x  2  [] IONTO  [] NMR (36322) x     [] VASO  [x] Manual (80505) x 1      [x] Other:  [] TA x      [] Mech Traction (78720)  [] ES(attended) (09127)      [] ES (un) (23206):     GOALS:  Patient stated goal: Going to [employer], daily activities. [] Progressing: [] Met: [] Not Met: [] Adjusted    Therapist goals for Patient:   Short Term Goals: To be achieved in: 2 weeks  1. Independent in HEP and progression per patient tolerance, in order to prevent re-injury. [] Progressing: [] Met: [] Not Met: [] Adjusted  2. Patient will have a decrease in pain to facilitate improvement in movement, function, and ADLs as indicated by Functional Deficits. [x] Progressing: [] Met: [] Not Met: [] Adjusted    Long Term Goals:  To be achieved in: 16 weeks  1. Disability index score of 35% or less for the [facility] to assist with reaching prior level of function. [x] Progressing: [] Met: [] Not Met: [] Adjusted  2. Patient will demonstrate increased AROM to 150 flex, 90 ER and 70 IR to allow for proper joint functioning as indicated by patients Functional Deficits. [x] Progressing: [] Met: [] Not Met: [] Adjusted  3. Patient will demonstrate an increase in Strength to 4+/5 throughout right UE to allow for proper functional mobility as indicated by patients Functional Deficits. [] Progressing: [] Met: [] Not Met: [] Adjusted  4. Patient will return to sleeping in bed without increased symptoms or restriction. [x] Progressing: [] Met: [] Not Met: [] Adjusted  5. Able to drive, dress, cook, and perform light household chores. [x] Progressing: [] Met: [] Not Met: [] Adjusted     Progression Towards Functional goals:  [x] Patient is progressing as expected towards functional goals listed. [] Progression is slowed due to complexities listed. [] Progression has been slowed due to co-morbidities. [] Plan just implemented, too soon to assess goals progression  [] Other:     ASSESSMENT:  Pt needs additional post cap s. Updated HEP. Overall Progression Towards Functional goals/ Treatment Progress Update:  [x] Patient is progressing as expected towards functional goals listed. [] Progression is slowed due to complexities/Impairments listed. [] Progression has been slowed due to co-morbidities.   [] Plan just implemented, too soon to assess goals progression <30days   [] Goals require adjustment due to lack of progress  [] Patient is not progressing as expected and requires additional follow up with physician  [] Other    Prognosis for POC: [x] Good [] Fair  [] Poor      Patient requires continued skilled intervention: [x] Yes  [] No    Treatment/Activity Tolerance:  [x] Patient able to complete treatment  [] Patient limited by fatigue  [] Patient limited by pain    [] Patient limited by other medical complications  [] Other:           PLAN: See eval  [x] Continue per plan of care [] Alter current plan (see comments above)  [] Plan of care initiated [] Hold pending MD visit [] Discharge      Electronically signed by:  Ana Ying PT    Note: If patient does not return for scheduled/ recommended follow up visits, this note will serve as a discharge from care along with most recent update on progress.

## 2021-01-13 ENCOUNTER — HOSPITAL ENCOUNTER (OUTPATIENT)
Dept: PHYSICAL THERAPY | Age: 66
Setting detail: THERAPIES SERIES
Discharge: HOME | End: 2021-01-13
Payer: MEDICARE

## 2021-01-13 PROCEDURE — 97140 MANUAL THERAPY 1/> REGIONS: CPT | Performed by: PHYSICAL THERAPIST

## 2021-01-13 PROCEDURE — 97110 THERAPEUTIC EXERCISES: CPT | Performed by: PHYSICAL THERAPIST

## 2021-01-13 NOTE — FLOWSHEET NOTE
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 · Treatment Diagnosis: right shoulder pain M25.511, right RTC tear C47.062  Insurance/Certification information:  PT Insurance Information:  [facility]  Physician Information:  Referring Practitioner: Dr. Pastor Kelly  Has the plan of care been signed (Y/N):        []  Yes  [x]  No     Date of Patient follow up with Physician: 20      Is this a Progress Report:     [x]  Yes  []  No        If Yes:  Date Range for reporting period:  Beginnin20  Endin20    Progress report will be due (10 Rx or 30 days whichever is less):        Recertification will be due (POC Duration  / 90 days whichever is less):       Visit # Insurance Allowable Auth Required   In-person  in  bmn []  Yes [x]  No    Telehealth   []  Yes []  No    Total          Therapy Diagnosis/Practice Pattern:I      Number of Comorbidities:  []0     [x]1-2    []3+    Latex Allergy:  [x]NO      []YES  Preferred Language for Healthcare:   [x]English       []other:      SUBJECTIVE:  Pt is 11 weeks post op. Pt was moving stuff out of rooms to get new carpet, so arm is sore. OBJECTIVE: See eval   Observation:    Test measurements:    Right 20       Current  Shoulder Flex 20 145   Shoulder Abd      Shoulder ER 20 71   Shoulder IR   68   Elbow flex 125    Elbow ext.   10           Strength  Right    Shoulder Flex n/t n/t   Shoulder Scap n/t    Shoulder ER n/t n/t   Shoulder IR n/t n/t       Pain scale 6/ 10 0/10    Quick Dash 53= 93% 30= 43%       RESTRICTIONS/PRECAUTIONS: 4 weeks pillow/8 PROM     Exercises/Interventions:     Therapeutic Ex (14064) Sets/sec Reps Notes/CUES         hpe         Cane press/  Cane flex  X 10  hep   hep      Supine R/S 2 directions 3# X 20     Supine post cap s :20 3x    SL ERC  2 x 10     Sl abd to 90 1# 2 x 10    SL horiz abd 1# 2 x 10     Active elbow flex 2# 2 X 10 hep   No $ YL  2 x 10 hep   hep   Table slides  X 10 hep   Submax, pain free   hep   SB lift off :05 X 10  Wall walks  X 10  TB Row/  TB ext GR  X 20  TB IR/ ER RD 2 x 10  Finisher 3 exercisesModified towel s IR :10 5x Manual Intervention (78298)      Joint oscillations/  PROM  6  min    STM of biceps  2 min                            NMR re-education (17440)   CUES NEEDED                                   Therapeutic Exercise and NMR EXR  [x] (79913) Provided verbal/tactile cueing for activities related to strengthening, flexibility, endurance, ROM  for improvements in scapular, scapulothoracic and UE control with self care, reaching, carrying, lifting, house/yardwork, driving/computer work.    [] (05182) Provided verbal/tactile cueing for activities related to improving balance, coordination, kinesthetic sense, posture, motor skill, proprioception  to assist with  scapular, scapulothoracic and UE control with self care, reaching, carrying, lifting, house/yardwork, driving/computer work. Therapeutic Activities:    [] (60198 or 09928) Provided verbal/tactile cueing for activities related to improving balance, coordination, kinesthetic sense, posture, motor skill, proprioception and motor activation to allow for proper function of scapular, scapulothoracic and UE control with self care, carrying, lifting, driving/computer work.      Home Exercise Program:    [x] (01481) Reviewed/Progressed HEP activities related to strengthening, flexibility, endurance, ROM of scapular, scapulothoracic and UE control with self care, reaching, carrying, lifting, house/yardwork, driving/computer work  [] (54173) Reviewed/Progressed HEP activities related to improving balance, coordination, kinesthetic sense, posture, motor skill, proprioception of scapular, scapulothoracic and UE control with self care, reaching, carrying, lifting, house/yardwork, driving/computer work      Manual Treatments:  PROM / STM / Oscillations-Mobs:  G-I, II, III, IV (PA's, Inf., Post.)  [x] (54438) Provided manual therapy to mobilize soft tissue/joints of cervical/CT, scapular GHJ and UE for the purpose of modulating pain, promoting relaxation,  increasing ROM, reducing/eliminating soft tissue swelling/inflammation/restriction, improving soft tissue extensibility and allowing for proper ROM for normal function with self care, reaching, carrying, lifting, house/yardwork, driving/computer work    Modalities:  ice    Charges:  Timed Code Treatment Minutes: 40   Total Treatment Minutes: 50       [] EVAL (LOW) 40812   [] EVAL (MOD) 30595   [] EVAL (HIGH) 57377   [] RE-EVAL     [x] CX(10459) x  2  [] IONTO  [] NMR (15047) x     [] VASO  [x] Manual (34119) x 1      [x] Other:  [] TA x      [] Mech Traction (74079)  [] ES(attended) (34752)      [] ES (un) (38694):     GOALS:  Patient stated goal: Going to [employer], daily activities. [] Progressing: [] Met: [] Not Met: [] Adjusted    Therapist goals for Patient:   Short Term Goals: To be achieved in: 2 weeks  1. Independent in HEP and progression per patient tolerance, in order to prevent re-injury. [] Progressing: [] Met: [] Not Met: [] Adjusted  2. Patient will have a decrease in pain to facilitate improvement in movement, function, and ADLs as indicated by Functional Deficits. [x] Progressing: [] Met: [] Not Met: [] Adjusted    Long Term Goals: To be achieved in: 16 weeks  1. Disability index score of 35% or less for the [facility] to assist with reaching prior level of function.    [x] Progressing: [] Met: [] Not Met: [] Adjusted  2. Patient will demonstrate increased AROM to 150 flex, 90 ER and 70 IR to allow for proper joint functioning as indicated by patients Functional Deficits. [x] Progressing: [] Met: [] Not Met: [] Adjusted  3. Patient will demonstrate an increase in Strength to 4+/5 throughout right UE to allow for proper functional mobility as indicated by patients Functional Deficits. [] Progressing: [] Met: [] Not Met: [] Adjusted  4. Patient will return to sleeping in bed without increased symptoms or restriction. [x] Progressing: [] Met: [] Not Met: [] Adjusted  5. Able to drive, dress, cook, and perform light household chores. [x] Progressing: [] Met: [] Not Met: [] Adjusted     Progression Towards Functional goals:  [x] Patient is progressing as expected towards functional goals listed. [] Progression is slowed due to complexities listed. [] Progression has been slowed due to co-morbidities. [] Plan just implemented, too soon to assess goals progression  [] Other:     ASSESSMENT:  Pt fatigued with increased strengthening exercises. HEP was updated. No shrug with active elevation. Overall Progression Towards Functional goals/ Treatment Progress Update:  [x] Patient is progressing as expected towards functional goals listed. [] Progression is slowed due to complexities/Impairments listed. [] Progression has been slowed due to co-morbidities.   [] Plan just implemented, too soon to assess goals progression <30days   [] Goals require adjustment due to lack of progress  [] Patient is not progressing as expected and requires additional follow up with physician  [] Other    Prognosis for POC: [x] Good [] Fair  [] Poor      Patient requires continued skilled intervention: [x] Yes  [] No    Treatment/Activity Tolerance:  [x] Patient able to complete treatment  [] Patient limited by fatigue  [] Patient limited by pain    [] Patient limited by other medical complications  [] Other:

## 2021-01-18 ENCOUNTER — HOSPITAL ENCOUNTER (OUTPATIENT)
Dept: PHYSICAL THERAPY | Age: 66
Setting detail: THERAPIES SERIES
Discharge: HOME | End: 2021-01-18
Payer: MEDICARE

## 2021-01-18 PROCEDURE — 97140 MANUAL THERAPY 1/> REGIONS: CPT | Performed by: PHYSICAL THERAPIST

## 2021-01-18 PROCEDURE — 97110 THERAPEUTIC EXERCISES: CPT | Performed by: PHYSICAL THERAPIST

## 2021-01-18 NOTE — FLOWSHEET NOTE
125    Elbow ext. 10           Strength  Right    Shoulder Flex n/t n/t   Shoulder Scap n/t    Shoulder ER n/t n/t   Shoulder IR n/t n/t          Pain scale 6/ 10 0 /10    Quick Dash 53= 93% 30= 43%       RESTRICTIONS/PRECAUTIONS: 4 weeks pillow/8 PROM     Exercises/Interventions:     Therapeutic Ex (16715) Sets/sec Reps Notes/CUES         hpe         Cane press/  Cane flex 5#  2X10  X 10  hep            Supine R/S 2 directions 4# X 20     Supine post cap s :20 3x    Supine Horiz abd yl X 20    Supine PNF yl  X 15     SL ERC 1# 2 x 10     Sl abd to 90 1# 2 x 10       Active elbow flex 2# 2 X 10 hep   hep   hep   Submax, pain free   hep   pulley  X 20 SB lift off :05 X 10  Wall walks  X 10  TB Row/  TB ext GR  X 20  TB IR/ ER RD 2 x 10  Finisher 3 exercisesModified towel s IR :10 5x Manual Intervention (59158)      Joint oscillations/  PROM  6  min    STM of biceps  2 min                            NMR re-education (91147)   CUES NEEDED                                   Therapeutic Exercise and NMR EXR  [x] (64814) Provided verbal/tactile cueing for activities related to strengthening, flexibility, endurance, ROM  for improvements in scapular, scapulothoracic and UE control with self care, reaching, carrying, lifting, house/yardwork, driving/computer work.    [] (94629) Provided verbal/tactile cueing for activities related to improving balance, coordination, kinesthetic sense, posture, motor skill, proprioception  to assist with  scapular, scapulothoracic and UE control with self care, reaching, carrying, lifting, house/yardwork, driving/computer work. Therapeutic Activities:    [] (48592 or 19332) Provided verbal/tactile cueing for activities related to improving balance, coordination, kinesthetic sense, posture, motor skill, proprioception and motor activation to allow for proper function of scapular, scapulothoracic and UE control with self care, carrying, lifting, driving/computer work.      Home Exercise Program:    [x] (40905) Reviewed/Progressed HEP activities related to strengthening, flexibility, endurance, ROM of scapular, scapulothoracic and UE control with self care, reaching, carrying, lifting, house/yardwork, driving/computer work  [] (12210) Reviewed/Progressed HEP activities related to improving balance, coordination, kinesthetic sense, posture, motor skill, proprioception of scapular, scapulothoracic and UE control with self care, reaching, carrying, lifting, house/yardwork, driving/computer work      Manual Treatments:  PROM / STM / Oscillations-Mobs:  G-I, II, III, IV (PA's, Inf., Post.)  [x] (60350) Provided manual therapy to mobilize soft tissue/joints of cervical/CT, scapular GHJ and UE for the purpose of modulating pain, promoting relaxation,  increasing ROM, reducing/eliminating soft tissue swelling/inflammation/restriction, improving soft tissue extensibility and allowing for proper ROM for normal function with self care, reaching, carrying, lifting, house/yardwork, driving/computer work    Modalities:  ice    Charges:  Timed Code Treatment Minutes: 40   Total Treatment Minutes: 50       [] EVAL (LOW) 12911   [] EVAL (MOD) 22577   [] EVAL (HIGH) 04320   [] RE-EVAL     [x] TB(24085) x  2  [] IONTO  [] NMR (15844) x     [] VASO  [x] Manual (36764) x 1      [x] Other:  [] TA x      [] Mech Traction (13486)  [] ES(attended) (53965)      [] ES (un) (69195):     GOALS:  Patient stated goal: Going to [employer], daily activities. [] Progressing: [] Met: [] Not Met: [] Adjusted    Therapist goals for Patient:   Short Term Goals: To be achieved in: 2 weeks  1. Independent in HEP and progression per patient tolerance, in order to prevent re-injury. [] Progressing: [] Met: [] Not Met: [] Adjusted  2. Patient will have a decrease in pain to facilitate improvement in movement, function, and ADLs as indicated by Functional Deficits. [x] Progressing: [] Met: [] Not Met: [] Adjusted    Long Term Goals:  To be achieved in: 16 weeks  1. Disability index score of 35% or less for the [facility] to assist with reaching prior level of function. [x] Progressing: [] Met: [] Not Met: [] Adjusted  2. Patient will demonstrate increased AROM to 150 flex, 90 ER and 70 IR to allow for proper joint functioning as indicated by patients Functional Deficits. [x] Progressing: [] Met: [] Not Met: [] Adjusted  3. Patient will demonstrate an increase in Strength to 4+/5 throughout right UE to allow for proper functional mobility as indicated by patients Functional Deficits. [] Progressing: [] Met: [] Not Met: [] Adjusted  4. Patient will return to sleeping in bed without increased symptoms or restriction. [x] Progressing: [] Met: [] Not Met: [] Adjusted  5. Able to drive, dress, cook, and perform light household chores. [x] Progressing: [] Met: [] Not Met: [] Adjusted     Progression Towards Functional goals:  [x] Patient is progressing as expected towards functional goals listed. [] Progression is slowed due to complexities listed. [] Progression has been slowed due to co-morbidities. [] Plan just implemented, too soon to assess goals progression  [] Other:     ASSESSMENT:  Pt fatigued with increased strengthening exercises. HEP was updated. No shrug with active elevation. Overall Progression Towards Functional goals/ Treatment Progress Update:  [x] Patient is progressing as expected towards functional goals listed. [] Progression is slowed due to complexities/Impairments listed. [] Progression has been slowed due to co-morbidities.   [] Plan just implemented, too soon to assess goals progression <30days   [] Goals require adjustment due to lack of progress  [] Patient is not progressing as expected and requires additional follow up with physician  [] Other    Prognosis for POC: [x] Good [] Fair  [] Poor      Patient requires continued skilled intervention: [x] Yes  [] No    Treatment/Activity Tolerance:  [x] Patient able to complete treatment  [] Patient limited by fatigue  [] Patient limited by pain    [] Patient limited by other medical complications  [] Other:           PLAN: See eval  [x] Continue per plan of care [] Alter current plan (see comments above)  [] Plan of care initiated [] Hold pending MD visit [] Discharge      Electronically signed by:  Katie Campbell PT    Note: If patient does not return for scheduled/ recommended follow up visits, this note will serve as a discharge from care along with most recent update on progress.

## 2021-01-19 ENCOUNTER — OFFICE VISIT (OUTPATIENT)
Dept: ORTHOPEDIC SURGERY | Age: 66
End: 2021-01-19

## 2021-01-19 DIAGNOSIS — Z98.890 S/P ROTATOR CUFF REPAIR: Primary | ICD-10-CM

## 2021-01-19 DIAGNOSIS — M75.41 SHOULDER IMPINGEMENT SYNDROME, RIGHT: ICD-10-CM

## 2021-01-19 DIAGNOSIS — M54.12 CERVICAL RADICULOPATHY: ICD-10-CM

## 2021-01-19 DIAGNOSIS — S46.011D TRAUMATIC COMPLETE TEAR OF RIGHT ROTATOR CUFF, SUBSEQUENT ENCOUNTER: ICD-10-CM

## 2021-01-19 PROCEDURE — 99024 POSTOP FOLLOW-UP VISIT: CPT | Performed by: PHYSICIAN ASSISTANT

## 2021-01-19 NOTE — PROGRESS NOTES
History of present illness: The patient returns today for their postoperative visit after shoulder arthroscopy and mini open rotator cuff repair. She is 11 weeks postop/8 weeks passive. Pain control has been satisfactory with oral medications. There have been no fevers or chills. She does state that she occasionally gets some pain into her forearm. She has had previous cervical surgery. Pain Assessment  Location of Pain: Knee  Location Modifiers: Right  Severity of Pain: 1  Quality of Pain: Dull, Aching  Duration of Pain: Persistent  Frequency of Pain: Intermittent  Aggravating Factors: Exercise  Limiting Behavior: Some  Relieving Factors: Rest, Ice]    Physical examination: Inspection reveals expected swelling. Incisions are clean, dry, and intact. No signs of infection. Passively she has about 165 degrees of forward elevation with 80 degrees of external rotation. Strength is intact. . Neurovascular exam is intact. Assessment/plan: The patient is doing well after shoulder arthroscopy and rotator cuff repair. We will go ahead and continue her physical therapy but add in cervical treatments. She can go ahead  continue range of motion strengthening and physical therapy. She does not need a refill of her pain medication.   We will see her back in 4 weeks for repeat clinical exam and functional assessment

## 2021-01-20 ENCOUNTER — HOSPITAL ENCOUNTER (OUTPATIENT)
Dept: PHYSICAL THERAPY | Age: 66
Setting detail: THERAPIES SERIES
Discharge: HOME | End: 2021-01-20
Payer: MEDICARE

## 2021-01-20 PROCEDURE — 97140 MANUAL THERAPY 1/> REGIONS: CPT | Performed by: PHYSICAL THERAPIST

## 2021-01-20 PROCEDURE — 97110 THERAPEUTIC EXERCISES: CPT | Performed by: PHYSICAL THERAPIST

## 2021-01-20 NOTE — FLOWSHEET NOTE
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 · Treatment Diagnosis: right shoulder pain M25.511, right RTC tear I16.742  Insurance/Certification information:  PT Insurance Information: [facility]  Physician Information:  Referring Practitioner: Dr. Pastor Kelly  Has the plan of care been signed (Y/N):        []  Yes  [x]  No     Date of Patient follow up with Physician: 20      Is this a Progress Report:     [x]  Yes  []  No        If Yes:  Date Range for reporting period:  Beginnin20  Endin20    Progress report will be due (10 Rx or 30 days whichever is less):        Recertification will be due (POC Duration  / 90 days whichever is less):       Visit # Insurance Allowable Auth Required   In-person  in  bmn []  Yes [x]  No    Telehealth   []  Yes []  No    Total          Therapy Diagnosis/Practice Pattern:I      Number of Comorbidities:  []0     [x]1-2    []3+    Latex Allergy:  [x]NO      []YES  Preferred Language for Healthcare:   [x]English       []other:      SUBJECTIVE:  Pt is 12  weeks post op. Pt reports soreness after last visit. Pt did not do any exercise since. OBJECTIVE: See eval   Observation:    Test measurements:    Right 20       Current  Shoulder Flex 20 145   Shoulder Abd      Shoulder ER 20 80   Shoulder IR   68   Elbow flex 125    Elbow ext.   10           Strength  Right    Shoulder Flex n/t n/t   Shoulder Scap n/t    Shoulder ER n/t n/t   Shoulder IR n/t n/t        Pain scale 6/ 10 0 /10    Quick Dash 53= 93% 30= 43%       RESTRICTIONS/PRECAUTIONS: 4 weeks pillow/8 PROM     Exercises/Interventions:     Therapeutic Ex (38730) Sets/sec Reps Notes/CUES         hpe         Cane press/  Cane flex 5#  2X10  X 10  hep         Supine R/S 2 directions 4# X 20              SL ERC  2 x 10     Sl abd to 90  2 x 10    Active elbow flex 2# 2 X 10 hep   hep   hep   pulley  X 20 TB Row/  TB ext rd  X 20  Finisher 4#  x 20  3 exercisesModified towel s IR :10 5x Manual Intervention (44732)      Joint oscillations/  PROM  6  min    STM of biceps  2 min                            NMR re-education (10964)   CUES NEEDED                                   Therapeutic Exercise and NMR EXR  [x] (02020) Provided verbal/tactile cueing for activities related to strengthening, flexibility, endurance, ROM  for improvements in scapular, scapulothoracic and UE control with self care, reaching, carrying, lifting, house/yardwork, driving/computer work.    [] (79968) Provided verbal/tactile cueing for activities related to improving balance, coordination, kinesthetic sense, posture, motor skill, proprioception  to assist with  scapular, scapulothoracic and UE control with self care, reaching, carrying, lifting, house/yardwork, driving/computer work. Therapeutic Activities:    [] (79019 or 84441) Provided verbal/tactile cueing for activities related to improving balance, coordination, kinesthetic sense, posture, motor skill, proprioception and motor activation to allow for proper function of scapular, scapulothoracic and UE control with self care, carrying, lifting, driving/computer work.      Home Exercise Program:    [x] (70300) Reviewed/Progressed HEP activities related to strengthening, flexibility, endurance, ROM of scapular, scapulothoracic and UE control with self care, reaching, carrying, lifting, house/yardwork, driving/computer work  [] (48612) Reviewed/Progressed HEP activities related to indicated by patients Functional Deficits. [x] Progressing: [] Met: [] Not Met: [] Adjusted  3. Patient will demonstrate an increase in Strength to 4+/5 throughout right UE to allow for proper functional mobility as indicated by patients Functional Deficits. [] Progressing: [] Met: [] Not Met: [] Adjusted  4. Patient will return to sleeping in bed without increased symptoms or restriction. [x] Progressing: [] Met: [] Not Met: [] Adjusted  5. Able to drive, dress, cook, and perform light household chores. [x] Progressing: [] Met: [] Not Met: [] Adjusted     Progression Towards Functional goals:  [x] Patient is progressing as expected towards functional goals listed. [] Progression is slowed due to complexities listed. [] Progression has been slowed due to co-morbidities. [] Plan just implemented, too soon to assess goals progression  [] Other:     ASSESSMENT:  Decreased all resistance today with exercises. Encouraged pt to keep up with painfree exercises. Overall Progression Towards Functional goals/ Treatment Progress Update:  [x] Patient is progressing as expected towards functional goals listed. [] Progression is slowed due to complexities/Impairments listed. [] Progression has been slowed due to co-morbidities.   [] Plan just implemented, too soon to assess goals progression <30days   [] Goals require adjustment due to lack of progress  [] Patient is not progressing as expected and requires additional follow up with physician  [] Other    Prognosis for POC: [x] Good [] Fair  [] Poor      Patient requires continued skilled intervention: [x] Yes  [] No    Treatment/Activity Tolerance:  [x] Patient able to complete treatment  [] Patient limited by fatigue  [] Patient limited by pain    [] Patient limited by other medical complications  [] Other:           PLAN: See eval  [x] Continue per plan of care [] Alter current plan (see comments above)  [] Plan of care initiated [] Hold pending MD visit [] Discharge      Electronically signed by:  Crow Kelly, PT    Note: If patient does not return for scheduled/ recommended follow up visits, this note will serve as a discharge from care along with most recent update on progress.

## 2021-01-25 ENCOUNTER — HOSPITAL ENCOUNTER (OUTPATIENT)
Dept: PHYSICAL THERAPY | Age: 66
Setting detail: THERAPIES SERIES
Discharge: HOME | End: 2021-01-25
Payer: MEDICARE

## 2021-01-25 PROCEDURE — 97110 THERAPEUTIC EXERCISES: CPT | Performed by: PHYSICAL THERAPIST

## 2021-01-25 PROCEDURE — 97140 MANUAL THERAPY 1/> REGIONS: CPT | Performed by: PHYSICAL THERAPIST

## 2021-01-25 NOTE — FLOWSHEET NOTE
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 · Treatment Diagnosis: right shoulder pain M25.511, right RTC tear U10.525  Insurance/Certification information:  PT Insurance Information: [facility]  Physician Information:  Referring Practitioner: Dr. Radha Schneider  Has the plan of care been signed (Y/N):        []  Yes  [x]  No     Date of Patient follow up with Physician: 20      Is this a Progress Report:     [x]  Yes  []  No        If Yes:  Date Range for reporting period:  Beginnin20  Endin20    Progress report will be due (10 Rx or 30 days whichever is less):        Recertification will be due (POC Duration  / 90 days whichever is less):       Visit # Insurance Allowable Auth Required   In-person 15 / 8 in  bmn []  Yes [x]  No    Telehealth   []  Yes []  No    Total          Therapy Diagnosis/Practice Pattern:I      Number of Comorbidities:  []0     [x]1-2    []3+    Latex Allergy:  [x]NO      []YES  Preferred Language for Healthcare:   [x]English       []other:      SUBJECTIVE:  Pt is 12.5 weeks. Pt had a busy weekend redecorating house. It hurt a couple times. Pt reports that she can better reach behind her back. OBJECTIVE: See eval   Observation:    Test measurements:    Right 20       Current  Shoulder Flex 20 145   Shoulder Abd      Shoulder ER 20 80   Shoulder IR   68   Elbow flex 125    Elbow ext.   10           Strength  Right    Shoulder Flex n/t n/t   Shoulder Scap n/t work  [] (83566) Reviewed/Progressed HEP activities related to improving balance, coordination, kinesthetic sense, posture, motor skill, proprioception of scapular, scapulothoracic and UE control with self care, reaching, carrying, lifting, house/yardwork, driving/computer work      Manual Treatments:  PROM / STM / Oscillations-Mobs:  G-I, II, III, IV (PA's, Inf., Post.)  [x] (73567) Provided manual therapy to mobilize soft tissue/joints of cervical/CT, scapular GHJ and UE for the purpose of modulating pain, promoting relaxation,  increasing ROM, reducing/eliminating soft tissue swelling/inflammation/restriction, improving soft tissue extensibility and allowing for proper ROM for normal function with self care, reaching, carrying, lifting, house/yardwork, driving/computer work    Modalities:  ice    Charges:  Timed Code Treatment Minutes: 40   Total Treatment Minutes: 50       [] EVAL (LOW) 60163   [] EVAL (MOD) 79532   [] EVAL (HIGH) 56108   [] RE-EVAL     [x] CL(86119) x  2  [] IONTO  [] NMR (17109) x     [] VASO  [x] Manual (98355) x 1      [x] Other:  [] TA x      [] Mech Traction (89835)  [] ES(attended) (85271)      [] ES (un) (56511):     GOALS:  Patient stated goal: Going to [employer], daily activities. [] Progressing: [] Met: [] Not Met: [] Adjusted    Therapist goals for Patient:   Short Term Goals: To be achieved in: 2 weeks  1. Independent in HEP and progression per patient tolerance, in order to prevent re-injury. [] Progressing: [] Met: [] Not Met: [] Adjusted  2. Patient will have a decrease in pain to facilitate improvement in movement, function, and ADLs as indicated by Functional Deficits. [x] Progressing: [] Met: [] Not Met: [] Adjusted    Long Term Goals: To be achieved in: 16 weeks  1. Disability index score of 35% or less for the [facility] to assist with reaching prior level of function. [x] Progressing: [] Met: [] Not Met: [] Adjusted  2.  Patient will demonstrate increased AROM to 150 flex, 90 ER and 70 IR to allow for proper joint functioning as indicated by patients Functional Deficits. [x] Progressing: [] Met: [] Not Met: [] Adjusted  3. Patient will demonstrate an increase in Strength to 4+/5 throughout right UE to allow for proper functional mobility as indicated by patients Functional Deficits. [] Progressing: [] Met: [] Not Met: [] Adjusted  4. Patient will return to sleeping in bed without increased symptoms or restriction. [x] Progressing: [] Met: [] Not Met: [] Adjusted  5. Able to drive, dress, cook, and perform light household chores. [x] Progressing: [] Met: [] Not Met: [] Adjusted     Progression Towards Functional goals:  [x] Patient is progressing as expected towards functional goals listed. [] Progression is slowed due to complexities listed. [] Progression has been slowed due to co-morbidities. [] Plan just implemented, too soon to assess goals progression  [] Other:     ASSESSMENT:  AROM is improving without shoulder shrug. Pt to see us 1x a week in Feb.  Pt allowed to return to aquatic aerobics, but no swimming yet. Overall Progression Towards Functional goals/ Treatment Progress Update:  [x] Patient is progressing as expected towards functional goals listed. [] Progression is slowed due to complexities/Impairments listed. [] Progression has been slowed due to co-morbidities.   [] Plan just implemented, too soon to assess goals progression <30days   [] Goals require adjustment due to lack of progress  [] Patient is not progressing as expected and requires additional follow up with physician  [] Other    Prognosis for POC: [x] Good [] Fair  [] Poor      Patient requires continued skilled intervention: [x] Yes  [] No    Treatment/Activity Tolerance:  [x] Patient able to complete treatment  [] Patient limited by fatigue  [] Patient limited by pain    [] Patient limited by other medical complications  [] Other:           PLAN: See eval  [x] Continue per plan of care [] Alter current plan (see comments above)  [] Plan of care initiated [] Hold pending MD visit [] Discharge      Electronically signed by:  Louis Jasmine PT    Note: If patient does not return for scheduled/ recommended follow up visits, this note will serve as a discharge from care along with most recent update on progress.

## 2021-01-27 ENCOUNTER — APPOINTMENT (OUTPATIENT)
Dept: PHYSICAL THERAPY | Age: 66
End: 2021-01-27
Payer: MEDICARE

## 2021-02-01 ENCOUNTER — HOSPITAL ENCOUNTER (OUTPATIENT)
Dept: PHYSICAL THERAPY | Age: 66
Setting detail: THERAPIES SERIES
Discharge: HOME | End: 2021-02-01
Payer: MEDICARE

## 2021-02-01 PROCEDURE — 97140 MANUAL THERAPY 1/> REGIONS: CPT | Performed by: PHYSICAL THERAPIST

## 2021-02-01 PROCEDURE — 97110 THERAPEUTIC EXERCISES: CPT | Performed by: PHYSICAL THERAPIST

## 2021-02-01 NOTE — FLOWSHEET NOTE
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 · Treatment Diagnosis: right shoulder pain M25.511, right RTC tear D15.110  Insurance/Certification information:  PT Insurance Information:  [facility]  Physician Information:  Referring Practitioner: Dr. Kathy Morrison  Has the plan of care been signed (Y/N):        []  Yes  [x]  No     Date of Patient follow up with Physician: 20      Is this a Progress Report:     [x]  Yes  []  No        If Yes:  Date Range for reporting period:  Beginnin20  Endin20    Progress report will be due (10 Rx or 30 days whichever is less):        Recertification will be due (POC Duration  / 90 days whichever is less):       Visit # Insurance Allowable Auth Required   In-person  in  bmn []  Yes [x]  No    Telehealth   []  Yes []  No    Total          Therapy Diagnosis/Practice Pattern:I      Number of Comorbidities:  []0     [x]1-2    []3+    Latex Allergy:  [x]NO      []YES  Preferred Language for Healthcare:   [x]English       []other:      SUBJECTIVE:  Pt is 13 weeks. Pt was able to reupholster an ottoman this weekend. Pt is sleeping well. OBJECTIVE: See eval   Observation:    Test measurements:    Right 20       Current  Shoulder Flex 20 145   Shoulder Abd      Shoulder ER 20 80   Shoulder IR   68   Elbow flex 125    Elbow ext.   10           Strength  Right    Shoulder Flex n/t n/t   Shoulder Scap n/t    Shoulder ER n/t n/t   Shoulder IR n/t n/t          Pain scale 6/ 10 0 /10    Quick Dash 53= 93% 30= 43%       RESTRICTIONS/PRECAUTIONS: 4 weeks pillow/8 PROM     Exercises/Interventions:     Therapeutic Ex (66675) Sets/sec Reps Notes/CUES         hep         Supine R/S 2 directions 3# X 20           Supine Horiz abd RD X 20    Supine PNF yl  X 15     SL ERC 2# 3 x 10     Sl abd to 90 1# 2 x 10    hep   hep   pulley  X 20 Wall push ups  X 20  Wall walks  X 10  AROM to 90  Flex/   scap X 10 X 10 TB Row/  TB ext Bl 3 x 10  TB IR/ ER GR 2 x 10  Finisher 6#  x 20  3 exercisesModified towel s IR :10 3x Manual Intervention (24567)      Joint oscillations/  PROM  6  min    STM of biceps  2 min                            NMR re-education (10155)   CUES NEEDED                                   Therapeutic Exercise and NMR EXR  [x] (31120) Provided verbal/tactile cueing for activities related to strengthening, flexibility, endurance, ROM  for improvements in scapular, scapulothoracic and UE control with self care, reaching, carrying, lifting, house/yardwork, driving/computer work.    [] (68400) Provided verbal/tactile cueing for activities related to improving balance, coordination, kinesthetic sense, posture, motor skill, proprioception  to assist with  scapular, scapulothoracic and UE control with self care, reaching, carrying, lifting, house/yardwork, driving/computer work. Therapeutic Activities:    [] (61027 or 00573) Provided verbal/tactile cueing for activities related to improving balance, coordination, kinesthetic sense, posture, motor skill, proprioception and motor activation to allow for proper function of scapular, scapulothoracic and UE control with self care, carrying, lifting, driving/computer work.      Home Exercise Program:    [x] (75616) Reviewed/Progressed HEP activities related to strengthening, flexibility, endurance, ROM of scapular, scapulothoracic and UE control with self care, reaching, carrying, lifting, house/yardwork, driving/computer work  [] (87078) Reviewed/Progressed HEP activities related to improving balance, coordination, kinesthetic sense, posture, motor skill, proprioception of scapular, scapulothoracic and UE control with self care, reaching, carrying, lifting, house/yardwork, driving/computer work      Manual Treatments:  PROM / STM / Oscillations-Mobs:  G-I, II, III, IV (PA's, Inf., Post.)  [x] (91142) Provided manual therapy to mobilize soft tissue/joints of cervical/CT, scapular GHJ and UE for the purpose of modulating pain, promoting relaxation,  increasing ROM, reducing/eliminating soft tissue swelling/inflammation/restriction, improving soft tissue extensibility and allowing for proper ROM for normal function with self care, reaching, carrying, lifting, house/yardwork, driving/computer work    Modalities:  ice    Charges:  Timed Code Treatment Minutes: 40   Total Treatment Minutes: 40       [] EVAL (LOW) 89982   [] EVAL (MOD) 02434   [] EVAL (HIGH) 53325   [] RE-EVAL     [x] DN(75758) x  2  [] IONTO  [] NMR (39056) x     [] VASO  [x] Manual (17271) x 1      [] Other:  [] TA x      [] Mech Traction (76924)  [] ES(attended) (00344)      [] ES (un) (62547):     GOALS:  Patient stated goal: Going to [employer], daily activities. [] Progressing: [] Met: [] Not Met: [] Adjusted    Therapist goals for Patient:   Short Term Goals: To be achieved in: 2 weeks  1. Independent in HEP and progression per patient tolerance, in order to prevent re-injury. [] Progressing: [] Met: [] Not Met: [] Adjusted  2. Patient will have a decrease in pain to facilitate improvement in movement, function, and ADLs as indicated by Functional Deficits. [x] Progressing: [] Met: [] Not Met: [] Adjusted    Long Term Goals: To be achieved in: 16 weeks  1. Disability index score of 35% or less for the [facility] to assist with reaching prior level of function. [x] Progressing: [] Met: [] Not Met: [] Adjusted  2.  Patient will demonstrate increased AROM to 150 flex, 90 ER and 70 IR to allow for proper joint functioning as indicated by patients Functional Deficits. [x] Progressing: [] Met: [] Not Met: [] Adjusted  3. Patient will demonstrate an increase in Strength to 4+/5 throughout right UE to allow for proper functional mobility as indicated by patients Functional Deficits. [] Progressing: [] Met: [] Not Met: [] Adjusted  4. Patient will return to sleeping in bed without increased symptoms or restriction. [x] Progressing: [] Met: [] Not Met: [] Adjusted  5. Able to drive, dress, cook, and perform light household chores. [x] Progressing: [] Met: [] Not Met: [] Adjusted     Progression Towards Functional goals:  [x] Patient is progressing as expected towards functional goals listed. [] Progression is slowed due to complexities listed. [] Progression has been slowed due to co-morbidities. [] Plan just implemented, too soon to assess goals progression  [] Other:     ASSESSMENT:  Pt is making progress towards goals. Prepare pt for dc in coming weeks. Overall Progression Towards Functional goals/ Treatment Progress Update:  [x] Patient is progressing as expected towards functional goals listed. [] Progression is slowed due to complexities/Impairments listed. [] Progression has been slowed due to co-morbidities.   [] Plan just implemented, too soon to assess goals progression <30days   [] Goals require adjustment due to lack of progress  [] Patient is not progressing as expected and requires additional follow up with physician  [] Other    Prognosis for POC: [x] Good [] Fair  [] Poor      Patient requires continued skilled intervention: [x] Yes  [] No    Treatment/Activity Tolerance:  [x] Patient able to complete treatment  [] Patient limited by fatigue  [] Patient limited by pain    [] Patient limited by other medical complications  [] Other:           PLAN: See eval  [x] Continue per plan of care [] Alter current plan (see comments above)  [] Plan of care initiated [] Hold pending MD visit [] Discharge      Electronically signed by:  Jazmin Pollack PT    Note: If patient does not return for scheduled/ recommended follow up visits, this note will serve as a discharge from care along with most recent update on progress.

## 2021-02-08 ENCOUNTER — HOSPITAL ENCOUNTER (OUTPATIENT)
Dept: PHYSICAL THERAPY | Age: 66
Setting detail: THERAPIES SERIES
Discharge: HOME | End: 2021-02-08
Payer: MEDICARE

## 2021-02-15 ENCOUNTER — HOSPITAL ENCOUNTER (OUTPATIENT)
Dept: PHYSICAL THERAPY | Age: 66
Setting detail: THERAPIES SERIES
Discharge: HOME | End: 2021-02-15
Payer: MEDICARE

## 2021-02-15 NOTE — FLOWSHEET NOTE
[facility],  [address]        Physical Therapy  Cancellation/No-show Note  Patient Name:  Nisha Aguilar  :  1955   Date:  2/15/2021  Cancelled visits to date: 2  No-shows to date: 0    For today's appointment patient:  [x]  Cancelled  []  Rescheduled appointment  []  No-show     Reason given by patient:  []  Patient ill  []  Conflicting appointment  []  No transportation    []  Conflict with work  []  No reason given  []  Other:     Comments:   weather    Electronically signed by:  Yomaira Elder, PT

## 2021-02-22 ENCOUNTER — OFFICE VISIT (OUTPATIENT)
Dept: ORTHOPEDIC SURGERY | Age: 66
End: 2021-02-22
Payer: MEDICARE

## 2021-02-22 ENCOUNTER — HOSPITAL ENCOUNTER (OUTPATIENT)
Dept: PHYSICAL THERAPY | Age: 66
Setting detail: THERAPIES SERIES
Discharge: HOME | End: 2021-02-22
Payer: MEDICARE

## 2021-02-22 VITALS — WEIGHT: 230 LBS | BODY MASS INDEX: 38.32 KG/M2 | HEIGHT: 65 IN

## 2021-02-22 DIAGNOSIS — M75.41 SHOULDER IMPINGEMENT SYNDROME, RIGHT: ICD-10-CM

## 2021-02-22 DIAGNOSIS — S46.011D TRAUMATIC COMPLETE TEAR OF RIGHT ROTATOR CUFF, SUBSEQUENT ENCOUNTER: ICD-10-CM

## 2021-02-22 DIAGNOSIS — Z98.890 S/P ROTATOR CUFF REPAIR: Primary | ICD-10-CM

## 2021-02-22 PROCEDURE — G8484 FLU IMMUNIZE NO ADMIN: HCPCS | Performed by: PHYSICIAN ASSISTANT

## 2021-02-22 PROCEDURE — 97140 MANUAL THERAPY 1/> REGIONS: CPT | Performed by: PHYSICAL THERAPIST

## 2021-02-22 PROCEDURE — 97110 THERAPEUTIC EXERCISES: CPT | Performed by: PHYSICAL THERAPIST

## 2021-02-22 PROCEDURE — 4040F PNEUMOC VAC/ADMIN/RCVD: CPT | Performed by: PHYSICIAN ASSISTANT

## 2021-02-22 PROCEDURE — 1090F PRES/ABSN URINE INCON ASSESS: CPT | Performed by: PHYSICIAN ASSISTANT

## 2021-02-22 PROCEDURE — G8400 PT W/DXA NO RESULTS DOC: HCPCS | Performed by: PHYSICIAN ASSISTANT

## 2021-02-22 PROCEDURE — G8427 DOCREV CUR MEDS BY ELIG CLIN: HCPCS | Performed by: PHYSICIAN ASSISTANT

## 2021-02-22 PROCEDURE — 99213 OFFICE O/P EST LOW 20 MIN: CPT | Performed by: PHYSICIAN ASSISTANT

## 2021-02-22 PROCEDURE — 3017F COLORECTAL CA SCREEN DOC REV: CPT | Performed by: PHYSICIAN ASSISTANT

## 2021-02-22 PROCEDURE — 4004F PT TOBACCO SCREEN RCVD TLK: CPT | Performed by: PHYSICIAN ASSISTANT

## 2021-02-22 PROCEDURE — G8417 CALC BMI ABV UP PARAM F/U: HCPCS | Performed by: PHYSICIAN ASSISTANT

## 2021-02-22 PROCEDURE — 1123F ACP DISCUSS/DSCN MKR DOCD: CPT | Performed by: PHYSICIAN ASSISTANT

## 2021-02-22 NOTE — FLOWSHEET NOTE
88   Shoulder IR   70   Elbow flex 125    Elbow ext. 10           Strength  Right    Shoulder Flex n/t n/t   Shoulder Scap n/t    Shoulder ER n/t n/t   Shoulder IR n/t n/t          Pain scale 6/ 10 0 /10    Quick Dash 53= 93% 30= 43%       RESTRICTIONS/PRECAUTIONS: 4 weeks pillow/8 PROM     Exercises/Interventions:     Therapeutic Ex (08071) Sets/sec Reps Notes/CUES          Cane flex  X 10  hep   Serratus punch 4# X 30     Supine R/S 2 directions 4# X 20                 SL ERC 2# 3 x 10     Sleeper s :20  3x     No $ RD 2 x 10 hep   hep   pulley  X 20 Wall push ups  X 20  Wall walks  X 10  AROM to 90  Flex/   scap X 10 X 10 TB Row/  TB ext BL 3 x 10  TB IR/ ER RD 2 x 10  Finisher 6#  x 20  5 exercises towel s IR :10 3x Manual Intervention (97495)      Joint oscillations/  PROM  6  min    STM of biceps  2 min    sidelying scap mobs  2 min                      NMR re-education (96440)   CUES NEEDED                                   Therapeutic Exercise and NMR EXR  [x] (79428) Provided verbal/tactile cueing for activities related to strengthening, flexibility, endurance, ROM  for improvements in scapular, scapulothoracic and UE control with self care, reaching, carrying, lifting, house/yardwork, driving/computer work.    [] (08066) Provided verbal/tactile cueing for activities related to improving balance, coordination, kinesthetic sense, posture, motor skill, proprioception  to assist with  scapular, scapulothoracic and UE control with self care, reaching, carrying, lifting, house/yardwork, driving/computer work. Therapeutic Activities:    [] (87756 or 34176) Provided verbal/tactile cueing for activities related to improving balance, coordination, kinesthetic sense, posture, motor skill, proprioception and motor activation to allow for proper function of scapular, scapulothoracic and UE control with self care, carrying, lifting, driving/computer work.      Home Exercise Program:    [x] (76580) Reviewed/Progressed HEP activities related to strengthening, flexibility, endurance, ROM of scapular, scapulothoracic and UE control with self care, reaching, carrying, lifting, house/yardwork, driving/computer work  [] (02132) Reviewed/Progressed HEP activities related to improving balance, coordination, kinesthetic sense, posture, motor skill, proprioception of scapular, scapulothoracic and UE control with self care, reaching, carrying, lifting, house/yardwork, driving/computer work      Manual Treatments:  PROM / STM / Oscillations-Mobs:  G-I, II, III, IV (PA's, Inf., Post.)  [x] (97426) Provided manual therapy to mobilize soft tissue/joints of cervical/CT, scapular GHJ and UE for the purpose of modulating pain, promoting relaxation,  increasing ROM, reducing/eliminating soft tissue swelling/inflammation/restriction, improving soft tissue extensibility and allowing for proper ROM for normal function with self care, reaching, carrying, lifting, house/yardwork, driving/computer work    Modalities:  ice    Charges:  Timed Code Treatment Minutes: 40   Total Treatment Minutes: 50       [] EVAL (LOW) 97478   [] EVAL (MOD) 18208   [] EVAL (HIGH) 30614   [] RE-EVAL     [x] EI(12978) x  2  [] IONTO  [] NMR (96745) x     [] VASO  [x] Manual (49680) x 1      [x] Other:ice  [] TA x      [] Mech Traction (95072)  [] ES(attended) (43843)      [] ES (un) (16233):     GOALS:  Patient stated goal: Going to [employer], daily activities. [] Progressing: [] Met: [] Not Met: [] Adjusted    Therapist goals for Patient:   Short Term Goals: To be achieved in: 2 weeks  1. Independent in HEP and progression per patient tolerance, in order to prevent re-injury. [] Progressing: [] Met: [] Not Met: [] Adjusted  2. Patient will have a decrease in pain to facilitate improvement in movement, function, and ADLs as indicated by Functional Deficits. [x] Progressing: [] Met: [] Not Met: [] Adjusted    Long Term Goals: To be achieved in: 16 weeks  1.  Disability index score of 35% or less for the [facility] to assist with reaching prior level of function. [x] Progressing: [] Met: [] Not Met: [] Adjusted  2. Patient will demonstrate increased AROM to 150 flex, 90 ER and 70 IR to allow for proper joint functioning as indicated by patients Functional Deficits. [x] Progressing: [] Met: [] Not Met: [] Adjusted  3. Patient will demonstrate an increase in Strength to 4+/5 throughout right UE to allow for proper functional mobility as indicated by patients Functional Deficits. [] Progressing: [] Met: [] Not Met: [] Adjusted  4. Patient will return to sleeping in bed without increased symptoms or restriction. [x] Progressing: [] Met: [] Not Met: [] Adjusted  5. Able to drive, dress, cook, and perform light household chores. [x] Progressing: [] Met: [] Not Met: [] Adjusted     Progression Towards Functional goals:  [x] Patient is progressing as expected towards functional goals listed. [] Progression is slowed due to complexities listed. [] Progression has been slowed due to co-morbidities. [] Plan just implemented, too soon to assess goals progression  [] Other:     ASSESSMENT: Cont to address post cap flexibility, RTC strength, and scap stability along with pain management with ice. Overall Progression Towards Functional goals/ Treatment Progress Update:  [x] Patient is progressing as expected towards functional goals listed. [] Progression is slowed due to complexities/Impairments listed. [] Progression has been slowed due to co-morbidities.   [] Plan just implemented, too soon to assess goals progression <30days   [] Goals require adjustment due to lack of progress  [] Patient is not progressing as expected and requires additional follow up with physician  [] Other    Prognosis for POC: [x] Good [] Fair  [] Poor      Patient requires continued skilled intervention: [x] Yes  [] No    Treatment/Activity Tolerance:  [x] Patient able to complete treatment  [] Patient limited by fatigue  [] Patient limited by pain    [] Patient limited by other medical complications  [] Other:           PLAN: See eval  [x] Continue per plan of care [] Alter current plan (see comments above)  [] Plan of care initiated [] Hold pending MD visit [] Discharge      Electronically signed by:  Miko Agarwal PT    Note: If patient does not return for scheduled/ recommended follow up visits, this note will serve as a discharge from care along with most recent update on progress.

## 2021-02-22 NOTE — PROGRESS NOTES
Chief Complaint    Shoulder Pain (CK RIGHT SHOULDER)      History of Present Illness:  Ti Kaur is a 77 y.o. female presents to the office today for follow-up visit. Patient is over 3 months status post right shoulder rotator cuff repair. She was doing extremely well. Then approximately 3 weeks ago she did fall. She states that she is still doing well but has a little bit more pain in her shoulder. She does not feel she has any weakness. Pain is concentrated lateral shoulder and increased with overhead activities.     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    Arthritis     Calculus of kidney     Cancer (HCC)     skin    Cystocele, midline     Gastro-esophageal reflux disease without esophagitis     Hypertension     Liver cyst     Neuritis     Nocturia     Osteoarthritis     Otalgia     Perennial allergic rhinitis     Urinary frequency     Vaginal vault 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      Patient Active Problem List    Diagnosis Date Noted    RUQ pain 08/31/2020    Primary osteoarthritis of right knee 09/16/2019    Neuritis 06/05/2018    Cystocele, midline 11/02/2017    Vaginal enterocele 11/02/2017    Vaginal vault 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 11/02/2017    Liver cyst 04/21/2017    Calculus of kidney 08/25/2016    Essential (primary) hypertension 08/25/2016    Gastro-esophageal reflux disease without esophagitis 08/25/2016    Generalized anxiety disorder 08/25/2016    Hyperlipidemia 08/25/2016    Old myocardial infarction 08/25/2016    Tobacco use 08/25/2016    Eustachian tube dysfunction, bilateral 06/15/2016    Otalgia, right 06/15/2016    Tinnitus of vascular origin 06/15/2016    Tinnitus, bilateral 06/15/2016    Acute laryngitis 01/29/2015    Pharyngeal inflammation 12/04/2014    Acute lymphadenitis of face, head and neck 12/04/2014    Perennial allergic rhinitis 12/04/2014    Urinary incontinence 12/27/2012     Past Surgical History:   Procedure Laterality Date    APPENDECTOMY      CHOLECYSTECTOMY      ELBOW DEBRIDEMENT      FOOT SURGERY      bilateral    HYSTERECTOMY, VAGINAL      KNEE ARTHROSCOPY Right 4/24/2019    RIGHT KNEE ARTHROSCOPY,, SYNOVECTOMY CHONDROPLASTY,MEDIAL AND LATERAL MENESECTOMY, REMOVAL LOOSE BODIES performed by Svetlana Sumner [facility] at [facility] 1      bilateral    LIVER SURGERY      cyst removal    NECK SURGERY      ROTATOR CUFF REPAIR Right 10/30/2020    VIDEO ARTHROSCOPY RIGHT SHOULDER SUBACROMIAL DECOMPRESSION LABRAL DEBRIDEMENT ROTATOR CUFF TEAR performed by Richy Lema MD at [place] [address] ARTHROSCOPY      TONSILLECTOMY      TUBAL LIGATION      URETHRA SURGERY      US BREAST LESION REMOVAL LEFT       Family History   Problem Relation Age of Onset    Breast Cancer Mother     Cancer Mother         Bone    Diabetes Mother     Heart Disease Mother         [facility] Arthritis Mother     Heart Attack Father     Prostate Cancer Brother     Cancer Maternal Grandmother     Cancer Brother         kidney    Cancer Brother         bladder     Social History     Socioeconomic History    Marital status:       Spouse name: None    Number of children: None    Years of education: None    Highest education level: None   Occupational History    None   Social Needs    Financial resource strain: None    Food insecurity     Worry: None     Inability: None    Transportation needs     Medical: None     Non-medical: None   Tobacco Use    Smoking status: Current Every Day Smoker     Packs/day: 0.25     Types: Cigarettes    Smokeless tobacco: Never Used    Tobacco comment: quit March 4, 2019   Substance and Sexual Activity    Alcohol use: No    Drug use: No    Sexual activity: Not Currently   Lifestyle    Physical activity     Days per week: None     Minutes per session: None    Stress: None   Relationships    Social connections     Talks on phone: None     Gets together: None     Attends Hoahaoism service: None     Active member of club or organization: None     Attends meetings of clubs or organizations: None     Relationship status: None    Intimate partner violence     Fear of current or ex partner: None     Emotionally abused: None     Physically abused: None     Forced sexual activity: None   Other Topics Concern    None   Social History Narrative    None     Current Outpatient Medications   Medication Sig Dispense Refill    tiZANidine (ZANAFLEX) 4 MG tablet Take 1 tablet by mouth 3 times daily 30 tablet 1    diclofenac (VOLTAREN) 75 MG EC tablet Take 1 tablet by mouth 2 times daily 60 tablet 3    ondansetron (ZOFRAN) 4 MG tablet Take 1 tablet by mouth 3 times daily as needed for Nausea or Vomiting 15 tablet 0    gabapentin (NEURONTIN) 300 MG capsule TAKE 1 CAPSULE BY MOUTH THREE TIMES DAILY FOR 30 DAYS      ciclopirox (PENLAC) 8 % solution APPLY TOPICALLY ONE APPLICATION TO THE AFFECTED AREA DAILY      mirabegron (MYRBETRIQ) 25 MG TB24 Take 1 tablet by mouth daily 90 tablet 3    Multiple Vitamins-Minerals (MULTIVITAMIN ADULT PO) Take 1 tablet by mouth daily      fluticasone (FLONASE) 50 MCG/ACT nasal spray USE 2 SPRAYS IN EACH NOSTRIL DAILY (Patient taking differently: daily as needed ) 1 Bottle 3    carvedilol (COREG) 12.5 MG tablet Take 12.5 mg by mouth 2 times daily (with meals).  amLODIPine (NORVASC) 5 MG tablet Take 5 mg by mouth daily.  omeprazole (PRILOSEC) 40 MG capsule Take 40 mg by mouth daily.  aspirin 81 MG tablet Take 81 mg by mouth daily.  S-Adenosylmethionine (EFREN-E) 400 MG TABS Take  by mouth.        No current facility-administered medications for this visit.          Vital Signs:  Ht 5' 5\" (1.651 m)   Wt 230 lb (104.3 kg)   BMI 38.27 kg/m²     General Exam:   Constitutional: Patient is adequately groomed with no evidence of malnutrition  DTRs: Deep tendon reflexes are intact  Mental Status: The patient is oriented to time, place and person. The patient's mood and affect are appropriate. Lymphatic: The lymphatic examination bilaterally reveals all areas to be without enlargement or induration. Vascular: Examination reveals no swelling or calf tenderness. Peripheral pulses are palpable and 2+. Neurological: The patient has good coordination. There is no weakness or sensory deficit. Right shoulder Examination:    Inspection:  No rashes, scars, or lesions. No deformity or atrophy. Palpation: No tenderness over AC joint or bicipital groove    Range of Motion: 175 degrees of forward flexion, 80 degrees of external rotation, internal rotation T12    Strength: 5/5 strength with internal rotation, external rotation, elevation, and abduction. Biceps and triceps strength is 5/5. Special Tests:  Positive Iglesias and Neer impingement exam.  Negative speed sign. Negative crossover examination. Skin: There are no rashes, ulcerations or lesions. Gait: Normal gait pattern    Reflex normal deep tendon reflexes    Additional Comments:       Additional Examinations:         Contralateral Exam: Examination of the left shoulder reveals no atrophy or deformity. Skin is warm and dry. Range of motion is within normal limits. There is no focal tenderness with palpation. No AC joint tenderness. Negative Neer and Iglesias-Jorge exams. Strength is graded 5/5 throughout. Neck: Examination of the neck does not show any tenderness, deformity or injury. Range of motion is unremarkable. There is no gross instability. There are no rashes, ulcerations or lesions. Strength and tone are normal.            Impression:  Encounter Diagnoses   Name Primary?     S/P rotator cuff repair Yes    Traumatic complete tear of right rotator cuff, subsequent encounter     Shoulder impingement syndrome, right        Office Procedures:  No orders of the defined types were placed in this encounter. Treatment Plan: Patient overall is doing fairly well. I have impressed upon her the importance of continuing with her home exercises. I do not see any evidence of recurrent rotator cuff tear at this time. I would like to see the patient back in 4 weeks. If in 4 weeks patient continues to be symptomatic consideration for a new MRI to look for recurrent rotator cuff tear. Patient follow-up in the office sooner problems arise.

## 2021-03-01 ENCOUNTER — HOSPITAL ENCOUNTER (OUTPATIENT)
Dept: PHYSICAL THERAPY | Age: 66
Setting detail: THERAPIES SERIES
Discharge: HOME | End: 2021-03-01
Payer: MEDICARE

## 2021-03-01 PROCEDURE — 97140 MANUAL THERAPY 1/> REGIONS: CPT | Performed by: PHYSICAL THERAPIST

## 2021-03-01 PROCEDURE — 97110 THERAPEUTIC EXERCISES: CPT | Performed by: PHYSICAL THERAPIST

## 2021-03-01 PROCEDURE — G0283 ELEC STIM OTHER THAN WOUND: HCPCS | Performed by: PHYSICAL THERAPIST

## 2021-03-01 NOTE — FLOWSHEET NOTE
[place] [address] [facility], [address] [place]  Phone: [phone]  Fax [phone]        Date:  3/1/2021    Patient Name:  Brooke Kim    :  1955  MRN: [id number]  Restrictions/Precautions:    Medical/Treatment Diagnosis Information:  · Diagnosis: Z98.890 s/p rtc repair, S46.011D right complete, traumatic RTC, M75.41 right RTC impingement    DOS:  10/30/2020  s/p SAD, labral debridement, Mini open RTC repair of supra and infra with regeneten patch. · Treatment Diagnosis: right shoulder pain M25.511, right RTC tear W81.900  Insurance/Certification information:  PT Insurance Information: [facility]  Physician Information:  Referring Practitioner: Dr. Joan Walden  Has the plan of care been signed (Y/N):        []  Yes  [x]  No     Date of Patient follow up with Physician: 20      Is this a Progress Report:     [x]  Yes  []  No        If Yes:  Date Range for reporting period:  Beginnin20  Endin20    Progress report will be due (10 Rx or 30 days whichever is less):        Recertification will be due (POC Duration  / 90 days whichever is less):       Visit # Insurance Allowable Auth Required   In-person  in  bmn []  Yes [x]  No    Telehealth   []  Yes []  No    Total          Therapy Diagnosis/Practice Pattern:I      Number of Comorbidities:  []0     [x]1-2    []3+    Latex Allergy:  [x]NO      []YES  Preferred Language for Healthcare:   [x]English       []other:      SUBJECTIVE:  Pt is 17.5 weeks post op. Pt states that she feels much better than before surgery. Pt feels 70% better. Pt is able to lie on the shoulder, but it does get sore after 4 hours. Pt is only taking OA meds. OBJECTIVE: See eval   Observation:    Test measurements:    Right 20       Current  Shoulder Flex 20 145   Shoulder Abd      Shoulder ER 20 88   Shoulder IR   70   Elbow flex 125    Elbow ext.   10        Strength  Right    Shoulder Flex n/t n/t   Shoulder Scap n/t    Shoulder ER n/t n/t   Shoulder IR n/t n/t          Pain scale 6/ 10 0 /10    Quick Dash 53= 93% 30= 43%       RESTRICTIONS/PRECAUTIONS: 4 weeks pillow/8 PROM     Exercises/Interventions:     Therapeutic Ex (66516) Sets/sec Reps Notes/CUES          Cane flex  X 10  hep   Serratus punch 4# X 30     Supine R/S 2 directions 4# X 10     Supine pec s/  Supine angels 3x  :20 X 10     Supine Horiz abd YL X 20    Supine PNF yl  X 15     SL ERC 2# 3 x 10     Sleeper s :20  3x     No $ YL 3 x 10 hep   hep   Wall push ups  X 20  Wall walks  X 10  AROM to 90  Flex/   scap X 10 X 10 TB Row/  TB ext BL 3 x 10  TB IR/ ER RD 2 x 10  Finisher 6#  x 20  5 exercises towel s IR :10 3x Manual Intervention (37972)      Joint oscillations/  PROM  6  min       sidelying scap mobs  2 min                      NMR re-education (85779)   CUES NEEDED                                   Therapeutic Exercise and NMR EXR  [x] (59637) Provided verbal/tactile cueing for activities related to strengthening, flexibility, endurance, ROM  for improvements in scapular, scapulothoracic and UE control with self care, reaching, carrying, lifting, house/yardwork, driving/computer work.    [] (19997) Provided verbal/tactile cueing for activities related to improving balance, coordination, kinesthetic sense, posture, motor skill, proprioception  to assist with  scapular, scapulothoracic and UE control with self care, reaching, carrying, lifting, house/yardwork, driving/computer work. Therapeutic Activities:    [] (32424 or 90347) Provided verbal/tactile cueing for activities related to improving balance, coordination, kinesthetic sense, posture, motor skill, proprioception and motor activation to allow for proper function of scapular, scapulothoracic and UE control with self care, carrying, lifting, driving/computer work.      Home Exercise Program:    [x] (84492) Reviewed/Progressed HEP activities related to strengthening, flexibility, endurance, ROM of scapular, scapulothoracic and UE control with self care, reaching, carrying, lifting, house/yardwork, driving/computer work  [] (60022) Reviewed/Progressed HEP activities related to improving balance, coordination, kinesthetic sense, posture, motor skill, proprioception of scapular, scapulothoracic and UE control with self care, reaching, carrying, lifting, house/yardwork, driving/computer work      Manual Treatments:  PROM / STM / Oscillations-Mobs:  G-I, II, III, IV (PA's, Inf., Post.)  [x] (73414) Provided manual therapy to mobilize soft tissue/joints of cervical/CT, scapular GHJ and UE for the purpose of modulating pain, promoting relaxation,  increasing ROM, reducing/eliminating soft tissue swelling/inflammation/restriction, improving soft tissue extensibility and allowing for proper ROM for normal function with self care, reaching, carrying, lifting, house/yardwork, driving/computer work    Modalities:  Premod/ ice    Charges:  Timed Code Treatment Minutes: 35   Total Treatment Minutes: 50       [] EVAL (LOW) 33376   [] EVAL (MOD) 74344   [] EVAL (HIGH) 79109   [] RE-EVAL     [x] MT(78320) x  2  [] IONTO  [] NMR (67130) x     [] VASO  [x] Manual (40935) x 1      [x] Other:ice  [] TA x      [] Mech Traction (31011)  [] ES(attended) (97065)      [x] ES (un) (42674):     GOALS:  Patient stated goal: Going to Religion, daily activities. [] Progressing: [] Met: [] Not Met: [] Adjusted    Therapist goals for Patient:   Short Term Goals: To be achieved in: 2 weeks  1. Independent in HEP and progression per patient tolerance, in order to prevent re-injury. [] Progressing: [] Met: [] Not Met: [] Adjusted  2. Patient will have a decrease in pain to facilitate improvement in movement, function, and ADLs as indicated by Functional Deficits. [x] Progressing: [] Met: [] Not Met: [] Adjusted    Long Term Goals: To be achieved in: 16 weeks  1.  Disability index score of 35% or less for the [facility] to assist with reaching prior level of function. [x] Progressing: [] Met: [] Not Met: [] Adjusted  2. Patient will demonstrate increased AROM to 150 flex, 90 ER and 70 IR to allow for proper joint functioning as indicated by patients Functional Deficits. [x] Progressing: [] Met: [] Not Met: [] Adjusted  3. Patient will demonstrate an increase in Strength to 4+/5 throughout right UE to allow for proper functional mobility as indicated by patients Functional Deficits. [] Progressing: [] Met: [] Not Met: [] Adjusted  4. Patient will return to sleeping in bed without increased symptoms or restriction. [x] Progressing: [] Met: [] Not Met: [] Adjusted  5. Able to drive, dress, cook, and perform light household chores. [x] Progressing: [] Met: [] Not Met: [] Adjusted     Progression Towards Functional goals:  [x] Patient is progressing as expected towards functional goals listed. [] Progression is slowed due to complexities listed. [] Progression has been slowed due to co-morbidities. [] Plan just implemented, too soon to assess goals progression  [] Other:     ASSESSMENT: pt had fatigue and increased soreness after program.  Added ESU to address increased discomfort. Pt is able to forward elevate without shrug sign. Overall Progression Towards Functional goals/ Treatment Progress Update:  [x] Patient is progressing as expected towards functional goals listed. [] Progression is slowed due to complexities/Impairments listed. [] Progression has been slowed due to co-morbidities.   [] Plan just implemented, too soon to assess goals progression <30days   [] Goals require adjustment due to lack of progress  [] Patient is not progressing as expected and requires additional follow up with physician  [] Other    Prognosis for POC: [x] Good [] Fair  [] Poor      Patient requires continued skilled intervention: [x] Yes  [] No    Treatment/Activity Tolerance:  [x]

## 2021-03-08 ENCOUNTER — HOSPITAL ENCOUNTER (OUTPATIENT)
Dept: PHYSICAL THERAPY | Age: 66
Setting detail: THERAPIES SERIES
Discharge: HOME | End: 2021-03-08
Payer: MEDICARE

## 2021-03-08 NOTE — FLOWSHEET NOTE
[place] [facility] [facility],  [address]        Physical Therapy  Cancellation/No-show Note  Patient Name:  Rik Goetz  :  1955   Date:  3/8/2021  Cancelled visits to date: 3  No-shows to date: 0    For today's appointment patient:  [x]  Cancelled  []  Rescheduled appointment  []  No-show     Reason given by patient:  []  Patient ill  []  Conflicting appointment  []  No transportation    []  Conflict with work  []  No reason given  []  Other:     Comments:   Family came in town    Electronically signed by:  Agapito Hanson, PT

## 2021-03-22 ENCOUNTER — OFFICE VISIT (OUTPATIENT)
Dept: ORTHOPEDIC SURGERY | Age: 66
End: 2021-03-22
Payer: MEDICARE

## 2021-03-22 VITALS — BODY MASS INDEX: 38.32 KG/M2 | WEIGHT: 230 LBS | HEIGHT: 65 IN

## 2021-03-22 DIAGNOSIS — M17.11 PRIMARY OSTEOARTHRITIS OF RIGHT KNEE: ICD-10-CM

## 2021-03-22 DIAGNOSIS — M75.41 SHOULDER IMPINGEMENT SYNDROME, RIGHT: ICD-10-CM

## 2021-03-22 DIAGNOSIS — S46.011A STRAIN OF RIGHT ROTATOR CUFF CAPSULE, INITIAL ENCOUNTER: ICD-10-CM

## 2021-03-22 DIAGNOSIS — Z98.890 S/P ROTATOR CUFF REPAIR: Primary | ICD-10-CM

## 2021-03-22 PROCEDURE — 3017F COLORECTAL CA SCREEN DOC REV: CPT | Performed by: PHYSICIAN ASSISTANT

## 2021-03-22 PROCEDURE — G8427 DOCREV CUR MEDS BY ELIG CLIN: HCPCS | Performed by: PHYSICIAN ASSISTANT

## 2021-03-22 PROCEDURE — 4004F PT TOBACCO SCREEN RCVD TLK: CPT | Performed by: PHYSICIAN ASSISTANT

## 2021-03-22 PROCEDURE — G8400 PT W/DXA NO RESULTS DOC: HCPCS | Performed by: PHYSICIAN ASSISTANT

## 2021-03-22 PROCEDURE — 99213 OFFICE O/P EST LOW 20 MIN: CPT | Performed by: PHYSICIAN ASSISTANT

## 2021-03-22 PROCEDURE — G8484 FLU IMMUNIZE NO ADMIN: HCPCS | Performed by: PHYSICIAN ASSISTANT

## 2021-03-22 PROCEDURE — 4040F PNEUMOC VAC/ADMIN/RCVD: CPT | Performed by: PHYSICIAN ASSISTANT

## 2021-03-22 PROCEDURE — G8417 CALC BMI ABV UP PARAM F/U: HCPCS | Performed by: PHYSICIAN ASSISTANT

## 2021-03-22 PROCEDURE — 1123F ACP DISCUSS/DSCN MKR DOCD: CPT | Performed by: PHYSICIAN ASSISTANT

## 2021-03-22 PROCEDURE — 1090F PRES/ABSN URINE INCON ASSESS: CPT | Performed by: PHYSICIAN ASSISTANT

## 2021-03-22 NOTE — PROGRESS NOTES
Chief Complaint    Shoulder Pain (CK RIGHT SHOULDER)      History of Present Illness:  Oni Plaza is a 77 y.o. female is here today for follow-up visit. Her right shoulder is doing better. She does have a minimal amount of pain concentrated over the lateral shoulder with a clicking sensation. She reports no difficulties with activities. She denies cervical pain or upper extremity radicular symptoms. Devious history: Presents to the office today for follow-up visit. Patient is over 3 months status post right shoulder rotator cuff repair. She was doing extremely well. Then approximately 3 weeks ago she did fall. She states that she is still doing well but has a little bit more pain in her shoulder. She does not feel she has any weakness. Pain is concentrated lateral shoulder and increased with overhead activities.     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    Arthritis     Calculus of kidney     Cancer (HCC)     skin    Cystocele, midline     Eustachian tube dysfunction, bilateral     Gastro-esophageal reflux disease without esophagitis     Hyperlipidemia     Hypertension     Liver cyst     Neuritis     Nocturia     Old myocardial infarction     Osteoarthritis     Otalgia     Perennial allergic rhinitis     Pharyngeal inflammation     Primary osteoarthritis of right knee     Tinnitus, bilateral     Tobacco use     Urinary frequency     Urinary incontinence     Vaginal enterocele     Vaginal vault 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      Patient Active Problem List    Diagnosis Date Noted    RUQ pain 08/31/2020    Primary osteoarthritis of right knee 09/16/2019    Neuritis 06/05/2018    Cystocele, midline 11/02/2017    Vaginal enterocele 11/02/2017    Vaginal vault 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 11/02/2017    Liver cyst 04/21/2017    Calculus of kidney 08/25/2016    Essential (primary) hypertension 08/25/2016    Gastro-esophageal reflux disease without esophagitis 08/25/2016    Generalized anxiety disorder 08/25/2016    Hyperlipidemia 08/25/2016    Old myocardial infarction 08/25/2016    Tobacco use 08/25/2016    Eustachian tube dysfunction, bilateral 06/15/2016    Otalgia, right 06/15/2016    Tinnitus of vascular origin 06/15/2016    Tinnitus, bilateral 06/15/2016    Acute laryngitis 01/29/2015    Pharyngeal inflammation 12/04/2014    Acute lymphadenitis of face, head and neck 12/04/2014    Perennial allergic rhinitis 12/04/2014    Urinary incontinence 12/27/2012     Past Surgical History:   Procedure Laterality Date    APPENDECTOMY      CHOLECYSTECTOMY      ELBOW DEBRIDEMENT      FOOT SURGERY      bilateral    HYSTERECTOMY, VAGINAL      KNEE ARTHROSCOPY Right 4/24/2019    RIGHT KNEE ARTHROSCOPY,, SYNOVECTOMY CHONDROPLASTY,MEDIAL AND LATERAL MENESECTOMY, REMOVAL LOOSE BODIES performed by Misty Steward MD at [facility] 1      bilateral    LIVER SURGERY      cyst removal    NECK SURGERY      ROTATOR CUFF REPAIR Right 10/30/2020    VIDEO ARTHROSCOPY RIGHT SHOULDER SUBACROMIAL DECOMPRESSION LABRAL DEBRIDEMENT ROTATOR CUFF TEAR performed by Lynn Knowles MD at [place] [address] ARTHROSCOPY      TONSILLECTOMY      TUBAL LIGATION      URETHRA SURGERY      US BREAST LESION REMOVAL LEFT       Family History   Problem Relation Age of Onset    Breast Cancer Mother     Cancer Mother         Bone    Diabetes Mother     Heart Disease Mother         CHF   Reba Mccreedy Arthritis Mother     Heart Attack Father     Prostate Cancer Brother     Cancer Maternal tablet by mouth daily 90 tablet 3    Multiple Vitamins-Minerals (MULTIVITAMIN ADULT PO) Take 1 tablet by mouth daily      fluticasone (FLONASE) 50 MCG/ACT nasal spray USE 2 SPRAYS IN EACH NOSTRIL DAILY (Patient taking differently: daily as needed ) 1 Bottle 3    carvedilol (COREG) 12.5 MG tablet Take 12.5 mg by mouth 2 times daily (with meals).  amLODIPine (NORVASC) 5 MG tablet Take 5 mg by mouth daily.  omeprazole (PRILOSEC) 40 MG capsule Take 40 mg by mouth daily.  aspirin 81 MG tablet Take 81 mg by mouth daily.  S-Adenosylmethionine (EFREN-E) 400 MG TABS Take  by mouth. No current facility-administered medications for this visit. Vital Signs:  Ht 5' 5\" (1.651 m)   Wt 230 lb (104.3 kg)   BMI 38.27 kg/m²     General Exam:   Constitutional: Patient is adequately groomed with no evidence of malnutrition  DTRs: Deep tendon reflexes are intact  Mental Status: The patient is oriented to time, place and person. The patient's mood and affect are appropriate. Lymphatic: The lymphatic examination bilaterally reveals all areas to be without enlargement or induration. Vascular: Examination reveals no swelling or calf tenderness. Peripheral pulses are palpable and 2+. Neurological: The patient has good coordination. There is no weakness or sensory deficit. Right shoulder Examination:    Inspection:  No rashes, scars, or lesions. No deformity or atrophy. Palpation: No tenderness over AC joint or bicipital groove    Range of Motion: 175 degrees of forward flexion, 80 degrees of external rotation, internal rotation T12    Strength: 5/5 strength with internal rotation, external rotation, elevation, and abduction. Biceps and triceps strength is 5/5. Special Tests:  Positive Iglesias and Neer impingement exam.  Negative speed sign. Negative crossover examination. Skin: There are no rashes, ulcerations or lesions.     Gait: Normal gait pattern    Reflex normal deep tendon reflexes    Additional Comments:       Additional Examinations:         Contralateral Exam: Examination of the left shoulder reveals no atrophy or deformity. Skin is warm and dry. Range of motion is within normal limits. There is no focal tenderness with palpation. No AC joint tenderness. Negative Neer and Iglesias-Jorge exams. Strength is graded 5/5 throughout. Neck: Examination of the neck does not show any tenderness, deformity or injury. Range of motion is unremarkable. There is no gross instability. There are no rashes, ulcerations or lesions. Strength and tone are normal.            Impression:  Encounter Diagnoses   Name Primary?  S/P rotator cuff repair Yes    Strain of right rotator cuff capsule, initial encounter     Shoulder impingement syndrome, right     Primary osteoarthritis of right knee        Office Procedures:  No orders of the defined types were placed in this encounter. Treatment Plan:     Patient is improving. I have encouraged her to continue with her home exercises concentrating on rotator cuff strengthening. Continue to use oral anti-inflammatory medication as needed. If she continues to progress and does well she can follow-up on a as needed basis. If her symptoms return I would get the patient an MRI to look for recurrent rotator cuff tear. I discussed with Quincy Steward that her history, symptoms, signs and imaging are most consistent with rotator cuff tendonitis. We reviewed the natural history of these conditions and treatment options ranging from conservative measures (rest, icing, activity modification, physical therapy, pain meds, cortisone injection) to surgical options. In terms of treatment, I recommended continuing with rest, icing, avoidance of painful activities, NSAIDs or pain meds as tolerated, and physical therapy. If these are not effective, cortisone injection can be considered.   We discussed surgical options as well, should conservative measures fail.

## 2021-03-23 ENCOUNTER — PROCEDURE VISIT (OUTPATIENT)
Dept: UROGYNECOLOGY | Age: 66
End: 2021-03-23
Payer: MEDICARE

## 2021-03-23 VITALS
DIASTOLIC BLOOD PRESSURE: 81 MMHG | HEART RATE: 78 BPM | TEMPERATURE: 97.2 F | SYSTOLIC BLOOD PRESSURE: 148 MMHG | OXYGEN SATURATION: 95 %

## 2021-03-23 DIAGNOSIS — N39.41 URGE INCONTINENCE: ICD-10-CM

## 2021-03-23 DIAGNOSIS — N81.11 CYSTOCELE, MIDLINE: Primary | ICD-10-CM

## 2021-03-23 DIAGNOSIS — N81.5 VAGINAL ENTEROCELE: ICD-10-CM

## 2021-03-23 DIAGNOSIS — R35.0 URINARY FREQUENCY: ICD-10-CM

## 2021-03-23 DIAGNOSIS — R39.15 URINARY URGENCY: ICD-10-CM

## 2021-03-23 PROCEDURE — 52285 CYSTOSCOPY AND TREATMENT: CPT | Performed by: OBSTETRICS & GYNECOLOGY

## 2021-03-23 PROCEDURE — 0509F URINE INCON PLAN DOCD: CPT | Performed by: OBSTETRICS & GYNECOLOGY

## 2021-03-23 PROCEDURE — G8417 CALC BMI ABV UP PARAM F/U: HCPCS | Performed by: OBSTETRICS & GYNECOLOGY

## 2021-03-23 PROCEDURE — G8484 FLU IMMUNIZE NO ADMIN: HCPCS | Performed by: OBSTETRICS & GYNECOLOGY

## 2021-03-23 PROCEDURE — G8400 PT W/DXA NO RESULTS DOC: HCPCS | Performed by: OBSTETRICS & GYNECOLOGY

## 2021-03-23 PROCEDURE — 1123F ACP DISCUSS/DSCN MKR DOCD: CPT | Performed by: OBSTETRICS & GYNECOLOGY

## 2021-03-23 PROCEDURE — 4040F PNEUMOC VAC/ADMIN/RCVD: CPT | Performed by: OBSTETRICS & GYNECOLOGY

## 2021-03-23 PROCEDURE — 3017F COLORECTAL CA SCREEN DOC REV: CPT | Performed by: OBSTETRICS & GYNECOLOGY

## 2021-03-23 PROCEDURE — 4004F PT TOBACCO SCREEN RCVD TLK: CPT | Performed by: OBSTETRICS & GYNECOLOGY

## 2021-03-23 PROCEDURE — G8427 DOCREV CUR MEDS BY ELIG CLIN: HCPCS | Performed by: OBSTETRICS & GYNECOLOGY

## 2021-03-23 PROCEDURE — 99214 OFFICE O/P EST MOD 30 MIN: CPT | Performed by: OBSTETRICS & GYNECOLOGY

## 2021-03-23 PROCEDURE — 1090F PRES/ABSN URINE INCON ASSESS: CPT | Performed by: OBSTETRICS & GYNECOLOGY

## 2021-03-23 RX ORDER — CIPROFLOXACIN 500 MG/1
500 TABLET, FILM COATED ORAL 2 TIMES DAILY
Qty: 14 TABLET | Refills: 0 | Status: SHIPPED | OUTPATIENT
Start: 2021-03-23 | End: 2021-03-30

## 2021-03-23 RX ORDER — IBUPROFEN 600 MG/1
600 TABLET ORAL ONCE
Qty: 1 TABLET | Refills: 0 | Status: SHIPPED | OUTPATIENT
Start: 2021-03-23 | End: 2021-04-22

## 2021-03-23 RX ORDER — METHOCARBAMOL 500 MG/1
500 TABLET, FILM COATED ORAL 3 TIMES DAILY PRN
COMMUNITY
Start: 2020-10-21 | End: 2021-05-06

## 2021-03-23 ASSESSMENT — ENCOUNTER SYMPTOMS
SINUS PRESSURE: 1
EYE ITCHING: 1

## 2021-03-23 NOTE — PROGRESS NOTES
3/23/2021     HPI:     Name: April Gilford  YOB: 1955    CC: April Gilford is a 77 y.o. female presenting for an evaluation of urge incontinence , pelvic pain and voiding dysfunction. HPI:  She continues to have problems with pain for the Myrbetriq and all of the anticholinergic medications because significant dry mouth for her. How long have you had this problem? Years  Please rate the severity of your problem: moderately severe  Anything make it better? Meds helped a little, but causes my blood pressure to go up    Ob/Gyn History:    OB History   No obstetric history on file.      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    Arthritis     Calculus of kidney     Cancer (HCC)     skin    Cystocele, midline     Eustachian tube dysfunction, bilateral     Gastro-esophageal reflux disease without esophagitis     Hyperlipidemia     Hypertension     Liver cyst     Neuritis     Nocturia     Old myocardial infarction     Osteoarthritis     Otalgia     Perennial allergic rhinitis     Pharyngeal inflammation     Primary osteoarthritis of right knee     Tinnitus, bilateral     Tobacco use     Urinary frequency     Urinary incontinence     Vaginal enterocele     Vaginal vault 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      Past Surgical History:   Past Surgical History:   Procedure Laterality Date    APPENDECTOMY      CHOLECYSTECTOMY      ELBOW DEBRIDEMENT      FOOT SURGERY      bilateral    HYSTERECTOMY, VAGINAL      KNEE ARTHROSCOPY Right 4/24/2019    RIGHT KNEE ARTHROSCOPY,, SYNOVECTOMY CHONDROPLASTY,MEDIAL AND LATERAL MENESECTOMY, REMOVAL LOOSE BODIES performed by Twan Patton MD at [facility] 1      bilateral    LIVER SURGERY      cyst removal    NECK SURGERY      ROTATOR CUFF REPAIR Right 10/30/2020    VIDEO ARTHROSCOPY RIGHT SHOULDER SUBACROMIAL DECOMPRESSION LABRAL DEBRIDEMENT ROTATOR CUFF TEAR performed by Richy Lema MD at [place] [address] ARTHROSCOPY      TONSILLECTOMY      TUBAL LIGATION      URETHRA SURGERY      US BREAST LESION REMOVAL LEFT       Current Medications:  Current Outpatient Medications   Medication Sig Dispense Refill    methocarbamol (ROBAXIN) 500 MG tablet Take 500 mg by mouth 3 times daily as needed      ibuprofen (ADVIL;MOTRIN) 600 MG tablet Take 1 tablet by mouth once for 1 dose Take 1 pill 1 hour prior to Botox Procedure. 1 tablet 0    ciprofloxacin (CIPRO) 500 MG tablet Take 1 tablet by mouth 2 times daily for 7 days Start 3 days prior to Botox Procedure and then continue for 4 days. 14 tablet 0    tiZANidine (ZANAFLEX) 4 MG tablet Take 1 tablet by mouth 3 times daily 30 tablet 1    diclofenac (VOLTAREN) 75 MG EC tablet Take 1 tablet by mouth 2 times daily 60 tablet 3    ondansetron (ZOFRAN) 4 MG tablet Take 1 tablet by mouth 3 times daily as needed for Nausea or Vomiting 15 tablet 0    gabapentin (NEURONTIN) 300 MG capsule TAKE 1 CAPSULE BY MOUTH THREE TIMES DAILY FOR 30 DAYS      ciclopirox (PENLAC) 8 % solution APPLY TOPICALLY ONE APPLICATION TO THE AFFECTED AREA DAILY      mirabegron (MYRBETRIQ) 25 MG TB24 Take 1 tablet by mouth daily 90 tablet 3    Multiple Vitamins-Minerals (MULTIVITAMIN ADULT PO) Take 1 tablet by mouth daily      fluticasone (FLONASE) 50 MCG/ACT nasal spray USE 2 SPRAYS IN EACH NOSTRIL DAILY (Patient taking differently: daily as needed ) 1 Bottle 3    carvedilol (COREG) 12.5 MG tablet Take 12.5 mg by mouth 2 times daily (with meals).  amLODIPine (NORVASC) 5 MG tablet Take 5 mg by mouth daily.  omeprazole (PRILOSEC) 40 MG capsule Take 40 mg by mouth daily.  aspirin 81 MG tablet Take 81 mg by mouth daily.  S-Adenosylmethionine (EFREN-E) 400 MG TABS Take  by mouth.        No current facility-administered medications for this visit. Allergies: Allergies   Allergen Reactions    Adhesive Tape      Band aids blisters skin    Codeine     Crestor [Rosuvastatin Calcium] Other (See Comments)     Legs ache      Food      Black walnut    Lipitor [Atorvastatin Calcium] Other (See Comments)     Legs ache      Nitrofurantoin Itching    Penicillins Swelling     \"Throat closes\"    Sulfa Antibiotics     Tramadol Nausea Only     Social History:   Social History     Socioeconomic History    Marital status:       Spouse name: Not on file    Number of children: Not on file    Years of education: Not on file    Highest education level: Not on file   Occupational History    Not on file   Social Needs    Financial resource strain: Not on file    Food insecurity     Worry: Not on file     Inability: Not on file    Transportation needs     Medical: Not on file     Non-medical: Not on file   Tobacco Use    Smoking status: Current Every Day Smoker     Packs/day: 0.25     Types: Cigarettes    Smokeless tobacco: Never Used    Tobacco comment: quit March 4, 2019   Substance and Sexual Activity    Alcohol use: No    Drug use: No    Sexual activity: Not Currently   Lifestyle    Physical activity     Days per week: Not on file     Minutes per session: Not on file    Stress: Not on file   Relationships    Social connections     Talks on phone: Not on file     Gets together: Not on file     Attends Druze service: Not on file     Active member of club or organization: Not on file     Attends meetings of clubs or organizations: Not on file     Relationship status: Not on file    Intimate partner violence     Fear of current or ex partner: Not on file     Emotionally abused: Not on file     Physically abused: Not on file     Forced sexual activity: Not on file   Other Topics Concern    Not on file   Social History Narrative    Not on file     Family History:   Family History   Problem Relation Age of Onset    Breast Cancer Mother     Cancer Mother         Bone    Diabetes Mother     Heart Disease Mother         CHF   [facility] Arthritis Mother     Heart Attack Father     Prostate Cancer Brother     Cancer Maternal Grandmother     Cancer Brother         kidney    Cancer Brother         bladder     Review of System  Review of Systems   HENT: Positive for sinus pressure and tinnitus. Eyes: Positive for itching. Cardiovascular: Positive for leg swelling. Endocrine: Positive for polyuria. Genitourinary: Positive for enuresis and frequency. All other systems reviewed and are negative. A review of systems was done by the patient and reviewed by me and scanned into media today. Objective:     Vital Signs  Vitals:    03/23/21 1050   BP: (!) 148/81   Pulse: 78   Temp: 97.2 °F (36.2 °C)   SpO2: 95%      Physical Exam  Physical Exam  HENT:      Head: Normocephalic and atraumatic. Eyes:      Conjunctiva/sclera: Conjunctivae normal.   Neck:      Musculoskeletal: Normal range of motion and neck supple. Pulmonary:      Effort: Pulmonary effort is normal.   Abdominal:      Palpations: Abdomen is soft. Skin:     General: Skin is warm and dry. Neurological:      Mental Status: She is alert and oriented to person, place, and time. Procedure: The urethral was anesthesized with topical lidocaine jelly and dilated to a #14 [place]. A 0-degree urethroscope was used to examine the urethra. A 70-degree cystoscope was used to evaluate the bladder. FINDINGS:  1. Urethra was normal  2. Bladder had normal  3. Trigone appeared Normal  4. Ureters illustrated bilateral efflux  5. Patient exhibited spasmsd uring the study no      No results found for this visit on 03/23/21. Assessment/Plan:     Rohit Colon is a 77 y.o. female with   1. Cystocele, midline    2. Vaginal enterocele    3. Urinary urgency    4. Urinary frequency    5. Urge incontinence      She would like to move forward with Botox injections.   Risk and benefits were discussed with her at great length today. She is going to [place] in April and would like to have this done in the middle of April. Orders Placed This Encounter   Procedures    CA CYSTOSCOPY,RX FEMALE URETHRAL SYND     Orders Placed This Encounter   Medications    ibuprofen (ADVIL;MOTRIN) 600 MG tablet     Sig: Take 1 tablet by mouth once for 1 dose Take 1 pill 1 hour prior to Botox Procedure. Dispense:  1 tablet     Refill:  0    ciprofloxacin (CIPRO) 500 MG tablet     Sig: Take 1 tablet by mouth 2 times daily for 7 days Start 3 days prior to Botox Procedure and then continue for 4 days.      Dispense:  14 tablet     Refill:  0       Kyra Meehan

## 2021-03-23 NOTE — LETTER
Hypertension     Liver cyst     Neuritis     Nocturia     Old myocardial infarction     Osteoarthritis     Otalgia     Perennial allergic rhinitis     Pharyngeal inflammation     Primary osteoarthritis of right knee     Tinnitus, bilateral     Tobacco use     Urinary frequency     Urinary incontinence     Vaginal enterocele     Vaginal vault 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      Past Surgical History:   Past Surgical History:   Procedure Laterality Date    APPENDECTOMY      CHOLECYSTECTOMY      ELBOW DEBRIDEMENT      FOOT SURGERY      bilateral    HYSTERECTOMY, VAGINAL      KNEE ARTHROSCOPY Right 4/24/2019    RIGHT KNEE ARTHROSCOPY,, SYNOVECTOMY CHONDROPLASTY,MEDIAL AND LATERAL MENESECTOMY, REMOVAL LOOSE BODIES performed by Sandra Cutler MD at [facility]      bilateral    LIVER SURGERY      cyst removal    NECK SURGERY      ROTATOR CUFF REPAIR Right 10/30/2020    VIDEO ARTHROSCOPY RIGHT SHOULDER SUBACROMIAL DECOMPRESSION LABRAL DEBRIDEMENT ROTATOR CUFF TEAR performed by Pastor Kelly MD at [place] [address] ARTHROSCOPY      TONSILLECTOMY      TUBAL LIGATION      URETHRA SURGERY      US BREAST LESION REMOVAL LEFT       Current Medications:  Current Outpatient Medications   Medication Sig Dispense Refill    methocarbamol (ROBAXIN) 500 MG tablet Take 500 mg by mouth 3 times daily as needed      ibuprofen (ADVIL;MOTRIN) 600 MG tablet Take 1 tablet by mouth once for 1 dose Take 1 pill 1 hour prior to Botox Procedure. 1 tablet 0    ciprofloxacin (CIPRO) 500 MG tablet Take 1 tablet by mouth 2 times daily for 7 days Start 3 days prior to Botox Procedure and then continue for 4 days.  14 tablet 0    tiZANidine (ZANAFLEX) 4 MG tablet Take 1 tablet by mouth 3 times daily 30 tablet 1    diclofenac (VOLTAREN) 75 MG EC tablet Take 1 tablet by mouth 2 times daily 60 tablet 3    ondansetron (ZOFRAN) 4 MG tablet Take 1 tablet by mouth 3 times daily as needed for Nausea or Vomiting comment: quit March 4, 2019   Substance and Sexual Activity    Alcohol use: No    Drug use: No    Sexual activity: Not Currently   Lifestyle    Physical activity     Days per week: Not on file     Minutes per session: Not on file    Stress: Not on file   Relationships    Social connections     Talks on phone: Not on file     Gets together: Not on file     Attends Worship service: Not on file     Active member of club or organization: Not on file     Attends meetings of clubs or organizations: Not on file     Relationship status: Not on file    Intimate partner violence     Fear of current or ex partner: Not on file     Emotionally abused: Not on file     Physically abused: Not on file     Forced sexual activity: Not on file   Other Topics Concern    Not on file   Social History Narrative    Not on file     Family History:   Family History   Problem Relation Age of Onset    Breast Cancer Mother     Cancer Mother         Bone    Diabetes Mother     Heart Disease Mother         CHF    Arthritis Mother     Heart Attack Father     Prostate Cancer Brother     Cancer Maternal Grandmother     Cancer Brother         kidney    Cancer Brother         bladder     Review of System  Review of Systems   HENT: Positive for sinus pressure and tinnitus. Eyes: Positive for itching. Cardiovascular: Positive for leg swelling. Endocrine: Positive for polyuria. Genitourinary: Positive for enuresis and frequency. All other systems reviewed and are negative. A review of systems was done by the patient and reviewed by me and scanned into media today. Objective:     Vital Signs  Vitals:    03/23/21 1050   BP: (!) 148/81   Pulse: 78   Temp: 97.2 °F (36.2 °C)   SpO2: 95%      Physical Exam  Physical Exam  HENT:      Head: Normocephalic and atraumatic. Eyes:      Conjunctiva/sclera: Conjunctivae normal.   Neck:      Musculoskeletal: Normal range of motion and neck supple.    Pulmonary:      Effort: Pulmonary effort is normal.   Abdominal:      Palpations: Abdomen is soft. Skin:     General: Skin is warm and dry. Neurological:      Mental Status: She is alert and oriented to person, place, and time. Procedure: The urethral was anesthesized with topical lidocaine jelly and dilated to a #14 [place]. A 0-degree urethroscope was used to examine the urethra. A 70-degree cystoscope was used to evaluate the bladder. FINDINGS:  1. Urethra was normal  2. Bladder had normal  3. Trigone appeared Normal  4. Ureters illustrated bilateral efflux  5. Patient exhibited spasmsd uring the study no      No results found for this visit on 03/23/21. Assessment/Plan:     Osmar Pearson is a 77 y.o. female with   1. Cystocele, midline    2. Vaginal enterocele    3. Urinary urgency    4. Urinary frequency    5. Urge incontinence      She would like to move forward with Botox injections. Risk and benefits were discussed with her at great length today. She is going to [place] in April and would like to have this done in the middle of April. Orders Placed This Encounter   Procedures    MS CYSTOSCOPY,RX FEMALE URETHRAL SYND     Orders Placed This Encounter   Medications    ibuprofen (ADVIL;MOTRIN) 600 MG tablet     Sig: Take 1 tablet by mouth once for 1 dose Take 1 pill 1 hour prior to Botox Procedure. Dispense:  1 tablet     Refill:  0    ciprofloxacin (CIPRO) 500 MG tablet     Sig: Take 1 tablet by mouth 2 times daily for 7 days Start 3 days prior to Botox Procedure and then continue for 4 days.      Dispense:  14 tablet     Refill:  0       Liliane Dilling

## 2021-03-29 DIAGNOSIS — S46.011D TRAUMATIC COMPLETE TEAR OF RIGHT ROTATOR CUFF, SUBSEQUENT ENCOUNTER: ICD-10-CM

## 2021-03-29 DIAGNOSIS — M75.41 SHOULDER IMPINGEMENT SYNDROME, RIGHT: ICD-10-CM

## 2021-03-29 DIAGNOSIS — Z98.890 S/P ROTATOR CUFF REPAIR: ICD-10-CM

## 2021-03-29 RX ORDER — DICLOFENAC SODIUM 75 MG/1
TABLET, DELAYED RELEASE ORAL
Qty: 60 TABLET | Refills: 0 | Status: SHIPPED | OUTPATIENT
Start: 2021-03-29 | End: 2021-06-10 | Stop reason: SDUPTHER

## 2021-04-15 RX ORDER — IBUPROFEN 600 MG/1
600 TABLET ORAL ONCE
Qty: 1 TABLET | Refills: 0 | Status: SHIPPED | OUTPATIENT
Start: 2021-04-15 | End: 2021-04-22

## 2021-04-15 RX ORDER — CIPROFLOXACIN 500 MG/1
500 TABLET, FILM COATED ORAL 2 TIMES DAILY
Qty: 14 TABLET | Refills: 0 | Status: SHIPPED | OUTPATIENT
Start: 2021-04-15 | End: 2021-04-22

## 2021-04-22 ENCOUNTER — PROCEDURE VISIT (OUTPATIENT)
Dept: UROGYNECOLOGY | Age: 66
End: 2021-04-22
Payer: MEDICARE

## 2021-04-22 VITALS
TEMPERATURE: 97.2 F | DIASTOLIC BLOOD PRESSURE: 82 MMHG | SYSTOLIC BLOOD PRESSURE: 147 MMHG | RESPIRATION RATE: 18 BRPM | HEART RATE: 86 BPM | OXYGEN SATURATION: 98 %

## 2021-04-22 DIAGNOSIS — R39.15 URGENCY OF URINATION: Primary | ICD-10-CM

## 2021-04-22 DIAGNOSIS — R33.9 INCOMPLETE BLADDER EMPTYING: ICD-10-CM

## 2021-04-22 DIAGNOSIS — R35.0 URINARY FREQUENCY: ICD-10-CM

## 2021-04-22 LAB
BILIRUBIN, POC: NORMAL
BLOOD URINE, POC: NORMAL
CLARITY, POC: CLEAR
COLOR, POC: YELLOW
GLUCOSE URINE, POC: NORMAL
KETONES, POC: NORMAL
LEUKOCYTE EST, POC: NORMAL
NITRITE, POC: NORMAL
PH, POC: 6.5
PROTEIN, POC: NORMAL
SPECIFIC GRAVITY, POC: 1.01
UROBILINOGEN, POC: NORMAL

## 2021-04-22 PROCEDURE — 81002 URINALYSIS NONAUTO W/O SCOPE: CPT | Performed by: OBSTETRICS & GYNECOLOGY

## 2021-04-22 PROCEDURE — 52287 CYSTOSCOPY CHEMODENERVATION: CPT | Performed by: OBSTETRICS & GYNECOLOGY

## 2021-04-22 RX ORDER — ONABOTULINUMTOXINA 100 [USP'U]/1
100 INJECTION, POWDER, LYOPHILIZED, FOR SOLUTION INTRADERMAL; INTRAMUSCULAR ONCE
Qty: 100 UNITS | Refills: 0 | Status: SHIPPED | OUTPATIENT
Start: 2021-04-22 | End: 2021-04-22 | Stop reason: ALTCHOICE

## 2021-04-22 RX ORDER — LIDOCAINE HYDROCHLORIDE 20 MG/ML
50 INJECTION, SOLUTION INFILTRATION; PERINEURAL ONCE
Status: COMPLETED | OUTPATIENT
Start: 2021-04-22 | End: 2021-04-22

## 2021-04-22 RX ADMIN — LIDOCAINE HYDROCHLORIDE 50 ML: 20 INJECTION, SOLUTION INFILTRATION; PERINEURAL at 16:22

## 2021-04-22 ASSESSMENT — ENCOUNTER SYMPTOMS
BACK PAIN: 1
SINUS PRESSURE: 1

## 2021-04-22 NOTE — PROGRESS NOTES
4/22/2021       HPI:     Name: Randi Nicole  YOB: 1955    CC: Patient with urinary urgency, frequency, overactive bladder. HPI: Randi Nicole is a 77 y.o. female who is here for intravesical botulinum toxin A injection.       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    Arthritis     Calculus of kidney     Cancer (HCC)     skin    Cystocele, midline     Eustachian tube dysfunction, bilateral     Gastro-esophageal reflux disease without esophagitis     Hyperlipidemia     Hypertension     Liver cyst     Neuritis     Nocturia     Old myocardial infarction     Osteoarthritis     Otalgia     Perennial allergic rhinitis     Pharyngeal inflammation     Primary osteoarthritis of right knee     Tinnitus, bilateral     Tobacco use     Urinary frequency     Urinary incontinence     Vaginal enterocele     Vaginal vault 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      Past Surgical History:   Past Surgical History:   Procedure Laterality Date    APPENDECTOMY      CHOLECYSTECTOMY      ELBOW DEBRIDEMENT      FOOT SURGERY      bilateral    HYSTERECTOMY, VAGINAL      KNEE ARTHROSCOPY Right 4/24/2019    RIGHT KNEE ARTHROSCOPY,, SYNOVECTOMY CHONDROPLASTY,MEDIAL AND LATERAL MENESECTOMY, REMOVAL LOOSE BODIES performed by Eliseo Eden MD at [facility] 1      bilateral    LIVER SURGERY      cyst removal    NECK SURGERY      ROTATOR CUFF REPAIR Right 10/30/2020    VIDEO ARTHROSCOPY RIGHT SHOULDER SUBACROMIAL DECOMPRESSION LABRAL DEBRIDEMENT ROTATOR CUFF TEAR performed by Alli Raymond MD at [place] [address] ARTHROSCOPY      TONSILLECTOMY      TUBAL LIGATION      URETHRA SURGERY      US BREAST LESION REMOVAL LEFT       Current Medications:  Current Outpatient Medications   Medication Sig Dispense Refill    ciprofloxacin (CIPRO) 500 MG tablet Take 1 tablet by mouth 2 times daily for 7 days 14 tablet 0    diclofenac (VOLTAREN) 75 MG EC tablet Take 1 tablet by mouth twice daily 60 tablet 0    methocarbamol (ROBAXIN) 500 MG tablet Take 500 mg by mouth 3 times daily as needed      tiZANidine (ZANAFLEX) 4 MG tablet Take 1 tablet by mouth 3 times daily 30 tablet 1    ondansetron (ZOFRAN) 4 MG tablet Take 1 tablet by mouth 3 times daily as needed for Nausea or Vomiting 15 tablet 0    gabapentin (NEURONTIN) 300 MG capsule TAKE 1 CAPSULE BY MOUTH THREE TIMES DAILY FOR 30 DAYS      ciclopirox (PENLAC) 8 % solution APPLY TOPICALLY ONE APPLICATION TO THE AFFECTED AREA DAILY      mirabegron (MYRBETRIQ) 25 MG TB24 Take 1 tablet by mouth daily 90 tablet 3    Multiple Vitamins-Minerals (MULTIVITAMIN ADULT PO) Take 1 tablet by mouth daily      fluticasone (FLONASE) 50 MCG/ACT nasal spray USE 2 SPRAYS IN EACH NOSTRIL DAILY (Patient taking differently: daily as needed ) 1 Bottle 3    carvedilol (COREG) 12.5 MG tablet Take 12.5 mg by mouth 2 times daily (with meals).  amLODIPine (NORVASC) 5 MG tablet Take 5 mg by mouth daily.  omeprazole (PRILOSEC) 40 MG capsule Take 40 mg by mouth daily.  aspirin 81 MG tablet Take 81 mg by mouth daily.  S-Adenosylmethionine (EFREN-E) 400 MG TABS Take  by mouth. No current facility-administered medications for this visit. Allergies: Allergies   Allergen Reactions    Adhesive Tape      Band aids blisters skin    Codeine     Crestor [Rosuvastatin Calcium] Other (See Comments)     Legs ache      Food      Black walnut    Lipitor [Atorvastatin Calcium] Other (See Comments)     Legs ache      Nitrofurantoin Itching    Penicillins Swelling     \"Throat closes\"    Sulfa Antibiotics     Tramadol Nausea Only     Social History:   Social History     Socioeconomic History    Marital status:       Spouse name: Not on file    Number of children: Not on file    Years of reviewed by me and scanned into media today. Objective:     Vital Signs  Vitals:    04/22/21 1539   BP: (!) 147/82   Pulse: 86   Resp: 18   Temp: 97.2 °F (36.2 °C)   SpO2: 98%      Physical Exam  Physical Exam  HENT:      Head: Normocephalic and atraumatic. Eyes:      Conjunctiva/sclera: Conjunctivae normal.   Neck:      Musculoskeletal: Normal range of motion and neck supple. Pulmonary:      Effort: Pulmonary effort is normal.   Abdominal:      Palpations: Abdomen is soft. Skin:     General: Skin is warm and dry. Neurological:      Mental Status: She is alert and oriented to person, place, and time. Procedure:  Patient was taken to the cystoscopy suite and allowed to void. Following this, the urethra was cleaned and then dilated to 14 [place] using lidocaine gel. A Soares catheter was placed using lidocaine gel. The remaining urine was drained and a dipstick urinalysis was performed with the following results: WBC negative, nitrates negative, RBC negative. The bladder was then instilled with 50ml of 2% lidocaine. The patient remained in the supine position for 20 minutes followed by alternating to the right and left side for 10 additional minutes each. Cystoscope was placed. The bladder wall and trigone were normal in appearance. Botox was injected through the bladder wall taking care to avoid the detrusor. Total units: 100 Total number of injections: 21    No results found for this visit on 04/22/21. Assessment/Plan:     Diann Martins is a 77 y.o. female with urinary urgency, frequency and overactive bladder. Patient tolerated the procedure well. Patient counseled on risk of UTI and urinary retention. She was educated on signs and symptoms of both. She is to call the office with any concerns. The patient will follow up in 2 weeks for a post void residual.     No orders of the defined types were placed in this encounter.     No orders of the defined types were placed in this encounter.       Pieter Gallagher

## 2021-05-06 ENCOUNTER — OFFICE VISIT (OUTPATIENT)
Dept: UROGYNECOLOGY | Age: 66
End: 2021-05-06
Payer: MEDICARE

## 2021-05-06 VITALS
SYSTOLIC BLOOD PRESSURE: 177 MMHG | HEART RATE: 87 BPM | TEMPERATURE: 97.2 F | DIASTOLIC BLOOD PRESSURE: 88 MMHG | RESPIRATION RATE: 16 BRPM | OXYGEN SATURATION: 98 %

## 2021-05-06 DIAGNOSIS — R35.0 URINARY FREQUENCY: Primary | ICD-10-CM

## 2021-05-06 DIAGNOSIS — R33.9 INCOMPLETE BLADDER EMPTYING: ICD-10-CM

## 2021-05-06 PROCEDURE — 99213 OFFICE O/P EST LOW 20 MIN: CPT | Performed by: NURSE PRACTITIONER

## 2021-05-06 PROCEDURE — 51701 INSERT BLADDER CATHETER: CPT | Performed by: NURSE PRACTITIONER

## 2021-05-06 PROCEDURE — 81002 URINALYSIS NONAUTO W/O SCOPE: CPT | Performed by: NURSE PRACTITIONER

## 2021-05-06 ASSESSMENT — ENCOUNTER SYMPTOMS
BACK PAIN: 1
SINUS PAIN: 1

## 2021-05-06 NOTE — PROGRESS NOTES
ARTHROSCOPY RIGHT SHOULDER SUBACROMIAL DECOMPRESSION LABRAL DEBRIDEMENT ROTATOR CUFF TEAR performed by Drea Chadwick MD at [facility] . ARTHROSCOPY      TONSILLECTOMY      TUBAL LIGATION      URETHRA SURGERY      US BREAST LESION REMOVAL LEFT       Allergies: Allergies   Allergen Reactions    Adhesive Tape      Band aids blisters skin    Codeine     Crestor [Rosuvastatin Calcium] Other (See Comments)     Legs ache      Food      Black walnut    Lipitor [Atorvastatin Calcium] Other (See Comments)     Legs ache      Nitrofurantoin Itching    Penicillins Swelling     \"Throat closes\"    Sulfa Antibiotics     Tramadol Nausea Only     Current Medications:  Current Outpatient Medications   Medication Sig Dispense Refill    diclofenac (VOLTAREN) 75 MG EC tablet Take 1 tablet by mouth twice daily 60 tablet 0    gabapentin (NEURONTIN) 300 MG capsule TAKE 1 CAPSULE BY MOUTH THREE TIMES DAILY FOR 30 DAYS      ciclopirox (PENLAC) 8 % solution APPLY TOPICALLY ONE APPLICATION TO THE AFFECTED AREA DAILY      Multiple Vitamins-Minerals (MULTIVITAMIN ADULT PO) Take 1 tablet by mouth daily      carvedilol (COREG) 12.5 MG tablet Take 12.5 mg by mouth 2 times daily (with meals).  amLODIPine (NORVASC) 5 MG tablet Take 5 mg by mouth daily.  omeprazole (PRILOSEC) 40 MG capsule Take 40 mg by mouth daily.  aspirin 81 MG tablet Take 81 mg by mouth daily.  S-Adenosylmethionine (EFREN-E) 400 MG TABS Take  by mouth. No current facility-administered medications for this visit. Social History:   Social History     Socioeconomic History    Marital status:       Spouse name: Not on file    Number of children: Not on file    Years of education: Not on file    Highest education level: Not on file   Occupational History    Not on file   Social Needs    Financial resource strain: Not on file    Food insecurity     Worry: Not on file     Inability: Not on file   [place] needs     Medical: Not on file     Non-medical: Not on file   Tobacco Use    Smoking status: Current Every Day Smoker     Packs/day: 0.25     Types: Cigarettes    Smokeless tobacco: Never Used    Tobacco comment: quit March 4, 2019   Substance and Sexual Activity    Alcohol use: No    Drug use: No    Sexual activity: Not Currently   Lifestyle    Physical activity     Days per week: Not on file     Minutes per session: Not on file    Stress: Not on file   Relationships    Social connections     Talks on phone: Not on file     Gets together: Not on file     Attends Jew service: Not on file     Active member of club or organization: Not on file     Attends meetings of clubs or organizations: Not on file     Relationship status: Not on file    Intimate partner violence     Fear of current or ex partner: Not on file     Emotionally abused: Not on file     Physically abused: Not on file     Forced sexual activity: Not on file   Other Topics Concern    Not on file   Social History Narrative    Not on file     Family History:   Family History   Problem Relation Age of Onset    Breast Cancer Mother     Cancer Mother         Bone    Diabetes Mother     Heart Disease Mother         CHF    Arthritis Mother     Heart Attack Father     Prostate Cancer Brother     Cancer Maternal Grandmother     Cancer Brother         kidney    Cancer Brother         bladder     Review of System   Review of Systems   HENT: Positive for sinus pain and tinnitus. Cardiovascular: Positive for leg swelling. Musculoskeletal: Positive for arthralgias, back pain, joint swelling and neck stiffness. All other systems reviewed and are negative. A review of systems was done by the patient and reviewed by me and scanned into media today.     Objective:     Vitals  Vitals:    05/06/21 1455   BP: (!) 177/88   Pulse: 87   Resp: 16   Temp: 97.2 °F (36.2 °C)   SpO2: 98%     Physical Exam  Physical Exam  Vitals signs and nursing note reviewed. Constitutional:       Appearance: Normal appearance. HENT:      Head: Normocephalic. Eyes:      Conjunctiva/sclera: Conjunctivae normal.   Neck:      Musculoskeletal: Normal range of motion. Cardiovascular:      Rate and Rhythm: Normal rate. Pulmonary:      Effort: Pulmonary effort is normal.   Musculoskeletal: Normal range of motion. Skin:     General: Skin is warm and dry. Neurological:      General: No focal deficit present. Mental Status: She is alert and oriented to person, place, and time. Psychiatric:         Mood and Affect: Mood normal.         Behavior: Behavior normal.         Thought Content: Thought content normal.         Straight urinary catheterization performed by sterile procedure for urine specimen per Rosario Guajardo, NP.  275ml post void. Patient tolerated well. Rosario Scale, NP made aware. Results for POC orders placed in visit on 05/06/21   POCT Urinalysis no Micro   Result Value Ref Range    Color, UA yellow     Clarity, UA clear     Glucose, UA POC neg     Bilirubin, UA neg     Ketones, UA neg     Spec Grav, UA 1.015     Blood, UA POC trace     pH, UA 6.5     Protein, UA POC neg     Urobilinogen, UA neg     Leukocytes, UA neg     Nitrite, UA neg        Assessment/Plan:     Celeste Stallings is a 77 y.o. female with   1. Urinary frequency    2. Incomplete bladder emptying      -Records reviewed:  2 weeks s/p bladder botox with reduction in urgency  Urinary frequency:  Likely due to Incomplete bladder emptying. Discussed management options with patient and offered to teach [address] until her PVR declines. She desires to monitor for one week, return to office and see if this has improved. She is not uncomfortable or distended. RTO 1 week for measured void and PVR. HUBERT Mayorga CNP    Orders Placed This Encounter   Procedures    Culture, Urine     Order Specific Question:   Specify (ex-cath, midstream, cysto, etc)?      Answer:

## 2021-05-09 LAB — URINE CULTURE, ROUTINE: NORMAL

## 2021-05-10 ENCOUNTER — TELEPHONE (OUTPATIENT)
Dept: UROGYNECOLOGY | Age: 66
End: 2021-05-10

## 2021-05-10 NOTE — TELEPHONE ENCOUNTER
----- Message from HUBERT Weiss CNP sent at 5/10/2021  1:21 PM EDT -----  Please advise patient of normal urine culture. No infection.

## 2021-05-14 ENCOUNTER — OFFICE VISIT (OUTPATIENT)
Dept: UROGYNECOLOGY | Age: 66
End: 2021-05-14
Payer: MEDICARE

## 2021-05-14 VITALS
SYSTOLIC BLOOD PRESSURE: 151 MMHG | OXYGEN SATURATION: 98 % | TEMPERATURE: 97.4 F | HEART RATE: 98 BPM | DIASTOLIC BLOOD PRESSURE: 95 MMHG | RESPIRATION RATE: 18 BRPM

## 2021-05-14 DIAGNOSIS — R33.9 INCOMPLETE BLADDER EMPTYING: ICD-10-CM

## 2021-05-14 DIAGNOSIS — R33.9 RETENTION OF URINE: Primary | ICD-10-CM

## 2021-05-14 LAB
BILIRUBIN, POC: NORMAL
BLOOD URINE, POC: NORMAL
CLARITY, POC: NORMAL
COLOR, POC: YELLOW
GLUCOSE URINE, POC: NORMAL
KETONES, POC: NORMAL
LEUKOCYTE EST, POC: NORMAL
NITRITE, POC: NORMAL
PH, POC: 7.5
PROTEIN, POC: NORMAL
SPECIFIC GRAVITY, POC: 1.02
UROBILINOGEN, POC: 0.2

## 2021-05-14 PROCEDURE — 51701 INSERT BLADDER CATHETER: CPT | Performed by: NURSE PRACTITIONER

## 2021-05-14 PROCEDURE — 81002 URINALYSIS NONAUTO W/O SCOPE: CPT | Performed by: NURSE PRACTITIONER

## 2021-05-14 PROCEDURE — 99213 OFFICE O/P EST LOW 20 MIN: CPT | Performed by: NURSE PRACTITIONER

## 2021-05-14 ASSESSMENT — ENCOUNTER SYMPTOMS
SINUS PAIN: 1
SINUS PRESSURE: 1
BACK PAIN: 1

## 2021-05-14 NOTE — PROGRESS NOTES
5/14/2021       HPI:     Name: Nataly Valderrama  YOB: 1955    CC: Patient is a 77 y.o. presenting for evaluation of voiding dysfunction. HPI: How long have you had this problem? years  Please rate the severity of your problem: mild  Anything make it better? Botox helps    Elizabeth Trevino remains pleased with outcomes of Bladder botox stating Sherry Raines has no accidents. Is able to make it to restroom now\"  She reports frequency in the morning, but improves by afternoon. She feels she is emptying  Presents today for evaluation of PVR, which was elevated at her 2 week post procedure visit    Ob/Gyn History:    OB History   No obstetric history on file.      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    Arthritis     Calculus of kidney     Cancer (HCC)     skin    Cystocele, midline     Eustachian tube dysfunction, bilateral     Gastro-esophageal reflux disease without esophagitis     Hyperlipidemia     Hypertension     Liver cyst     Neuritis     Nocturia     Old myocardial infarction     Osteoarthritis     Otalgia     Perennial allergic rhinitis     Pharyngeal inflammation     Primary osteoarthritis of right knee     Tinnitus, bilateral     Tobacco use     Urinary frequency     Urinary incontinence     Vaginal enterocele     Vaginal vault 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    A review of systems was done by the patient and reviewed by me and scanned into media today. Objective:     Vitals  Vitals:    05/14/21 1130   BP: (!) 151/95   Pulse: 98   Resp: 18   Temp: 97.4 °F (36.3 °C)   SpO2: 98%     Physical Exam  Physical Exam  Vitals signs and nursing note reviewed. Constitutional:       Appearance: Normal appearance. HENT:      Head: Normocephalic. Eyes:      Conjunctiva/sclera: Conjunctivae normal.   Neck:      Musculoskeletal: Normal range of motion. Cardiovascular:      Rate and Rhythm: Normal rate. Pulmonary:      Effort: Pulmonary effort is normal.   Musculoskeletal: Normal range of motion. Skin:     General: Skin is warm and dry. Neurological:      General: No focal deficit present. Mental Status: She is alert and oriented to person, place, and time. Psychiatric:         Mood and Affect: Mood normal.         Behavior: Behavior normal.         Thought Content: Thought content normal.         Results for POC orders placed in visit on 05/14/21   POCT Urinalysis no Micro   Result Value Ref Range    Color, UA Yellow     Clarity, UA Cloudy     Glucose, UA POC Neg     Bilirubin, UA Neg     Ketones, UA Trace     Spec Grav, UA 1.025     Blood, UA POC Small     pH, UA 7.5     Protein, UA POC Neg     Urobilinogen, UA 0.2     Leukocytes, UA Small     Nitrite, UA Neg    PVR 295ML    Assessment/Plan:     Lina Lazo is a 77 y.o. female with   1. Retention of urine    2. Incomplete bladder emptying      Courtney Serve is 3 weeks s/p bladder botox. Continues to have incomplete emptying w/ PVR today of 395ML. She is asymptomatic of this and is pleased with her Botox. Urine sent for culture to assure no infection. She will return in 10 days to evaluate PVR and is aware of s/s of retention and to call if this becomes bothersome.   Patient verbalizes understanding  HUBERT Almanzar - CNP    Orders Placed This Encounter   Procedures    Culture, Urine     Order Specific Question:   Specify (ex-cath, midstream, cysto, etc)? Answer:   Straight Cath    POCT Urinalysis no Micro    GA INSERT,NON-INDWELLING BLADDER CATHETER     No orders of the defined types were placed in this encounter.       Dionna Waters

## 2021-05-16 LAB
ORGANISM: ABNORMAL
URINE CULTURE, ROUTINE: ABNORMAL

## 2021-05-17 ENCOUNTER — TELEPHONE (OUTPATIENT)
Dept: UROGYNECOLOGY | Age: 66
End: 2021-05-17

## 2021-05-17 RX ORDER — GRANULES FOR ORAL 3 G/1
3 POWDER ORAL ONCE
Qty: 1 EACH | Refills: 0 | Status: SHIPPED | OUTPATIENT
Start: 2021-05-17 | End: 2021-05-17

## 2021-05-17 NOTE — TELEPHONE ENCOUNTER
Called and let her know of positive culture, and need for treatment.  Sent script to [address] in [place] per her request

## 2021-05-24 ENCOUNTER — OFFICE VISIT (OUTPATIENT)
Dept: UROGYNECOLOGY | Age: 66
End: 2021-05-24
Payer: MEDICARE

## 2021-05-24 VITALS
SYSTOLIC BLOOD PRESSURE: 148 MMHG | OXYGEN SATURATION: 98 % | HEART RATE: 78 BPM | TEMPERATURE: 98.7 F | RESPIRATION RATE: 14 BRPM | DIASTOLIC BLOOD PRESSURE: 84 MMHG

## 2021-05-24 DIAGNOSIS — R35.0 URINARY FREQUENCY: ICD-10-CM

## 2021-05-24 DIAGNOSIS — R33.9 INCOMPLETE BLADDER EMPTYING: Primary | ICD-10-CM

## 2021-05-24 PROCEDURE — 51701 INSERT BLADDER CATHETER: CPT | Performed by: NURSE PRACTITIONER

## 2021-05-24 PROCEDURE — 99213 OFFICE O/P EST LOW 20 MIN: CPT | Performed by: NURSE PRACTITIONER

## 2021-05-24 ASSESSMENT — ENCOUNTER SYMPTOMS: BACK PAIN: 1

## 2021-05-24 NOTE — PROGRESS NOTES
5/24/2021       HPI:     Name: Randi Nicole  YOB: 1955    CC: Patient is a 77 y.o. presenting for evaluation of botox follow up. HPI: How long have you had this problem? Several years  Please rate the severity of your problem: mild  Anything make it better? She is here for a botox follow up. She was last seen 10 days ago and her pvr was 395ml and she had a UTI. She was treated with Fosfomycin and presents today for follow up PVR. She continues to be pleased with her outcomes and reports she \"now has time to get to restroom\". She is very pleased. Ob/Gyn History:    OB History   No obstetric history on file.      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    Arthritis     Calculus of kidney     Cancer (HCC)     skin    Cystocele, midline     Eustachian tube dysfunction, bilateral     Gastro-esophageal reflux disease without esophagitis     Hyperlipidemia     Hypertension     Liver cyst     Neuritis     Nocturia     Old myocardial infarction     Osteoarthritis     Otalgia     Perennial allergic rhinitis     Pharyngeal inflammation     Primary osteoarthritis of right knee     Tinnitus, bilateral     Tobacco use     Urinary frequency     Urinary incontinence     Vaginal enterocele     Vaginal vault 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      Past Surgical History:   Past Surgical History:   Procedure Laterality Date    APPENDECTOMY      CHOLECYSTECTOMY      ELBOW DEBRIDEMENT      FOOT SURGERY      bilateral    HYSTERECTOMY, VAGINAL      KNEE ARTHROSCOPY Right 4/24/2019    RIGHT KNEE ARTHROSCOPY,, SYNOVECTOMY CHONDROPLASTY,MEDIAL AND LATERAL MENESECTOMY, REMOVAL LOOSE BODIES performed by Eliseo Eden MD [facility]      bilateral    LIVER SURGERY      cyst removal    NECK SURGERY      ROTATOR CUFF SpO2: 98%     Physical Exam  Physical Exam  Vitals and nursing note reviewed. Constitutional:       Appearance: Normal appearance. HENT:      Head: Normocephalic. Eyes:      Conjunctiva/sclera: Conjunctivae normal.   Cardiovascular:      Rate and Rhythm: Normal rate. Pulmonary:      Effort: Pulmonary effort is normal.   Musculoskeletal:         General: Normal range of motion. Cervical back: Normal range of motion. Skin:     General: Skin is warm and dry. Neurological:      General: No focal deficit present. Mental Status: She is alert and oriented to person, place, and time. Psychiatric:         Mood and Affect: Mood normal.         Behavior: Behavior normal.         Thought Content: Thought content normal.          Using sterile technique a catheter was placed. A post void residual (PVR) was noted to be 300ml. Results for POC orders placed in visit on 05/24/21   POCT Urinalysis no Micro   Result Value Ref Range    Color, UA yellow     Clarity, UA clear     Glucose, UA POC neg     Bilirubin, UA neg     Ketones, UA neg     Spec Grav, UA 1.015     Blood, UA POC neg     pH, UA 6.5     Protein, UA POC neg     Urobilinogen, UA neg     Leukocytes, UA neg     Nitrite, UA neg        Assessment/Plan:     Celestino Betancourt is a 77 y.o. female with   1. Incomplete bladder emptying    2. Urinary frequency    -Records reviewed  Patient is pleased with Bladder Botox outcomes. She denies s/s of UTI  Continues to have elevated PVR but this has decreased from last visit and she is feeling well with no symptoms of retention. At this time she can follow up prn if any changes or follow up when due for next Botox to discuss course of treament with Dr. Tammy Dillon. [facility], APRN - CNP      Orders Placed This Encounter   Procedures    Insert crouch catheter    POCT Urinalysis no Micro     No orders of the defined types were placed in this encounter.       Amanda Perez RN

## 2021-06-10 DIAGNOSIS — S46.011D TRAUMATIC COMPLETE TEAR OF RIGHT ROTATOR CUFF, SUBSEQUENT ENCOUNTER: ICD-10-CM

## 2021-06-10 DIAGNOSIS — Z98.890 S/P ROTATOR CUFF REPAIR: ICD-10-CM

## 2021-06-10 DIAGNOSIS — M75.41 SHOULDER IMPINGEMENT SYNDROME, RIGHT: ICD-10-CM

## 2021-06-10 RX ORDER — DICLOFENAC SODIUM 75 MG/1
75 TABLET, DELAYED RELEASE ORAL 2 TIMES DAILY
Qty: 60 TABLET | Refills: 2 | Status: SHIPPED | OUTPATIENT
Start: 2021-06-10 | End: 2021-07-23 | Stop reason: SDUPTHER

## 2021-07-01 ENCOUNTER — TELEPHONE (OUTPATIENT)
Dept: UROGYNECOLOGY | Age: 66
End: 2021-07-01

## 2021-07-01 RX ORDER — GRANULES FOR ORAL 3 G/1
3 POWDER ORAL ONCE
Qty: 1 EACH | Refills: 0 | Status: SHIPPED | OUTPATIENT
Start: 2021-07-01 | End: 2021-07-01

## 2021-07-01 NOTE — TELEPHONE ENCOUNTER
Pt called and reported that she has a UTI. Per CD ok to send in Fosfamycin.  Nikki Pinzon to call back on Tuesday if she still wasn't feeling better

## 2021-07-20 ENCOUNTER — OFFICE VISIT (OUTPATIENT)
Dept: ORTHOPEDIC SURGERY | Age: 66
End: 2021-07-20
Payer: MEDICARE

## 2021-07-20 VITALS — HEIGHT: 65 IN | BODY MASS INDEX: 38.32 KG/M2 | WEIGHT: 230 LBS

## 2021-07-20 DIAGNOSIS — Z98.890 S/P ROTATOR CUFF REPAIR: Primary | ICD-10-CM

## 2021-07-20 DIAGNOSIS — M75.41 SHOULDER IMPINGEMENT SYNDROME, RIGHT: ICD-10-CM

## 2021-07-20 DIAGNOSIS — S46.011A STRAIN OF RIGHT ROTATOR CUFF CAPSULE, INITIAL ENCOUNTER: ICD-10-CM

## 2021-07-20 PROCEDURE — 3017F COLORECTAL CA SCREEN DOC REV: CPT | Performed by: PHYSICIAN ASSISTANT

## 2021-07-20 PROCEDURE — 1123F ACP DISCUSS/DSCN MKR DOCD: CPT | Performed by: PHYSICIAN ASSISTANT

## 2021-07-20 PROCEDURE — 4040F PNEUMOC VAC/ADMIN/RCVD: CPT | Performed by: PHYSICIAN ASSISTANT

## 2021-07-20 PROCEDURE — G8427 DOCREV CUR MEDS BY ELIG CLIN: HCPCS | Performed by: PHYSICIAN ASSISTANT

## 2021-07-20 PROCEDURE — 1090F PRES/ABSN URINE INCON ASSESS: CPT | Performed by: PHYSICIAN ASSISTANT

## 2021-07-20 PROCEDURE — 99213 OFFICE O/P EST LOW 20 MIN: CPT | Performed by: PHYSICIAN ASSISTANT

## 2021-07-20 PROCEDURE — G8417 CALC BMI ABV UP PARAM F/U: HCPCS | Performed by: PHYSICIAN ASSISTANT

## 2021-07-20 PROCEDURE — G8400 PT W/DXA NO RESULTS DOC: HCPCS | Performed by: PHYSICIAN ASSISTANT

## 2021-07-20 PROCEDURE — 4004F PT TOBACCO SCREEN RCVD TLK: CPT | Performed by: PHYSICIAN ASSISTANT

## 2021-07-20 NOTE — PROGRESS NOTES
Chief Complaint    Shoulder Pain (Right - doing ok / but smedays/ NOT today arm hurts - reaching behleatha (has been doing A LOT of cleaning out the barn) )      History of Present Illness:  Loli Munoz is a 77 y.o. female patient did have a rotator cuff repair done by Dr. Nakia Ferris October 2020. Afterward she did well. She did have a fall and symptoms got worse for short period of time but she eventually got back to where she was before. She is here with minimal symptoms over the lateral shoulder. She states she is able to do all activities without any significant difficulties. She overall is very happy with her shoulder. Previous history: Is here today for follow-up visit. Her right shoulder is doing better. She does have a minimal amount of pain concentrated over the lateral shoulder with a clicking sensation. She reports no difficulties with activities. She denies cervical pain or upper extremity radicular symptoms. Pevious history: Presents to the office today for follow-up visit. Patient is over 3 months status post right shoulder rotator cuff repair. She was doing extremely well. Then approximately 3 weeks ago she did fall. She states that she is still doing well but has a little bit more pain in her shoulder. She does not feel she has any weakness. Pain is concentrated lateral shoulder and increased with overhead activities.     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    Arthritis     Calculus of kidney     Cancer (HCC)     skin    Cystocele, midline     Eustachian tube dysfunction, bilateral     Gastro-esophageal reflux disease without esophagitis     Hyperlipidemia     Hypertension     Liver cyst     Neuritis     Nocturia     Old myocardial infarction     Osteoarthritis     Otalgia     Perennial allergic rhinitis     Pharyngeal inflammation     Primary osteoarthritis of right knee     Tinnitus, bilateral     Tobacco use     Urinary frequency     Urinary incontinence     Vaginal enterocele     Vaginal vault 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      Patient Active Problem List    Diagnosis Date Noted    RUQ pain 08/31/2020    Primary osteoarthritis of right knee 09/16/2019    Neuritis 06/05/2018    Cystocele, midline 11/02/2017    Vaginal enterocele 11/02/2017    Vaginal vault 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 11/02/2017    Liver cyst 04/21/2017    Calculus of kidney 08/25/2016    Essential (primary) hypertension 08/25/2016    Gastro-esophageal reflux disease without esophagitis 08/25/2016    Generalized anxiety disorder 08/25/2016    Hyperlipidemia 08/25/2016    Old myocardial infarction 08/25/2016    Tobacco use 08/25/2016    Eustachian tube dysfunction, bilateral 06/15/2016    Otalgia, right 06/15/2016    Tinnitus of vascular origin 06/15/2016    Tinnitus, bilateral 06/15/2016    Acute laryngitis 01/29/2015    Pharyngeal inflammation 12/04/2014    Acute lymphadenitis of face, head and neck 12/04/2014    Perennial allergic rhinitis 12/04/2014    Urinary incontinence 12/27/2012     Past Surgical History:   Procedure Laterality Date    APPENDECTOMY      CHOLECYSTECTOMY      ELBOW DEBRIDEMENT      FOOT SURGERY      bilateral    HYSTERECTOMY, VAGINAL      KNEE ARTHROSCOPY Right 4/24/2019    RIGHT KNEE ARTHROSCOPY,, SYNOVECTOMY CHONDROPLASTY,MEDIAL AND LATERAL MENESECTOMY, REMOVAL LOOSE BODIES performed by Zahida Nevarez MD at [facility]      bilateral    LIVER SURGERY      cyst removal    NECK SURGERY      ROTATOR CUFF REPAIR Right 10/30/2020    VIDEO ARTHROSCOPY RIGHT SHOULDER SUBACROMIAL DECOMPRESSION LABRAL DEBRIDEMENT ROTATOR CUFF TEAR performed by Alma Delia Hernandez MD at [place] [id number] ARTHROSCOPY      TONSILLECTOMY      TUBAL LIGATION      URETHRA SURGERY      US BREAST LESION REMOVAL LEFT       Family History   Problem Relation Age of Onset    Breast Cancer Mother     Cancer Mother         Bone    Diabetes Mother     Heart Disease Mother         CHF   Everlene Cleo Arthritis Mother     Heart Attack Father     Prostate Cancer Brother     Cancer Maternal Grandmother     Cancer Brother         kidney    Cancer Brother         bladder     Social History     Socioeconomic History    Marital status:      Spouse name: None    Number of children: None    Years of education: None    Highest education level: None   Occupational History    None   Tobacco Use    Smoking status: Current Every Day Smoker     Packs/day: 0.25     Types: Cigarettes    Smokeless tobacco: Never Used    Tobacco comment: quit March 4, 2019   Vaping Use    Vaping Use: Never used   Substance and Sexual Activity    Alcohol use: No    Drug use: No    Sexual activity: Not Currently   Other Topics Concern    None   Social History Narrative    None     Social Determinants of Health     Financial Resource Strain:     Difficulty of Paying Living Expenses:    Food Insecurity:     Worried About Running Out of Food in the Last Year:     [address] in the Last Year:    Transportation Needs:     Lack of Transportation (Medical):      Lack of Transportation (Non-Medical):    Physical Activity:     Days of Exercise per Week:     Minutes of Exercise per Session:    Stress:     Feeling of Stress :    Social Connections:     Frequency of Communication with Friends and Family:     Frequency of Social Gatherings with Friends and Family:     Attends Yarsanism Services:     Active Member of Clubs or Organizations:     Attends Club or Organization Meetings:     Marital Status:    Intimate Partner Violence:     Fear of Current or Ex-Partner:     Emotionally Abused:     Physically Abused:     Sexually Abused:      Current Outpatient Medications   Medication Sig Dispense Refill    diclofenac (VOLTAREN) 75 MG EC tablet Take 1 tablet by mouth 2 times daily 60 tablet 2    gabapentin (NEURONTIN) 300 MG capsule TAKE 1 CAPSULE BY MOUTH THREE TIMES DAILY FOR 30 DAYS      ciclopirox (PENLAC) 8 % solution APPLY TOPICALLY ONE APPLICATION TO THE AFFECTED AREA DAILY      Multiple Vitamins-Minerals (MULTIVITAMIN ADULT PO) Take 1 tablet by mouth daily      carvedilol (COREG) 12.5 MG tablet Take 12.5 mg by mouth 2 times daily (with meals).  amLODIPine (NORVASC) 5 MG tablet Take 5 mg by mouth daily.  omeprazole (PRILOSEC) 40 MG capsule Take 40 mg by mouth daily.  aspirin 81 MG tablet Take 81 mg by mouth daily.  S-Adenosylmethionine (EFREN-E) 400 MG TABS Take  by mouth. No current facility-administered medications for this visit. Vital Signs:  Ht 5' 5\" (1.651 m)   Wt 230 lb (104.3 kg)   BMI 38.27 kg/m²     General Exam:   Constitutional: Patient is adequately groomed with no evidence of malnutrition  DTRs: Deep tendon reflexes are intact  Mental Status: The patient is oriented to time, place and person. The patient's mood and affect are appropriate. Lymphatic: The lymphatic examination bilaterally reveals all areas to be without enlargement or induration. Vascular: Examination reveals no swelling or calf tenderness. Peripheral pulses are palpable and 2+. Neurological: The patient has good coordination. There is no weakness or sensory deficit. Right shoulder Examination:    Inspection:  No rashes, scars, or lesions. No deformity or atrophy. Palpation: No tenderness over AC joint or bicipital groove    Range of Motion: 175 degrees of forward flexion, 80 degrees of external rotation, internal rotation T12    Strength: 5/5 strength with internal rotation, external rotation, elevation, and abduction.   Biceps and triceps strength is 5/5. Special Tests:  Positive Iglesias and Neer impingement exam.  Negative speed sign. Negative crossover examination. Skin: There are no rashes, ulcerations or lesions. Gait: Normal gait pattern    Reflex normal deep tendon reflexes    Additional Comments:       Additional Examinations:         Contralateral Exam: Examination of the left shoulder reveals no atrophy or deformity. Skin is warm and dry. Range of motion is within normal limits. There is no focal tenderness with palpation. No AC joint tenderness. Negative Neer and Iglesias-Jorge exams. Strength is graded 5/5 throughout. Neck: Examination of the neck does not show any tenderness, deformity or injury. Range of motion is unremarkable. There is no gross instability. There are no rashes, ulcerations or lesions. Strength and tone are normal.            Impression:  Encounter Diagnoses   Name Primary?  S/P rotator cuff repair Yes    Shoulder impingement syndrome, right     Strain of right rotator cuff capsule, initial encounter        Office Procedures:  No orders of the defined types were placed in this encounter. Treatment Plan:     Patient overall is doing very well. We have had a long conversation about conservative versus surgical intervention. It is likely with her recurrent fall that she may have a at least partial thickness possible recurrent full-thickness tear. Each time she has a rotator cuff repair the tissue becomes more friable. She simply states that she does not want to even consider any surgery at this time. I also would not recommend any surgery at this time she is functioning at a very high level with little to no symptoms. We have discussed at some point in the future she may need a reverse total shoulder arthroplasty but this would be remotely. She will see us back on a as needed basis.     I discussed with Rush Skaggs that her history, symptoms, signs and imaging are most consistent with rotator cuff partial tears. We reviewed the natural history of these conditions and treatment options ranging from conservative measures (rest, icing, activity modification, physical therapy, pain meds, cortisone injection) to surgical options. In terms of treatment, I recommended continuing with rest, icing, avoidance of painful activities, NSAIDs or pain meds as tolerated, and physical therapy. If these are not effective, cortisone injection can be considered. We discussed surgical options as well, should conservative measures fail.

## 2021-07-23 ENCOUNTER — TELEPHONE (OUTPATIENT)
Dept: ORTHOPEDIC SURGERY | Age: 66
End: 2021-07-23

## 2021-07-23 DIAGNOSIS — M75.41 SHOULDER IMPINGEMENT SYNDROME, RIGHT: ICD-10-CM

## 2021-07-23 DIAGNOSIS — Z98.890 S/P ROTATOR CUFF REPAIR: ICD-10-CM

## 2021-07-23 DIAGNOSIS — S46.011D TRAUMATIC COMPLETE TEAR OF RIGHT ROTATOR CUFF, SUBSEQUENT ENCOUNTER: ICD-10-CM

## 2021-07-23 RX ORDER — DICLOFENAC SODIUM 75 MG/1
75 TABLET, DELAYED RELEASE ORAL 2 TIMES DAILY
Qty: 60 TABLET | Refills: 2 | Status: SHIPPED | OUTPATIENT
Start: 2021-07-23 | End: 2021-11-08

## 2021-07-23 NOTE — TELEPHONE ENCOUNTER
Arnoldo Tran requested a new prescription for the diclofenac sodium 75 mg tab, delayed release, 90-day supply for this patient.     Cal Shelton    PHONE:  [phone]    FAX:  [address] [place] [address]

## 2021-09-03 LAB — MAMMOGRAPHY, EXTERNAL: NORMAL

## 2021-09-30 ENCOUNTER — TELEPHONE (OUTPATIENT)
Dept: ORTHOPEDIC SURGERY | Age: 66
End: 2021-09-30

## 2021-09-30 NOTE — TELEPHONE ENCOUNTER
General Question     Subject: PATIENT HAD SEEN TANVI BLU FOR HER SHOULDER. SHE IS CALLING NOW BECAUSE SHE HAS SEVERE SCIATIC PAIN. SHE CANNOT WALK, CANNOT SLEEP AND CAN ONLY SIT FOR A FEW MINUTES. SHE CALLED HER PRIMARY CARE FOR SOME MEDICATION BUT WAS INFORMED SHE NEEDED TO COME INTO THE OFFICE FIRST. PATIENT CANNOT EVEN WALK TO GET TO THE CAR DUE TO THE PAIN BEING SO SEVERE. SHE IS CALLING TO SEE IF TANVI HURT WILL BE ABLE TO GIVE HER SOME MEDICATION TO LESSEN THE PAIN.   Patient:  Bernadette Jones  Contact Number: [phone]    THIS MESSAGE IS BEING DIRECTED TO Jose Vega

## 2021-10-01 ENCOUNTER — TELEMEDICINE (OUTPATIENT)
Dept: PRIMARY CARE CLINIC | Age: 66
End: 2021-10-01
Payer: MEDICARE

## 2021-10-01 DIAGNOSIS — M54.41 ACUTE RIGHT-SIDED LOW BACK PAIN WITH RIGHT-SIDED SCIATICA: Primary | ICD-10-CM

## 2021-10-01 PROCEDURE — 1123F ACP DISCUSS/DSCN MKR DOCD: CPT | Performed by: FAMILY MEDICINE

## 2021-10-01 PROCEDURE — 4004F PT TOBACCO SCREEN RCVD TLK: CPT | Performed by: FAMILY MEDICINE

## 2021-10-01 PROCEDURE — 3017F COLORECTAL CA SCREEN DOC REV: CPT | Performed by: FAMILY MEDICINE

## 2021-10-01 PROCEDURE — G8484 FLU IMMUNIZE NO ADMIN: HCPCS | Performed by: FAMILY MEDICINE

## 2021-10-01 PROCEDURE — G8427 DOCREV CUR MEDS BY ELIG CLIN: HCPCS | Performed by: FAMILY MEDICINE

## 2021-10-01 PROCEDURE — 99203 OFFICE O/P NEW LOW 30 MIN: CPT | Performed by: FAMILY MEDICINE

## 2021-10-01 PROCEDURE — 1090F PRES/ABSN URINE INCON ASSESS: CPT | Performed by: FAMILY MEDICINE

## 2021-10-01 PROCEDURE — G8417 CALC BMI ABV UP PARAM F/U: HCPCS | Performed by: FAMILY MEDICINE

## 2021-10-01 PROCEDURE — 4040F PNEUMOC VAC/ADMIN/RCVD: CPT | Performed by: FAMILY MEDICINE

## 2021-10-01 PROCEDURE — G8400 PT W/DXA NO RESULTS DOC: HCPCS | Performed by: FAMILY MEDICINE

## 2021-10-01 RX ORDER — PREDNISONE 20 MG/1
60 TABLET ORAL DAILY
Qty: 15 TABLET | Refills: 0 | Status: SHIPPED | OUTPATIENT
Start: 2021-10-01 | End: 2021-10-06

## 2021-10-01 ASSESSMENT — ENCOUNTER SYMPTOMS
DIARRHEA: 0
ABDOMINAL PAIN: 0
CONSTIPATION: 0
SHORTNESS OF BREATH: 0

## 2021-10-01 NOTE — PROGRESS NOTES
her right leg into her calf and foot. Patient discussed this with her chiropractor who recommended that she call her primary care provider to be started on a steroid pack and for consideration of MRI. Patient did call her primary care provider who was unable to see her and she contacted our office that she wanted to change her primary care provider to our practice. Patient states that she has seen orthopedics in the past for other issues but has never seen a provider outside of her chiropractor for management of her low back pain. Patient is also not performed physical therapy for her back and has not had imaging either x-rays or MRIs of her back in the past.  Patient also saw the masseuse within the chiropractor office who stated that her discomfort was originating from the S1 dermatome. Patient has been on gabapentin in the past for neuralgia related to a drain after a liver cyst.  Patient states that she has called for refills of the medication but has unable to get those from her prior primary care provider's office.       Patient Active Problem List   Diagnosis    Pharyngeal inflammation    Acute lymphadenitis of face, head and neck    Perennial allergic rhinitis    Acute laryngitis    Primary osteoarthritis of right knee    Calculus of kidney    Cystocele, midline    Essential (primary) hypertension    Eustachian tube dysfunction, bilateral    Gastro-esophageal reflux disease without esophagitis    Generalized anxiety disorder    Hyperlipidemia    Liver cyst    Neuritis    Old myocardial infarction    Otalgia, right    Tinnitus of vascular origin    Vaginal enterocele    RUQ pain    Tinnitus, bilateral    Tobacco use    Vaginal vault 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    Urinary incontinence         Past Medical History:    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    Arthritis     Calculus of kidney     Cancer (HCC)     skin    Cystocele, midline     Eustachian tube dysfunction, bilateral     Gastro-esophageal reflux disease without esophagitis     Hyperlipidemia     Hypertension     Liver cyst     Neuritis     Nocturia     Old myocardial infarction     Osteoarthritis     Otalgia     Perennial allergic rhinitis     Pharyngeal inflammation     Primary osteoarthritis of right knee     Tinnitus, bilateral     Tobacco use     Urinary frequency     Urinary incontinence     Vaginal enterocele     Vaginal vault 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        Past Surgical History:  Past Surgical History:   Procedure Laterality Date    APPENDECTOMY      CHOLECYSTECTOMY      ELBOW DEBRIDEMENT      FOOT SURGERY      bilateral    HYSTERECTOMY, VAGINAL      KNEE ARTHROSCOPY Right 4/24/2019    RIGHT KNEE ARTHROSCOPY,, SYNOVECTOMY CHONDROPLASTY,MEDIAL AND LATERAL MENESECTOMY, REMOVAL LOOSE BODIES performed by Margarita Toney MD at [facility] 1      bilateral    LIVER SURGERY      cyst removal    NECK SURGERY      ROTATOR CUFF REPAIR Right 10/30/2020    VIDEO ARTHROSCOPY RIGHT SHOULDER SUBACROMIAL DECOMPRESSION LABRAL DEBRIDEMENT ROTATOR CUFF TEAR performed by Valentine Hensley MD at [place] [address] ARTHROSCOPY      TONSILLECTOMY      TUBAL LIGATION      URETHRA SURGERY      US BREAST LESION REMOVAL LEFT         Home Meds:  Prior to Visit Medications    Medication Sig Taking?  Authorizing Provider   predniSONE (DELTASONE) 20 MG tablet Take 3 tablets by mouth daily for 5 days Yes Darrall Goltz Gardiner, DO   diclofenac (VOLTAREN) 75 MG EC tablet Take 1 tablet by mouth 2 times daily  Mica Garcia MD   gabapentin (NEURONTIN) 300 MG capsule TAKE 1 CAPSULE BY MOUTH THREE TIMES DAILY FOR 30 DAYS  Historical Provider, MD   ciclopirox (PENLAC) 8 % solution APPLY TOPICALLY ONE APPLICATION TO THE AFFECTED AREA DAILY  Historical Provider, MD   Multiple Vitamins-Minerals (MULTIVITAMIN ADULT PO) Take 1 tablet by mouth daily  Historical Provider, MD   carvedilol (COREG) 12.5 MG tablet Take 12.5 mg by mouth 2 times daily (with meals). Historical Provider, MD   amLODIPine (NORVASC) 5 MG tablet Take 5 mg by mouth daily. Historical Provider, MD   omeprazole (PRILOSEC) 40 MG capsule Take 40 mg by mouth daily. Historical Provider, MD   aspirin 81 MG tablet Take 81 mg by mouth daily. Historical Provider, MD   S-Adenosylmethionine (EFREN-E) 400 MG TABS Take  by mouth.   Historical Provider, MD       Allergies:    Adhesive tape, Codeine, Crestor [rosuvastatin calcium], Food, Lipitor [atorvastatin calcium], Nitrofurantoin, Penicillins, Sulfa antibiotics, and Tramadol    Family History:       Problem Relation Age of Onset    Breast Cancer Mother     Cancer Mother         Bone    Diabetes Mother     Heart Disease Mother         CHF   [employer] Arthritis Mother     Heart Attack Father     Prostate Cancer Brother     Cancer Maternal Grandmother     Cancer Brother         kidney    Cancer Brother         bladder       Social History:  Social History     Tobacco History     Smoking Status  Current Every Day Smoker Smoking Frequency  0.25 packs/day Smoking Tobacco Type  Cigarettes    Smokeless Tobacco Use  Never Used    Tobacco Comment  quit March 4, 2019          Alcohol History     Alcohol Use Status  No          Drug Use     Drug Use Status  No          Sexual Activity     Sexually Active  Not Currently                   Health Maintenance Completed:  Health Maintenance   Topic Date Due    Hepatitis C screen  Never done    COVID-19 Vaccine (1) Never done    DTaP/Tdap/Td vaccine (1 - Tdap) Never done    Lipid screen  Never done    Diabetes screen  Never done    Colon cancer screen colonoscopy  Never done    DEXA (modify frequency per FRAX score)  Never done    Potassium monitoring  01/11/2014    Creatinine monitoring  01/11/2014    Shingles Vaccine (2 of 3) 12/29/2015  Annual Wellness Visit (AWV)  Never done    Flu vaccine (1) 09/01/2021    Breast cancer screen  09/20/2022    Pneumococcal 65+ years Vaccine  Completed    Hepatitis A vaccine  Aged Out    Hepatitis B vaccine  Aged Out    Hib vaccine  Aged Out    Meningococcal (ACWY) vaccine  Aged Out            There is no immunization history on file for this patient. Review of Systems:  Review of Systems   Constitutional: Negative for fatigue and fever. Respiratory: Negative for shortness of breath. Cardiovascular: Negative for chest pain and leg swelling. Gastrointestinal: Negative for abdominal pain, constipation and diarrhea. Musculoskeletal:        See hpi   Skin: Negative for rash. Neurological: Negative for headaches. Physical Exam:   There were no vitals filed for this visit. There is no height or weight on file to calculate BMI. Wt Readings from Last 3 Encounters:   07/20/21 230 lb (104.3 kg)   03/22/21 230 lb (104.3 kg)   02/22/21 230 lb (104.3 kg)       BP Readings from Last 3 Encounters:   05/24/21 (!) 148/84   05/14/21 (!) 151/95   05/06/21 (!) 177/88       Physical Exam  Constitutional:       Appearance: Normal appearance. HENT:      Head: Normocephalic. Pulmonary:      Effort: Pulmonary effort is normal.   Neurological:      General: No focal deficit present. Mental Status: She is alert and oriented to person, place, and time. Psychiatric:         Mood and Affect: Mood normal.         Behavior: Behavior normal.         Thought Content: Thought content normal.         Judgment: Judgment normal.              Lab Review:   No visits with results within 2 Month(s) from this visit.    Latest known visit with results is:   Office Visit on 05/24/2021   Component Date Value    Color, UA 05/24/2021 yellow     Clarity, UA 05/24/2021 clear     Glucose, UA POC 05/24/2021 neg     Bilirubin, UA 05/24/2021 neg     Ketones, UA 05/24/2021 neg     Spec Grav, UA 05/24/2021 1.015     Blood, UA POC 05/24/2021 neg     pH, UA 05/24/2021 6.5     Protein, UA POC 05/24/2021 neg     Urobilinogen, UA 05/24/2021 neg     Leukocytes, UA 05/24/2021 neg     Nitrite, UA 05/24/2021 neg           Assessment/Plan:  Diagnoses and all orders for this visit:    Acute right-sided low back pain with right-sided sciatica  -     [facility]    Other orders  -     predniSONE (DELTASONE) 20 MG tablet; Take 3 tablets by mouth daily for 11days    68-year-old female with    1. Acute low back pain with radiculopathy. Patient will be given prednisone 60 mg daily x5 days. Usual side effects discussed with patient in detail. Patient will also be referred to physical therapy for range of motion and strengthening exercises as well as core stabilization exercises. Patient will continue to follow with chiropractic care as ordered. 2.  Chronic disease management. Patient to to our practice and this is a virtual visit, so chronic conditions were not discussed during today's visit. Patient was agreeable to scheduling a follow-up in approximately 3 to 4 weeks to review chronic conditions and to discuss health maintenance and care gaps that may be due.     Health Maintenance Due:  Health Maintenance Due   Topic Date Due    Hepatitis C screen  Never done    COVID-19 Vaccine (1) Never done    DTaP/Tdap/Td vaccine (1 - Tdap) Never done    Lipid screen  Never done    Diabetes screen  Never done    Colon cancer screen colonoscopy  Never done    DEXA (modify frequency per FRAX score)  Never done    Potassium monitoring  01/11/2014    Creatinine monitoring  01/11/2014    Shingles Vaccine (2 of 3) 12/29/2015    Annual Wellness Visit (AWV)  Never done    Flu vaccine (1) 09/01/2021        Health care decision maker:  completed today by physician       Health Maintenance: (USPSTF Recommendations)  (F) Breast Cancer Screen: (40-49 (C), 50-74 biennial screening mammogram (B))  (F) Cervical Cancer Screen: (21-29 q3yr cytology alone; 30-65 q3yr cytology alone, q5yr with hrHPV alone, or q5yr cytology+hrHPV (A))  (M) Prostate Cancer Screen: (54-77 yo discuss benefits/harm, does not recommend testing PSA in men >75 yo (D):   (M) AAA Screen: (men 73-67 yo who has ever smoked (B), consider in nonsmokers if high risk):  CRC/Colonoscopy Screening: (adults 39-53 (B), 50-75 (A))  Lung Ca Screening: Annual LDCT (+smoker age 49-80, smoked within 15 years, total of 20 pack yr history (B)):  DEXA Screen: (women >65 and older, <65 if at risk/postmenopausal (B))  HIV Screen: (16-65 yr old, and all pregnant patients (A)): Hep C Screen: (18-79 yr old (B)):  [facility] [address] Screen: (all pts with cirrhosis and high risk Hep B (US q6 mo)):  Immunizations:    RTC:  Return in about 3 weeks (around 10/22/2021) for establish care, follow up low back pain. EMR Dragon/transcription disclaimer:  Much of this encounter note is electronic transcription/translation of spoken language to printed texts. The electronic translation of spoken language may be erroneous, or at times, nonsensical words or phrases may be inadvertently transcribed.   Although I have reviewed the note for such errors, some may still exist.

## 2021-10-06 ENCOUNTER — HOSPITAL ENCOUNTER (OUTPATIENT)
Dept: PHYSICAL THERAPY | Age: 66
Setting detail: THERAPIES SERIES
Discharge: HOME | End: 2021-10-06
Payer: MEDICARE

## 2021-10-06 PROCEDURE — 97110 THERAPEUTIC EXERCISES: CPT | Performed by: PHYSICAL THERAPIST

## 2021-10-06 PROCEDURE — 97161 PT EVAL LOW COMPLEX 20 MIN: CPT | Performed by: PHYSICAL THERAPIST

## 2021-10-06 PROCEDURE — 97140 MANUAL THERAPY 1/> REGIONS: CPT | Performed by: PHYSICAL THERAPIST

## 2021-10-06 NOTE — FLOWSHEET NOTE
[facility],  [address] [place]  Phone: [phone]  Fax [phone]    Physical Therapy Treatment Note/ Progress Report:           Date:  10/6/2021    Patient Name:  Janelle Morillo    :  1955  MRN: [id number]  Restrictions/Precautions:    Medical/Treatment Diagnosis Information:  Diagnosis: M54.41 (ICD-10-CM) - Acute right-sided low back pain with right-sided sciatica  Treatment Diagnosis: Low back pain F73.6  Insurance/Certification information:  PT Insurance Information: [facility] [place] [facility]  Physician Information:  Referring Practitioner: Dr. Tucker Wagner  Has the plan of care been signed (Y/N):        []  Yes  [x]  No     Date of Patient follow up with Physician: ?      Is this a Progress Report:     []  Yes  [x]  No        If Yes:  Date Range for reporting period:  Beginning: 10/6/21  Ending:     Progress report will be due (10 Rx or 30 days whichever is less):        Recertification will be due (POC Duration  / 90 days whichever is less):         Visit # Insurance Allowable Auth Required   In-person 1  []  Yes []  No    Telehealth   []  Yes []  No    Total          Therapy Diagnosis/Practice Pattern:F      Number of Comorbidities:  []0     [x]1-2    []3+    Latex Allergy:  []NO      [x]YES  Preferred Language for Healthcare:   [x]English       []other:    SUBJECTIVE:  See eval    OBJECTIVE: See eval   Observation:    Test measurements:    ROM       LUMBAR FLEX WNL     LUMBAR EXT Limited 50%     Sidebend WNL Limited 50%     LEFT RIGHT   HIP Flex WNL WNL   HIP Abd   Pain with full passive abd   Knee Flex WNL WNL   Knee ext WNL WNL   Hamstring FLEX  90/90 0 0           Strength  LEFT RIGHT   HIP Flexors 4 4 pain   HIP Abductors       HIP ER 4 4 pain   Hip IR 4 4 pain   Knee EXT (quad) 5 5   Knee Flex (HS) 5 5   Ankle DF 5 5           Pain Scale 8/10     Mod oswestry 66%      RESTRICTIONS/PRECAUTIONS: HBP, OA    Exercises/Interventions:     Therapeutic Ex (91283) Sets/sec Reps Notes/CUES         TA :05 X 10 hep   Supine Hip Iso  Abd/ add  :05 X 10 hep   BKFO  X 10 hep   LTR s  X 10          Seated TA :05 X 10 hep   Seated hip abd/ add iso :05 X 10 hep   Seated glut sets ;05 X 10 hep   Seated Hs s ;20 3x hep                     Manual Intervention (59931)      Piriformis massage  4 min    Man HS/  Man ITB  4 min                            NMR re-education (82384)   CUES NEEDED                                                         Therapeutic Activity (23816)            Sleep position/ log roll/  Avoid twisting  x Handout provided. Access Code: [email or web address]  URL: [employer]/  Date: 91/23/4724  Prepared by: Ofelia Less    Exercises  Supine Posterior Pelvic Tilt - 2 x daily - 7 x weekly - 1 sets - 10 reps - 5 hold  Supine Hip Adduction Isometric with Ball - 2 x daily - 7 x weekly - 1 sets - 10 reps - 5 hold  Hooklying Isometric Clamshell - 2 x daily - 7 x weekly - 1 sets - 10 reps - 5 hold  Supine Single Bent Knee Fallout - 2 x daily - 7 x weekly - 1 sets - 10 reps - 2 hold  Seated Transversus Abdominis Bracing - 2 x daily - 7 x weekly - 1 sets - 10 reps - 5 hold  Seated Hip Adduction Isometrics with Ball - 2 x daily - 7 x weekly - 1 sets - 10 reps - 5 hold  Seated Gluteal Sets - 2 x daily - 7 x weekly - 1 sets - 10 reps - 5 hold  Seated Hamstring Stretch - 2 x daily - 7 x weekly - 1 sets - 3 reps - 20 hold    Therapeutic Exercise and NMR EXR  [x] (76816) Provided verbal/tactile cueing for activities related to strengthening, flexibility, endurance, ROM  for improvements in proximal hip and core control with self care, mobility, lifting and ambulation.  [] (93661) Provided verbal/tactile cueing for activities related to improving balance, coordination, kinesthetic sense, posture, motor skill, proprioception  to assist with core control in self care, mobility, lifting, and ambulation. Therapeutic Activities:    [] (02941 or 66386) Provided verbal/tactile cueing for activities related to improving balance, coordination, kinesthetic sense, posture, motor skill, proprioception and motor activation to allow for proper function  with self care and ADLs  [] (36585) Provided training and instruction to the patient for proper core and proximal hip recruitment and positioning with ambulation re-education     Home Exercise Program:    [x] (90526) Reviewed/Progressed HEP activities related to strengthening, flexibility, endurance, ROM of core, proximal hip and LE for functional self-care, mobility, lifting and ambulation   [] (13440) Reviewed/Progressed HEP activities related to improving balance, coordination, kinesthetic sense, posture, motor skill, proprioception of core, proximal hip and LE for self care, mobility, lifting, and ambulation      Manual Treatments:  PROM / STM / Oscillations-Mobs:  G-I, II, III, IV (PA's, Inf., Post.)  [x] (72536) Provided manual therapy to mobilize proximal hip and LS spine soft tissue/joints for the purpose of modulating pain, promoting relaxation,  increasing ROM, reducing/eliminating soft tissue swelling/inflammation/restriction, improving soft tissue extensibility and allowing for proper ROM for normal function with self care, mobility, lifting and ambulation. Modalities:       Charges  Timed Code Treatment Minutes: 30   Total Treatment Minutes: 50     Medicare total (add KX at $2000)  154.12     [x] EVAL (LOW) 12597   [] EVAL (MOD) 20775   [] EVAL (HIGH) 81233   [] RE-EVAL     [x] VL(64634) x   1  [] IONTO  [] NMR (02976) x     [] VASO  [x] Manual (61592) x 1     [] Other:  [] TA x      [] Mech Traction (30556)  [] ES(attended) (40630)      [] ES (un) (39664):     Goals:   Patient stated goal: My life  [] Progressing: [] Met: [] Not Met: [] Adjusted    Therapist goals for Patient:   Short Term Goals:  To be achieved in: 2 weeks  1. Independent in HEP and progression per patient tolerance, in order to prevent re-injury. [] Progressing: [] Met: [] Not Met: [] Adjusted  2. Patient will have a decrease in pain to facilitate improvement in movement, function, and ADLs as indicated by Functional Deficits. [] Progressing: [] Met: [] Not Met: [] Adjusted      Long Term Goals: To be achieved in: 8 weeks  1. Disability index score of 30% or less for the mod oswestry  to assist with reaching prior level of function. [] Progressing: [] Met: [] Not Met: [] Adjusted  2. Patient will demonstrate increased AROM to WNL, good LS mobility, good hip ROM to allow for proper joint functioning as indicated by patients Functional Deficits. [] Progressing: [] Met: [] Not Met: [] Adjusted  3. Patient will demonstrate an increase in Strength to good proximal hip and core activation to allow for proper functional mobility as indicated by patients Functional Deficits. [] Progressing: [] Met: [] Not Met: [] Adjusted  4. Patient will return to standing up to 30 min to prepare a meal or grocery shop without increased symptoms or restriction. [] Progressing: [] Met: [] Not Met: [] Adjusted  5. Able to sleep 7-8 hours at night. [] Progressing: [] Met: [] Not Met: [] Adjusted       Progression Towards Functional goals:  [] Patient is progressing as expected towards functional goals listed. [] Progression is slowed due to complexities listed. [] Progression has been slowed due to co-morbidities. [x] Plan just implemented, too soon to assess goals progression  [] Other:     Overall Progression Towards Functional goals/ Treatment Progress Update:  [] Patient is progressing as expected towards functional goals listed. [] Progression is slowed due to complexities/Impairments listed. [] Progression has been slowed due to co-morbidities.   [x] Plan just implemented, too soon to assess goals progression <30days   [] Goals require adjustment due to lack of progress  [] Patient is not progressing as expected and requires additional follow up with physician  [] Other    Prognosis for POC: [x] Good [] Fair  [] Poor      Patient requires continued skilled intervention: [x] Yes  [] No    Treatment/Activity Tolerance:  [x] Patient able to complete treatment  [] Patient limited by fatigue  [] Patient limited by pain    [] Patient limited by other medical complications  [] Other:     ASSESSMENT: see eval        PLAN: See eval  [] Continue per plan of care [] Alter current plan (see comments above)  [x] Plan of care initiated [] Hold pending MD visit [] Discharge      Electronically signed by:  Endy Lucas PT    Note: If patient does not return for scheduled/ recommended follow up visits, this note will serve as a discharge from care along with most recent update on progress.

## 2021-10-06 NOTE — PLAN OF CARE
[facility], [address], [address] [place] [address]  Phone: [phone]  Fax [phone]    Physical Therapy Certification    Dear Referring Practitioner: Dr. Jennifer Marcum,    We had the pleasure of evaluating the following patient for physical therapy services at [address] [place] [address]. A summary of our findings can be found in the initial assessment below. This includes our plan of care. If you have any questions or concerns regarding these findings, please do not hesitate to contact me at the office phone number checked above. Thank you for the referral.       Physician Signature:_______________________________Date:__________________  By signing above (or electronic signature), therapists plan is approved by physician    Patient: Kemal Menard   : 1955   MRN: [id number]  Referring Physician: Referring Practitioner: Dr. Brina Wagner      Evaluation Date: 10/6/2021      Medical Diagnosis Information:  Diagnosis: M54.41 (ICD-10-CM) - Acute right-sided low back pain with right-sided sciatica   Treatment Diagnosis: Low back pain M54.5                                         Insurance information: PT Insurance Information: /  [employer]       Precautions/ Contra-indications: HBP, OA, neuropathy    C-SSRS Triggered by Intake questionnaire (Past 2 wk assessment):   [x] No, Questionnaire did not trigger screening.   [] Yes, Patient intake triggered further evaluation      [] C-SSRS Screening completed  [] PCP notified via Plan of Care  [] Emergency services notified     Latex Allergy:  []NO      [x]YES  Preferred Language for Healthcare:   [x]English       []other:    SUBJECTIVE: Patient stated complaint:On , she had to crawl across front seat of her car to get to 's side. Car had parked too close. Two days later, she had increased LBP. Pt woke up with pain in her right glut.    Pt has been to chiropractor and massage therapist.   Pn has gotten worse. Radiates down the back of her leg into her ankle. Pt has used a walker and was prescribed prednisone, which did help reduce intensity of pain. Pt gets some relief with sitting in recliner with feet propped. Pt is very limited with standing; only 5 minutes. Pt is not sleeping well. She is getting two hours at a time. Pt has history of neuropathy in both feet, and now the right is going up the leg. Pt has been working on home a lot. She recently had to cancel [place] trip. Relevant Medical History:right Shoulder surgery. Functional Disability Index/G-Codes: Mod oswestry  66%    Pain Scale: 8/10  Easing factors: Ice,  Rest,   Provocative factors: sleeping, standing, walking, kneeling, self care, position changes, squatting, reaching, sitting, driving, lifting     Type: []Constant   []Intermittent  []Radiating []Localized []other:     Numbness/Tingling: neuropathy. Occupation/School: Retired. Living Status/Prior Level of Function: Independent with ADLs and IADLs, swimming, traveling. OBJECTIVE:     ROM     LUMBAR FLEX WNL    LUMBAR EXT Limited 50%    Sidebend WNL Limited 50%    LEFT RIGHT   HIP Flex WNL WNL   HIP Abd  Pain with full passive abd   Knee Flex WNL WNL   Knee ext WNL WNL   Hamstring FLEX  90/90 0 0        Strength  LEFT RIGHT   HIP Flexors 4 4 pain   HIP Abductors     HIP ER 4 4 pain   Hip IR 4 4 pain   Knee EXT (quad) 5 5   Knee Flex (HS) 5 5   Ankle DF 5 5        Pain Scale 8/10    Mod oswestry 66%      Reflexes/Sensation:    [x]Dermatomes/Myotomes intact    []UE Reflexes     []Normal []Hypo      []Hyper   []LE Reflexes     []Normal []Hypo      []Hyper   []Babinski/Clonus/Hoffmans:    []Other:    Joint mobility: -  Will assess lumbar spine prone next visit   []Normal    []Hypo   []Hyper    Palpation: tender over right SI    Functional Mobility/Transfers: Indep.   Pain with sit to progress    Falls Risk Assessment (30 days):   [x] Falls Risk assessed and no intervention required. [] Falls Risk assessed and Patient requires intervention due to being higher risk   TUG score (>12s at risk):     [] Falls education provided, including       G-Codes: Mod oswestry 66%    ASSESSMENT:   Functional Impairments:     [x]Noted lumbar/proximal hip hypomobility   []Noted lumbosacral and/or generalized hypermobility   [x]Decreased Lumbosacral/hip/LE functional ROM   [x]Decreased core/proximal hip strength and neuromuscular control    [x]Decreased LE functional strength    []Abnormal reflexes/sensation/myotomal/dermatomal deficits  [x]Reduced balance/proprioceptive control    []other:      Functional Activity Limitations (from functional questionnaire and intake)   []Reduced ability to tolerate prolonged functional positions   [x]Reduced ability or difficulty with changes of positions or transfers between positions   [x]Reduced ability to maintain good posture and demonstrate good body mechanics with sitting, bending, and lifting   [x]Reduced ability to sleep   [] Reduced ability or tolerance with driving and/or computer work   [x]Reduced ability to perform lifting, reaching, carrying tasks   [x]Reduced ability to squat   [x]Reduced ability to forward bend   [x]Reduced ability to ambulate prolonged functional periods/distances/surfaces   [x]Reduced ability to ascend/descend stairs   []other:       Participation Restrictions   [x]Reduced participation in self care activities   [x]Reduced participation in home management activities   []Reduced participation in work activities   []Reduced participation in social activities. []Reduced participation in sport/recreation activities. Classification:   [x]Signs/symptoms consistent with Lumbar instability/stabilization subgroup. []Signs/symptoms consistent with Lumbar mobilization/manipulation subgroup, myotomes and dermatomes intact.  Meets manipulation criteria. []Signs/symptoms consistent with Lumbar direction specific/centralization subgroup   []Signs/symptoms consistent with Lumbar traction subgroup     [x]Signs/symptoms consistent with lumbar facet dysfunction   []Signs/symptoms consistent with lumbar stenosis type dysfunction   []Signs/symptoms consistent with nerve root involvement including myotome & dermatome dysfunction   []Signs/symptoms consistent with post-surgical status including: decreased ROM, strength and function. []signs/symptoms consistent with pathology which may benefit from Dry needling     []other:      Prognosis/Rehab Potential:      []Excellent   [x]Good    []Fair   []Poor    Tolerance of evaluation/treatment:    []Excellent   [x]Good    []Fair   []Poor    Physical Therapy Evaluation Complexity Justification  [x] A history of present problem with:  [] no personal factors and/or comorbidities that impact the plan of care;  []1-2 personal factors and/or comorbidities that impact the plan of care  []3 personal factors and/or comorbidities that impact the plan of care  [x] An examination of body systems using standardized tests and measures addressing any of the following: body structures and functions (impairments), activity limitations, and/or participation restrictions;:  [] a total of 1-2 or more elements   [] a total of 3 or more elements   [] a total of 4 or more elements   [x] A clinical presentation with:  [] stable and/or uncomplicated characteristics   [] evolving clinical presentation with changing characteristics  [] unstable and unpredictable characteristics;   [x] Clinical decision making of [x] low, [] moderate, [] high complexity using standardized patient assessment instrument and/or measurable assessment of functional outcome.     [x] EVAL (LOW) 22661 (typically 20 minutes face-to-face)  [] EVAL (MOD) 14502 (typically 30 minutes face-to-face)  [] EVAL (HIGH) 31184 (typically 45 minutes face-to-face)  [] RE-EVAL PLAN: Begin PT focusing on: proximal hip mobilizations, LB mobs, LB core activation, proximal hip activation, and HEP    Frequency/Duration:  2 days per week for 8 Weeks:  Interventions:  [x]  Therapeutic exercise including: strength training, ROM, for LE, Glutes and core   [x]  NMR activation and proprioception for glutes , LE and Core   [x]  Manual therapy as indicated for Hip complex, LE and spine to include: Dry Needling/IASTM, STM, PROM, Gr I-IV mobilizations, manipulation. [x]  Modalities as needed that may include: thermal agents, E-stim, Biofeedback, US, iontophoresis as indicated  [x]  Patient education on joint protection, postural re-education, activity modification, progression of HEP. HEP instruction: [email or web address]    GOALS:  Patient stated goal: My life  [] Progressing: [] Met: [] Not Met: [] Adjusted    Therapist goals for Patient:   Short Term Goals: To be achieved in: 2 weeks  1. Independent in HEP and progression per patient tolerance, in order to prevent re-injury. [] Progressing: [] Met: [] Not Met: [] Adjusted  2. Patient will have a decrease in pain to facilitate improvement in movement, function, and ADLs as indicated by Functional Deficits. [] Progressing: [] Met: [] Not Met: [] Adjusted      Long Term Goals: To be achieved in: 8 weeks  1. Disability index score of 30% or less for the mod oswestry  to assist with reaching prior level of function. [] Progressing: [] Met: [] Not Met: [] Adjusted  2. Patient will demonstrate increased AROM to WNL, good LS mobility, good hip ROM to allow for proper joint functioning as indicated by patients Functional Deficits. [] Progressing: [] Met: [] Not Met: [] Adjusted  3. Patient will demonstrate an increase in Strength to good proximal hip and core activation to allow for proper functional mobility as indicated by patients Functional Deficits. [] Progressing: [] Met: [] Not Met: [] Adjusted  4.  Patient will return to standing up to 30 min to prepare a meal or grocery shop without increased symptoms or restriction. [] Progressing: [] Met: [] Not Met: [] Adjusted  5. Able to sleep 7-8 hours at night.    [] Progressing: [] Met: [] Not Met: [] Adjusted       Electronically signed by:  David Johnson PT

## 2021-10-08 ENCOUNTER — HOSPITAL ENCOUNTER (OUTPATIENT)
Dept: PHYSICAL THERAPY | Age: 66
Setting detail: THERAPIES SERIES
Discharge: HOME | End: 2021-10-08
Payer: MEDICARE

## 2021-10-08 PROCEDURE — 97110 THERAPEUTIC EXERCISES: CPT | Performed by: PHYSICAL THERAPIST

## 2021-10-08 PROCEDURE — 97140 MANUAL THERAPY 1/> REGIONS: CPT | Performed by: PHYSICAL THERAPIST

## 2021-10-08 NOTE — FLOWSHEET NOTE
[facility], [address] [place]  Phone: [phone]  Fax [phone]    Physical Therapy Treatment Note/ Progress Report:           Date:  10/8/2021    Patient Name:  Kike Rae    :  1955  MRN: [id number]  Restrictions/Precautions:    Medical/Treatment Diagnosis Information:  Diagnosis: M54.41 (ICD-10-CM) - Acute right-sided low back pain with right-sided sciatica  Treatment Diagnosis: Low back pain Q09.1  Insurance/Certification information:  PT Insurance Information: MC/  [employer] Supp  Physician Information:  Referring Practitioner: Dr. Ruthie Wagner  Has the plan of care been signed (Y/N):        []  Yes  [x]  No     Date of Patient follow up with Physician: ?      Is this a Progress Report:     []  Yes  [x]  No        If Yes:  Date Range for reporting period:  Beginning: 10/6/21  Ending:     Progress report will be due (10 Rx or 30 days whichever is less):        Recertification will be due (POC Duration  / 90 days whichever is less):         Visit # Insurance Allowable Auth Required   In-person 2  []  Yes []  No    Telehealth   []  Yes []  No    Total          Therapy Diagnosis/Practice Pattern:F      Number of Comorbidities:  []0     [x]1-2    []3+    Latex Allergy:  []NO      [x]YES  Preferred Language for Healthcare:   [x]English       []other:    SUBJECTIVE:  Pain in the lateral calf 6/10 currently. Patient reports overall slightly improved. No issues with exercises, feels helping. Able to walk a little further. OBJECTIVE: See eval   Observation:   Increase radicular pain to calf/ ankle in prone, side lying and supine. Hook lying most tolerable position.     Test measurements:    ROM       LUMBAR FLEX WNL     LUMBAR EXT Limited 50%     Sidebend WNL Limited 50%     LEFT RIGHT   HIP Flex WNL WNL   HIP Abd   Pain with full passive abd   Knee Flex WNL WNL   Knee ext WNL WNL Hamstring FLEX  90/90 0 0           Strength  LEFT RIGHT   HIP Flexors 4 4 pain   HIP Abductors       HIP ER 4 4 pain   Hip IR 4 4 pain   Knee EXT (quad) 5 5   Knee Flex (HS) 5 5   Ankle DF 5 5           Pain Scale 8/10     Mod oswestry 66%        RESTRICTIONS/PRECAUTIONS: HBP, OA    Exercises/Interventions:     Therapeutic Ex (18023) Sets/sec Reps Notes/CUES         TA :05 X 10 hep   TA with march  X 10     Supine Hip Iso  Abd/ add  :05 X 10 hep   BKFO  X 10 hep   LTR s SB X 10 left only    SB DKTC  10 x     Single KTC 20\" 3 x     Piriformis stretch 20\"  3 x     Seated TA :05 X 10 hep   Seated hip abd/ add iso :05 X 10 hep   Seated glut sets ;05 X 10 hep   Seated Hs s ;20 3x hep                     Manual Intervention (25202)      Piriformis massage      Man HS/  Man ITB  2 min    Right inferior hip mob  X 2 min     Long axis right LE distraction  X 2 min  Pain with release of traction                NMR re-education (71289)   CUES NEEDED                                                         Therapeutic Activity (37576)            Sleep position/ log roll/  Avoid twisting  x Handout provided. Access Code: [email or web address]  URL: [employer]/  Date: 23/38/5927  Prepared by: Fariba Hui    Exercises  Supine Posterior Pelvic Tilt - 2 x daily - 7 x weekly - 1 sets - 10 reps - 5 hold  Supine Hip Adduction Isometric with Ball - 2 x daily - 7 x weekly - 1 sets - 10 reps - 5 hold  Hooklying Isometric Clamshell - 2 x daily - 7 x weekly - 1 sets - 10 reps - 5 hold  Supine Single Bent Knee Fallout - 2 x daily - 7 x weekly - 1 sets - 10 reps - 2 hold  Seated Transversus Abdominis Bracing - 2 x daily - 7 x weekly - 1 sets - 10 reps - 5 hold  Seated Hip Adduction Isometrics with Ball - 2 x daily - 7 x weekly - 1 sets - 10 reps - 5 hold  Seated Gluteal Sets - 2 x daily - 7 x weekly - 1 sets - 10 reps - 5 hold  Seated Hamstring Stretch - 2 x daily - 7 x weekly - 1 sets - 3 reps - 20 hold    Therapeutic Exercise and NMR EXR  [x] (00049) Provided verbal/tactile cueing for activities related to strengthening, flexibility, endurance, ROM  for improvements in proximal hip and core control with self care, mobility, lifting and ambulation.  [] (38955) Provided verbal/tactile cueing for activities related to improving balance, coordination, kinesthetic sense, posture, motor skill, proprioception  to assist with core control in self care, mobility, lifting, and ambulation. Therapeutic Activities:    [] (65942 or 70572) Provided verbal/tactile cueing for activities related to improving balance, coordination, kinesthetic sense, posture, motor skill, proprioception and motor activation to allow for proper function  with self care and ADLs  [] (39763) Provided training and instruction to the patient for proper core and proximal hip recruitment and positioning with ambulation re-education     Home Exercise Program:    [x] (47535) Reviewed/Progressed HEP activities related to strengthening, flexibility, endurance, ROM of core, proximal hip and LE for functional self-care, mobility, lifting and ambulation   [] (96413) Reviewed/Progressed HEP activities related to improving balance, coordination, kinesthetic sense, posture, motor skill, proprioception of core, proximal hip and LE for self care, mobility, lifting, and ambulation      Manual Treatments:  PROM / STM / Oscillations-Mobs:  G-I, II, III, IV (PA's, Inf., Post.)  [x] (79389) Provided manual therapy to mobilize proximal hip and LS spine soft tissue/joints for the purpose of modulating pain, promoting relaxation,  increasing ROM, reducing/eliminating soft tissue swelling/inflammation/restriction, improving soft tissue extensibility and allowing for proper ROM for normal function with self care, mobility, lifting and ambulation.      Modalities:  Cold pack x 15 minutes      Charges  Timed Code Treatment Minutes: 35   Total Treatment Minutes: 50 Medicare total (add KX at $2000)  210.25   (9 visits prior for shoulder)     [] EVAL (LOW) 45747   [] EVAL (MOD) 34205   [] EVAL (HIGH) 69170   [] RE-EVAL     [x] HQ(19289) x   1  [] IONTO  [] NMR (67025) x     [] VASO  [x] Manual (51852) x 1     [] Other:  [] TA x      [] Mech Traction (85074)  [] ES(attended) (80252)      [] ES (un) (83705):     Goals:   Patient stated goal: My life  [] Progressing: [] Met: [] Not Met: [] Adjusted    Therapist goals for Patient:   Short Term Goals: To be achieved in: 2 weeks  1. Independent in HEP and progression per patient tolerance, in order to prevent re-injury. [] Progressing: [] Met: [] Not Met: [] Adjusted  2. Patient will have a decrease in pain to facilitate improvement in movement, function, and ADLs as indicated by Functional Deficits. [] Progressing: [] Met: [] Not Met: [] Adjusted      Long Term Goals: To be achieved in: 8 weeks  1. Disability index score of 30% or less for the mod oswestry  to assist with reaching prior level of function. [] Progressing: [] Met: [] Not Met: [] Adjusted  2. Patient will demonstrate increased AROM to WNL, good LS mobility, good hip ROM to allow for proper joint functioning as indicated by patients Functional Deficits. [] Progressing: [] Met: [] Not Met: [] Adjusted  3. Patient will demonstrate an increase in Strength to good proximal hip and core activation to allow for proper functional mobility as indicated by patients Functional Deficits. [] Progressing: [] Met: [] Not Met: [] Adjusted  4. Patient will return to standing up to 30 min to prepare a meal or grocery shop without increased symptoms or restriction. [] Progressing: [] Met: [] Not Met: [] Adjusted  5. Able to sleep 7-8 hours at night. [] Progressing: [] Met: [] Not Met: [] Adjusted       Progression Towards Functional goals:  [] Patient is progressing as expected towards functional goals listed. [] Progression is slowed due to complexities listed.   [] Progression has been slowed due to co-morbidities. [x] Plan just implemented, too soon to assess goals progression  [] Other:     Overall Progression Towards Functional goals/ Treatment Progress Update:  [] Patient is progressing as expected towards functional goals listed. [] Progression is slowed due to complexities/Impairments listed. [] Progression has been slowed due to co-morbidities. [x] Plan just implemented, too soon to assess goals progression <30days   [] Goals require adjustment due to lack of progress  [] Patient is not progressing as expected and requires additional follow up with physician  [] Other    Prognosis for POC: [x] Good [] Fair  [] Poor      Patient requires continued skilled intervention: [x] Yes  [] No    Treatment/Activity Tolerance:  [x] Patient able to complete treatment  [] Patient limited by fatigue  [] Patient limited by pain    [] Patient limited by other medical complications  [] Other:     ASSESSMENT: Poor tolerance to bed mobility and positions. Most relief in hook lying and 90/90 supine position. By end of session less ankle / buttock pain and able to tolerate all hook lying exercises. Updated HEP with piriformis/ SKTC/ figure 4 stretch. PLAN: See eval  [x] Continue per plan of care [] Alter current plan (see comments above)  [] Plan of care initiated [] Hold pending MD visit [] Discharge      Electronically signed by:  Beth Mascorro PT    Note: If patient does not return for scheduled/ recommended follow up visits, this note will serve as a discharge from care along with most recent update on progress.

## 2021-10-11 ENCOUNTER — HOSPITAL ENCOUNTER (OUTPATIENT)
Dept: PHYSICAL THERAPY | Age: 66
Setting detail: THERAPIES SERIES
Discharge: HOME | End: 2021-10-11
Payer: MEDICARE

## 2021-10-11 PROCEDURE — 97140 MANUAL THERAPY 1/> REGIONS: CPT | Performed by: PHYSICAL THERAPIST

## 2021-10-11 PROCEDURE — G0283 ELEC STIM OTHER THAN WOUND: HCPCS | Performed by: PHYSICAL THERAPIST

## 2021-10-11 PROCEDURE — 97110 THERAPEUTIC EXERCISES: CPT | Performed by: PHYSICAL THERAPIST

## 2021-10-11 NOTE — FLOWSHEET NOTE
[facility], [address] [place]  Phone: [phone]  Fax [phone]    Physical Therapy Treatment Note/ Progress Report:           Date:  10/11/2021    Patient Name:  Antonio Rodriguez    :  1955  MRN: [id number]  Restrictions/Precautions:    Medical/Treatment Diagnosis Information:  Diagnosis: M54.41 (ICD-10-CM) - Acute right-sided low back pain with right-sided sciatica  Treatment Diagnosis: Low back pain Z67.8  Insurance/Certification information:  PT Insurance Information: MC/  [employer] Supp  Physician Information:  Referring Practitioner: Dr. Sarah Wagner  Has the plan of care been signed (Y/N):        []  Yes  [x]  No     Date of Patient follow up with Physician: ?      Is this a Progress Report:     []  Yes  [x]  No        If Yes:  Date Range for reporting period:  Beginning: 10/6/21  Ending:     Progress report will be due (10 Rx or 30 days whichever is less):        Recertification will be due (POC Duration  / 90 days whichever is less):         Visit # Insurance Allowable Auth Required   In-person 3  []  Yes []  No    Telehealth   []  Yes []  No    Total          Therapy Diagnosis/Practice Pattern:F      Number of Comorbidities:  []0     [x]1-2    []3+    Latex Allergy:  []NO      [x]YES  Preferred Language for Healthcare:   [x]English       []other:    SUBJECTIVE:  Pt reports that she still has pain, but it does seem to be lessening. Pt gets most relief with sitting. Pt amb into clinic with cane today. Pt forgot TENS unit    OBJECTIVE: See eval   Observation:   Increase radicular pain to calf/ ankle in prone, side lying and supine. Hook lying most tolerable position.     Test measurements:    ROM        LUMBAR FLEX WNL      LUMBAR EXT Limited 50%      Sidebend WNL Limited 50%       LEFT RIGHT     HIP Flex WNL WNL     HIP Abd   Pain with full passive abd     Knee Flex WNL WNL     Knee ext WNL WNL     Hamstring FLEX  90/90 0 0              Strength  LEFT RIGHT     HIP Flexors 4 4 pain     HIP Abductors        HIP ER 4 4 pain     Hip IR 4 4 pain     Knee EXT (quad) 5 5     Knee Flex (HS) 5 5     Ankle DF 5 5              Pain Scale 8/10   4-5/10   Mod oswestry 66%         RESTRICTIONS/PRECAUTIONS: HBP, OA    Exercises/Interventions:     Therapeutic Ex (01485) Sets/sec Reps Notes/CUES   Bike  5 min    TA with march  X 10     Supine Hip Iso  Abd/ add  :05 X 10 hep   BKFO  X 10 hep   LTR s SB X 10 left only    SB DKTC  10 x     Single KTC 20\" 3 x     Piriformis stretch 20\"  3 x     Incline s :20 3x     Seated SB pelvic a-p, m-l,  Cw, ccw  X 10 each    Seated lumbar flex SB  X 10     Seated SB march  X 10 each    Manual Intervention (19748)      Piriformis massage      Man HS/  Man ITB  2 min    Right inferior hip mob  X 2 min     Long axis right LE distraction  X 2 min  Pain with release of traction    Mulligan lateral mobs  2 min    Left sidelying lumbar rotation mobs  4 min    NMR re-education (66004)   CUES NEEDED                                                         Therapeutic Activity (63220)            Sleep position/ log roll/  Avoid twisting  x Handout provided. Access Code: [email or web address]  URL: [email or web address]  Date: 80/45/5274  Prepared by: Tea Most    Exercises  Supine Posterior Pelvic Tilt - 2 x daily - 7 x weekly - 1 sets - 10 reps - 5 hold  Supine Hip Adduction Isometric with Ball - 2 x daily - 7 x weekly - 1 sets - 10 reps - 5 hold  Hooklying Isometric Clamshell - 2 x daily - 7 x weekly - 1 sets - 10 reps - 5 hold  Supine Single Bent Knee Fallout - 2 x daily - 7 x weekly - 1 sets - 10 reps - 2 hold  Seated Transversus Abdominis Bracing - 2 x daily - 7 x weekly - 1 sets - 10 reps - 5 hold  Seated Hip Adduction Isometrics with Ball - 2 x daily - 7 x weekly - 1 sets - 10 reps - 5 hold  Seated Gluteal Sets - 2 x daily - 7 x weekly - 1 sets - 10 reps - 5 hold  Seated Hamstring Stretch - 2 x daily - 7 x weekly - 1 sets - 3 reps - 20 hold    Therapeutic Exercise and NMR EXR  [x] (11636) Provided verbal/tactile cueing for activities related to strengthening, flexibility, endurance, ROM  for improvements in proximal hip and core control with self care, mobility, lifting and ambulation.  [] (70671) Provided verbal/tactile cueing for activities related to improving balance, coordination, kinesthetic sense, posture, motor skill, proprioception  to assist with core control in self care, mobility, lifting, and ambulation. Therapeutic Activities:    [] (06316 or 23664) Provided verbal/tactile cueing for activities related to improving balance, coordination, kinesthetic sense, posture, motor skill, proprioception and motor activation to allow for proper function  with self care and ADLs  [] (12522) Provided training and instruction to the patient for proper core and proximal hip recruitment and positioning with ambulation re-education     Home Exercise Program:    [x] (07644) Reviewed/Progressed HEP activities related to strengthening, flexibility, endurance, ROM of core, proximal hip and LE for functional self-care, mobility, lifting and ambulation   [] (11619) Reviewed/Progressed HEP activities related to improving balance, coordination, kinesthetic sense, posture, motor skill, proprioception of core, proximal hip and LE for self care, mobility, lifting, and ambulation      Manual Treatments:  PROM / STM / Oscillations-Mobs:  G-I, II, III, IV (PA's, Inf., Post.)  [x] (77951) Provided manual therapy to mobilize proximal hip and LS spine soft tissue/joints for the purpose of modulating pain, promoting relaxation,  increasing ROM, reducing/eliminating soft tissue swelling/inflammation/restriction, improving soft tissue extensibility and allowing for proper ROM for normal function with self care, mobility, lifting and ambulation.      Modalities:  ESU /  Cold pack x 15 minutes      Charges  Timed Code Treatment Minutes: 45   Total Treatment Minutes: 60     Medicare total (add KX at $2000)  308.31   (9 visits prior for shoulder)   98.06  [] EVAL (LOW) 21765   [] EVAL (MOD) 35137   [] EVAL (HIGH) 24586   [] RE-EVAL     [x] VY(11884) x   2  [] IONTO  [] NMR (01421) x     [] VASO  [x] Manual (36351) x 1     [] Other:  [] TA x      [] Mech Traction (48241)  [] ES(attended) (46763)      [x] ES (un) (70209):     Goals:   Patient stated goal: My life  [] Progressing: [] Met: [] Not Met: [] Adjusted    Therapist goals for Patient:   Short Term Goals: To be achieved in: 2 weeks  1. Independent in HEP and progression per patient tolerance, in order to prevent re-injury. [] Progressing: [] Met: [] Not Met: [] Adjusted  2. Patient will have a decrease in pain to facilitate improvement in movement, function, and ADLs as indicated by Functional Deficits. [] Progressing: [] Met: [] Not Met: [] Adjusted      Long Term Goals: To be achieved in: 8 weeks  1. Disability index score of 30% or less for the mod oswestry  to assist with reaching prior level of function. [] Progressing: [] Met: [] Not Met: [] Adjusted  2. Patient will demonstrate increased AROM to WNL, good LS mobility, good hip ROM to allow for proper joint functioning as indicated by patients Functional Deficits. [] Progressing: [] Met: [] Not Met: [] Adjusted  3. Patient will demonstrate an increase in Strength to good proximal hip and core activation to allow for proper functional mobility as indicated by patients Functional Deficits. [] Progressing: [] Met: [] Not Met: [] Adjusted  4. Patient will return to standing up to 30 min to prepare a meal or grocery shop without increased symptoms or restriction. [] Progressing: [] Met: [] Not Met: [] Adjusted  5. Able to sleep 7-8 hours at night.    [] Progressing: [] Met: [] Not Met: [] Adjusted       Progression Towards Functional goals:  [] Patient is progressing as expected towards functional goals listed. [] Progression is slowed due to complexities listed. [] Progression has been slowed due to co-morbidities. [x] Plan just implemented, too soon to assess goals progression  [] Other:     Overall Progression Towards Functional goals/ Treatment Progress Update:  [] Patient is progressing as expected towards functional goals listed. [] Progression is slowed due to complexities/Impairments listed. [] Progression has been slowed due to co-morbidities. [x] Plan just implemented, too soon to assess goals progression <30days   [] Goals require adjustment due to lack of progress  [] Patient is not progressing as expected and requires additional follow up with physician  [] Other    Prognosis for POC: [x] Good [] Fair  [] Poor      Patient requires continued skilled intervention: [x] Yes  [] No    Treatment/Activity Tolerance:  [x] Patient able to complete treatment  [] Patient limited by fatigue  [] Patient limited by pain    [] Patient limited by other medical complications  [] Other:     ASSESSMENT: Pt has relief with sidelying mob and with Fig 4 s         PLAN: See eval  [x] Continue per plan of care [] Alter current plan (see comments above)  [] Plan of care initiated [] Hold pending MD visit [] Discharge      Electronically signed by:  Tea Foster, PT    Note: If patient does not return for scheduled/ recommended follow up visits, this note will serve as a discharge from care along with most recent update on progress.

## 2021-10-13 ENCOUNTER — HOSPITAL ENCOUNTER (OUTPATIENT)
Dept: PHYSICAL THERAPY | Age: 66
Setting detail: THERAPIES SERIES
Discharge: HOME | End: 2021-10-13
Payer: MEDICARE

## 2021-10-13 PROCEDURE — 97140 MANUAL THERAPY 1/> REGIONS: CPT | Performed by: PHYSICAL THERAPIST

## 2021-10-13 PROCEDURE — 97110 THERAPEUTIC EXERCISES: CPT | Performed by: PHYSICAL THERAPIST

## 2021-10-13 NOTE — FLOWSHEET NOTE
[facility], [address] [place]  Phone: [phone]  Fax [phone]    Physical Therapy Treatment Note/ Progress Report:           Date:  10/13/2021    Patient Name:  Cheryl Wellington    :  1955  MRN: [id number]  Restrictions/Precautions:    Medical/Treatment Diagnosis Information:  Diagnosis: M54.41 (ICD-10-CM) - Acute right-sided low back pain with right-sided sciatica  Treatment Diagnosis: Low back pain R31.1  Insurance/Certification information:  PT Insurance Information: MC/  [employer] Supp  Physician Information:  Referring Practitioner: Dr. Nicolasa Wagner  Has the plan of care been signed (Y/N):        []  Yes  [x]  No     Date of Patient follow up with Physician: ?      Is this a Progress Report:     []  Yes  [x]  No        If Yes:  Date Range for reporting period:  Beginning: 10/6/21  Ending:     Progress report will be due (10 Rx or 30 days whichever is less): 80       Recertification will be due (POC Duration  / 90 days whichever is less):         Visit # Insurance Allowable Auth Required   In-person 4  []  Yes []  No    Telehealth   []  Yes []  No    Total          Therapy Diagnosis/Practice Pattern:F      Number of Comorbidities:  []0     [x]1-2    []3+    Latex Allergy:  []NO      [x]YES  Preferred Language for Healthcare:   [x]English       []other:    SUBJECTIVE:  Pt reports that she is not having as much gluteal pain. Pt has most of her discomfort along her right side low back. Pn is radiating down to ankle. Pt slept longer last night. Pt woke up in more pain this AM.  Pt took tylenol and advil yesterday. OBJECTIVE: See eval   Observation:   Increase radicular pain to calf/ ankle in prone, side lying and supine. Hook lying most tolerable position.     Test measurements:    ROM        LUMBAR FLEX WNL      LUMBAR EXT Limited 50%      Sidebend WNL Limited 50%       LEFT RIGHT HIP Flex WNL WNL     HIP Abd   Pain with full passive abd     Knee Flex WNL WNL     Knee ext WNL WNL     Hamstring FLEX  90/90 0 0              Strength  LEFT RIGHT     HIP Flexors 4 4 pain     HIP Abductors        HIP ER 4 4 pain     Hip IR 4 4 pain     Knee EXT (quad) 5 5     Knee Flex (HS) 5 5     Ankle DF 5 5              Pain Scale 8/10   4-5/10   Mod oswestry 66%         RESTRICTIONS/PRECAUTIONS: HBP, OA    Exercises/Interventions:     Therapeutic Ex (31858) Sets/sec Reps Notes/CUES   Bike  5 min    TA with march  X 10     Supine Hip Iso  Abd/ add  :05 X 15 hep   BKFO  X 15 hep   Clamshells/  Rev clamshells X 15 X 15              Piriformis stretch 20\"  3 x     Incline s :20 3x     Seated SB pelvic a-p, m-l,  Cw, ccw  X 10 each    Seated lumbar flex SB  X 10     Seated SB march  X 10 each    Manual Intervention (59597)      Piriformis massage  4 min    Man HS/  Man ITB  2 min    Right inferior hip mob  X 2 min     Long axis right LE distraction  X 2 min  Pain with release of traction    Prone sacral mobs/  Prone quad s  4 min    Left sidelying lumbar rotation mobs  2 min    NMR re-education (03554)   CUES NEEDED                                 Therapeutic Activity (50400)            Sleep position/ log roll/  Avoid twisting  x Handout provided. Access Code: [email or web address]  URL: [email or web address]  Date: 36/84/7005  Prepared by: Doni Manning    Exercises  Supine Posterior Pelvic Tilt - 2 x daily - 7 x weekly - 1 sets - 10 reps - 5 hold  Supine Hip Adduction Isometric with Ball - 2 x daily - 7 x weekly - 1 sets - 10 reps - 5 hold  Hooklying Isometric Clamshell - 2 x daily - 7 x weekly - 1 sets - 10 reps - 5 hold  Supine Single Bent Knee Fallout - 2 x daily - 7 x weekly - 1 sets - 10 reps - 2 hold  Seated Transversus Abdominis Bracing - 2 x daily - 7 x weekly - 1 sets - 10 reps - 5 hold  Seated Hip Adduction Isometrics with Ball - 2 x daily - 7 x weekly - 1 sets - 10 reps - 5 hold  Seated Gluteal Sets - 2 x daily - 7 x weekly - 1 sets - 10 reps - 5 hold  Seated Hamstring Stretch - 2 x daily - 7 x weekly - 1 sets - 3 reps - 20 hold    Therapeutic Exercise and NMR EXR  [x] (09623) Provided verbal/tactile cueing for activities related to strengthening, flexibility, endurance, ROM  for improvements in proximal hip and core control with self care, mobility, lifting and ambulation.  [] (36239) Provided verbal/tactile cueing for activities related to improving balance, coordination, kinesthetic sense, posture, motor skill, proprioception  to assist with core control in self care, mobility, lifting, and ambulation.      Therapeutic Activities:    [] (50659 or 16291) Provided verbal/tactile cueing for activities related to improving balance, coordination, kinesthetic sense, posture, motor skill, proprioception and motor activation to allow for proper function  with self care and ADLs  [] (10399) Provided training and instruction to the patient for proper core and proximal hip recruitment and positioning with ambulation re-education     Home Exercise Program:    [x] (64261) Reviewed/Progressed HEP activities related to strengthening, flexibility, endurance, ROM of core, proximal hip and LE for functional self-care, mobility, lifting and ambulation   [] (29127) Reviewed/Progressed HEP activities related to improving balance, coordination, kinesthetic sense, posture, motor skill, proprioception of core, proximal hip and LE for self care, mobility, lifting, and ambulation      Manual Treatments:  PROM / STM / Oscillations-Mobs:  G-I, II, III, IV (PA's, Inf., Post.)  [x] (33393) Provided manual therapy to mobilize proximal hip and LS spine soft tissue/joints for the purpose of modulating pain, promoting relaxation,  increasing ROM, reducing/eliminating soft tissue swelling/inflammation/restriction, improving soft tissue extensibility and allowing for proper ROM for normal function with self care, mobility, lifting and ambulation. Modalities:  Cold pack x 10 minutes      Charges  Timed Code Treatment Minutes: 40   Total Treatment Minutes: 50       Medicare total (add KX at $2000)  393.65   (9 visits prior for shoulder)   85.34    [] EVAL (LOW) 83137   [] EVAL (MOD) 01706   [] EVAL (HIGH) 48275   [] RE-EVAL     [x] GP(80843) x   2  [] IONTO  [] NMR (19705) x     [] VASO  [x] Manual (29688) x 1     [] Other:  [] TA x      [] Mech Traction (23868)  [] ES(attended) (49891)      [] ES (un) (34494):     Goals:   Patient stated goal: My life  [] Progressing: [] Met: [] Not Met: [] Adjusted    Therapist goals for Patient:   Short Term Goals: To be achieved in: 2 weeks  1. Independent in HEP and progression per patient tolerance, in order to prevent re-injury. [] Progressing: [] Met: [] Not Met: [] Adjusted  2. Patient will have a decrease in pain to facilitate improvement in movement, function, and ADLs as indicated by Functional Deficits. [] Progressing: [] Met: [] Not Met: [] Adjusted      Long Term Goals: To be achieved in: 8 weeks  1. Disability index score of 30% or less for the mod oswestry  to assist with reaching prior level of function. [] Progressing: [] Met: [] Not Met: [] Adjusted  2. Patient will demonstrate increased AROM to WNL, good LS mobility, good hip ROM to allow for proper joint functioning as indicated by patients Functional Deficits. [] Progressing: [] Met: [] Not Met: [] Adjusted  3. Patient will demonstrate an increase in Strength to good proximal hip and core activation to allow for proper functional mobility as indicated by patients Functional Deficits. [] Progressing: [] Met: [] Not Met: [] Adjusted  4. Patient will return to standing up to 30 min to prepare a meal or grocery shop without increased symptoms or restriction. [] Progressing: [] Met: [] Not Met: [] Adjusted  5. Able to sleep 7-8 hours at night.    [] Progressing: [] Met: [] Not Met: [] Adjusted       Progression Towards Functional goals:  [] Patient is progressing as expected towards functional goals listed. [] Progression is slowed due to complexities listed. [] Progression has been slowed due to co-morbidities. [x] Plan just implemented, too soon to assess goals progression  [] Other:     Overall Progression Towards Functional goals/ Treatment Progress Update:  [] Patient is progressing as expected towards functional goals listed. [] Progression is slowed due to complexities/Impairments listed. [] Progression has been slowed due to co-morbidities. [x] Plan just implemented, too soon to assess goals progression <30days   [] Goals require adjustment due to lack of progress  [] Patient is not progressing as expected and requires additional follow up with physician  [] Other    Prognosis for POC: [x] Good [] Fair  [] Poor      Patient requires continued skilled intervention: [x] Yes  [] No    Treatment/Activity Tolerance:  [x] Patient able to complete treatment  [] Patient limited by fatigue  [] Patient limited by pain    [] Patient limited by other medical complications  [] Other:     ASSESSMENT: Pt had difficulty lying on her back in hooklying. Attempted prone, but pt reported increased pain into lower leg. PLAN: See eval  [x] Continue per plan of care [] Alter current plan (see comments above)  [] Plan of care initiated [] Hold pending MD visit [] Discharge      Electronically signed by:  Toshia Costa, PT    Note: If patient does not return for scheduled/ recommended follow up visits, this note will serve as a discharge from care along with most recent update on progress.

## 2021-10-18 ENCOUNTER — OFFICE VISIT (OUTPATIENT)
Dept: ORTHOPEDIC SURGERY | Age: 66
End: 2021-10-18
Payer: MEDICARE

## 2021-10-18 ENCOUNTER — HOSPITAL ENCOUNTER (OUTPATIENT)
Dept: PHYSICAL THERAPY | Age: 66
Setting detail: THERAPIES SERIES
Discharge: HOME | End: 2021-10-18
Payer: MEDICARE

## 2021-10-18 ENCOUNTER — TELEPHONE (OUTPATIENT)
Dept: ORTHOPEDIC SURGERY | Age: 66
End: 2021-10-18

## 2021-10-18 VITALS — WEIGHT: 230 LBS | HEIGHT: 64 IN | BODY MASS INDEX: 39.27 KG/M2

## 2021-10-18 DIAGNOSIS — M54.16 LUMBAR RADICULITIS: ICD-10-CM

## 2021-10-18 DIAGNOSIS — M54.50 LUMBAR PAIN: Primary | ICD-10-CM

## 2021-10-18 DIAGNOSIS — M51.36 DDD (DEGENERATIVE DISC DISEASE), LUMBAR: ICD-10-CM

## 2021-10-18 PROCEDURE — 1123F ACP DISCUSS/DSCN MKR DOCD: CPT | Performed by: PHYSICIAN ASSISTANT

## 2021-10-18 PROCEDURE — G8400 PT W/DXA NO RESULTS DOC: HCPCS | Performed by: PHYSICIAN ASSISTANT

## 2021-10-18 PROCEDURE — G8427 DOCREV CUR MEDS BY ELIG CLIN: HCPCS | Performed by: PHYSICIAN ASSISTANT

## 2021-10-18 PROCEDURE — G8484 FLU IMMUNIZE NO ADMIN: HCPCS | Performed by: PHYSICIAN ASSISTANT

## 2021-10-18 PROCEDURE — 4004F PT TOBACCO SCREEN RCVD TLK: CPT | Performed by: PHYSICIAN ASSISTANT

## 2021-10-18 PROCEDURE — 97110 THERAPEUTIC EXERCISES: CPT | Performed by: PHYSICAL THERAPIST

## 2021-10-18 PROCEDURE — 4040F PNEUMOC VAC/ADMIN/RCVD: CPT | Performed by: PHYSICIAN ASSISTANT

## 2021-10-18 PROCEDURE — 3017F COLORECTAL CA SCREEN DOC REV: CPT | Performed by: PHYSICIAN ASSISTANT

## 2021-10-18 PROCEDURE — G8417 CALC BMI ABV UP PARAM F/U: HCPCS | Performed by: PHYSICIAN ASSISTANT

## 2021-10-18 PROCEDURE — 99213 OFFICE O/P EST LOW 20 MIN: CPT | Performed by: PHYSICIAN ASSISTANT

## 2021-10-18 PROCEDURE — 1090F PRES/ABSN URINE INCON ASSESS: CPT | Performed by: PHYSICIAN ASSISTANT

## 2021-10-18 PROCEDURE — 97140 MANUAL THERAPY 1/> REGIONS: CPT | Performed by: PHYSICAL THERAPIST

## 2021-10-18 RX ORDER — METHYLPREDNISOLONE 4 MG/1
TABLET ORAL
Qty: 1 KIT | Refills: 0 | Status: ON HOLD | OUTPATIENT
Start: 2021-10-18 | End: 2021-11-18

## 2021-10-18 RX ORDER — CLOTRIMAZOLE 10 MG/1
LOZENGE ORAL; TOPICAL
COMMUNITY
Start: 2021-09-03 | End: 2022-05-10 | Stop reason: ALTCHOICE

## 2021-10-18 NOTE — FLOWSHEET NOTE
[facility], [address] [place]  Phone: [phone]  Fax [phone]    Physical Therapy Treatment Note/ Progress Report:           Date:  10/18/2021    Patient Name:  Cheryl Wellington    :  1955  MRN: [id number]  Restrictions/Precautions:    Medical/Treatment Diagnosis Information:  Diagnosis: M54.41 (ICD-10-CM) - Acute right-sided low back pain with right-sided sciatica  Treatment Diagnosis: Low back pain Z29.2  Insurance/Certification information:  PT Insurance Information: MC/  [employer] Supp  Physician Information:  Referring Practitioner: Dr. Nicolasa Wagner  Has the plan of care been signed (Y/N):        []  Yes  [x]  No     Date of Patient follow up with Physician: ?      Is this a Progress Report:     []  Yes  [x]  No        If Yes:  Date Range for reporting period:  Beginning: 10/6/21  Ending:     Progress report will be due (10 Rx or 30 days whichever is less):        Recertification will be due (POC Duration  / 90 days whichever is less):         Visit # Insurance Allowable Auth Required   In-person 4  []  Yes []  No    Telehealth   []  Yes []  No    Total          Therapy Diagnosis/Practice Pattern:F      Number of Comorbidities:  []0     [x]1-2    []3+    Latex Allergy:  []NO      [x]YES  Preferred Language for Healthcare:   [x]English       []other:    SUBJECTIVE:   Pt was able to stand and take a shower this AM.  Pt walked around house today without cane. Pt says she started feeling better yesterday. Pt believes the bike aggravated symptoms last visit. Pt had relief with TENS unit. Pt is seeing Monika Abraham after today's apptmnt. She has not had work up of spine. She is currently not taking any meds for her back. OBJECTIVE: See eval   Observation:   Increase radicular pain to calf/ ankle in prone, side lying and supine. Hook lying most tolerable position.     Test measurements:    ROM     current   LUMBAR FLEX WNL      LUMBAR EXT Limited 50%      Sidebend WNL Limited 50%       LEFT RIGHT     HIP Flex WNL WNL     HIP Abd   Pain with full passive abd     Knee Flex WNL WNL     Knee ext WNL WNL     Hamstring FLEX  90/90 0 0              Strength  LEFT RIGHT     HIP Flexors 4 4 pain 4 4   HIP Abductors        HIP ER 4 4 pain     Hip IR 4 4 pain     Knee EXT (quad) 5 5 5 5   Knee Flex (HS) 5 5 5 5   Ankle DF 5 5 5 5            Pain Scale 8/10   3.5 /10   Mod oswestry 66%   42%      RESTRICTIONS/PRECAUTIONS: HBP, OA    Exercises/Interventions:     Therapeutic Ex (07187) Sets/sec Reps Notes/CUES   Bike    TA :05 X 10 TA with march  X 10     Supine Hip Iso  Abd/ add  :05 X 15 hep   BKFO  X 15 hep   Clamshells/  Rev clamshells X 15 X 15              Piriformis stretch 20\"  3 x     Incline s :20 3x     Seated Hs s ;20 3x Lateral side stepping 2 laps     Standing hip abd  2 x 10 bilat    Seated SB pelvic a-p, m-l,  Cw, ccw  X 10 each    Seated lumbar flex SB  X 10     Seated SB march  X 10 each    Manual Intervention (69580)      Piriformis massage  4 min    Man HS/  Man ITB  2 min       Pain with release of traction          NMR re-education (82424)   CUES NEEDED                                 Therapeutic Activity (41533)            Sleep position/ log roll/  Avoid twisting  x Handout provided. Access Code: [email or web address]  URL: [facility]/  Date: 95/78/9875  Prepared by: Neoma Pyo    Exercises  Supine Posterior Pelvic Tilt - 2 x daily - 7 x weekly - 1 sets - 10 reps - 5 hold  Supine Hip Adduction Isometric with Ball - 2 x daily - 7 x weekly - 1 sets - 10 reps - 5 hold  Hooklying Isometric Clamshell - 2 x daily - 7 x weekly - 1 sets - 10 reps - 5 hold  Supine Single Bent Knee Fallout - 2 x daily - 7 x weekly - 1 sets - 10 reps - 2 hold  Seated Transversus Abdominis Bracing - 2 x daily - 7 x weekly - 1 sets - 10 reps - 5 hold  Seated Hip Adduction Isometrics with Ball - 2 x daily - 7 x weekly - 1 sets - 10 reps - 5 hold  Seated Gluteal Sets - 2 x daily - 7 x weekly - 1 sets - 10 reps - 5 hold  Seated Hamstring Stretch - 2 x daily - 7 x weekly - 1 sets - 3 reps - 20 hold    Therapeutic Exercise and NMR EXR  [x] (41847) Provided verbal/tactile cueing for activities related to strengthening, flexibility, endurance, ROM  for improvements in proximal hip and core control with self care, mobility, lifting and ambulation.  [] (17172) Provided verbal/tactile cueing for activities related to improving balance, coordination, kinesthetic sense, posture, motor skill, proprioception  to assist with core control in self care, mobility, lifting, and ambulation.      Therapeutic Activities:    [] (14528 or 83274) Provided verbal/tactile cueing for activities related to improving balance, coordination, kinesthetic sense, posture, motor skill, proprioception and motor activation to allow for proper function  with self care and ADLs  [] (02964) Provided training and instruction to the patient for proper core and proximal hip recruitment and positioning with ambulation re-education     Home Exercise Program:    [x] (54511) Reviewed/Progressed HEP activities related to strengthening, flexibility, endurance, ROM of core, proximal hip and LE for functional self-care, mobility, lifting and ambulation   [] (69622) Reviewed/Progressed HEP activities related to improving balance, coordination, kinesthetic sense, posture, motor skill, proprioception of core, proximal hip and LE for self care, mobility, lifting, and ambulation      Manual Treatments:  PROM / STM / Oscillations-Mobs:  G-I, II, III, IV (PA's, Inf., Post.)  [x] (15996) Provided manual therapy to mobilize proximal hip and LS spine soft tissue/joints for the purpose of modulating pain, promoting relaxation,  increasing ROM, reducing/eliminating soft tissue swelling/inflammation/restriction, improving soft tissue extensibility and allowing for proper ROM for normal function with self care, mobility, lifting and ambulation. Modalities:  Cold pack x 10 minutes      Charges  Timed Code Treatment Minutes: 40   Total Treatment Minutes: 50       Medicare total (add KX at $2000)  479.88   (9 visits prior for shoulder)   85.34    [] EVAL (LOW) 17326   [] EVAL (MOD) 20707   [] EVAL (HIGH) 08085   [] RE-EVAL     [x] XX(22789) x   2  [] IONTO  [] NMR (32431) x     [] VASO  [x] Manual (31741) x 1     [] Other:  [] TA x      [] Mech Traction (72219)  [] ES(attended) (94151)      [] ES (un) (87283):     Goals:   Patient stated goal: My life  [] Progressing: [] Met: [] Not Met: [] Adjusted    Therapist goals for Patient:   Short Term Goals: To be achieved in: 2 weeks  1. Independent in HEP and progression per patient tolerance, in order to prevent re-injury. [] Progressing: [] Met: [] Not Met: [] Adjusted  2. Patient will have a decrease in pain to facilitate improvement in movement, function, and ADLs as indicated by Functional Deficits. [] Progressing: [] Met: [] Not Met: [] Adjusted      Long Term Goals: To be achieved in: 8 weeks  1. Disability index score of 30% or less for the mod oswestry  to assist with reaching prior level of function. [] Progressing: [] Met: [] Not Met: [] Adjusted  2. Patient will demonstrate increased AROM to WNL, good LS mobility, good hip ROM to allow for proper joint functioning as indicated by patients Functional Deficits. [] Progressing: [] Met: [] Not Met: [] Adjusted  3. Patient will demonstrate an increase in Strength to good proximal hip and core activation to allow for proper functional mobility as indicated by patients Functional Deficits. [] Progressing: [] Met: [] Not Met: [] Adjusted  4. Patient will return to standing up to 30 min to prepare a meal or grocery shop without increased symptoms or restriction. [] Progressing: [] Met: [] Not Met: [] Adjusted  5.  Able to sleep 7-8 hours at night. [] Progressing: [] Met: [] Not Met: [] Adjusted       Progression Towards Functional goals:  [] Patient is progressing as expected towards functional goals listed. [] Progression is slowed due to complexities listed. [] Progression has been slowed due to co-morbidities. [x] Plan just implemented, too soon to assess goals progression  [] Other:     Overall Progression Towards Functional goals/ Treatment Progress Update:  [] Patient is progressing as expected towards functional goals listed. [] Progression is slowed due to complexities/Impairments listed. [] Progression has been slowed due to co-morbidities. [x] Plan just implemented, too soon to assess goals progression <30days   [] Goals require adjustment due to lack of progress  [] Patient is not progressing as expected and requires additional follow up with physician  [] Other    Prognosis for POC: [x] Good [] Fair  [] Poor      Patient requires continued skilled intervention: [x] Yes  [] No    Treatment/Activity Tolerance:  [x] Patient able to complete treatment  [] Patient limited by fatigue  [] Patient limited by pain    [] Patient limited by other medical complications  [] Other:     ASSESSMENT: Add TB row, ext and hip ext nv? PLAN: See eval  [x] Continue per plan of care [] Alter current plan (see comments above)  [] Plan of care initiated [] Hold pending MD visit [] Discharge      Electronically signed by:  Tea Foster PT    Note: If patient does not return for scheduled/ recommended follow up visits, this note will serve as a discharge from care along with most recent update on progress.

## 2021-10-18 NOTE — TELEPHONE ENCOUNTER
Patient contacted regarding Lumbar MRI W/O CONTRAST approval and authorization is valid until 10/18/2022. Patient was notified and instructed to call to schedule their Lumbar MRI W/O CONTRAST at [address] [address] [facility] [address] P: [phone]    Once completed, patient was instructed on scheduling a follow up appoint to review results. TEST RESULTS WILL NOT BE GIVEN OVER THE PHONE. AN IN-OFFICE APPOINTMENT IS REQUIRED    The patient was provided contact information should the need arise to reschedule any of the appointments.

## 2021-10-18 NOTE — PROGRESS NOTES
New Patient: Oliva Villela    10/18/2021     CHIEF COMPLAINT:    Chief Complaint   Patient presents with    New Patient     new patient low back doi:9/23/21       HISTORY OF PRESENT ILLNESS:              The patient is a 77 y.o. female history of hypertension here for low back and radiating right leg pain. She states on 9/23/2021 she climbed into the  seat from the passenger side and aggravated her low back. She currently describes aching and burning right low back/buttock pain radiating to the posterior thigh/calf to the foot. She reports tingling in her right foot. She reports subjective right leg weakness and states that her leg feels as if it will \"give out\". She has been ambulating with a cane. Her symptoms are increased with walking or standing. She reports relief with sitting and resting. Conservative care includes prednisone, NSAIDs, ice, TENS, PT. She reports approximately 60 to 70% improvement at this time. She denies any progressive extremity weakness. Denies any recent bowel bladder dysfunction. Denies any recent fevers chills infections. Denies any direct injury or trauma.   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    Arthritis     Calculus of kidney     Cancer (HCC)     skin    Cystocele, midline     Eustachian tube dysfunction, bilateral     Gastro-esophageal reflux disease without esophagitis     Hyperlipidemia     Hypertension     Liver cyst     Neuritis     Nocturia     Old myocardial infarction     Osteoarthritis     Otalgia     Perennial allergic rhinitis     Pharyngeal inflammation     Primary osteoarthritis of right knee     Tinnitus, bilateral     Tobacco use     Urinary frequency     Urinary incontinence     Vaginal enterocele     Vaginal vault 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         Current/Past Treatment:   · Physical Therapy: Yes  · Chiropractic:     · Injection:     Medications:            NSAIDS: Yes            Muscle relaxer:              Steriods:   Prednisone            Neuropathic medications:              Opioids:            Other:   · Surgery/Consult:    Work Status/Functionality: Retired    Past Medical History: Medical history form was reviewed today & scanned into the media tab  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    Arthritis     Calculus of kidney     Cancer (HCC)     skin    Cystocele, midline     Eustachian tube dysfunction, bilateral     Gastro-esophageal reflux disease without esophagitis     Hyperlipidemia     Hypertension     Liver cyst     Neuritis     Nocturia     Old myocardial infarction     Osteoarthritis     Otalgia     Perennial allergic rhinitis     Pharyngeal inflammation     Primary osteoarthritis of right knee     Tinnitus, bilateral     Tobacco use     Urinary frequency     Urinary incontinence     Vaginal enterocele     Vaginal vault 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       Past Surgical History:     Past Surgical History:   Procedure Laterality Date    APPENDECTOMY      CHOLECYSTECTOMY      ELBOW DEBRIDEMENT      FOOT SURGERY      bilateral    HYSTERECTOMY, VAGINAL      KNEE ARTHROSCOPY Right 4/24/2019    RIGHT KNEE ARTHROSCOPY,, SYNOVECTOMY CHONDROPLASTY,MEDIAL AND LATERAL MENESECTOMY, REMOVAL LOOSE BODIES performed by Abbie Latif MD at [facility]      bilateral    LIVER SURGERY      cyst removal    NECK SURGERY      ROTATOR CUFF REPAIR Right 10/30/2020    VIDEO ARTHROSCOPY RIGHT SHOULDER SUBACROMIAL DECOMPRESSION LABRAL DEBRIDEMENT ROTATOR CUFF TEAR performed by Pamela Angel MD at [address] [place] [address] LEFT Current Medications:     Current Outpatient Medications:     betamethasone valerate (VALISONE) 0.1 % cream, APPLY SMALL AMOUNT OF CREAM TOPICALLY TO AFFECTED AREA TWICE DAILY FOR 7 DAYS, Disp: , Rfl:     clotrimazole (MYCELEX) 10 MG kg, DISSOLVE 1 TABLET BY MOUTH FIVE TIMES DAILY FOR 7 DAYS, Disp: , Rfl:     diclofenac (VOLTAREN) 75 MG EC tablet, Take 1 tablet by mouth 2 times daily, Disp: 60 tablet, Rfl: 2    ciclopirox (PENLAC) 8 % solution, APPLY TOPICALLY ONE APPLICATION TO THE AFFECTED AREA DAILY, Disp: , Rfl:     Multiple Vitamins-Minerals (MULTIVITAMIN ADULT PO), Take 1 tablet by mouth daily, Disp: , Rfl:     carvedilol (COREG) 12.5 MG tablet, Take 12.5 mg by mouth 2 times daily (with meals). , Disp: , Rfl:     amLODIPine (NORVASC) 5 MG tablet, Take 5 mg by mouth daily. , Disp: , Rfl:     omeprazole (PRILOSEC) 40 MG capsule, Take 40 mg by mouth daily. , Disp: , Rfl:     aspirin 81 MG tablet, Take 81 mg by mouth daily. , Disp: , Rfl:     S-Adenosylmethionine (EFREN-E) 400 MG TABS, Take  by mouth., Disp: , Rfl:   Allergies:  Adhesive tape, Codeine, Crestor [rosuvastatin calcium], Food, Lipitor [atorvastatin calcium], Nitrofurantoin, Penicillins, Sulfa antibiotics, and Tramadol  Social History:    reports that she has been smoking cigarettes. She has been smoking about 0.25 packs per day. She has never used smokeless tobacco. She reports that she does not drink alcohol and does not use drugs.   Family History:   Family History   Problem Relation Age of Onset    Breast Cancer Mother     Cancer Mother         Bone    Diabetes Mother     Heart Disease Mother         CHF    Arthritis Mother     Heart Attack Father     Prostate Cancer Brother     Cancer Maternal Grandmother     Cancer Brother         kidney    Cancer Brother         bladder       REVIEW OF SYSTEMS: Full ROS reviewed & scanned into chart  CONSTITUTIONAL: Denies unexplained weight loss, fevers   SKIN: Denies active skin conditions ENT: Denies dizziness, nosebleeds  RESPIRATORY: Denies current dyspnea, difficulty breathing  CARDIOVASCULAR: Denies chest pain   NEUROLOGICAL: Denies stroke, unsteady gait or progressive weakness  PSYCHOLOGICAL: Denies anxiety, depression   HEMATOLOGIC: Denies blood disorders, cancer  ENDOCRINE: Denies excessive thirst, urination, heat/cold  GI: Denies ulcer, nausea, vomiting, diarrhea   : Denies bowel or bladder incontinence       PHYSICAL EXAM:    Vitals: Height 5' 4\" (1.626 m), weight 230 lb (104.3 kg). Pain score 3/10    GENERAL EXAM:  · General Apparence: Patient is adequately groomed with no evidence of malnutrition. · Orientation: The patient is oriented to time, place and person. · Mood & Affect:The patient's mood and affect are appropriate   · Lymphatic: The lymphatic examination bilaterally reveals all areas to be without enlargement or induration  · Sensation: Sensation is intact without deficit  · Coordination/Balance: Good coordination     LUMBAR/SACRAL EXAMINATION:  · Inspection: Local inspection shows no step-off or bruising. Lumbar alignment is normal.  Sagittal and Coronal balance is neutral.      · Palpation:   No evidence of tenderness at the midline. · Range of Motion: Mild loss of flexion mild to moderate loss extension  · Strength:   Strength testing is 5/5 in all muscle groups tested. · Special Tests:   Straight leg raise and crossed SLR negative. Leg length and pelvis level.  0 out of 5 Arlette's signs. · Skin: There are no rashes, ulcerations or lesions. · Reflexes: Reflexes are symmetrically 1+ at the patellar and ankle tendons; trace left patellar with reinforcement. Clonus absent bilaterally at the feet. · Gait & station: Forward flexed with cane  · Additional Examinations:   · RIGHT LOWER EXTREMITY: Inspection/examination of the right lower extremity does not show any tenderness, deformity or injury. Range of motion is full. There is no gross instability.   There are no rashes, ulcerations or lesions. Strength and tone are normal.  · LEFT LOWER EXTREMITY:  Inspection/examination of the left lower extremity does not show any tenderness, deformity or injury. Range of motion is full. There is no gross instability. There are no rashes, ulcerations or lesions.   Strength and tone are normal.    Diagnostic Testin views lumbar spine 10/18/2021 mild scoliosis, multilevel moderate to severe DDD/spondylosis and facet arthropathy, mild L5 superior endplate irregularity likely degenerative, L1-L2 right lateral spur        Impression:  1) 1mo right lumbar radiculitis  2) Multilevel lumbar DDD/spondylosis  3) Subjective right LE weakness  4) HTN      Plan:   1) 2 views lumbar spine as above  2) Lumbar MRI WO--assess for right-sided neural impingement and spinal stenosis  3) MDP--side effects discussed, DC NSAIDs during  4) Cont HEP  5) F/u to review lumbar MRI scan briefly discussed interventional procedures if warranted           Jenise Poon PA-C, MPAS  Board Certified by the [facility]

## 2021-10-19 ENCOUNTER — TELEPHONE (OUTPATIENT)
Dept: UROGYNECOLOGY | Age: 66
End: 2021-10-19

## 2021-10-19 ENCOUNTER — HOSPITAL ENCOUNTER (OUTPATIENT)
Dept: MRI IMAGING | Age: 66
Discharge: HOME | End: 2021-10-19
Payer: MEDICARE

## 2021-10-19 DIAGNOSIS — M54.16 LUMBAR RADICULITIS: ICD-10-CM

## 2021-10-19 DIAGNOSIS — M51.36 DDD (DEGENERATIVE DISC DISEASE), LUMBAR: ICD-10-CM

## 2021-10-19 DIAGNOSIS — M54.50 LUMBAR PAIN: ICD-10-CM

## 2021-10-19 PROCEDURE — 72148 MRI LUMBAR SPINE W/O DYE: CPT

## 2021-10-20 ENCOUNTER — HOSPITAL ENCOUNTER (OUTPATIENT)
Dept: PHYSICAL THERAPY | Age: 66
Setting detail: THERAPIES SERIES
Discharge: HOME | End: 2021-10-20
Payer: MEDICARE

## 2021-10-20 NOTE — FLOWSHEET NOTE
[facility],  [address]        Physical Therapy  Cancellation/No-show Note  Patient Name:  Sarah Dickey  :  1955   Date:  10/20/2021  Cancelled visits to date: 1  No-shows to date: 0    For today's appointment patient:  [x]  Cancelled  []  Rescheduled appointment  []  No-show     Reason given by patient:  [x]  Patient ill  []  Conflicting appointment  []  No transportation    []  Conflict with work  []  No reason given  []  Other:     Comments:      Electronically signed by:  Palomo Betancur, PT

## 2021-10-22 ENCOUNTER — TELEPHONE (OUTPATIENT)
Dept: UROGYNECOLOGY | Age: 66
End: 2021-10-22

## 2021-10-22 RX ORDER — CIPROFLOXACIN 500 MG/1
500 TABLET, FILM COATED ORAL 2 TIMES DAILY
Qty: 14 TABLET | Refills: 0 | Status: SHIPPED | OUTPATIENT
Start: 2021-10-22 | End: 2021-10-29

## 2021-10-22 RX ORDER — IBUPROFEN 600 MG/1
600 TABLET ORAL ONCE
Qty: 1 TABLET | Refills: 0 | Status: SHIPPED | OUTPATIENT
Start: 2021-10-22 | End: 2021-11-08

## 2021-10-22 NOTE — TELEPHONE ENCOUNTER
Premedication called in for Botox procedure per Dr Josey Sands. Patient made aware and verbalized understanding. Patient denies need for Valium medication for Procedure. Patient verbalized understanding for premedication.

## 2021-11-01 ENCOUNTER — HOSPITAL ENCOUNTER (OUTPATIENT)
Dept: PHYSICAL THERAPY | Age: 66
Setting detail: THERAPIES SERIES
Discharge: HOME | End: 2021-11-01
Payer: MEDICARE

## 2021-11-01 PROCEDURE — G0283 ELEC STIM OTHER THAN WOUND: HCPCS | Performed by: PHYSICAL THERAPIST

## 2021-11-01 PROCEDURE — 97110 THERAPEUTIC EXERCISES: CPT | Performed by: PHYSICAL THERAPIST

## 2021-11-01 NOTE — FLOWSHEET NOTE
Sidebend WNL Limited 50%       LEFT RIGHT     HIP Flex WNL WNL     HIP Abd   Pain with full passive abd     Knee Flex WNL WNL     Knee ext WNL WNL     Hamstring FLEX  90/90 0 0              Strength  LEFT RIGHT     HIP Flexors 4 4 pain 4 4   HIP Abductors        HIP ER 4 4 pain     Hip IR 4 4 pain     Knee EXT (quad) 5 5 5 5   Knee Flex (HS) 5 5 5 5   Ankle DF 5 5 5 5            Pain Scale 8/10   3.5 /10   Mod oswestry 66%   42%      RESTRICTIONS/PRECAUTIONS: HBP, OA    Exercises/Interventions:     Therapeutic Ex (07647) Sets/sec Reps Notes/CUES   Bike    TA with march  X 10     Supine Hip Iso  Abd/ add  :05 X 15 hep   hep   Clamshells/  Rev clamshells X 15 X 15     Supine SB DKC / SB LTR X 15  x15        Piriformis stretch 20\"  3 x     Incline s :20 3x     Seated Hs s ;20 3x Lateral side stepping 2 laps     Standing hip abd  2 x 10 bilat    Seated SB pelvic a-p, m-l,  Cw, ccw  X 10 each    Seated lumbar flex SB  X 10     Seated SB march  X 10 each    Manual Intervention (90203)      Piriformis massage  4 min    Man HS/  Man ITB  2 min       Pain with release of traction          NMR re-education (39257)   CUES NEEDED                                 Therapeutic Activity (36304)            Sleep position/ log roll/  Avoid twisting  x Handout provided. Access Code: [email or web address]  URL: [employer]/  Date: 48/28/3855  Prepared by: Viet Acuña    Exercises  Supine Posterior Pelvic Tilt - 2 x daily - 7 x weekly - 1 sets - 10 reps - 5 hold  Supine Hip Adduction Isometric with Ball - 2 x daily - 7 x weekly - 1 sets - 10 reps - 5 hold  Hooklying Isometric Clamshell - 2 x daily - 7 x weekly - 1 sets - 10 reps - 5 hold  Supine Single Bent Knee Fallout - 2 x daily - 7 x weekly - 1 sets - 10 reps - 2 hold  Seated Transversus Abdominis Bracing - 2 x daily - 7 x weekly - 1 sets - 10 reps - 5 hold  Seated Hip Adduction Isometrics with Ball - 2 x daily - 7 x weekly - 1 sets - 10 reps - 5 hold  Seated Gluteal Sets - 2 x daily - 7 x weekly - 1 sets - 10 reps - 5 hold  Seated Hamstring Stretch - 2 x daily - 7 x weekly - 1 sets - 3 reps - 20 hold    Therapeutic Exercise and NMR EXR  [x] (87848) Provided verbal/tactile cueing for activities related to strengthening, flexibility, endurance, ROM  for improvements in proximal hip and core control with self care, mobility, lifting and ambulation.  [] (13420) Provided verbal/tactile cueing for activities related to improving balance, coordination, kinesthetic sense, posture, motor skill, proprioception  to assist with core control in self care, mobility, lifting, and ambulation.      Therapeutic Activities:    [] (16986 or 12510) Provided verbal/tactile cueing for activities related to improving balance, coordination, kinesthetic sense, posture, motor skill, proprioception and motor activation to allow for proper function  with self care and ADLs  [] (62490) Provided training and instruction to the patient for proper core and proximal hip recruitment and positioning with ambulation re-education     Home Exercise Program:    [x] (25344) Reviewed/Progressed HEP activities related to strengthening, flexibility, endurance, ROM of core, proximal hip and LE for functional self-care, mobility, lifting and ambulation   [] (72106) Reviewed/Progressed HEP activities related to improving balance, coordination, kinesthetic sense, posture, motor skill, proprioception of core, proximal hip and LE for self care, mobility, lifting, and ambulation      Manual Treatments:  PROM / STM / Oscillations-Mobs:  G-I, II, III, IV (PA's, Inf., Post.)  [x] (66069) Provided manual therapy to mobilize proximal hip and LS spine soft tissue/joints for the purpose of modulating pain, promoting relaxation,  increasing ROM, reducing/eliminating soft tissue swelling/inflammation/restriction, improving soft tissue extensibility and allowing for proper ROM for normal function with self care, mobility, lifting and ambulation. Modalities:  Cold pack x 10 minutes      Charges  Timed Code Treatment Minutes: 40   Total Treatment Minutes: 50       Medicare total (add KX at $2000)  551.02   (9 visits prior for shoulder)     71.17    [] EVAL (LOW) 16809   [] EVAL (MOD) 93178   [] EVAL (HIGH) 24561   [] RE-EVAL     [x] WN(37757) x   2  [] IONTO  [] NMR (64952) x     [] VASO  [] Manual (53945) x      [] Other:  [] TA x      [] Mech Traction (83036)  [] ES(attended) (19511)      [x] ES (un) (13299):     Goals:   Patient stated goal: My life  [] Progressing: [] Met: [] Not Met: [] Adjusted    Therapist goals for Patient:   Short Term Goals: To be achieved in: 2 weeks  1. Independent in HEP and progression per patient tolerance, in order to prevent re-injury. [] Progressing: [] Met: [] Not Met: [] Adjusted  2. Patient will have a decrease in pain to facilitate improvement in movement, function, and ADLs as indicated by Functional Deficits. [] Progressing: [] Met: [] Not Met: [] Adjusted      Long Term Goals: To be achieved in: 8 weeks  1. Disability index score of 30% or less for the mod oswestry  to assist with reaching prior level of function. [] Progressing: [] Met: [] Not Met: [] Adjusted  2. Patient will demonstrate increased AROM to WNL, good LS mobility, good hip ROM to allow for proper joint functioning as indicated by patients Functional Deficits. [] Progressing: [] Met: [] Not Met: [] Adjusted  3. Patient will demonstrate an increase in Strength to good proximal hip and core activation to allow for proper functional mobility as indicated by patients Functional Deficits. [] Progressing: [] Met: [] Not Met: [] Adjusted  4. Patient will return to standing up to 30 min to prepare a meal or grocery shop without increased symptoms or restriction. [] Progressing: [] Met: [] Not Met: [] Adjusted  5. Able to sleep 7-8 hours at night.    [] Progressing: [] Met: [] Not Met: [] Adjusted       Progression Towards Functional goals:  [] Patient is progressing as expected towards functional goals listed. [] Progression is slowed due to complexities listed. [] Progression has been slowed due to co-morbidities. [x] Plan just implemented, too soon to assess goals progression  [] Other:     Overall Progression Towards Functional goals/ Treatment Progress Update:  [] Patient is progressing as expected towards functional goals listed. [] Progression is slowed due to complexities/Impairments listed. [] Progression has been slowed due to co-morbidities. [x] Plan just implemented, too soon to assess goals progression <30days   [] Goals require adjustment due to lack of progress  [] Patient is not progressing as expected and requires additional follow up with physician  [] Other    Prognosis for POC: [x] Good [] Fair  [] Poor      Patient requires continued skilled intervention: [x] Yes  [] No    Treatment/Activity Tolerance:  [x] Patient able to complete treatment  [] Patient limited by fatigue  [] Patient limited by pain    [] Patient limited by other medical complications  [] Other:     ASSESSMENT: Add TB row, ext and hip ext nv? PLAN: See eval  [x] Continue per plan of care [] Alter current plan (see comments above)  [] Plan of care initiated [] Hold pending MD visit [] Discharge      Electronically signed by:  Endy Lucas PT    Note: If patient does not return for scheduled/ recommended follow up visits, this note will serve as a discharge from care along with most recent update on progress.

## 2021-11-03 ENCOUNTER — HOSPITAL ENCOUNTER (OUTPATIENT)
Dept: PHYSICAL THERAPY | Age: 66
Setting detail: THERAPIES SERIES
Discharge: HOME | End: 2021-11-03
Payer: MEDICARE

## 2021-11-03 PROCEDURE — 97110 THERAPEUTIC EXERCISES: CPT | Performed by: PHYSICAL THERAPIST

## 2021-11-03 PROCEDURE — 97140 MANUAL THERAPY 1/> REGIONS: CPT | Performed by: PHYSICAL THERAPIST

## 2021-11-03 NOTE — FLOWSHEET NOTE
[place] [facility] [facility], [address] [place]  Phone: [phone]  Fax [phone]    Physical Therapy Treatment Note/ Progress Report:           Date:  11/3/2021    Patient Name:  Yosi Arreola    :  1955  MRN: [id number]  Restrictions/Precautions:    Medical/Treatment Diagnosis Information:  Diagnosis: M54.41 (ICD-10-CM) - Acute right-sided low back pain with right-sided sciatica  Treatment Diagnosis: Low back pain A63.8  Insurance/Certification information:  PT Insurance Information: MC/  [employer] Supp  Physician Information:  Referring Practitioner: Dr. Dasia Wagner  Has the plan of care been signed (Y/N):        []  Yes  [x]  No     Date of Patient follow up with Physician: ?      Is this a Progress Report:     []  Yes  [x]  No        If Yes:  Date Range for reporting period:  Beginning: 10/6/21  Ending:     Progress report will be due (10 Rx or 30 days whichever is less): 77       Recertification will be due (POC Duration  / 90 days whichever is less):         Visit # Insurance Allowable Auth Required   In-person 5  []  Yes []  No    Telehealth   []  Yes []  No    Total          Therapy Diagnosis/Practice Pattern:F      Number of Comorbidities:  []0     [x]1-2    []3+    Latex Allergy:  []NO      [x]YES  Preferred Language for Healthcare:   [x]English       []other:    SUBJECTIVE:  Pt sees Cristina Bliss on Friday. Pt states that she feels better raising the cane up one notch. Pt reports no pain today. Pt states that it takes her two hours to unload . Pt is able to sit for a couple of hours. Pt is able to stand for about 5 min before pain starts down right leg, but today that seems better. Pt has discomfort with exercises in clinic, but she states that her HEP is fine. Pt I finished with steroid, and she not taking any meds. She is using TENS unit for discomfort.      OBJECTIVE: See eval   Observation:   Increase radicular pain to calf/ ankle in prone, side lying and supine. Hook lying most tolerable position.  Test measurements:    ROM     current   LUMBAR FLEX WNL      LUMBAR EXT Limited 50%      Sidebend WNL Limited 50%       LEFT RIGHT     HIP Flex WNL WNL     HIP Abd   Pain with full passive abd     Knee Flex WNL WNL     Knee ext WNL WNL     Hamstring FLEX  90/90 0 0              Strength  LEFT RIGHT     HIP Flexors 4 4 pain 4 4   HIP Abductors        HIP ER 4 4 pain     Hip IR 4 4 pain     Knee EXT (quad) 5 5 5 5   Knee Flex (HS) 5 5 5 5   Ankle DF 5 5 5 5            Pain Scale 8/10   0 /10   Mod oswestry 66%   42%      RESTRICTIONS/PRECAUTIONS: HBP, OA    Exercises/Interventions:     Therapeutic Ex (35470) Sets/sec Reps Notes/CUES   Pt feels this exacerbates her pain. TA with march  X 15     Supine Hip Iso  Abd/ add  :05 X 15 hep   Clamshells/  Rev clamshells X 15 X 15     Supine SB DKC / SB LTR X 15  x10     Piriformis stretch/  Fig 4 s 20\"  3 x     Incline s :20 3x     Seated Hs s :20 3x Lateral side stepping 2 laps     Standing hip abd  2 x 10 bilat    TB row/  TB ext BL X 30 each    Seated SB pelvic a-p, m-l,  Cw, ccw  X 10 each    Seated lumbar flex SB  X 10     Seated SB march  X 10 each    Manual Intervention (85831)      Piriformis massage  4 min    Man HS/  Man ITB  2 min       Pain with release of traction          NMR re-education (58750)   CUES NEEDED                                 Therapeutic Activity (88762)            Sleep position/ log roll/  Avoid twisting  x Handout provided. Access Code: [email or web address]  URL: [email or web address]  Date: 36/88/4098  Prepared by: Viet Acuña    Exercises  Supine Posterior Pelvic Tilt - 2 x daily - 7 x weekly - 1 sets - 10 reps - 5 hold  Supine Hip Adduction Isometric with Ball - 2 x daily - 7 x weekly - 1 sets - 10 reps - 5 hold  Hooklying Isometric Clamshell - 2 x daily - 7 x weekly - 1 sets - 10 reps - 5 hold  Supine Single Bent Knee Fallout - 2 x daily - 7 x weekly - 1 sets - 10 reps - 2 hold  Seated Transversus Abdominis Bracing - 2 x daily - 7 x weekly - 1 sets - 10 reps - 5 hold  Seated Hip Adduction Isometrics with Ball - 2 x daily - 7 x weekly - 1 sets - 10 reps - 5 hold  Seated Gluteal Sets - 2 x daily - 7 x weekly - 1 sets - 10 reps - 5 hold  Seated Hamstring Stretch - 2 x daily - 7 x weekly - 1 sets - 3 reps - 20 hold    Therapeutic Exercise and NMR EXR  [x] (94098) Provided verbal/tactile cueing for activities related to strengthening, flexibility, endurance, ROM  for improvements in proximal hip and core control with self care, mobility, lifting and ambulation.  [] (60267) Provided verbal/tactile cueing for activities related to improving balance, coordination, kinesthetic sense, posture, motor skill, proprioception  to assist with core control in self care, mobility, lifting, and ambulation.      Therapeutic Activities:    [] (67337 or 86566) Provided verbal/tactile cueing for activities related to improving balance, coordination, kinesthetic sense, posture, motor skill, proprioception and motor activation to allow for proper function  with self care and ADLs  [] (15336) Provided training and instruction to the patient for proper core and proximal hip recruitment and positioning with ambulation re-education     Home Exercise Program:    [x] (70736) Reviewed/Progressed HEP activities related to strengthening, flexibility, endurance, ROM of core, proximal hip and LE for functional self-care, mobility, lifting and ambulation   [] (55195) Reviewed/Progressed HEP activities related to improving balance, coordination, kinesthetic sense, posture, motor skill, proprioception of core, proximal hip and LE for self care, mobility, lifting, and ambulation      Manual Treatments:  PROM / STM / Oscillations-Mobs:  G-I, II, III, IV (PA's, Inf., Post.)  [x] (34621) Provided manual therapy to mobilize proximal hip and LS spine soft tissue/joints for the purpose of modulating pain, promoting relaxation,  increasing ROM, reducing/eliminating soft tissue swelling/inflammation/restriction, improving soft tissue extensibility and allowing for proper ROM for normal function with self care, mobility, lifting and ambulation. Modalities:  Cold pack x 10 minutes      Charges  Timed Code Treatment Minutes: 40   Total Treatment Minutes: 50     Medicare total (add KX at $2000)  636.36   (9 visits prior for shoulder)     85.34  MEDICARE CAP EXCEPTION DOCUMENTATION      I certify that this patient meets one of the below criteria necessary for becoming an exception to the Medicare cap on therapy services:    []  The patient has a condition identified by an ICD-10 code that has a direct and significant impact on the need for therapy. (Significantly impacts the rate of recovery.)                   []  The patient has a complexity identified by an ICD-10 code that has a direct and significant impact on the need for therapy. (Significantly impacts the rate of recovery and is associated with a primary condition.)         []  The patient has associated variables that influence the amount of treatment to include:  Social support, self-efficacy/motivation, prognosis, time since onset/acuity. []  The patient has generalized musculoskeletal conditions or a condition affecting multiple sites that will have a direct impact on the rate of recovery. [x]  The patient had a prior episode of outpatient therapy during this calendar year for a different condition. []  The patient has a mental or cognitive disorder in addition to the condition being treated that will have a direct and significant impact on the rate of recovery.           [] EVAL (LOW) 85952   [] EVAL (MOD) 09413   [] EVAL (HIGH) 57738   [] RE-EVAL     [x] NP(71181) x   2  [] IONTO  [] NMR (68628) x     [] VASO  [x] Manual (78041) x 1       [] Other:  [] TA listed. [] Progression is slowed due to complexities/Impairments listed. [] Progression has been slowed due to co-morbidities. [x] Plan just implemented, too soon to assess goals progression <30days   [] Goals require adjustment due to lack of progress  [] Patient is not progressing as expected and requires additional follow up with physician  [] Other    Prognosis for POC: [x] Good [] Fair  [] Poor      Patient requires continued skilled intervention: [x] Yes  [] No    Treatment/Activity Tolerance:  [x] Patient able to complete treatment  [] Patient limited by fatigue  [] Patient limited by pain    [] Patient limited by other medical complications  [] Other:     ASSESSMENT:  Pt tolerated increase in WB exercises. PLAN: See eval  [x] Continue per plan of care [] Alter current plan (see comments above)  [] Plan of care initiated [] Hold pending MD visit [] Discharge      Electronically signed by:  Toshia Costa, PT    Note: If patient does not return for scheduled/ recommended follow up visits, this note will serve as a discharge from care along with most recent update on progress.

## 2021-11-05 ENCOUNTER — OFFICE VISIT (OUTPATIENT)
Dept: ORTHOPEDIC SURGERY | Age: 66
End: 2021-11-05
Payer: MEDICARE

## 2021-11-05 VITALS — HEIGHT: 64 IN | BODY MASS INDEX: 39.27 KG/M2 | WEIGHT: 230 LBS

## 2021-11-05 DIAGNOSIS — M54.16 RIGHT LUMBAR RADICULITIS: ICD-10-CM

## 2021-11-05 DIAGNOSIS — M51.26 PROTRUSION OF LUMBAR INTERVERTEBRAL DISC: Primary | ICD-10-CM

## 2021-11-05 DIAGNOSIS — M48.062 LUMBAR STENOSIS WITH NEUROGENIC CLAUDICATION: ICD-10-CM

## 2021-11-05 PROCEDURE — G8427 DOCREV CUR MEDS BY ELIG CLIN: HCPCS | Performed by: PHYSICIAN ASSISTANT

## 2021-11-05 PROCEDURE — G8417 CALC BMI ABV UP PARAM F/U: HCPCS | Performed by: PHYSICIAN ASSISTANT

## 2021-11-05 PROCEDURE — 4040F PNEUMOC VAC/ADMIN/RCVD: CPT | Performed by: PHYSICIAN ASSISTANT

## 2021-11-05 PROCEDURE — 4004F PT TOBACCO SCREEN RCVD TLK: CPT | Performed by: PHYSICIAN ASSISTANT

## 2021-11-05 PROCEDURE — G8484 FLU IMMUNIZE NO ADMIN: HCPCS | Performed by: PHYSICIAN ASSISTANT

## 2021-11-05 PROCEDURE — G8400 PT W/DXA NO RESULTS DOC: HCPCS | Performed by: PHYSICIAN ASSISTANT

## 2021-11-05 PROCEDURE — 3017F COLORECTAL CA SCREEN DOC REV: CPT | Performed by: PHYSICIAN ASSISTANT

## 2021-11-05 PROCEDURE — 99214 OFFICE O/P EST MOD 30 MIN: CPT | Performed by: PHYSICIAN ASSISTANT

## 2021-11-05 PROCEDURE — 1090F PRES/ABSN URINE INCON ASSESS: CPT | Performed by: PHYSICIAN ASSISTANT

## 2021-11-05 PROCEDURE — 1123F ACP DISCUSS/DSCN MKR DOCD: CPT | Performed by: PHYSICIAN ASSISTANT

## 2021-11-05 NOTE — PROGRESS NOTES
FOLLOW UP: SPINE    11/5/2021     CHIEF COMPLAINT:    Chief Complaint   Patient presents with    Follow-up     F/U MRI RESULTS       HISTORY OF PRESENT ILLNESS:              The patient is a 77 y.o. female history of hypertension here to review lumbar MRI for low back and radiating right leg pain. She states on 9/23/2021 she climbed into the  seat from the passenger side and aggravated her low back. She currently describes aching and burning low back/buttock pain radiating to the right posterior thigh/calf to the foot. She reports tingling in her right foot. She reports subjective right leg weakness and states that her leg feels as if it will \"give out\". She has been ambulating with a cane. Her symptoms are increased with walking or standing. She reports relief with sitting and resting. Conservative care includes: MDP, prednisone, NSAIDs, ice, TENS, PT. She does report improvement at this time but still with daily symptoms and limited functionally. She denies any progressive extremity weakness. Denies any recent bowel bladder dysfunction. Denies any recent fevers chills infections. Denies any direct injury or trauma.     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    Arthritis     Calculus of kidney     Cancer ([facility] [address]     skin    Cystocele, midline     Eustachian tube dysfunction, bilateral     Gastro-esophageal reflux disease without esophagitis     Hyperlipidemia     Hypertension     Liver cyst     Neuritis     Nocturia     Old myocardial infarction     Osteoarthritis     Otalgia     Perennial allergic rhinitis     Pharyngeal inflammation     Primary osteoarthritis of right knee     Tinnitus, bilateral     Tobacco use     Urinary frequency     Urinary incontinence     Vaginal enterocele     Vaginal vault 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       Past Surgical History:     Past Surgical History:   Procedure Laterality Date    APPENDECTOMY      CHOLECYSTECTOMY      ELBOW DEBRIDEMENT      FOOT SURGERY      bilateral    HYSTERECTOMY, VAGINAL      KNEE ARTHROSCOPY Right 4/24/2019    RIGHT KNEE ARTHROSCOPY,, SYNOVECTOMY CHONDROPLASTY,MEDIAL AND LATERAL MENESECTOMY, REMOVAL LOOSE BODIES performed by Vignesh Jordan MD at [facility] 1      bilateral    LIVER SURGERY      cyst removal    NECK SURGERY      ROTATOR CUFF REPAIR Right 10/30/2020    VIDEO ARTHROSCOPY RIGHT SHOULDER SUBACROMIAL DECOMPRESSION LABRAL DEBRIDEMENT ROTATOR CUFF TEAR performed by Kay Marks MD at [place] [address] ARTHROSCOPY      TONSILLECTOMY      TUBAL LIGATION      URETHRA SURGERY      US BREAST LESION REMOVAL LEFT       Current Medications:     Current Outpatient Medications:     ibuprofen (ADVIL;MOTRIN) 600 MG tablet, Take 1 tablet by mouth once for 1 dose Take one hour prior to procedure, Disp: 1 tablet, Rfl: 0    betamethasone valerate (VALISONE) 0.1 % cream, APPLY SMALL AMOUNT OF CREAM TOPICALLY TO AFFECTED AREA TWICE DAILY FOR 7 DAYS, Disp: , Rfl:     clotrimazole (MYCELEX) 10 MG kg, DISSOLVE 1 TABLET BY MOUTH FIVE TIMES DAILY FOR 7 DAYS, Disp: , Rfl:     methylPREDNISolone (MEDROL, NALLELY,) 4 MG tablet, Take by mouth., Disp: 1 kit, Rfl: 0    diclofenac (VOLTAREN) 75 MG EC tablet, Take 1 tablet by mouth 2 times daily, Disp: 60 tablet, Rfl: 2    ciclopirox (PENLAC) 8 % solution, APPLY TOPICALLY ONE APPLICATION TO THE AFFECTED AREA DAILY, Disp: , Rfl:     Multiple Vitamins-Minerals (MULTIVITAMIN ADULT PO), Take 1 tablet by mouth daily, Disp: , Rfl:     carvedilol (COREG) 12.5 MG tablet, Take 12.5 mg by mouth 2 times daily (with meals). , Disp: , Rfl:     amLODIPine (NORVASC) 5 MG tablet, Take 5 mg by mouth daily. , Disp: , Rfl:     omeprazole (PRILOSEC) 40 MG capsule, Take 40 mg by mouth daily. , Disp: , Rfl:     aspirin 81 MG tablet, Take 81 mg by mouth daily. , Disp: , Rfl:     S-Adenosylmethionine (EFREN-E) 400 MG TABS, Take  by mouth., Disp: , Rfl:   Allergies:  Adhesive tape, Codeine, Crestor [rosuvastatin calcium], Food, Lipitor [atorvastatin calcium], Nitrofurantoin, Penicillins, Sulfa antibiotics, and Tramadol  Social History:    reports that she has been smoking cigarettes. She has been smoking about 0.25 packs per day. She has never used smokeless tobacco. She reports that she does not drink alcohol and does not use drugs. Family History:   Family History   Problem Relation Age of Onset    Breast Cancer Mother     Cancer Mother         Bone    Diabetes Mother     Heart Disease Mother         CHF    Arthritis Mother     Heart Attack Father     Prostate Cancer Brother     Cancer Maternal Grandmother     Cancer Brother         kidney    Cancer Brother         bladder       REVIEW OF SYSTEMS: Full ROS reviewed & scanned into chart  CONSTITUTIONAL: Denies unexplained weight loss, fevers   SKIN: Denies active skin conditions       PHYSICAL EXAM:    Vitals: Height 5' 4\" (1.626 m), weight 230 lb (104.3 kg). Pain score 0/10, seated    GENERAL EXAM:  · General Apparence: Patient is adequately groomed with no evidence of malnutrition. · Orientation: The patient is oriented to time, place and person.    · Mood & Affect:The patient's mood and affect are appropriate   · Lymphatic: The lymphatic examination bilaterally reveals all areas to be without enlargement or induration  · Sensation: Sensation is intact without deficit    LUMBAR/SACRAL EXAMINATION:  · Inspection: Local inspection shows no step-off or bruising. Lumbar alignment is normal.  Sagittal and Coronal balance is neutral.      · Palpation:   No evidence of tenderness at the midline. · Range of Motion: Able to sit forward flex  · Strength:   Strength testing is 5/5 in all muscle groups tested. · Special Tests:   Straight leg raise and crossed SLR negative. Leg length and pelvis level.  0 out of 5 Arlette's signs. · Skin: There are no rashes, ulcerations or lesions. · Reflexes: Reflexes are symmetrically 1+ at the patellar and ankle tendons; trace left patellar with reinforcement. Clonus absent bilaterally at the feet. · Gait & station: Forward flexed with cane  · Additional Examinations:   · RIGHT LOWER EXTREMITY: Inspection/examination of the right lower extremity does not show any tenderness, deformity or injury. Range of motion is full. There is no gross instability. There are no rashes, ulcerations or lesions. Strength and tone are normal.  · LEFT LOWER EXTREMITY:  Inspection/examination of the left lower extremity does not show any tenderness, deformity or injury. Range of motion is full. There is no gross instability. There are no rashes, ulcerations or lesions.   Strength and tone are normal.    Diagnostic Testing:    Lumbar MRI scan report independently reviewed from October 2021 showing right paracentral disc extrusion L3-4 with moderate central and foraminal stenosis, right paracentral protrusion L4-5 with moderate right foraminal stenosis, multilevel DDD/facet arthropathy    2 views lumbar spine 10/18/2021 mild scoliosis, multilevel moderate to severe DDD/spondylosis and facet arthropathy, mild L5 superior endplate irregularity likely degenerative, L1-L2 right lateral spur        Impression:  1) 6wks right lumbar radiculitis  2) right disc retrusion L3-4, L4-5, moderate central stenosis  3) Subjective right LE weakness  4) HTN      Plan:   1) We had a long discussion. We reviewed her lumbar MRI scan. She wishes to proceed with right L4-5 LESI #1. Procedure risk and benefits were discussed. Pamphlet provided for more information.  Will order with Dr. Vilma Peña   2) Flexion based HEP provided  3) F/u 2wks after [address], PA-C, MPAS  Board Certified by the [facility] [facility]

## 2021-11-05 NOTE — LETTER
[address] [facility]    Please Schedule the following with: Jt    Today's Date:  11/5/21     Patient: Tammie Mcgee     YOB: 1955    Patient Home Phone: [phone] (home)    Diagnosis:  Right paracentral protrusion L3-4 and L4-5, moderate central stenosis L3-4, right lumbar radiculitis    []LT     [x]RT     []CURTIS     []Midline    Levels: L4-5    []Cervical MARY 98633, 52635  []L-MBB 00607, 82227  []SI Joint 96598   []C-FACET 58714, 62725, 03671  []L-FACET [id number], 26710  [x]Interlaminar MARY 02506     []HIP 08921    []C-MBB  []Transforaminal MARY 14292  []Neurotomy 36859, 27403, 14223    Attending Provider: Candie Bennett PA-C    Injection Schedule for: At: First Insurance:                                               Pre-cert #:  Second Insurance:                 Pre-cert #:    Comments: Pt to follow up with MENDY العلي 2 wks after MARY [phone]    SEDATION:       [] IV           [] ORAL    [x] Blood Thinner:   ASA              []Diabetic           []Antibiotic:               []Glaucoma:    [] Pacemaker/defib       [] Current Open Wounds, Lacerations or Sores     Allergies:    Allergies   Allergen Reactions    Adhesive Tape      Band aids blisters skin    Codeine     Crestor [Rosuvastatin Calcium] Other (See Comments)     Legs ache      Food      Black walnut    Lipitor [Atorvastatin Calcium] Other (See Comments)     Legs ache      Nitrofurantoin Itching    Penicillins Swelling     \"Throat closes\"    Sulfa Antibiotics     Tramadol Nausea Only       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    Arthritis     Calculus of kidney     Cancer (HCC)     skin    Cystocele, midline     Eustachian tube dysfunction, bilateral     Gastro-esophageal reflux disease without esophagitis     Hyperlipidemia     Hypertension     Liver cyst     Neuritis     Nocturia     Old myocardial infarction     Osteoarthritis     Otalgia     Perennial allergic rhinitis     Pharyngeal inflammation     Primary osteoarthritis of right knee     Tinnitus, bilateral     Tobacco use     Urinary frequency     Urinary incontinence     Vaginal enterocele     Vaginal vault 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           Current Outpatient Medications:     ibuprofen (ADVIL;MOTRIN) 600 MG tablet, Take 1 tablet by mouth once for 1 dose Take one hour prior to procedure, Disp: 1 tablet, Rfl: 0    betamethasone valerate (VALISONE) 0.1 % cream, APPLY SMALL AMOUNT OF CREAM TOPICALLY TO AFFECTED AREA TWICE DAILY FOR 7 DAYS, Disp: , Rfl:     clotrimazole (MYCELEX) 10 MG kg, DISSOLVE 1 TABLET BY MOUTH FIVE TIMES DAILY FOR 7 DAYS, Disp: , Rfl:     methylPREDNISolone (MEDROL, NALLELY,) 4 MG tablet, Take by mouth., Disp: 1 kit, Rfl: 0    diclofenac (VOLTAREN) 75 MG EC tablet, Take 1 tablet by mouth 2 times daily, Disp: 60 tablet, Rfl: 2    ciclopirox (PENLAC) 8 % solution, APPLY TOPICALLY ONE APPLICATION TO THE AFFECTED AREA DAILY, Disp: , Rfl:     Multiple Vitamins-Minerals (MULTIVITAMIN ADULT PO), Take 1 tablet by mouth daily, Disp: , Rfl:     carvedilol (COREG) 12.5 MG tablet, Take 12.5 mg by mouth 2 times daily (with meals). , Disp: , Rfl:     amLODIPine (NORVASC) 5 MG tablet, Take 5 mg by mouth daily. , Disp: , Rfl:     omeprazole (PRILOSEC) 40 MG capsule, Take 40 mg by mouth daily. , Disp: , Rfl:     aspirin 81 MG tablet, Take 81 mg by mouth daily. , Disp: , Rfl:     S-Adenosylmethionine (EFREN-E) 400 MG TABS, Take  by mouth., Disp: , Rfl:

## 2021-11-08 ENCOUNTER — HOSPITAL ENCOUNTER (OUTPATIENT)
Age: 66
Discharge: HOME | End: 2021-11-08
Payer: MEDICARE

## 2021-11-08 ENCOUNTER — OFFICE VISIT (OUTPATIENT)
Dept: PRIMARY CARE CLINIC | Age: 66
End: 2021-11-08
Payer: MEDICARE

## 2021-11-08 VITALS
BODY MASS INDEX: 40.6 KG/M2 | WEIGHT: 237.8 LBS | OXYGEN SATURATION: 97 % | TEMPERATURE: 96.8 F | SYSTOLIC BLOOD PRESSURE: 128 MMHG | HEIGHT: 64 IN | DIASTOLIC BLOOD PRESSURE: 72 MMHG

## 2021-11-08 DIAGNOSIS — Z00.00 ROUTINE GENERAL MEDICAL EXAMINATION AT A HEALTH CARE FACILITY: Primary | ICD-10-CM

## 2021-11-08 DIAGNOSIS — K21.9 GASTRO-ESOPHAGEAL REFLUX DISEASE WITHOUT ESOPHAGITIS: ICD-10-CM

## 2021-11-08 DIAGNOSIS — B35.4 TINEA CORPORIS: ICD-10-CM

## 2021-11-08 DIAGNOSIS — I10 ESSENTIAL (PRIMARY) HYPERTENSION: ICD-10-CM

## 2021-11-08 DIAGNOSIS — E66.01 OBESITY, CLASS III, BMI 40-49.9 (MORBID OBESITY) (HCC): ICD-10-CM

## 2021-11-08 PROCEDURE — 4040F PNEUMOC VAC/ADMIN/RCVD: CPT | Performed by: FAMILY MEDICINE

## 2021-11-08 PROCEDURE — G8484 FLU IMMUNIZE NO ADMIN: HCPCS | Performed by: FAMILY MEDICINE

## 2021-11-08 PROCEDURE — G0438 PPPS, INITIAL VISIT: HCPCS | Performed by: FAMILY MEDICINE

## 2021-11-08 PROCEDURE — 1123F ACP DISCUSS/DSCN MKR DOCD: CPT | Performed by: FAMILY MEDICINE

## 2021-11-08 PROCEDURE — 3017F COLORECTAL CA SCREEN DOC REV: CPT | Performed by: FAMILY MEDICINE

## 2021-11-08 RX ORDER — NYSTATIN 100000 [USP'U]/G
POWDER TOPICAL
Qty: 60 G | Refills: 1 | Status: ON HOLD | OUTPATIENT
Start: 2021-11-08 | End: 2021-11-18

## 2021-11-08 RX ORDER — CARVEDILOL 12.5 MG/1
12.5 TABLET ORAL 2 TIMES DAILY WITH MEALS
Qty: 180 TABLET | Refills: 3 | Status: SHIPPED | OUTPATIENT
Start: 2021-11-08 | End: 2022-09-06 | Stop reason: ALTCHOICE

## 2021-11-08 RX ORDER — OMEPRAZOLE 40 MG/1
40 CAPSULE, DELAYED RELEASE ORAL DAILY
Qty: 90 CAPSULE | Refills: 3 | Status: SHIPPED | OUTPATIENT
Start: 2021-11-08 | End: 2022-10-04

## 2021-11-08 RX ORDER — AMLODIPINE BESYLATE 5 MG/1
5 TABLET ORAL DAILY
Qty: 90 TABLET | Refills: 3 | Status: SHIPPED | OUTPATIENT
Start: 2021-11-08 | End: 2022-09-06 | Stop reason: ALTCHOICE

## 2021-11-08 ASSESSMENT — PATIENT HEALTH QUESTIONNAIRE - PHQ9
SUM OF ALL RESPONSES TO PHQ QUESTIONS 1-9: 2
10. IF YOU CHECKED OFF ANY PROBLEMS, HOW DIFFICULT HAVE THESE PROBLEMS MADE IT FOR YOU TO DO YOUR WORK, TAKE CARE OF THINGS AT HOME, OR GET ALONG WITH OTHER PEOPLE: 1
2. FEELING DOWN, DEPRESSED OR HOPELESS: 0
3. TROUBLE FALLING OR STAYING ASLEEP: 0
8. MOVING OR SPEAKING SO SLOWLY THAT OTHER PEOPLE COULD HAVE NOTICED. OR THE OPPOSITE, BEING SO FIGETY OR RESTLESS THAT YOU HAVE BEEN MOVING AROUND A LOT MORE THAN USUAL: 0
6. FEELING BAD ABOUT YOURSELF - OR THAT YOU ARE A FAILURE OR HAVE LET YOURSELF OR YOUR FAMILY DOWN: 0
7. TROUBLE CONCENTRATING ON THINGS, SUCH AS READING THE NEWSPAPER OR WATCHING TELEVISION: 0
5. POOR APPETITE OR OVEREATING: 1
9. THOUGHTS THAT YOU WOULD BE BETTER OFF DEAD, OR OF HURTING YOURSELF: 0
SUM OF ALL RESPONSES TO PHQ9 QUESTIONS 1 & 2: 0
SUM OF ALL RESPONSES TO PHQ QUESTIONS 1-9: 2
1. LITTLE INTEREST OR PLEASURE IN DOING THINGS: 0
SUM OF ALL RESPONSES TO PHQ QUESTIONS 1-9: 2
4. FEELING TIRED OR HAVING LITTLE ENERGY: 1

## 2021-11-08 ASSESSMENT — LIFESTYLE VARIABLES
HOW OFTEN DO YOU HAVE A DRINK CONTAINING ALCOHOL: 1
HOW OFTEN DURING THE LAST YEAR HAVE YOU FOUND THAT YOU WERE NOT ABLE TO STOP DRINKING ONCE YOU HAD STARTED: 0
HOW OFTEN DURING THE LAST YEAR HAVE YOU BEEN UNABLE TO REMEMBER WHAT HAPPENED THE NIGHT BEFORE BECAUSE YOU HAD BEEN DRINKING: 0
HAVE YOU OR SOMEONE ELSE BEEN INJURED AS A RESULT OF YOUR DRINKING: 0
HOW OFTEN DURING THE LAST YEAR HAVE YOU FAILED TO DO WHAT WAS NORMALLY EXPECTED FROM YOU BECAUSE OF DRINKING: 0
HOW OFTEN DO YOU HAVE SIX OR MORE DRINKS ON ONE OCCASION: 0
HOW OFTEN DURING THE LAST YEAR HAVE YOU NEEDED AN ALCOHOLIC DRINK FIRST THING IN THE MORNING TO GET YOURSELF GOING AFTER A NIGHT OF HEAVY DRINKING: 0
HAS A RELATIVE, FRIEND, DOCTOR, OR ANOTHER HEALTH PROFESSIONAL EXPRESSED CONCERN ABOUT YOUR DRINKING OR SUGGESTED YOU CUT DOWN: 0
HOW OFTEN DURING THE LAST YEAR HAVE YOU HAD A FEELING OF GUILT OR REMORSE AFTER DRINKING: 0

## 2021-11-08 ASSESSMENT — ANXIETY QUESTIONNAIRES
IF YOU CHECKED OFF ANY PROBLEMS ON THIS QUESTIONNAIRE, HOW DIFFICULT HAVE THESE PROBLEMS MADE IT FOR YOU TO DO YOUR WORK, TAKE CARE OF THINGS AT HOME, OR GET ALONG WITH OTHER PEOPLE: NOT DIFFICULT AT ALL
GAD7 TOTAL SCORE: 0
4. TROUBLE RELAXING: 0
6. BECOMING EASILY ANNOYED OR IRRITABLE: 0
3. WORRYING TOO MUCH ABOUT DIFFERENT THINGS: 0
7. FEELING AFRAID AS IF SOMETHING AWFUL MIGHT HAPPEN: 0
2. NOT BEING ABLE TO STOP OR CONTROL WORRYING: 0
1. FEELING NERVOUS, ANXIOUS, OR ON EDGE: 0
5. BEING SO RESTLESS THAT IT IS HARD TO SIT STILL: 0

## 2021-11-08 ASSESSMENT — ENCOUNTER SYMPTOMS
ABDOMINAL PAIN: 0
DIARRHEA: 0
CONSTIPATION: 0
COLOR CHANGE: 1
SHORTNESS OF BREATH: 0

## 2021-11-08 NOTE — PROGRESS NOTES
Medicare Annual Wellness Visit  Name: Christopher Abel Date: 2021   MRN: [id number] Sex: Female   Age: 77 y.o. Ethnicity: Non- / Non    : 1955 Race: White (non-)      Angie De Guzman is here for Medicare AWV and Rash (sore under left breast, spreading. Has used antifungal cream and coraid. Has also used ice )    Screenings for behavioral, psychosocial and functional/safety risks, and cognitive dysfunction are all negative except as indicated below. These results, as well as other patient data from the [employer] [address] [employer] [address] form, are documented in Flowsheets linked to this Encounter. Allergies   Allergen Reactions    Adhesive Tape      Band aids blisters skin    Codeine     Crestor [Rosuvastatin Calcium] Other (See Comments)     Legs ache      Food      Black walnut    Lipitor [Atorvastatin Calcium] Other (See Comments)     Legs ache      Nitrofurantoin Itching    Penicillins Swelling     \"Throat closes\"    Sulfa Antibiotics     Tramadol Nausea Only       Prior to Visit Medications    Medication Sig Taking? Authorizing Provider   betamethasone valerate (VALISONE) 0.1 % cream APPLY SMALL AMOUNT OF CREAM TOPICALLY TO AFFECTED AREA TWICE DAILY FOR 7 DAYS Yes Historical Provider, MD   clotrimazole (MYCELEX) 10 MG kg DISSOLVE 1 TABLET BY MOUTH FIVE TIMES DAILY FOR 7 DAYS Yes Historical Provider, MD   methylPREDNISolone (MEDROL, NALLELY,) 4 MG tablet Take by mouth. Yes Violeta Merchant PA-C   ciclopirox (PENLAC) 8 % solution APPLY TOPICALLY ONE APPLICATION TO THE AFFECTED AREA DAILY Yes Historical Provider, MD   Multiple Vitamins-Minerals (MULTIVITAMIN ADULT PO) Take 1 tablet by mouth daily Yes Historical Provider, MD   carvedilol (COREG) 12.5 MG tablet Take 12.5 mg by mouth 2 times daily (with meals). Yes Historical Provider, MD   amLODIPine (NORVASC) 5 MG tablet Take 5 mg by mouth daily.  Yes Historical Provider, MD   omeprazole (PRILOSEC) 40 MG capsule Take 40 mg by mouth daily. Yes Historical Provider, MD   aspirin 81 MG tablet Take 81 mg by mouth daily. Yes Historical Provider, MD   S-Adenosylmethionine (EFREN-E) 400 MG TABS Take  by mouth.  Yes Historical Provider, MD       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    Arthritis     Calculus of kidney     Cancer (HCC)     skin    Cystocele, midline     Eustachian tube dysfunction, bilateral     Gastro-esophageal reflux disease without esophagitis     Hyperlipidemia     Hypertension     Liver cyst     Neuritis     Nocturia     Old myocardial infarction     Osteoarthritis     Otalgia     Perennial allergic rhinitis     Pharyngeal inflammation     Primary osteoarthritis of right knee     Tinnitus, bilateral     Tobacco use     Urinary frequency     Urinary incontinence     Vaginal enterocele     Vaginal vault 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        Past Surgical History:   Procedure Laterality Date    APPENDECTOMY      CHOLECYSTECTOMY      ELBOW DEBRIDEMENT      FOOT SURGERY      bilateral    HYSTERECTOMY, VAGINAL      KNEE ARTHROSCOPY Right 4/24/2019    RIGHT KNEE ARTHROSCOPY,, SYNOVECTOMY CHONDROPLASTY,MEDIAL AND LATERAL MENESECTOMY, REMOVAL LOOSE BODIES performed by Rosetta Shone, MD at [place] [address]      bilateral    LIVER SURGERY      cyst removal    NECK SURGERY      ROTATOR CUFF REPAIR Right 10/30/2020    VIDEO ARTHROSCOPY RIGHT SHOULDER SUBACROMIAL DECOMPRESSION LABRAL DEBRIDEMENT ROTATOR CUFF TEAR performed by Gilberto Alcantara MD at [place] [address] ARTHROSCOPY      TONSILLECTOMY      TUBAL LIGATION      URETHRA SURGERY      US BREAST LESION REMOVAL LEFT         Family History   Problem Relation Age of Onset    Breast Cancer Mother     Cancer Mother         Bone    Diabetes Mother     Heart Disease Mother         CHF   Marisa Gardner Arthritis Mother     Heart Attack Father     Prostate Cancer Brother     Cancer Maternal Grandmother     Cancer Brother         kidney    Cancer Brother         bladder       CareTeam (Including outside providers/suppliers regularly involved in providing care):   Patient Care Team:  Shauna Manzano DO as PCP - General (Family Medicine)  Shauna Manzano DO as PCP - [facility] Empaneled Provider  Saralee Cranker, MD as Surgeon (Orthopedic Surgery)    Wt Readings from Last 3 Encounters:   11/08/21 237 lb 12.8 oz (107.9 kg)   11/05/21 230 lb (104.3 kg)   10/18/21 230 lb (104.3 kg)     Vitals:    11/08/21 0953   BP: 128/72   Temp: 96.8 °F (36 °C)   TempSrc: Temporal   SpO2: 97%   Weight: 237 lb 12.8 oz (107.9 kg)   Height: 5' 4\" (1.626 m)     Body mass index is 40.82 kg/m². Based upon direct observation of the patient, evaluation of cognition reveals recent and remote memory intact. General Appearance: alert and oriented to person, place and time, well developed and well- nourished, in no acute distress  Skin: warm and dry, no rash or erythema  Head: normocephalic and atraumatic  Neck: supple and non-tender without mass, no thyromegaly or thyroid nodules, no cervical lymphadenopathy  Pulmonary/Chest: clear to auscultation bilaterally- no wheezes, rales or rhonchi, normal air movement, no respiratory distress  Cardiovascular: normal rate, regular rhythm, normal S1 and S2, no murmurs, rubs, clicks, or gallops, distal pulses intact, no carotid bruits  Abdomen: soft, non-tender, non-distended, normal bowel sounds, no masses or organomegaly  Extremities: no cyanosis, clubbing or edema    Patient's complete Health Risk Assessment and screening values have been reviewed and are found in Flowsheets. The following problems were reviewed today and where indicated follow up appointments were made and/or referrals ordered.     Positive Risk Factor Screenings with Interventions:         Substance History:  Social History     Tobacco History     Smoking Status  Current Every Day Smoker Smoking Frequency  0.25 packs/day for 40 years (10 pk yrs) Smoking Tobacco Type  Cigarettes    Smokeless Tobacco Use  Never Used    Tobacco Comment  quit March 4, 2019          Alcohol History     Alcohol Use Status  No          Drug Use     Drug Use Status  No          Sexual Activity     Sexually Active  Not Currently               Alcohol Screening:       A score of 8 or more is associated with harmful or hazardous drinking. A score of 13 or more in women, and 15 or more in men, is likely to indicate alcohol dependence. Substance Abuse Interventions:  · Tobacco abuse:  tobacco cessation tips and resources provided    General Health and ACP:  General  In general, how would you say your health is?: Good  In the past 7 days, have you experienced any of the following?  New or Increased Pain, New or Increased Fatigue, Loneliness, Social Isolation, Stress or Anger?: None of These  Do you get the social and emotional support that you need?: Yes  Do you have a Living Will?: (!) No  Advance Directives     Power of [address] [place] [address] Will ACP-Advance Directive ACP-Power of     Not on File Not on File Not on File Not on File      General Health Risk Interventions:  · No Living Will: Advance Care Planning addressed with patient today    Health Habits/Nutrition:  Health Habits/Nutrition  Do you exercise for at least 20 minutes 2-3 times per week?: (!) No  Have you lost any weight without trying in the past 3 months?: No  Do you eat only one meal per day?: No  Have you seen the dentist within the past year?: Yes  Body mass index: (!) 40.81  Health Habits/Nutrition Interventions:  · Inadequate physical activity:  educational materials provided to promote increased physical activity    Hearing/Vision:  No exam data present  Hearing/Vision  Do you or your family notice any trouble with your hearing that hasn't been managed with hearing aids?: (!) Yes  Do you have difficulty driving, watching TV, or doing any of your daily activities because of your eyesight?: No  Have you had an eye exam within the past year?: Yes  Hearing/Vision Interventions:  · Hearing concerns:  patient declines any further evaluation/treatment for hearing issues    Safety:  Safety  Do you have working smoke detectors?: Yes  Have all throw rugs been removed or fastened?: Yes  Do you have non-slip mats or surfaces in all bathtubs/showers?: Yes  Do all of your stairways have a railing or banister?: (!) No  Are your doorways, halls and stairs free of clutter?: Yes  Do you always fasten your seatbelt when you are in a car?: Yes  Safety Interventions:  · Home safety tips provided    ADL:  ADLs  In the past 7 days, did you need help from others to perform any of the following everyday activities? Eating, dressing, grooming, bathing, toileting, or walking/balance?: None  In the past 7 days, did you need help from others to take care of any of the following? Laundry, housekeeping, banking/finances, shopping, telephone use, food preparation, transportation, or taking medications?: (!) Housekeeping  ADL Interventions:  · Patient declines any further evaluation/treatment for this issue    Personalized Preventive Plan   Current Health Maintenance Status    There is no immunization history on file for this patient.      Health Maintenance   Topic Date Due    Hepatitis C screen  Never done    COVID-19 Vaccine (1) Never done    DTaP/Tdap/Td vaccine (1 - Tdap) Never done    Lipid screen  Never done    Diabetes screen  Never done    Colon cancer screen colonoscopy  Never done    DEXA (modify frequency per FRAX score)  Never done    Potassium monitoring  01/11/2014    Creatinine monitoring  01/11/2014    Shingles Vaccine (2 of 3) 12/29/2015    Annual Wellness Visit (AWV)  Never done    Flu vaccine (1) 09/01/2021    Breast cancer screen  09/20/2022    Pneumococcal 65+ years Vaccine  Completed  Hepatitis A vaccine  Aged Out    Hepatitis B vaccine  Aged Out    Hib vaccine  Aged Out    Meningococcal (ACWY) vaccine  Aged Out     Recommendations for [employer] Due: see orders and patient instructions/AVS.  . Recommended screening schedule for the next 5-10 years is provided to the patient in written form: see Patient Instructions/AVS.    Salvatore Javier was seen today for medicare awv and rash. Diagnoses and all orders for this visit:    Routine general medical examination at a health care facility    Obesity, Class III, BMI 40-49.9 (morbid obesity) [facility])                          [address] [facility]                                             [address] [facility], [address] [place] [address]        Phone: [phone]      Name:  Sebastian Pepper  :    1955    Consultants:   Patient Care Team:  Mp Silva DO as PCP - General (Family Medicine)  Mp Silva DO as PCP - [address] [place], [place] [address]  Devante Partida MD as Surgeon (Orthopedic Surgery)    Chief Complaint:     Sebastian Pepper is a 77 y.o. female  who presents today for an established patient care visit with Personalized Prevention Plan Services as noted below. HPI:     13-year-old female seen for AWV but also for chronic care management. Patient continues to follow with orthopedic spine for evaluation of her low back pain with radiculopathy. Patient is waiting to schedule MARY for ongoing treatment. Patient states the pain in her butt has resolved but she continues with numbness and pain to her right calf and now across her lower back. Patient continues on omeprazole for treatment of acid reflux disorder. Patient states that it works really well for her if she takes it daily or every other day. Patient does states she needs a refill however. Patient is supplementing with magnesium daily.     Patient continues on carvedilol and amlodipine for management of her hypertension. Patient denies any side effects of the medication and states that she needs refills of those medicines today. Patient has a history of a coronary artery dissection which was managed by cardiology. Patient states she had follow-up testing and it was healed. Patient has remained on the carvedilol and amlodipine. Patient was unable to tolerate statin therapy in the form of rosuvastatin and atorvastatin due to proximal muscle weakness and pain. Patient continues to follow with urology for issues of incomplete voiding. Patient has had Botox injections and does have a follow-up appointment with them at the end of the month. Patient does have an acute complaint of concern today of a red rash under her right breast.  Patient states that it has been there for approximately 8 days. Patient states that she has been using ice to help with some of the burning sensation that she has to that area.       Patient Active Problem List   Diagnosis    Pharyngeal inflammation    Acute lymphadenitis of face, head and neck    Perennial allergic rhinitis    Acute laryngitis    Primary osteoarthritis of right knee    Calculus of kidney    Cystocele, midline    Essential (primary) hypertension    Eustachian tube dysfunction, bilateral    Gastro-esophageal reflux disease without esophagitis    Generalized anxiety disorder    Hyperlipidemia    Liver cyst    Neuritis    Old myocardial infarction    Otalgia, right    Tinnitus of vascular origin    Vaginal enterocele    RUQ pain    Tinnitus, bilateral    Tobacco use    Vaginal vault 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    Urinary incontinence         Past Medical History:    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    Arthritis     Calculus of kidney     Cancer ([facility] [address] skin    Cystocele, midline     Eustachian tube dysfunction, bilateral     Gastro-esophageal reflux disease without esophagitis     Hyperlipidemia     Hypertension     Liver cyst     Neuritis     Nocturia     Old myocardial infarction     Osteoarthritis     Otalgia     Perennial allergic rhinitis     Pharyngeal inflammation     Primary osteoarthritis of right knee     Tinnitus, bilateral     Tobacco use     Urinary frequency     Urinary incontinence     Vaginal enterocele     Vaginal vault 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        Past Surgical History:  Past Surgical History:   Procedure Laterality Date    APPENDECTOMY      CHOLECYSTECTOMY      ELBOW DEBRIDEMENT      FOOT SURGERY      bilateral    HYSTERECTOMY, VAGINAL      KNEE ARTHROSCOPY Right 4/24/2019    RIGHT KNEE ARTHROSCOPY,, SYNOVECTOMY CHONDROPLASTY,MEDIAL AND LATERAL MENESECTOMY, REMOVAL LOOSE BODIES performed by Trini Gabriel MD at [facility]      bilateral    LIVER SURGERY      cyst removal    NECK SURGERY      ROTATOR CUFF REPAIR Right 10/30/2020    VIDEO ARTHROSCOPY RIGHT SHOULDER SUBACROMIAL DECOMPRESSION LABRAL DEBRIDEMENT ROTATOR CUFF TEAR performed by Manpreet Cifuentes MD at [place] [address] ARTHROSCOPY      TONSILLECTOMY      TUBAL LIGATION      URETHRA SURGERY      US BREAST LESION REMOVAL LEFT         Home Meds:  Prior to Visit Medications    Medication Sig Taking? Authorizing Provider   carvedilol (COREG) 12.5 MG tablet Take 1 tablet by mouth 2 times daily (with meals) Yes Yasmeen Wagner, DO   amLODIPine (NORVASC) 5 MG tablet Take 1 tablet by mouth daily Yes Yasmeen Wagner, DO   omeprazole (PRILOSEC) 40 MG delayed release capsule Take 1 capsule by mouth daily Yes Yasmeen Wagner, DO   nystatin (MYCOSTATIN) 654908 UNIT/GM powder Apply 3 times daily.  Yes Yasmeen Wagner, DO   betamethasone valerate (VALISONE) 0.1 % cream APPLY SMALL AMOUNT OF CREAM TOPICALLY TO AFFECTED AREA TWICE DAILY FOR 7 DAYS Yes Historical Provider, MD   clotrimazole (MYCELEX) 10 MG kg DISSOLVE 1 TABLET BY MOUTH FIVE TIMES DAILY FOR 7 DAYS Yes Historical Provider, MD   methylPREDNISolone (MEDROL, NALLELY,) 4 MG tablet Take by mouth. Yes Efrain Simms PA-C   ciclopirox (PENLAC) 8 % solution APPLY TOPICALLY ONE APPLICATION TO THE AFFECTED AREA DAILY Yes Historical Provider, MD   Multiple Vitamins-Minerals (MULTIVITAMIN ADULT PO) Take 1 tablet by mouth daily Yes Historical Provider, MD   aspirin 81 MG tablet Take 81 mg by mouth daily. Yes Historical Provider, MD   S-Adenosylmethionine (EFREN-E) 400 MG TABS Take  by mouth.  Yes Historical Provider, MD       Allergies:    Adhesive tape, Codeine, Crestor [rosuvastatin calcium], Food, Lipitor [atorvastatin calcium], Nitrofurantoin, Penicillins, Sulfa antibiotics, and Tramadol    Family History:       Problem Relation Age of Onset    Breast Cancer Mother     Cancer Mother         Bone    Diabetes Mother     Heart Disease Mother         CHF   Toni Petties Arthritis Mother     Heart Attack Father     Prostate Cancer Brother     Cancer Maternal Grandmother     Cancer Brother         kidney    Cancer Brother         bladder         Health Maintenance Completed:  Health Maintenance   Topic Date Due    Hepatitis C screen  Never done    COVID-19 Vaccine (1) Never done    DTaP/Tdap/Td vaccine (1 - Tdap) Never done    Lipid screen  Never done    Diabetes screen  Never done    Colon cancer screen colonoscopy  Never done    DEXA (modify frequency per FRAX score)  Never done    Potassium monitoring  01/11/2014    Creatinine monitoring  01/11/2014    Shingles Vaccine (2 of 3) 12/29/2015    Annual Wellness Visit (AWV)  Never done    Flu vaccine (1) 09/01/2021    Breast cancer screen  09/20/2022    Pneumococcal 65+ years Vaccine  Completed    Hepatitis A vaccine  Aged Out    Hepatitis B vaccine  Aged Out    Hib vaccine  Aged Out    Meningococcal (ACWY) vaccine  Aged Out There is no immunization history on file for this patient. Review of Systems:  Review of Systems   Constitutional: Negative for fatigue and fever. Respiratory: Negative for shortness of breath. Cardiovascular: Negative for chest pain and leg swelling. Gastrointestinal: Negative for abdominal pain, constipation and diarrhea. Skin: Positive for color change and rash. Neurological: Negative for headaches. Physical Exam:   Vitals:    11/08/21 0953   BP: 128/72   Temp: 96.8 °F (36 °C)   TempSrc: Temporal   SpO2: 97%   Weight: 237 lb 12.8 oz (107.9 kg)   Height: 5' 4\" (1.626 m)     Body mass index is 40.82 kg/m². Wt Readings from Last 3 Encounters:   11/08/21 237 lb 12.8 oz (107.9 kg)   11/05/21 230 lb (104.3 kg)   10/18/21 230 lb (104.3 kg)       BP Readings from Last 3 Encounters:   11/08/21 128/72   05/24/21 (!) 148/84   05/14/21 (!) 151/95       Physical Exam  Vitals and nursing note reviewed. Constitutional:       Appearance: Normal appearance. HENT:      Head: Normocephalic and atraumatic. Neck:      Vascular: No carotid bruit. Cardiovascular:      Rate and Rhythm: Normal rate and regular rhythm. Heart sounds: Normal heart sounds. No murmur heard. Pulmonary:      Effort: Pulmonary effort is normal.      Breath sounds: Normal breath sounds. No wheezing, rhonchi or rales. Musculoskeletal:      Cervical back: Neck supple. Lymphadenopathy:      Cervical: No cervical adenopathy. Skin:     General: Skin is warm and dry. Findings: No erythema or rash. Neurological:      General: No focal deficit present. Mental Status: She is alert and oriented to person, place, and time. Psychiatric:         Mood and Affect: Mood normal.         Behavior: Behavior normal.         Judgment: Judgment normal.              Lab Review:   No visits with results within 2 Month(s) from this visit.    Latest known visit with results is:   Office Visit on 05/24/2021 Component Date Value    Color, UA 05/24/2021 yellow     Clarity, UA 05/24/2021 clear     Glucose, UA POC 05/24/2021 neg     Bilirubin, UA 05/24/2021 neg     Ketones, UA 05/24/2021 neg     Spec Grav, UA 05/24/2021 1.015     Blood, UA POC 05/24/2021 neg     pH, UA 05/24/2021 6.5     Protein, UA POC 05/24/2021 neg     Urobilinogen, UA 05/24/2021 neg     Leukocytes, UA 05/24/2021 neg     Nitrite, UA 05/24/2021 neg           Assessment/Plan:  Carli Spears was seen today for medicare awv and rash. Diagnoses and all orders for this visit:    Routine general medical examination at a health care facility  -     Hemoglobin A1C; Future  -     Lipid Panel; Future  -     HEPATITIS C ANTIBODY; Future    Obesity, Class III, BMI 40-49.9 (morbid obesity) (HCC)    Tinea corporis    Essential (primary) hypertension  -     Comprehensive Metabolic Panel; Future  -     CBC Auto Differential; Future  -     TSH with Reflex; Future    Gastro-esophageal reflux disease without esophagitis  -     Vitamin D 25 Hydroxy; Future  -     Magnesium; Future    Other orders  -     carvedilol (COREG) 12.5 MG tablet; Take 1 tablet by mouth 2 times daily (with meals)  -     amLODIPine (NORVASC) 5 MG tablet; Take 1 tablet by mouth daily  -     omeprazole (PRILOSEC) 40 MG delayed release capsule; Take 1 capsule by mouth daily  -     nystatin (MYCOSTATIN) 828106 UNIT/GM powder; Apply 3 times daily. 27-year-old female with    1. Low back pain with radiculopathy. Patient will continue to follow with spine orthopedics for consideration of MARY and ongoing treatment. 2.  Gastroesophageal reflux disease. Controlled. Patient will continue omeprazole as written and we will obtain CBC and magnesium level. 3.  Hypertension. Controlled. Patient will continue the carvedilol and amlodipine as written. We will obtain comprehensive metabolic panel and thyroid testing today. 4.  Coronary dissection. Resolved.   Patient will continue carvedilol and amlodipine as written. 5.  Hyperlipidemia, statin intolerant. Patient will have repeat lipid testing obtained today. Further treatment based on those results. 6.  Incomplete voiding. Patient will continue follow-up with urology as scheduled. 7.  Tinea corporis. Patient will be written for nystatin powder to apply to the area. Patient is to call the office in 1 week if symptoms have not improved for consideration of oral antibiotics. 8.  Health maintenance. Patient will have hepatitis C as well as hemoglobin A1c obtained today. Health Maintenance Due:  Health Maintenance Due   Topic Date Due    Hepatitis C screen  Never done    COVID-19 Vaccine (1) Never done    DTaP/Tdap/Td vaccine (1 - Tdap) Never done    Lipid screen  Never done    Diabetes screen  Never done    Colon cancer screen colonoscopy  Never done    DEXA (modify frequency per FRAX score)  Never done    Potassium monitoring  01/11/2014    Creatinine monitoring  01/11/2014    Shingles Vaccine (2 of 3) 12/29/2015    Annual Wellness Visit (AWV)  Never done    Flu vaccine (1) 09/01/2021          Health care decision maker:  Up to date      Health Maintenance: (USPSTF Recommendations)  (F) Breast Cancer Screen: (40-49 (C), 50-74 biennial screening mammogram (B))  (F) Cervical Cancer Screen: (21-29 q3yr cytology alone; 30-65 q3yr cytology alone, q5yr with hrHPV alone, or q5yr cytology+hrHPV (A))  (M) Prostate Cancer Screen: (54-79 yo discuss benefits/harm, does not recommend testing PSA in men >73 yo (D):   (M) AAA Screen: (men 73-67 yo who has ever smoked (B), consider in nonsmokers if high risk):  CRC/Colonoscopy Screening: (adults 39-53 (B), 50-75 (A))  Lung Ca Screening: Annual LDCT (+smoker age 49-80, smoked within 15 years, total of 20 pack yr history (B)):  DEXA Screen: (women >65 and older, <65 if at risk/postmenopausal (B))  HIV Screen: (16-65 yr old, and all pregnant patients (A)):   Hep C Screen: (18-79 yr old (B)):  [address] Screen: (all pts with cirrhosis and high risk Hep B (US q6 mo)):  Immunizations:    RTC:  Return in 3 months (on 2/8/2022) for Medicare Annual Wellness Visit in 1 year, Chronic Condition follow up. EMR Dragon/transcription disclaimer:  Much of this encounter note is electronic transcription/translation of spoken language to printed texts. The electronic translation of spoken language may be erroneous, or at times, nonsensical words or phrases may be inadvertently transcribed.   Although I have reviewed the note for such errors, some may still exist.

## 2021-11-08 NOTE — PATIENT INSTRUCTIONS
Personalized Preventive Plan for [facility] - 11/8/2021  Medicare offers a range of preventive health benefits. Some of the tests and screenings are paid in full while other may be subject to a deductible, co-insurance, and/or copay. Some of these benefits include a comprehensive review of your medical history including lifestyle, illnesses that may run in your family, and various assessments and screenings as appropriate. After reviewing your medical record and screening and assessments performed today your provider may have ordered immunizations, labs, imaging, and/or referrals for you. A list of these orders (if applicable) as well as your Preventive Care list are included within your After Visit Summary for your review. Other Preventive Recommendations:    · A preventive eye exam performed by an eye specialist is recommended every 1-2 years to screen for glaucoma; cataracts, macular degeneration, and other eye disorders. · A preventive dental visit is recommended every 6 months. · Try to get at least 150 minutes of exercise per week or 10,000 steps per day on a pedometer . · Order or download the FREE \"Exercise & Physical Activity: Your Everyday Guide\" from [employer]. Call 1-544.152.9731 or search [employer] online. · You need 6628-8240 mg of calcium and 4632-6220 IU of vitamin D per day. It is possible to meet your calcium requirement with diet alone, but a vitamin D supplement is usually necessary to meet this goal.  · When exposed to the sun, use a sunscreen that protects against both UVA and UVB radiation with an SPF of 30 or greater. Reapply every 2 to 3 hours or after sweating, drying off with a towel, or swimming. · Always wear a seat belt when traveling in a car. Always wear a helmet when riding a bicycle or motorcycle.

## 2021-11-12 ENCOUNTER — TELEPHONE (OUTPATIENT)
Dept: PRIMARY CARE CLINIC | Age: 66
End: 2021-11-12

## 2021-11-12 RX ORDER — DOXYCYCLINE HYCLATE 100 MG
100 TABLET ORAL 2 TIMES DAILY
Qty: 10 TABLET | Refills: 0 | Status: SHIPPED | OUTPATIENT
Start: 2021-11-12 | End: 2021-11-17

## 2021-11-12 NOTE — TELEPHONE ENCOUNTER
Please advise patient that oral antibiotic sent to pharmacy for her to begin. Advised to call back in 1 week if no improvement in her symptoms for consideration of a follow-up appointment.

## 2021-11-12 NOTE — TELEPHONE ENCOUNTER
----- Message from April Clay sent at 11/12/2021  9:11 AM EST -----  Subject: Appointment Request    Reason for Call: Urgent Skin Problem    QUESTIONS  Type of Appointment? Established Patient  Reason for appointment request? No appointments available during search  Additional Information for Provider? Pt states that the powder prescribed   hasn't worked and the area is still red and spreading and sore.  ---------------------------------------------------------------------------  --------------  [employer]  What is the best way for the office to contact you? OK to leave message on   voicemail  Preferred Call Back Phone Number? [phone]  ---------------------------------------------------------------------------  --------------  SCRIPT ANSWERS  Relationship to Patient? Self  Are you having swelling in your throat or face? No  Are you having difficulty breathing? No  Have the symptoms worsened or spread in the last day? Yes  Have you been diagnosed with, awaiting test results for, or told that you   are suspected of having COVID-19 (Coronavirus)? (If patient has tested   negative or was tested as a requirement for work, school, or travel and   not based on symptoms, answer no)? No  Within the past two weeks have you developed any of the following symptoms   (answer no if symptoms have been present longer than 2 weeks or began   more than 2 weeks ago)? Fever or Chills, Cough, Shortness of breath or   difficulty breathing, Loss of taste or smell, Sore throat, Nasal   congestion, Sneezing or runny nose, Fatigue or generalized body aches   (answer no if pain is specific to a body part e.g. back pain), Diarrhea,   Headache? No  Have you had close contact with someone with COVID-19 in the last 14 days? No  (Service Expert  click yes below to proceed with [employer] As Usual   Scheduling)?  Yes

## 2021-11-18 ENCOUNTER — HOSPITAL ENCOUNTER (OUTPATIENT)
Age: 66
Setting detail: OUTPATIENT SURGERY
Discharge: HOME | End: 2021-11-18
Attending: PAIN MEDICINE | Admitting: PAIN MEDICINE
Payer: MEDICARE

## 2021-11-18 VITALS
SYSTOLIC BLOOD PRESSURE: 168 MMHG | HEIGHT: 64 IN | TEMPERATURE: 97.2 F | HEART RATE: 72 BPM | OXYGEN SATURATION: 97 % | RESPIRATION RATE: 16 BRPM | BODY MASS INDEX: 40.46 KG/M2 | WEIGHT: 237 LBS | DIASTOLIC BLOOD PRESSURE: 103 MMHG

## 2021-11-18 PROCEDURE — 3600000002 HC SURGERY LEVEL 2 BASE: Performed by: PAIN MEDICINE

## 2021-11-18 PROCEDURE — 62323 NJX INTERLAMINAR LMBR/SAC: CPT | Performed by: PAIN MEDICINE

## 2021-11-18 PROCEDURE — 7100000010 HC PHASE II RECOVERY - FIRST 15 MIN: Performed by: PAIN MEDICINE

## 2021-11-18 PROCEDURE — 2709999900 HC NON-CHARGEABLE SUPPLY: Performed by: PAIN MEDICINE

## 2021-11-18 PROCEDURE — 2500000003 HC RX 250 WO HCPCS: Performed by: PAIN MEDICINE

## 2021-11-18 PROCEDURE — 6360000004 HC RX CONTRAST MEDICATION: Performed by: PAIN MEDICINE

## 2021-11-18 PROCEDURE — 6360000002 HC RX W HCPCS: Performed by: PAIN MEDICINE

## 2021-11-18 RX ORDER — BETAMETHASONE SODIUM PHOSPHATE AND BETAMETHASONE ACETATE 3; 3 MG/ML; MG/ML
INJECTION, SUSPENSION INTRA-ARTICULAR; INTRALESIONAL; INTRAMUSCULAR; SOFT TISSUE
Status: DISCONTINUED
Start: 2021-11-18 | End: 2021-11-18 | Stop reason: HOSPADM

## 2021-11-18 RX ORDER — LIDOCAINE HYDROCHLORIDE 10 MG/ML
INJECTION, SOLUTION EPIDURAL; INFILTRATION; INTRACAUDAL; PERINEURAL PRN
Status: DISCONTINUED | OUTPATIENT
Start: 2021-11-18 | End: 2021-11-18 | Stop reason: ALTCHOICE

## 2021-11-18 ASSESSMENT — PAIN DESCRIPTION - DESCRIPTORS: DESCRIPTORS: ACHING;CONSTANT

## 2021-11-18 NOTE — PROCEDURES
Lupe Quintero is a 77 y.o. female patient. No diagnosis found. 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    Arthritis     Calculus of kidney     Cancer (HCC)     skin    Cystocele, midline     Eustachian tube dysfunction, bilateral     Gastro-esophageal reflux disease without esophagitis     Hyperlipidemia     Hypertension     Liver cyst     Neuritis     Nocturia     Old myocardial infarction     Osteoarthritis     Otalgia     Perennial allergic rhinitis     Pharyngeal inflammation     Primary osteoarthritis of right knee     Tinnitus, bilateral     Tobacco use     Urinary frequency     Urinary incontinence     Vaginal enterocele     Vaginal vault 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      Blood pressure (!) 151/91, pulse 73, temperature 97.2 °F (36.2 °C), resp. rate 18, height 5' 4\" (1.626 m), weight 237 lb (107.5 kg), SpO2 96 %. Procedures    Yuliya Harper MD  11/18/2021  LUMBAR INTERLAMINAR EPIDURAL INJECTION AT   M L5S1        LEVEL. 56733 with 01570  INDICATIONS:  Lumbar Radiculitis 724.4, M54.16    OPERATIVE PROCEDURE:  Consent was signed by the patient after the risks and benefits were explained. With the patient in the prone position, the back was prepped with Prevail and draped. Aseptic technique was used at every stage of the procedure. Skin infiltration was with 0.5 cc of 1% Lidocaine followed by placement of an _18__-gauge Touhy needle under fluoroscopic guidance in the epidural space which was maximised by fluoroscopic angulation. Aspiration was negative for CSF or blood . This was followed by injection of 2__cc of _LIDO  with _80__ mg DEPOMEDROL. The needle was pulled out intact. The patient tolerated the procedure well and discharge instructions were given. ESTIMATED BLOOD LOSS:  Less than 1 cc      Pulse Ox Monitoring was done. Omnipaque dye was / was not injected. Lateral view was / was not checked.

## 2021-11-18 NOTE — PROGRESS NOTES
Discharge instructions given to pt and verbalized understanding, states pain is at a tolerable level, no numbness or weakness noted, pt ambulated out to car without complications
Pt arrived to PACU,A&O,no c/o pain and/or numbness or tingling, vitals stable, site unremarkable
Pt states she is currently on antibiotics for infection on breast. Annalee Nageotte at Dr Zayra Macias office notified
qing edwards

## 2021-11-23 ENCOUNTER — PROCEDURE VISIT (OUTPATIENT)
Dept: UROGYNECOLOGY | Age: 66
End: 2021-11-23
Payer: MEDICARE

## 2021-11-23 VITALS
TEMPERATURE: 97 F | DIASTOLIC BLOOD PRESSURE: 91 MMHG | RESPIRATION RATE: 18 BRPM | SYSTOLIC BLOOD PRESSURE: 154 MMHG | HEART RATE: 78 BPM | OXYGEN SATURATION: 96 %

## 2021-11-23 DIAGNOSIS — R35.0 URINARY FREQUENCY: Primary | ICD-10-CM

## 2021-11-23 DIAGNOSIS — N39.41 URGE INCONTINENCE: ICD-10-CM

## 2021-11-23 DIAGNOSIS — R39.15 URGENCY OF URINATION: ICD-10-CM

## 2021-11-23 PROCEDURE — 52287 CYSTOSCOPY CHEMODENERVATION: CPT | Performed by: OBSTETRICS & GYNECOLOGY

## 2021-11-23 PROCEDURE — 81002 URINALYSIS NONAUTO W/O SCOPE: CPT | Performed by: OBSTETRICS & GYNECOLOGY

## 2021-11-23 NOTE — PROGRESS NOTES
 TUBAL LIGATION      URETHRA SURGERY      US BREAST LESION REMOVAL LEFT       Current Medications:  Current Outpatient Medications   Medication Sig Dispense Refill    DICLOFENAC PO Take by mouth      carvedilol (COREG) 12.5 MG tablet Take 1 tablet by mouth 2 times daily (with meals) 180 tablet 3    amLODIPine (NORVASC) 5 MG tablet Take 1 tablet by mouth daily 90 tablet 3    omeprazole (PRILOSEC) 40 MG delayed release capsule Take 1 capsule by mouth daily 90 capsule 3    clotrimazole (MYCELEX) 10 MG kg DISSOLVE 1 TABLET BY MOUTH FIVE TIMES DAILY FOR 7 DAYS      Multiple Vitamins-Minerals (MULTIVITAMIN ADULT PO) Take 1 tablet by mouth daily      aspirin 81 MG tablet Take 81 mg by mouth daily.  S-Adenosylmethionine (EFREN-E) 400 MG TABS Take  by mouth. No current facility-administered medications for this visit. Allergies: Allergies   Allergen Reactions    Adhesive Tape      Band aids blisters skin    Codeine     Crestor [Rosuvastatin Calcium] Other (See Comments)     Legs ache      Food      Black walnut    Lipitor [Atorvastatin Calcium] Other (See Comments)     Legs ache      Nitrofurantoin Itching    Penicillins Swelling     \"Throat closes\"    Sulfa Antibiotics     Tramadol Nausea Only     Social History:   Social History     Socioeconomic History    Marital status:       Spouse name: Not on file    Number of children: Not on file    Years of education: Not on file    Highest education level: Not on file   Occupational History    Not on file   Tobacco Use    Smoking status: Current Every Day Smoker     Packs/day: 0.25     Years: 40.00     Pack years: 10.00     Types: Cigarettes    Smokeless tobacco: Never Used    Tobacco comment: quit March 4, 2019   Vaping Use    Vaping Use: Never used   Substance and Sexual Activity    Alcohol use: No    Drug use: No    Sexual activity: Not Currently   Other Topics Concern    Not on file   Social History Narrative    Not Pulse: 78   Resp: 18   Temp: 97 °F (36.1 °C)   SpO2: 96%      Physical Exam  Physical Exam  HENT:      Head: Normocephalic and atraumatic. Eyes:      Conjunctiva/sclera: Conjunctivae normal.   Pulmonary:      Effort: Pulmonary effort is normal.   Abdominal:      Palpations: Abdomen is soft. Musculoskeletal:      Cervical back: Normal range of motion and neck supple. Skin:     General: Skin is warm and dry. Neurological:      Mental Status: She is alert and oriented to person, place, and time. Procedure:  Patient was taken to the cystoscopy suite and allowed to void. Following this, the urethra was cleaned and then dilated to 14 [place]ian using lidocaine gel. A Soares catheter was placed using lidocaine gel. The remaining urine was drained and a dipstick urinalysis was performed with the following results: WBC negative, nitrates negative, RBC negative. The bladder was then instilled with 50ml of 2% lidocaine. The patient remained in the supine position for 20 minutes followed by alternating to the right and left side for 10 additional minutes each. Cystoscope was placed. The bladder wall and trigone were normal in appearance. Botox was injected through the bladder wall taking care to avoid the detrusor. Total units: 100 Total number of injections: 21    Results for POC orders placed in visit on 11/23/21   POCT Urinalysis no Micro   Result Value Ref Range    Color, UA yellow     Clarity, UA clear     Glucose, UA POC neg     Bilirubin, UA neg     Ketones, UA neg     Spec Grav, UA 1.015     Blood, UA POC neg     pH, UA 6.5     Protein, UA POC neg     Urobilinogen, UA neg     Leukocytes, UA neg     Nitrite, UA neg        Assessment/Plan:     Sebastian Hirsch is a 77 y.o. female with urinary urgency, frequency and overactive bladder. Patient tolerated the procedure well. Patient counseled on risk of UTI and urinary retention. She was educated on signs and symptoms of both.  She is to call the office with any concerns.  The patient will follow up in 2 weeks for a post void residual.     Orders Placed This Encounter   Procedures    POCT Urinalysis no Micro    Urinary catheter straight     Orders Placed This Encounter   Medications    onabotulinumtoxin A (BOTOX) injection 100 Units       Dave Lozano MD

## 2021-12-03 ENCOUNTER — OFFICE VISIT (OUTPATIENT)
Dept: ORTHOPEDIC SURGERY | Age: 66
End: 2021-12-03
Payer: MEDICARE

## 2021-12-03 VITALS — HEIGHT: 64 IN | BODY MASS INDEX: 39.44 KG/M2 | WEIGHT: 231 LBS

## 2021-12-03 DIAGNOSIS — M54.16 RIGHT LUMBAR RADICULITIS: ICD-10-CM

## 2021-12-03 DIAGNOSIS — M48.062 LUMBAR STENOSIS WITH NEUROGENIC CLAUDICATION: ICD-10-CM

## 2021-12-03 DIAGNOSIS — M51.26 PROTRUSION OF LUMBAR INTERVERTEBRAL DISC: Primary | ICD-10-CM

## 2021-12-03 PROCEDURE — 4040F PNEUMOC VAC/ADMIN/RCVD: CPT | Performed by: PHYSICIAN ASSISTANT

## 2021-12-03 PROCEDURE — G8427 DOCREV CUR MEDS BY ELIG CLIN: HCPCS | Performed by: PHYSICIAN ASSISTANT

## 2021-12-03 PROCEDURE — 3017F COLORECTAL CA SCREEN DOC REV: CPT | Performed by: PHYSICIAN ASSISTANT

## 2021-12-03 PROCEDURE — G8400 PT W/DXA NO RESULTS DOC: HCPCS | Performed by: PHYSICIAN ASSISTANT

## 2021-12-03 PROCEDURE — 1090F PRES/ABSN URINE INCON ASSESS: CPT | Performed by: PHYSICIAN ASSISTANT

## 2021-12-03 PROCEDURE — 4004F PT TOBACCO SCREEN RCVD TLK: CPT | Performed by: PHYSICIAN ASSISTANT

## 2021-12-03 PROCEDURE — 1123F ACP DISCUSS/DSCN MKR DOCD: CPT | Performed by: PHYSICIAN ASSISTANT

## 2021-12-03 PROCEDURE — G8484 FLU IMMUNIZE NO ADMIN: HCPCS | Performed by: PHYSICIAN ASSISTANT

## 2021-12-03 PROCEDURE — G8417 CALC BMI ABV UP PARAM F/U: HCPCS | Performed by: PHYSICIAN ASSISTANT

## 2021-12-03 PROCEDURE — 99213 OFFICE O/P EST LOW 20 MIN: CPT | Performed by: PHYSICIAN ASSISTANT

## 2021-12-03 NOTE — PROGRESS NOTES
FOLLOW UP: SPINE    12/3/2021     CHIEF COMPLAINT:    Chief Complaint   Patient presents with    Follow-up     F/U AFTER MARY 11/18/2021       HISTORY OF PRESENT ILLNESS:              The patient is a 77 y.o. female history of hypertension here to f/u after midline L5-S1 LESI #1 11/18/2021 for low back and radiating right leg pain. She states on 9/23/2021 she climbed into the  seat from the passenger side and aggravated her low back. She initially described aching and burning low back/buttock pain radiating to the right posterior thigh/calf to the foot with tingling. She was initially ambulating with a cane. Her symptoms were increased with walking or standing. She reported relief with sitting and resting. She currently reports 80% improvement since the procedure with improved functionality. Other conservative care includes: MDP, prednisone, NSAIDs--diclofenac, ice, TENS, PT. She still has some residual axial aching low back pain. She has now discontinued use of her cane. Currently denies any distal radiating pain. She denies any progressive extremity weakness. Denies any recent bowel bladder dysfunction. Denies any recent fevers chills infections. Denies any side effects the procedure aside from pain during.     Current/Past Treatment:   · Physical Therapy: Yes  · Chiropractic:     · Injection:     11/18/2021 LUMBAR INTERLAMINAR EPIDURAL INJECTION AT M Y4E2--55%  Medications:            NSAIDS: Yes, diclofenac            Muscle relaxer:              Steriods:   Prednisone, MDP            Neuropathic medications:   Has taken gabapentin in the past for unrelated issue            Opioids:            Other:   · Surgery/Consult:    Work Status/Functionality: Retired    Past Medical History: Medical history form was reviewed today & scanned into the media tab  Past Medical History:   Diagnosis Date    Acute laryngitis     Acute lymphadenitis of face, head and neck     Artery dissection ([facility] [address] (PRILOSEC) 40 MG delayed release capsule, Take 1 capsule by mouth daily, Disp: 90 capsule, Rfl: 3    clotrimazole (MYCELEX) 10 MG kg, DISSOLVE 1 TABLET BY MOUTH FIVE TIMES DAILY FOR 7 DAYS, Disp: , Rfl:     Multiple Vitamins-Minerals (MULTIVITAMIN ADULT PO), Take 1 tablet by mouth daily, Disp: , Rfl:     aspirin 81 MG tablet, Take 81 mg by mouth daily. , Disp: , Rfl:     S-Adenosylmethionine (EFREN-E) 400 MG TABS, Take  by mouth., Disp: , Rfl:   Allergies:  Adhesive tape, Codeine, Crestor [rosuvastatin calcium], Food, Lipitor [atorvastatin calcium], Nitrofurantoin, Penicillins, Sulfa antibiotics, and Tramadol  Social History:    reports that she has been smoking cigarettes. She has a 10.00 pack-year smoking history. She has never used smokeless tobacco. She reports that she does not drink alcohol and does not use drugs. Family History:   Family History   Problem Relation Age of Onset    Breast Cancer Mother     Cancer Mother         Bone    Diabetes Mother     Heart Disease Mother         CHF    Arthritis Mother     Heart Attack Father     Prostate Cancer Brother     Cancer Maternal Grandmother     Cancer Brother         kidney    Cancer Brother         bladder       REVIEW OF SYSTEMS: Full ROS reviewed & scanned into chart  CONSTITUTIONAL: Denies unexplained weight loss, fevers   SKIN: Denies active skin conditions       PHYSICAL EXAM:    Vitals: Height 5' 4\" (1.626 m), weight 231 lb (104.8 kg). Pain score 0/10, seated    GENERAL EXAM:  · General Apparence: Patient is adequately groomed with no evidence of malnutrition. · Orientation: The patient is oriented to time, place and person. · Mood & Affect:The patient's mood and affect are appropriate   · Lymphatic: The lymphatic examination bilaterally reveals all areas to be without enlargement or induration  · Sensation: Sensation is intact without deficit    LUMBAR/SACRAL EXAMINATION:  · Inspection: Local inspection shows no step-off or bruising. We discussed continued HEP and proper ergonomics. 2) diclofenac gel OTC as needed  3) She may call directly for repeat midline L5-S1 LESI #2 if symptoms return or worsen. We also briefly discussed considering MBB in the future to assess for contributing facet pain if warranted.            Cait Guzman PA-C, MPAS  Board Certified by the [address]

## 2021-12-10 ENCOUNTER — OFFICE VISIT (OUTPATIENT)
Dept: UROGYNECOLOGY | Age: 66
End: 2021-12-10
Payer: MEDICARE

## 2021-12-10 VITALS
TEMPERATURE: 97.7 F | RESPIRATION RATE: 18 BRPM | HEART RATE: 86 BPM | DIASTOLIC BLOOD PRESSURE: 93 MMHG | OXYGEN SATURATION: 98 % | SYSTOLIC BLOOD PRESSURE: 167 MMHG

## 2021-12-10 DIAGNOSIS — R35.0 URINARY FREQUENCY: ICD-10-CM

## 2021-12-10 DIAGNOSIS — Z87.440 HISTORY OF UTI: ICD-10-CM

## 2021-12-10 DIAGNOSIS — R39.15 URGENCY OF URINATION: ICD-10-CM

## 2021-12-10 DIAGNOSIS — R33.9 RETENTION OF URINE: ICD-10-CM

## 2021-12-10 DIAGNOSIS — N39.41 URGE INCONTINENCE: ICD-10-CM

## 2021-12-10 DIAGNOSIS — R33.9 INCOMPLETE BLADDER EMPTYING: Primary | ICD-10-CM

## 2021-12-10 PROCEDURE — G8427 DOCREV CUR MEDS BY ELIG CLIN: HCPCS | Performed by: NURSE PRACTITIONER

## 2021-12-10 PROCEDURE — 51701 INSERT BLADDER CATHETER: CPT | Performed by: NURSE PRACTITIONER

## 2021-12-10 PROCEDURE — 1123F ACP DISCUSS/DSCN MKR DOCD: CPT | Performed by: NURSE PRACTITIONER

## 2021-12-10 PROCEDURE — G8417 CALC BMI ABV UP PARAM F/U: HCPCS | Performed by: NURSE PRACTITIONER

## 2021-12-10 PROCEDURE — G8400 PT W/DXA NO RESULTS DOC: HCPCS | Performed by: NURSE PRACTITIONER

## 2021-12-10 PROCEDURE — G8484 FLU IMMUNIZE NO ADMIN: HCPCS | Performed by: NURSE PRACTITIONER

## 2021-12-10 PROCEDURE — 3017F COLORECTAL CA SCREEN DOC REV: CPT | Performed by: NURSE PRACTITIONER

## 2021-12-10 PROCEDURE — 4004F PT TOBACCO SCREEN RCVD TLK: CPT | Performed by: NURSE PRACTITIONER

## 2021-12-10 PROCEDURE — 1090F PRES/ABSN URINE INCON ASSESS: CPT | Performed by: NURSE PRACTITIONER

## 2021-12-10 PROCEDURE — 81002 URINALYSIS NONAUTO W/O SCOPE: CPT | Performed by: NURSE PRACTITIONER

## 2021-12-10 PROCEDURE — 99213 OFFICE O/P EST LOW 20 MIN: CPT | Performed by: NURSE PRACTITIONER

## 2021-12-10 PROCEDURE — 0509F URINE INCON PLAN DOCD: CPT | Performed by: NURSE PRACTITIONER

## 2021-12-10 PROCEDURE — 4040F PNEUMOC VAC/ADMIN/RCVD: CPT | Performed by: NURSE PRACTITIONER

## 2021-12-10 RX ORDER — GRANULES FOR ORAL 3 G/1
3 POWDER ORAL ONCE
Qty: 1 EACH | Refills: 0 | Status: SHIPPED | OUTPATIENT
Start: 2021-12-10 | End: 2021-12-10

## 2021-12-10 ASSESSMENT — ENCOUNTER SYMPTOMS
EYE ITCHING: 1
BACK PAIN: 1

## 2021-12-10 NOTE — PROGRESS NOTES
12/10/2021       HPI:     Name: Tisha Ruelas  YOB: 1955    CC: Patient is a 77 y.o. presenting for evaluation of stress incontinence , urge incontinence , voiding dysfunction and PVR s/p botox injections. HPI: How long have you had this problem? Years  Please rate the severity of your problem: mild  Anything make it better? The Botox helps a lot per patient. Ob/Gyn History:    OB History   No obstetric history on file.      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    Arthritis     Calculus of kidney     Cancer (HCC)     skin    Cystocele, midline     Eustachian tube dysfunction, bilateral     Gastro-esophageal reflux disease without esophagitis     Hyperlipidemia     Hypertension     Liver cyst     Neuritis     Nocturia     Old myocardial infarction     Osteoarthritis     Otalgia     Perennial allergic rhinitis     Pharyngeal inflammation     Primary osteoarthritis of right knee     Tinnitus, bilateral     Tobacco use     Urinary frequency     Urinary incontinence     Vaginal enterocele     Vaginal vault 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      Past Surgical History:   Past Surgical History:   Procedure Laterality Date    APPENDECTOMY      CHOLECYSTECTOMY      ELBOW DEBRIDEMENT      FOOT SURGERY      bilateral    HYSTERECTOMY, VAGINAL      KNEE ARTHROSCOPY Right 4/24/2019    RIGHT KNEE ARTHROSCOPY,, SYNOVECTOMY CHONDROPLASTY,MEDIAL AND LATERAL MENESECTOMY, REMOVAL LOOSE BODIES performed by Rosetta Shone, [facility] at [facility]      bilateral    LIVER SURGERY      cyst removal    NECK SURGERY      PAIN MANAGEMENT PROCEDURE Right 11/18/2021    RIGHT LUMBAR FOUR FIVE EPIDURAL STEROID INJECTION SITE CONFIRMED BY FLUOROSCOPY performed by Andrea Mims MD at [address] Cigarettes    Smokeless tobacco: Never Used    Tobacco comment: quit March 4, 2019   Vaping Use    Vaping Use: Never used   Substance and Sexual Activity    Alcohol use: No    Drug use: No    Sexual activity: Not Currently   Other Topics Concern    Not on file   Social History Narrative    Not on file     Social Determinants of Health     Financial Resource Strain:     Difficulty of Paying Living Expenses: Not on file   Food Insecurity:     Worried About Running Out of Food in the Last Year: Not on file    Grant of Food in the Last Year: Not on file   Transportation Needs:     Lack of Transportation (Medical): Not on file    Lack of Transportation (Non-Medical):  Not on file   Physical Activity:     Days of Exercise per Week: Not on file    Minutes of Exercise per Session: Not on file   Stress:     Feeling of Stress : Not on file   Social Connections:     Frequency of Communication with Friends and Family: Not on file    Frequency of Social Gatherings with Friends and Family: Not on file    Attends Mu-ism Services: Not on file    Active Member of [address] [place] [address] or Organizations: Not on file    Attends Club or Organization Meetings: Not on file    Marital Status: Not on file   Intimate Partner Violence:     Fear of Current or Ex-Partner: Not on file    Emotionally Abused: Not on file    Physically Abused: Not on file    Sexually Abused: Not on file   Housing Stability:     Unable to Pay for Housing in the Last Year: Not on file    Number of [place] in the Last Year: Not on file    Unstable Housing in the Last Year: Not on file     Family History:   Family History   Problem Relation Age of Onset    Breast Cancer Mother     Cancer Mother         Bone    Diabetes Mother     Heart Disease Mother         CHF    Arthritis Mother     Heart Attack Father     Prostate Cancer Brother     Cancer Maternal Grandmother     Cancer Brother         kidney    Cancer Brother         bladder Review of System   Review of Systems   Eyes: Positive for itching. Musculoskeletal: Positive for back pain. All other systems reviewed and are negative. A review of systems was done by the patient and reviewed by me. Objective:     Vitals  Vitals:    12/10/21 1354   BP: (!) 167/93   Pulse: 86   Resp: 18   Temp: 97.7 °F (36.5 °C)   SpO2: 98%     Physical Exam  Physical Exam  Vitals and nursing note reviewed. Constitutional:       Appearance: Normal appearance. HENT:      Head: Normocephalic. Eyes:      Conjunctiva/sclera: Conjunctivae normal.   Cardiovascular:      Rate and Rhythm: Normal rate. Pulmonary:      Effort: Pulmonary effort is normal.   Musculoskeletal:         General: Normal range of motion. Cervical back: Normal range of motion. Skin:     General: Skin is warm and dry. Neurological:      General: No focal deficit present. Mental Status: She is alert. Psychiatric:         Mood and Affect: Mood normal.         Behavior: Behavior normal.         Results for POC orders placed in visit on 12/10/21   POCT Urinalysis no Micro   Result Value Ref Range    Color, UA Yellow     Clarity, UA Clear     Glucose, UA POC Neg     Bilirubin, UA Neg     Ketones, UA Neg     Spec Grav, UA 1.015     Blood, UA POC Neg     pH, UA 6.5     Protein, UA POC Neg     Urobilinogen, UA Neg     Leukocytes, UA Neg     Nitrite, UA Neg    Straight urinary catheterization performed by sterile procedure for urine specimen per Violette Horn NP.  270 ml post void. Patient tolerated well. Violette Horn NP made aware. Assessment/Plan:     Benji Shelton is a 77 y.o. female with   1. Incomplete bladder emptying    2. Retention of urine    3. Urinary frequency    4. Urgency of urination    5. Urge incontinence      -UUI/ Frequency:  Patient has had Botox multiple times and consistently has an elevated PVR for a few weeks after treatment.   She is asymptomatic of retention and is pleased with outcomes of Bladder botox. -Hx of UTI:  She has on occasion gotten a UTI while having incomplete emptying and therefore I have sent a dose of Fosfoymycin to her pharmacy to have on hand. She is pleased with outcomes, typically gets relief of her UUI for 6 months. She will return to office in 5 months.   Pat Carreon, HUBERT - RUPAL      Orders Placed This Encounter   Procedures    POCT Urinalysis no Micro    ID INSERT,NON-INDWELLING BLADDER CATHETER     Orders Placed This Encounter   Medications    fosfomycin tromethamine (MONUROL) 3 g PACK     Sig: Take 1 packet by mouth once for 1 dose     Dispense:  1 each     Refill:  [address], APRN - CNP

## 2022-01-06 DIAGNOSIS — Z98.890 S/P ROTATOR CUFF REPAIR: ICD-10-CM

## 2022-01-06 DIAGNOSIS — S46.011D TRAUMATIC COMPLETE TEAR OF RIGHT ROTATOR CUFF, SUBSEQUENT ENCOUNTER: ICD-10-CM

## 2022-01-06 DIAGNOSIS — M75.41 SHOULDER IMPINGEMENT SYNDROME, RIGHT: ICD-10-CM

## 2022-01-06 RX ORDER — DICLOFENAC SODIUM 75 MG/1
TABLET, DELAYED RELEASE ORAL
Qty: 60 TABLET | Refills: 0 | Status: SHIPPED | OUTPATIENT
Start: 2022-01-06 | End: 2022-03-09 | Stop reason: SDUPTHER

## 2022-01-31 PROBLEM — N81.11 CYSTOCELE, MIDLINE: Status: RESOLVED | Noted: 2017-11-02 | Resolved: 2022-01-31

## 2022-01-31 PROBLEM — N99.3 VAGINAL VAULT PROLAPSE AFTER HYSTERECTOMY: Status: RESOLVED | Noted: 2017-11-02 | Resolved: 2022-01-31

## 2022-01-31 PROBLEM — R10.11 RUQ PAIN: Status: RESOLVED | Noted: 2020-08-31 | Resolved: 2022-01-31

## 2022-01-31 PROBLEM — N81.5 VAGINAL ENTEROCELE: Status: RESOLVED | Noted: 2017-11-02 | Resolved: 2022-01-31

## 2022-01-31 NOTE — PROGRESS NOTES
Preoperative Consultation  [address] [facility]                                  [address],  [place]ocelyn [place], [facility] [address]         Phone: [phone]      Mary Khan  YOB: 1955    Date of Service:  2/1/2022    Vitals:    02/01/22 1600 02/01/22 2222   BP: 130/72 122/74   Pulse: 82    Temp: 97.1 °F (36.2 °C)    TempSrc: Temporal    SpO2: 98%    Weight: 233 lb 9.6 oz (106 kg)    Height: 5' 4\" (1.626 m)       Wt Readings from Last 2 Encounters:   02/01/22 233 lb 9.6 oz (106 kg)   12/03/21 231 lb (104.8 kg)     BP Readings from Last 3 Encounters:   02/01/22 122/74   12/10/21 (!) 167/93   11/23/21 (!) 154/91        Chief Complaint   Patient presents with   Ashok Layer Pre-op Exam     eye surgery 2/11 Dr Concepción CARREAR, [place]     Allergies   Allergen Reactions    Adhesive Tape      Band aids blisters skin    Codeine     Crestor [Rosuvastatin Calcium] Other (See Comments)     Legs ache      Food      Black walnut    Lipitor [Atorvastatin Calcium] Other (See Comments)     Legs ache      Nitrofurantoin Itching    Penicillins Swelling     \"Throat closes\"    Sulfa Antibiotics     Tramadol Nausea Only     Outpatient Medications Marked as Taking for the 2/1/22 encounter (Office Visit) with Jt Yang,    Medication Sig Dispense Refill    diclofenac (VOLTAREN) 75 MG EC tablet Take 1 tablet by mouth twice daily 60 tablet 0    DICLOFENAC PO Take by mouth      carvedilol (COREG) 12.5 MG tablet Take 1 tablet by mouth 2 times daily (with meals) 180 tablet 3    amLODIPine (NORVASC) 5 MG tablet Take 1 tablet by mouth daily 90 tablet 3    omeprazole (PRILOSEC) 40 MG delayed release capsule Take 1 capsule by mouth daily 90 capsule 3    clotrimazole (MYCELEX) 10 MG kg DISSOLVE 1 TABLET BY MOUTH FIVE TIMES DAILY FOR 7 DAYS      Multiple Vitamins-Minerals (MULTIVITAMIN ADULT PO) Take 1 tablet by mouth daily      aspirin 81 MG tablet Take 81 mg by mouth daily.  S-Adenosylmethionine (EFREN-E) 400 MG TABS Take  by mouth. This patient presents to the office today for a preoperative consultation at the request of surgeon, Dr. Cheikh Torres, who plans on performing right Ptosis repair upper lid/Anterior approach surgery on February 11 at [facility]. The current problem began 2 months ago it started drooping again. and symptoms have been worsening with time. Conservative therapy: Previous eye surgery. Yes: same surgery in 2018, which has been effective. .    Planned anesthesia: Combination of sedation and local anesthesia.     Known anesthesia problems: None   Bleeding risk: No recent or remote history of abnormal bleeding  Personal or FH of DVT/PE: No    Patient objection to receiving blood products: No    Patient Active Problem List   Diagnosis    Primary osteoarthritis of right knee    Essential (primary) hypertension    Gastro-esophageal reflux disease without esophagitis    Hyperlipidemia    Liver cyst    Old myocardial infarction    Tobacco use    Urinary incontinence    Lumbar radiculopathy    Ptosis of right eyelid       Past Medical History:   Diagnosis Date    Acute laryngitis     Acute laryngitis 1/29/2015    Acute lymphadenitis of face, head and neck     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    Arthritis     Calculus of kidney     Cancer ([facility] [address]     skin    Cystocele, midline     Eustachian tube dysfunction, bilateral     Eustachian tube dysfunction, bilateral 6/15/2016    Gastro-esophageal reflux disease without esophagitis     Generalized anxiety disorder 8/25/2016    Hyperlipidemia     Hypertension     Liver cyst     Neuritis     Neuritis 6/5/2018    Nocturia     Old myocardial infarction     Osteoarthritis     Otalgia     Perennial allergic rhinitis     Pharyngeal inflammation     Primary osteoarthritis of right knee     Tinnitus of vascular origin 6/15/2016    Tinnitus, bilateral     Tobacco use     Urinary frequency     Urinary incontinence     Vaginal enterocele     Vaginal vault 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      Past Surgical History:   Procedure Laterality Date    APPENDECTOMY      CHOLECYSTECTOMY      ELBOW DEBRIDEMENT      FOOT SURGERY      bilateral    HYSTERECTOMY, VAGINAL      KNEE ARTHROSCOPY Right 4/24/2019    RIGHT KNEE ARTHROSCOPY,, SYNOVECTOMY CHONDROPLASTY,MEDIAL AND LATERAL MENESECTOMY, REMOVAL LOOSE BODIES performed by [facility]lysa [facility] at [facility]      bilateral    LIVER SURGERY      cyst removal    NECK SURGERY      PAIN MANAGEMENT PROCEDURE Right 11/18/2021    RIGHT LUMBAR FOUR FIVE EPIDURAL STEROID INJECTION SITE CONFIRMED BY FLUOROSCOPY performed by Damon Schulz MD at [place] [address] Right 10/30/2020    VIDEO ARTHROSCOPY RIGHT SHOULDER SUBACROMIAL DECOMPRESSION LABRAL DEBRIDEMENT ROTATOR CUFF TEAR performed by Loli Perez MD at [place] [address] ARTHROSCOPY      TONSILLECTOMY      TUBAL LIGATION      URETHRA SURGERY      US BREAST LESION REMOVAL LEFT       Family History   Problem Relation Age of Onset    Breast Cancer Mother     Cancer Mother         Bone    Diabetes Mother     Heart Disease Mother         CHF   Hernandez Arthritis Mother     Heart Attack Father     Prostate Cancer Brother     Cancer Maternal Grandmother     Cancer Brother         kidney    Cancer Brother         bladder     Social History     Socioeconomic History    Marital status:       Spouse name: Not on file    Number of children: Not on file    Years of education: Not on file    Highest education level: Not on file   Occupational History    Not on file   Tobacco Use    Smoking status: Current Every Day Smoker     Packs/day: 0.25     Years: 40.00     Pack years: 10.00     Types: Cigarettes    Smokeless tobacco: Never Used    Tobacco comment: quit March 4, 2019   Vaping Use    Vaping Use: Never used   Substance and Sexual Activity    Alcohol use: No    Drug use: No    Sexual activity: Not Currently   Other Topics Concern    Not on file   Social History Narrative    Not on file     Social Determinants of Health     Financial Resource Strain:     Difficulty of Paying Living Expenses: Not on file   Food Insecurity:     Worried About Running Out of Food in the Last Year: Not on file    Grant of Food in the Last Year: Not on file   Transportation Needs:     Lack of Transportation (Medical): Not on file    Lack of Transportation (Non-Medical):  Not on file   Physical Activity:     Days of Exercise per Week: Not on file    Minutes of Exercise per Session: Not on file   Stress:     Feeling of Stress : Not on file   Social Connections:     Frequency of Communication with Friends and Family: Not on file    Frequency of Social Gatherings with Friends and Family: Not on file    Attends Jehovah's witness Services: Not on file    Active Member of [address] [place] [address] or Organizations: Not on file    Attends Club or Organization Meetings: Not on file    Marital Status: Not on file   Intimate Partner Violence:     Fear of Current or Ex-Partner: Not on file    Emotionally Abused: Not on file    Physically Abused: Not on file    Sexually Abused: Not on file   Housing Stability:     Unable to Pay for Housing in the Last Year: Not on file    Number of [place] in the Last Year: Not on file    Unstable Housing in the Last Year: Not on file       Review of Systems  Constitutional:  Negative for activity or appetite change, fever or fatigue  HENT:  Negative for congestion, sinus pressure, or rhinorrhea  Eyes:  Negative for eye pain or visual changes  Resp:  Negative for SOB, chest tightness, cough  Cardiovascular: Negative for CP, palpitations, MONDRAGON, orthopnea, PND, LE edema  Gastrointestinal: Negative for abd pain, melena, BRBPR, N/V/D  Endocrine:  Negative for polydipsia and polyuria  :  Negative for dysuria, flank pain or urinary frequency  Musculoskeletal:  Negative for back pain or myalgias  Neuro:  Negative for dizziness or lightheadedness  Psych: negative for depression or anxiety       Physical Exam   Constitutional: She is oriented to person, place, and time. She appears well-developed and well-nourished. No distress. HENT:   Head: Normocephalic and atraumatic. Mouth/Throat: Uvula is midline, oropharynx is clear and moist and mucous membranes are normal.   Eyes: Conjunctivae and EOM are normal. Pupils are equal, round, and reactive to light. Neck: Trachea normal and normal range of motion. Neck supple. No JVD present. Carotid bruit is not present. No mass and no thyromegaly present. Cardiovascular: Normal rate, regular rhythm, normal heart sounds and intact distal pulses. Exam reveals no gallop and no friction rub. No murmur heard. Pulmonary/Chest: Effort normal and breath sounds normal. No respiratory distress. She has no wheezes. She has no rales. Abdominal: Soft. Normal aorta and bowel sounds are normal. She exhibits no distension and no mass. There is no hepatosplenomegaly. No tenderness. Musculoskeletal: She exhibits no edema and no tenderness. Neurological: She is alert and oriented to person, place, and time. She has normal strength. No cranial nerve deficit or sensory deficit. Coordination and gait normal.   Skin: Skin is warm and dry. No rash noted. No erythema. Psychiatric: She has a normal mood and affect.  Her behavior is normal.     Lab Review   Hospital Outpatient Visit on 02/01/2022   Component Date Value    Hemoglobin A1C 02/01/2022 5.9     eAG 02/01/2022 122.6     Cholesterol, Total 02/01/2022 354*    Triglycerides 02/01/2022 414*    HDL 02/01/2022 39*    LDL Calculated 02/01/2022 see below     VLDL Cholesterol Calcula* 02/01/2022 see below     Sodium 02/01/2022 141     Potassium 02/01/2022 3.9     Chloride 02/01/2022 103     CO2 02/01/2022 22     Anion Gap 02/01/2022 16     Glucose 02/01/2022 86     BUN 02/01/2022 15     CREATININE 02/01/2022 0.7     GFR Non- 02/01/2022 >60     GFR  02/01/2022 >60     Calcium 02/01/2022 9.7     Total Protein 02/01/2022 7.3     Albumin 02/01/2022 4.0     Albumin/Globulin Ratio 02/01/2022 1.2     Total Bilirubin 02/01/2022 0.3     Alkaline Phosphatase 02/01/2022 73     ALT 02/01/2022 14     AST 02/01/2022 14*    WBC 02/01/2022 9.3     RBC 02/01/2022 5.00     Hemoglobin 02/01/2022 13.7     Hematocrit 02/01/2022 42.1     MCV 02/01/2022 84.2     MCH 02/01/2022 27.5     MCHC 02/01/2022 32.6     RDW 02/01/2022 17.8*    Platelets 76/64/5694 220     MPV 02/01/2022 8.5     Neutrophils % 02/01/2022 71.3     Lymphocytes % 02/01/2022 20.9     Monocytes % 02/01/2022 5.3     Eosinophils % 02/01/2022 2.0     Basophils % 02/01/2022 0.5     Neutrophils Absolute 02/01/2022 6.7     Lymphocytes Absolute 02/01/2022 2.0     Monocytes Absolute 02/01/2022 0.5     Eosinophils Absolute 02/01/2022 0.2     Basophils Absolute 02/01/2022 0.0     TSH 02/01/2022 1.12     Vit D, 25-Hydroxy 02/01/2022 35.7     Magnesium 02/01/2022 2.30     Hep C Ab Interp 02/01/2022 Non-reactive     LDL Direct 02/01/2022 245*   Office Visit on 12/10/2021   Component Date Value    Color, UA 12/10/2021 Yellow     Clarity, UA 12/10/2021 Clear     Glucose, UA POC 12/10/2021 Neg     Bilirubin, UA 12/10/2021 Neg     Ketones, UA 12/10/2021 Neg     Spec Grav, UA 12/10/2021 1.015     Blood, UA POC 12/10/2021 Neg     pH, UA 12/10/2021 6.5     Protein, UA POC 12/10/2021 Neg     Urobilinogen, UA 12/10/2021 Neg     Leukocytes, UA 12/10/2021 Neg     Nitrite, UA 12/10/2021 Neg            Assessment:       74 y.o. patient with planned surgery as above.       Known risk factors for perioperative complications:   - Recommended patient to stop diclofenac 75 mg and aspirin 81mg 10 days prior to surgery.   - Recommended patient smoking cessation. Current medications which may produce withdrawal symptoms if withheld perioperatively: none      Plan     Osmani Munoz, 74 years female with past medical history of hypertension, coronary artery dissection, GERD, issues with voiding, obesity, hyperlipidemia, presents to the office today for a pre-op evaluation for her right ptosis repair surgery. Ptosis of right eyelid  - Scheduled surgery with Dr. Mirta Gentile, who plans on performing right Ptosis repair upper lid/Anterior approach surgery on February 11 at [facility]. - Return to clinic as needed    Essential (primary) hypertension   - Well controlled, at goal  - Recommended DASH diet  - Continue  carvedilol 12.5 twice daily for rate control, and amlodipine 5mg.   - Recommended patient to measure blood pressure at home, log it on paper  - Return to clinic as needed. Hyperlipidemia, unspecified hyperlipidemia type  - Not well controlled, patient stopped taking Crestor and Lipitor because she had leg cramps. - Patient performed lipid panel today. - Will consider further management once we get lab workup back  - Recommended patient to maintain healthy lifestyle  - Recommended to avoid refined carbohydrates, fast food or processed food  - Maintain a diet with lots of vegetables, fruits, and good source of protein   - Recommended 150 minutes of aerobic exercises with moderate intensity per week  - Return to clinic after eye surgery for further management. Old myocardial infarction  - Controlled, patient stated that she never had a myocardial infarction in the past.   - Continue carvedilol 12.5 twice daily for rate control, and amlodipine 5mg.  - Advised patient to stop aspirin 81mg 10 days prior to surgery. Tobacco use  - Patient still smoking about 10 cigarettes a day. - Educated patient about risks and harms of tobacco use specifically prior to a procedure.    - Encouraged patient tobacco cessation. Urinary incontinence, unspecified type  - Improved, asymptomatic at this time  - Patient follows urogynecologist, and had botox in the bladder, last one in 11/2021.   - Follow up with urogynecologist as scheduled. Primary osteoarthritis of right knee  Lumbar Radiculopathy  - Controlled   - Advised patient to stop diclofenac 75 mg 10 days prior to her surgery, patient showed agreement. - Continue tylenol as needed with not exceeding 4000mg daily; Patient stated that she takes 2 tylenol 500mg in the morning and 2 tylenol pm before bed. - Continue EFREN E which has helped her with osteoarthritis and bursitis per patient. - Follow up with pain management Dr. Joe Mcdaniel as scheduled. - Return to clinic as needed. Gastro-esophageal reflux disease without esophagitis  - Controlled  - Continue omeprazole 40mg daily  - Return to clinic as needed. RCRI risk score: 0 points Class 1 risk, 3.9% 30 day risk of death, MI or cardiac arrest.     Functional Capacity:  Determination not indicated as patient is low risk of MACE based on combined clinical and surgical risks. 1. Preoperative workup as follows: none    2. Change current medications as follows: stop diclofenac 75mg and aspirin 10 days prior the surgery, patient showed agreement. Encouraged patient smoking cessation, patient showed agreement. 3. Prophylaxis for cardiac events with   A)  perioperative beta-blockers: Continue bblocker as pt is already currently taking  carvedilol  B)  perioperative statins: indicated statin therapy, however patient stopped taking crestor and lipitor in the past because of leg cramps    4. Deep vein thrombosis prophylaxis: regimen to be chosen by surgical team    5. As outlined above, measures were taken to assess risk, and decrease risk when possible. Risks of surgery were discussed with patient, and benefits believed to outweigh risks.   Patient is making an informed decision to proceed with surgery with an understanding of any risk,  Please follow all pre-op recommendations above.     [place] [place]  2/2/2022

## 2022-02-01 ENCOUNTER — OFFICE VISIT (OUTPATIENT)
Dept: PRIMARY CARE CLINIC | Age: 67
End: 2022-02-01
Payer: MEDICARE

## 2022-02-01 ENCOUNTER — HOSPITAL ENCOUNTER (OUTPATIENT)
Age: 67
Discharge: HOME | End: 2022-02-01
Payer: MEDICARE

## 2022-02-01 VITALS
HEIGHT: 64 IN | SYSTOLIC BLOOD PRESSURE: 122 MMHG | HEART RATE: 82 BPM | BODY MASS INDEX: 39.88 KG/M2 | WEIGHT: 233.6 LBS | DIASTOLIC BLOOD PRESSURE: 74 MMHG | TEMPERATURE: 97.1 F | OXYGEN SATURATION: 98 %

## 2022-02-01 DIAGNOSIS — I10 ESSENTIAL (PRIMARY) HYPERTENSION: ICD-10-CM

## 2022-02-01 DIAGNOSIS — K21.9 GASTRO-ESOPHAGEAL REFLUX DISEASE WITHOUT ESOPHAGITIS: ICD-10-CM

## 2022-02-01 DIAGNOSIS — H93.13 TINNITUS, BILATERAL: ICD-10-CM

## 2022-02-01 DIAGNOSIS — E66.01 OBESITY, CLASS III, BMI 40-49.9 (MORBID OBESITY) (HCC): ICD-10-CM

## 2022-02-01 DIAGNOSIS — Z00.00 ROUTINE GENERAL MEDICAL EXAMINATION AT A HEALTH CARE FACILITY: ICD-10-CM

## 2022-02-01 DIAGNOSIS — K76.89 LIVER CYST: ICD-10-CM

## 2022-02-01 DIAGNOSIS — Z72.0 TOBACCO USE: ICD-10-CM

## 2022-02-01 DIAGNOSIS — H02.401 PTOSIS OF RIGHT EYELID: Primary | ICD-10-CM

## 2022-02-01 DIAGNOSIS — E78.5 HYPERLIPIDEMIA, UNSPECIFIED HYPERLIPIDEMIA TYPE: ICD-10-CM

## 2022-02-01 DIAGNOSIS — I25.2 OLD MYOCARDIAL INFARCTION: ICD-10-CM

## 2022-02-01 DIAGNOSIS — F41.1 GENERALIZED ANXIETY DISORDER: ICD-10-CM

## 2022-02-01 DIAGNOSIS — R32 URINARY INCONTINENCE, UNSPECIFIED TYPE: ICD-10-CM

## 2022-02-01 DIAGNOSIS — M54.16 LUMBAR RADICULOPATHY: ICD-10-CM

## 2022-02-01 DIAGNOSIS — M17.11 PRIMARY OSTEOARTHRITIS OF RIGHT KNEE: ICD-10-CM

## 2022-02-01 PROBLEM — M79.2 NEURITIS: Status: RESOLVED | Noted: 2018-06-05 | Resolved: 2022-02-01

## 2022-02-01 LAB
A/G RATIO: 1.2 (ref 1.1–2.2)
ALBUMIN SERPL-MCNC: 4 G/DL (ref 3.4–5)
ALP BLD-CCNC: 73 U/L (ref 40–129)
ALT SERPL-CCNC: 14 U/L (ref 10–40)
ANION GAP SERPL CALCULATED.3IONS-SCNC: 16 MMOL/L (ref 3–16)
AST SERPL-CCNC: 14 U/L (ref 15–37)
BASOPHILS ABSOLUTE: 0 K/UL (ref 0–0.2)
BASOPHILS RELATIVE PERCENT: 0.5 %
BILIRUB SERPL-MCNC: 0.3 MG/DL (ref 0–1)
BUN BLDV-MCNC: 15 MG/DL (ref 7–20)
CALCIUM SERPL-MCNC: 9.7 MG/DL (ref 8.3–10.6)
CHLORIDE BLD-SCNC: 103 MMOL/L (ref 99–110)
CHOLESTEROL, TOTAL: 354 MG/DL (ref 0–199)
CO2: 22 MMOL/L (ref 21–32)
CREAT SERPL-MCNC: 0.7 MG/DL (ref 0.6–1.2)
EOSINOPHILS ABSOLUTE: 0.2 K/UL (ref 0–0.6)
EOSINOPHILS RELATIVE PERCENT: 2 %
GFR AFRICAN AMERICAN: >60
GFR NON-AFRICAN AMERICAN: >60
GLUCOSE BLD-MCNC: 86 MG/DL (ref 70–99)
HCT VFR BLD CALC: 42.1 % (ref 36–48)
HDLC SERPL-MCNC: 39 MG/DL (ref 40–60)
HEMOGLOBIN: 13.7 G/DL (ref 12–16)
HEPATITIS C ANTIBODY INTERPRETATION: NORMAL
LDL CHOLESTEROL CALCULATED: ABNORMAL MG/DL
LDL CHOLESTEROL DIRECT: 245 MG/DL
LYMPHOCYTES ABSOLUTE: 2 K/UL (ref 1–5.1)
LYMPHOCYTES RELATIVE PERCENT: 20.9 %
MAGNESIUM: 2.3 MG/DL (ref 1.8–2.4)
MCH RBC QN AUTO: 27.5 PG (ref 26–34)
MCHC RBC AUTO-ENTMCNC: 32.6 G/DL (ref 31–36)
MCV RBC AUTO: 84.2 FL (ref 80–100)
MONOCYTES ABSOLUTE: 0.5 K/UL (ref 0–1.3)
MONOCYTES RELATIVE PERCENT: 5.3 %
NEUTROPHILS ABSOLUTE: 6.7 K/UL (ref 1.7–7.7)
NEUTROPHILS RELATIVE PERCENT: 71.3 %
PDW BLD-RTO: 17.8 % (ref 12.4–15.4)
PLATELET # BLD: 220 K/UL (ref 135–450)
PMV BLD AUTO: 8.5 FL (ref 5–10.5)
POTASSIUM SERPL-SCNC: 3.9 MMOL/L (ref 3.5–5.1)
RBC # BLD: 5 M/UL (ref 4–5.2)
SODIUM BLD-SCNC: 141 MMOL/L (ref 136–145)
TOTAL PROTEIN: 7.3 G/DL (ref 6.4–8.2)
TRIGL SERPL-MCNC: 414 MG/DL (ref 0–150)
TSH REFLEX: 1.12 UIU/ML (ref 0.27–4.2)
VITAMIN D 25-HYDROXY: 35.7 NG/ML
VLDLC SERPL CALC-MCNC: ABNORMAL MG/DL
WBC # BLD: 9.3 K/UL (ref 4–11)

## 2022-02-01 PROCEDURE — 82306 VITAMIN D 25 HYDROXY: CPT

## 2022-02-01 PROCEDURE — 3017F COLORECTAL CA SCREEN DOC REV: CPT

## 2022-02-01 PROCEDURE — 4004F PT TOBACCO SCREEN RCVD TLK: CPT

## 2022-02-01 PROCEDURE — 83721 ASSAY OF BLOOD LIPOPROTEIN: CPT

## 2022-02-01 PROCEDURE — G8484 FLU IMMUNIZE NO ADMIN: HCPCS

## 2022-02-01 PROCEDURE — 84443 ASSAY THYROID STIM HORMONE: CPT

## 2022-02-01 PROCEDURE — 83036 HEMOGLOBIN GLYCOSYLATED A1C: CPT

## 2022-02-01 PROCEDURE — 1123F ACP DISCUSS/DSCN MKR DOCD: CPT

## 2022-02-01 PROCEDURE — 80053 COMPREHEN METABOLIC PANEL: CPT

## 2022-02-01 PROCEDURE — 1090F PRES/ABSN URINE INCON ASSESS: CPT

## 2022-02-01 PROCEDURE — 80061 LIPID PANEL: CPT

## 2022-02-01 PROCEDURE — 99214 OFFICE O/P EST MOD 30 MIN: CPT

## 2022-02-01 PROCEDURE — G8400 PT W/DXA NO RESULTS DOC: HCPCS

## 2022-02-01 PROCEDURE — 86803 HEPATITIS C AB TEST: CPT

## 2022-02-01 PROCEDURE — 4040F PNEUMOC VAC/ADMIN/RCVD: CPT

## 2022-02-01 PROCEDURE — G8417 CALC BMI ABV UP PARAM F/U: HCPCS

## 2022-02-01 PROCEDURE — 83735 ASSAY OF MAGNESIUM: CPT

## 2022-02-01 PROCEDURE — 85025 COMPLETE CBC W/AUTO DIFF WBC: CPT

## 2022-02-01 PROCEDURE — 0509F URINE INCON PLAN DOCD: CPT

## 2022-02-01 PROCEDURE — G8427 DOCREV CUR MEDS BY ELIG CLIN: HCPCS

## 2022-02-01 PROCEDURE — 36415 COLL VENOUS BLD VENIPUNCTURE: CPT

## 2022-02-01 ASSESSMENT — PATIENT HEALTH QUESTIONNAIRE - PHQ9
6. FEELING BAD ABOUT YOURSELF - OR THAT YOU ARE A FAILURE OR HAVE LET YOURSELF OR YOUR FAMILY DOWN: 0
2. FEELING DOWN, DEPRESSED OR HOPELESS: 0
SUM OF ALL RESPONSES TO PHQ9 QUESTIONS 1 & 2: 0
SUM OF ALL RESPONSES TO PHQ QUESTIONS 1-9: 0
5. POOR APPETITE OR OVEREATING: 0
SUM OF ALL RESPONSES TO PHQ QUESTIONS 1-9: 0
1. LITTLE INTEREST OR PLEASURE IN DOING THINGS: 0
SUM OF ALL RESPONSES TO PHQ QUESTIONS 1-9: 0
4. FEELING TIRED OR HAVING LITTLE ENERGY: 0
9. THOUGHTS THAT YOU WOULD BE BETTER OFF DEAD, OR OF HURTING YOURSELF: 0
8. MOVING OR SPEAKING SO SLOWLY THAT OTHER PEOPLE COULD HAVE NOTICED. OR THE OPPOSITE, BEING SO FIGETY OR RESTLESS THAT YOU HAVE BEEN MOVING AROUND A LOT MORE THAN USUAL: 0
7. TROUBLE CONCENTRATING ON THINGS, SUCH AS READING THE NEWSPAPER OR WATCHING TELEVISION: 0
SUM OF ALL RESPONSES TO PHQ QUESTIONS 1-9: 0
10. IF YOU CHECKED OFF ANY PROBLEMS, HOW DIFFICULT HAVE THESE PROBLEMS MADE IT FOR YOU TO DO YOUR WORK, TAKE CARE OF THINGS AT HOME, OR GET ALONG WITH OTHER PEOPLE: 0
3. TROUBLE FALLING OR STAYING ASLEEP: 0

## 2022-02-01 ASSESSMENT — ANXIETY QUESTIONNAIRES
5. BEING SO RESTLESS THAT IT IS HARD TO SIT STILL: 0
GAD7 TOTAL SCORE: 0
4. TROUBLE RELAXING: 0
3. WORRYING TOO MUCH ABOUT DIFFERENT THINGS: 0
2. NOT BEING ABLE TO STOP OR CONTROL WORRYING: 0
IF YOU CHECKED OFF ANY PROBLEMS ON THIS QUESTIONNAIRE, HOW DIFFICULT HAVE THESE PROBLEMS MADE IT FOR YOU TO DO YOUR WORK, TAKE CARE OF THINGS AT HOME, OR GET ALONG WITH OTHER PEOPLE: NOT DIFFICULT AT ALL
1. FEELING NERVOUS, ANXIOUS, OR ON EDGE: 0
6. BECOMING EASILY ANNOYED OR IRRITABLE: 0
7. FEELING AFRAID AS IF SOMETHING AWFUL MIGHT HAPPEN: 0

## 2022-02-02 PROBLEM — H02.401 PTOSIS OF RIGHT EYELID: Status: ACTIVE | Noted: 2022-02-02

## 2022-02-02 LAB
ESTIMATED AVERAGE GLUCOSE: 122.6 MG/DL
HBA1C MFR BLD: 5.9 %

## 2022-02-03 RX ORDER — PRAVASTATIN SODIUM 20 MG
20 TABLET ORAL DAILY
Qty: 30 TABLET | Refills: 3 | Status: SHIPPED | OUTPATIENT
Start: 2022-02-03 | End: 2022-06-21

## 2022-03-09 DIAGNOSIS — S46.011D TRAUMATIC COMPLETE TEAR OF RIGHT ROTATOR CUFF, SUBSEQUENT ENCOUNTER: ICD-10-CM

## 2022-03-09 DIAGNOSIS — Z98.890 S/P ROTATOR CUFF REPAIR: ICD-10-CM

## 2022-03-09 DIAGNOSIS — M75.41 SHOULDER IMPINGEMENT SYNDROME, RIGHT: ICD-10-CM

## 2022-03-09 RX ORDER — DICLOFENAC SODIUM 75 MG/1
75 TABLET, DELAYED RELEASE ORAL 2 TIMES DAILY
Qty: 180 TABLET | Refills: 1 | Status: SHIPPED | OUTPATIENT
Start: 2022-03-09 | End: 2022-07-26

## 2022-05-09 ENCOUNTER — OFFICE VISIT (OUTPATIENT)
Dept: PRIMARY CARE CLINIC | Age: 67
End: 2022-05-09
Payer: MEDICARE

## 2022-05-09 VITALS
HEART RATE: 74 BPM | TEMPERATURE: 98 F | DIASTOLIC BLOOD PRESSURE: 78 MMHG | BODY MASS INDEX: 39.48 KG/M2 | OXYGEN SATURATION: 97 % | WEIGHT: 230 LBS | SYSTOLIC BLOOD PRESSURE: 138 MMHG
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
## 2022-05-09 DIAGNOSIS — S00.06XA TICK BITE OF SCALP, INITIAL ENCOUNTER: Primary | ICD-10-CM

## 2022-05-09 PROCEDURE — 4004F PT TOBACCO SCREEN RCVD TLK: CPT | Performed by: FAMILY MEDICINE

## 2022-05-09 PROCEDURE — 3017F COLORECTAL CA SCREEN DOC REV: CPT | Performed by: FAMILY MEDICINE

## 2022-05-09 PROCEDURE — 4040F PNEUMOC VAC/ADMIN/RCVD: CPT | Performed by: FAMILY MEDICINE

## 2022-05-09 PROCEDURE — G8427 DOCREV CUR MEDS BY ELIG CLIN: HCPCS | Performed by: FAMILY MEDICINE

## 2022-05-09 PROCEDURE — 1090F PRES/ABSN URINE INCON ASSESS: CPT | Performed by: FAMILY MEDICINE

## 2022-05-09 PROCEDURE — 1123F ACP DISCUSS/DSCN MKR DOCD: CPT | Performed by: FAMILY MEDICINE

## 2022-05-09 PROCEDURE — 99213 OFFICE O/P EST LOW 20 MIN: CPT | Performed by: FAMILY MEDICINE

## 2022-05-09 PROCEDURE — G8417 CALC BMI ABV UP PARAM F/U: HCPCS | Performed by: FAMILY MEDICINE

## 2022-05-09 PROCEDURE — G8400 PT W/DXA NO RESULTS DOC: HCPCS | Performed by: FAMILY MEDICINE

## 2022-05-09 RX ORDER — MAGNESIUM GLUCONATE 30 MG(550)
550 TABLET ORAL
COMMUNITY
End: 2022-10-04

## 2022-05-09 SDOH — ECONOMIC STABILITY: FOOD INSECURITY: WITHIN THE PAST 12 MONTHS, THE FOOD YOU BOUGHT JUST DIDN'T LAST AND YOU DIDN'T HAVE MONEY TO GET MORE.: NEVER TRUE

## 2022-05-09 SDOH — ECONOMIC STABILITY: FOOD INSECURITY: WITHIN THE PAST 12 MONTHS, YOU WORRIED THAT YOUR FOOD WOULD RUN OUT BEFORE YOU GOT MONEY TO BUY MORE.: NEVER TRUE

## 2022-05-09 ASSESSMENT — ENCOUNTER SYMPTOMS
SHORTNESS OF BREATH: 0
DIARRHEA: 0
ABDOMINAL PAIN: 0
CONSTIPATION: 0

## 2022-05-09 ASSESSMENT — ANXIETY QUESTIONNAIRES
7. FEELING AFRAID AS IF SOMETHING AWFUL MIGHT HAPPEN: 0-NOT AT ALL
1. FEELING NERVOUS, ANXIOUS, OR ON EDGE: 0
4. TROUBLE RELAXING: 0-NOT AT ALL
6. BECOMING EASILY ANNOYED OR IRRITABLE: 0-NOT AT ALL
2. NOT BEING ABLE TO STOP OR CONTROL WORRYING: 0-NOT AT ALL
GAD7 TOTAL SCORE: 0
3. WORRYING TOO MUCH ABOUT DIFFERENT THINGS: 0-NOT AT ALL
5. BEING SO RESTLESS THAT IT IS HARD TO SIT STILL: 0-NOT AT ALL

## 2022-05-09 ASSESSMENT — PATIENT HEALTH QUESTIONNAIRE - PHQ9
SUM OF ALL RESPONSES TO PHQ QUESTIONS 1-9: 2
SUM OF ALL RESPONSES TO PHQ QUESTIONS 1-9: 2
5. POOR APPETITE OR OVEREATING: 1
4. FEELING TIRED OR HAVING LITTLE ENERGY: 1
SUM OF ALL RESPONSES TO PHQ QUESTIONS 1-9: 2
6. FEELING BAD ABOUT YOURSELF - OR THAT YOU ARE A FAILURE OR HAVE LET YOURSELF OR YOUR FAMILY DOWN: 0
8. MOVING OR SPEAKING SO SLOWLY THAT OTHER PEOPLE COULD HAVE NOTICED. OR THE OPPOSITE, BEING SO FIGETY OR RESTLESS THAT YOU HAVE BEEN MOVING AROUND A LOT MORE THAN USUAL: 0
SUM OF ALL RESPONSES TO PHQ QUESTIONS 1-9: 2
SUM OF ALL RESPONSES TO PHQ9 QUESTIONS 1 & 2: 0
7. TROUBLE CONCENTRATING ON THINGS, SUCH AS READING THE NEWSPAPER OR WATCHING TELEVISION: 0
10. IF YOU CHECKED OFF ANY PROBLEMS, HOW DIFFICULT HAVE THESE PROBLEMS MADE IT FOR YOU TO DO YOUR WORK, TAKE CARE OF THINGS AT HOME, OR GET ALONG WITH OTHER PEOPLE: 0
2. FEELING DOWN, DEPRESSED OR HOPELESS: 0
3. TROUBLE FALLING OR STAYING ASLEEP: 0
1. LITTLE INTEREST OR PLEASURE IN DOING THINGS: 0
9. THOUGHTS THAT YOU WOULD BE BETTER OFF DEAD, OR OF HURTING YOURSELF: 0

## 2022-05-09 ASSESSMENT — SOCIAL DETERMINANTS OF HEALTH (SDOH): HOW HARD IS IT FOR YOU TO PAY FOR THE VERY BASICS LIKE FOOD, HOUSING, MEDICAL CARE, AND HEATING?: NOT HARD AT ALL

## 2022-05-09 NOTE — PROGRESS NOTES
[address] and [facility]                                             [address] [facility], [address] [place] [address]        Phone: [phone]      Name:  Ángel Petit  :    1955    Consultants:   Patient Care Team:  Krista Schaffer DO as PCP - General (Family Medicine)  Krista Schaffer DO as PCP - [facility] Empaneled Provider  Hannah Mayer MD as Surgeon (Orthopedic Surgery)    Chief Complaint:     Ángel Petit is a 74 y.o. female  who presents today for an established patient care visit with Personalized Prevention Plan Services as noted below. HPI:     33-year-old female seen in the office today for an acute visit for a tick bite. Patient states that she noticed last , 8 days prior to today's visit, an area on her right back that itched and noticed that she had a tick that was stuck in her skin. Patient pulled it out with tweezers without incidence. Patient then noticed on Thursday night that she also had a tick that was in her scalp to the left that she was able to pull out with a comb. Patient states she has indoor outdoor cats and believes that they got into the house through that route. Patient does not feel like either was on the skin for greater than 24 hours and both of the ticks were removed greater than 72 hours. Patient denies any systemic symptoms including muscle aches, joint pain, fatigue, fever, headaches. Patient also believes that these were dog ticks and not deer ticks after pictures were reviewed during today's encounter.       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    Arthritis     Calculus of kidney     Cancer (HCC)     skin    Cystocele, midline     Eustachian tube dysfunction, bilateral     Eustachian tube dysfunction, bilateral 6/15/2016    Gastro-esophageal reflux disease without esophagitis     Generalized anxiety disorder 8/25/2016    Hyperlipidemia     Hypertension     Liver cyst     Neuritis     Neuritis 6/5/2018    Nocturia     Old myocardial infarction     Osteoarthritis     Otalgia     Perennial allergic rhinitis     Pharyngeal inflammation     Primary osteoarthritis of right knee     Tinnitus of vascular origin 6/15/2016    Tinnitus, bilateral     Tobacco use     Urinary frequency     Urinary incontinence     Vaginal enterocele     Vaginal vault 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        Past Surgical History:  Past Surgical History:   Procedure Laterality Date    APPENDECTOMY      CHOLECYSTECTOMY      ELBOW DEBRIDEMENT      FOOT SURGERY      bilateral    HYSTERECTOMY, VAGINAL      KNEE ARTHROSCOPY Right 4/24/2019    RIGHT KNEE ARTHROSCOPY,, SYNOVECTOMY CHONDROPLASTY,MEDIAL AND LATERAL MENESECTOMY, REMOVAL LOOSE BODIES performed by [facility]essandra Joyce MD at [facility]      bilateral    LIVER SURGERY      cyst removal    NECK SURGERY      PAIN MANAGEMENT PROCEDURE Right 11/18/2021    RIGHT LUMBAR FOUR FIVE EPIDURAL STEROID INJECTION SITE CONFIRMED BY FLUOROSCOPY performed by Serina Harper MD at [place] [address] Right 10/30/2020    VIDEO ARTHROSCOPY RIGHT SHOULDER SUBACROMIAL DECOMPRESSION LABRAL DEBRIDEMENT ROTATOR CUFF TEAR performed by Tony Morris MD at [place] [address] ARTHROSCOPY      TONSILLECTOMY      TUBAL LIGATION      URETHRA SURGERY  US BREAST LESION REMOVAL LEFT         Home Meds:  Prior to Visit Medications    Medication Sig Taking? Authorizing Provider   diclofenac (VOLTAREN) 75 MG EC tablet Take 1 tablet by mouth 2 times daily Yes Edgar Lujan MD   carvedilol (COREG) 12.5 MG tablet Take 1 tablet by mouth 2 times daily (with meals) Yes Sylvester Wagner, DO   amLODIPine (NORVASC) 5 MG tablet Take 1 tablet by mouth daily Yes Sylvester Wagner DO   omeprazole (PRILOSEC) 40 MG delayed release capsule Take 1 capsule by mouth daily Yes Sylvester Wagner, DO   Multiple Vitamins-Minerals (MULTIVITAMIN ADULT PO) Take 1 tablet by mouth daily Yes Historical Provider, MD   aspirin 81 MG tablet Take 81 mg by mouth daily. Yes Historical Provider, MD   S-Adenosylmethionine (EFREN-E) 400 MG TABS Take  by mouth.  Yes Historical Provider, MD   ascorbic acid (VITAMIN C) 100 MG tablet Take 100 mg by mouth  Historical Provider, MD   Magnesium Gluconate 550 MG TABS Take 550 mg by mouth  Historical Provider, MD   vitamin A 3 MG (27072 UT) capsule Take 1 capsule by mouth  Historical Provider, MD   pravastatin (PRAVACHOL) 20 MG tablet Take 1 tablet by mouth daily  Patient not taking: Reported on 5/9/2022  Jade Moore,    DICLOFENAC PO Take by mouth  Historical Provider, MD   clotrimazole (MYCELEX) 10 MG kg DISSOLVE 1 TABLET BY MOUTH FIVE TIMES DAILY FOR 7 DAYS  Patient not taking: Reported on 5/9/2022  Historical Provider, MD       Allergies:    Adhesive tape, Codeine, Crestor [rosuvastatin calcium], Food, Lipitor [atorvastatin calcium], Nitrofurantoin, Penicillins, Sulfa antibiotics, and Tramadol    Family History:       Problem Relation Age of Onset    Breast Cancer Mother     Cancer Mother         Bone    Diabetes Mother     Heart Disease Mother         CHF    Arthritis Mother     Heart Attack Father     Prostate Cancer Brother     Cancer Maternal Grandmother     Cancer Brother         kidney    Cancer Brother         bladder Health Maintenance Completed:  Health Maintenance   Topic Date Due    COVID-19 Vaccine (1) Never done    DTaP/Tdap/Td vaccine (1 - Tdap) Never done    Colorectal Cancer Screen  Never done    DEXA (modify frequency per FRAX score)  Never done    Shingles vaccine (2 of 3) 12/29/2015    Flu vaccine (Season Ended) 09/01/2022    Breast cancer screen  09/20/2022    Annual Wellness Visit (AWV)  11/09/2022    A1C test (Diabetic or Prediabetic)  02/01/2023    Lipids  02/01/2023    Depression Screen  02/01/2023    Potassium  02/01/2023    Creatinine  02/01/2023    Pneumococcal 65+ years Vaccine  Completed    Hepatitis C screen  Completed    Hepatitis A vaccine  Aged Out    Hepatitis B vaccine  Aged Out    Hib vaccine  Aged Out    Meningococcal (ACWY) vaccine  Aged [employer] History   Administered Date(s) Administered    Influenza Virus Vaccine 11/03/2015    Influenza, MDCK Quadv, with preserv IM (Flucelvax 2 yrs and older) 09/25/2019    Pneumococcal Conjugate 13-valent (Rbwaogs13) 09/25/2019    Pneumococcal Polysaccharide ([id number]) 10/10/2020    Zoster Live (Zostavax) 11/03/2015         Review of Systems:  Review of Systems   Constitutional: Negative for fatigue and fever. Respiratory: Negative for shortness of breath. Cardiovascular: Negative for chest pain and leg swelling. Gastrointestinal: Negative for abdominal pain, constipation and diarrhea. Skin: Negative for rash. Neurological: Negative for headaches. Physical Exam:   Vitals:    05/09/22 1032   BP: 138/78   Site: Left Upper Arm   Position: Sitting   Cuff Size: Large Adult   Pulse: 74   Temp: 98 °F (36.7 °C)   TempSrc: Oral   SpO2: 97%   Weight: 230 lb (104.3 kg)     Body mass index is 39.48 kg/m².     Wt Readings from Last 3 Encounters:   05/09/22 230 lb (104.3 kg)   02/01/22 233 lb 9.6 oz (106 kg)   12/03/21 231 lb (104.8 kg)       BP Readings from Last 3 Encounters:   05/09/22 138/78   02/01/22 122/74   12/10/21 (!) 167/93       Physical Exam  Vitals and nursing note reviewed. Constitutional:       Appearance: Normal appearance. HENT:      Head: Normocephalic and atraumatic. Pulmonary:      Effort: Pulmonary effort is normal.   Skin:     General: Skin is warm and dry. Findings: No erythema or rash. Comments: Small eschar noted to the right posterior shoulder over spinous scapula, no surrounding erythema, rash, or fluctuance. Small eschar noted to the left scalp with no surrounding erythema, rash, or fluctuance. Neurological:      General: No focal deficit present. Mental Status: She is alert and oriented to person, place, and time. Psychiatric:         Mood and Affect: Mood normal.         Behavior: Behavior normal.         Judgment: Judgment normal.              Lab Review:   No visits with results within 2 Month(s) from this visit.    Latest known visit with results is:   Hospital Outpatient Visit on 02/01/2022   Component Date Value    Hemoglobin A1C 02/01/2022 5.9     eAG 02/01/2022 122.6     Cholesterol, Total 02/01/2022 354*    Triglycerides 02/01/2022 414*    HDL 02/01/2022 39*    LDL Calculated 02/01/2022 see below     VLDL Cholesterol Calcula* 02/01/2022 see below     Sodium 02/01/2022 141     Potassium 02/01/2022 3.9     Chloride 02/01/2022 103     CO2 02/01/2022 22     Anion Gap 02/01/2022 16     Glucose 02/01/2022 86     BUN 02/01/2022 15     CREATININE 02/01/2022 0.7     GFR Non- 02/01/2022 >60     GFR  02/01/2022 >60     Calcium 02/01/2022 9.7     Total Protein 02/01/2022 7.3     Albumin 02/01/2022 4.0     Albumin/Globulin Ratio 02/01/2022 1.2     Total Bilirubin 02/01/2022 0.3     Alkaline Phosphatase 02/01/2022 73     ALT 02/01/2022 14     AST 02/01/2022 14*    WBC 02/01/2022 9.3     RBC 02/01/2022 5.00     Hemoglobin 02/01/2022 13.7     Hematocrit 02/01/2022 42.1     MCV 02/01/2022 84.2     MCH 02/01/2022 27.5     MCHC 02/01/2022 32.6     RDW 02/01/2022 17.8*    Platelets 22/97/7239 220     MPV 02/01/2022 8.5     Neutrophils % 02/01/2022 71.3     Lymphocytes % 02/01/2022 20.9     Monocytes % 02/01/2022 5.3     Eosinophils % 02/01/2022 2.0     Basophils % 02/01/2022 0.5     Neutrophils Absolute 02/01/2022 6.7     Lymphocytes Absolute 02/01/2022 2.0     Monocytes Absolute 02/01/2022 0.5     Eosinophils Absolute 02/01/2022 0.2     Basophils Absolute 02/01/2022 0.0     TSH 02/01/2022 1.12     Vit D, 25-Hydroxy 02/01/2022 35.7     Magnesium 02/01/2022 2.30     Hep C Ab Interp 02/01/2022 Non-reactive     LDL Direct 02/01/2022 245*          Assessment/Plan:  Sunny Coley was seen today for insect bite. Diagnoses and all orders for this visit:    Tick bite of scalp, initial encounter    77-year-old female with    1. Tick bite. Differential diagnosis and usual treatment plans discussed with patient in detail. After review of CDC criteria for prophylaxis, it was felt patient does not meet criteria. Symptoms of tickborne diseases were discussed with patient in detail. Patient was advised to call the office if she begins symptomatic and doxycycline 100 mg twice daily for 14 days will be advised.     Health Maintenance Due:  Health Maintenance Due   Topic Date Due    COVID-19 Vaccine (1) Never done    DTaP/Tdap/Td vaccine (1 - Tdap) Never done    Colorectal Cancer Screen  Never done    DEXA (modify frequency per FRAX score)  Never done    Shingles vaccine (2 of 3) 12/29/2015          Health care decision maker:  up to date:  already done      Health Maintenance: (USPSTF Recommendations)  (F) Breast Cancer Screen: (40-49 (C), 50-74 biennial screening mammogram (B))  (F) Cervical Cancer Screen: (21-29 q3yr cytology alone; 30-65 q3yr cytology alone, q5yr with hrHPV alone, or q5yr cytology+hrHPV (A))  (M) Prostate Cancer Screen: (54-77 yo discuss benefits/harm, does not recommend testing PSA in men >73 yo (D):   (M) AAA Screen: (men 73-69 yo who has ever smoked (B), consider in nonsmokers if high risk):  CRC/Colonoscopy Screening: (adults 39-53 (B), 50-75 (A))  Lung Ca Screening: Annual LDCT (+smoker age 49-80, smoked within 15 years, total of 20 pack yr history (B)):  DEXA Screen: (women >65 and older, <65 if at risk/postmenopausal (B))  HIV Screen: (16-65 yr old, and all pregnant patients (A)): Hep C Screen: (18-79 yr old (B)):  HCC Screen: (all pts with cirrhosis and high risk Hep B (US q6 mo)):  Immunizations:    RTC:  Return if symptoms worsen or fail to improve. EMR Dragon/transcription disclaimer:  Much of this encounter note is electronic transcription/translation of spoken language to printed texts. The electronic translation of spoken language may be erroneous, or at times, nonsensical words or phrases may be inadvertently transcribed.   Although I have reviewed the note for such errors, some may still exist.

## 2022-05-10 ENCOUNTER — PROCEDURE VISIT (OUTPATIENT)
Dept: UROGYNECOLOGY | Age: 67
End: 2022-05-10
Payer: MEDICARE

## 2022-05-10 VITALS
HEART RATE: 79 BPM | OXYGEN SATURATION: 98 % | DIASTOLIC BLOOD PRESSURE: 94 MMHG | SYSTOLIC BLOOD PRESSURE: 156 MMHG | TEMPERATURE: 98.5 F | RESPIRATION RATE: 16 BRPM

## 2022-05-10 DIAGNOSIS — R39.15 URGENCY OF URINATION: Primary | ICD-10-CM

## 2022-05-10 DIAGNOSIS — N39.41 URGE INCONTINENCE: ICD-10-CM

## 2022-05-10 DIAGNOSIS — R39.15 URINARY URGENCY: ICD-10-CM

## 2022-05-10 PROCEDURE — 81002 URINALYSIS NONAUTO W/O SCOPE: CPT | Performed by: OBSTETRICS & GYNECOLOGY

## 2022-05-10 PROCEDURE — 52287 CYSTOSCOPY CHEMODENERVATION: CPT | Performed by: OBSTETRICS & GYNECOLOGY

## 2022-05-10 PROCEDURE — 96372 THER/PROPH/DIAG INJ SC/IM: CPT | Performed by: OBSTETRICS & GYNECOLOGY

## 2022-05-10 NOTE — PROGRESS NOTES
5/10/2022       HPI:     Name: Juan Shook  YOB: 1955    CC: Patient with urinary urgency, frequency, overactive bladder. HPI: Juan Shook is a 74 y.o. female who is here for intravesical botulinum toxin A injection. She is getting 100 units.       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    Arthritis     Calculus of kidney     Cancer (HCC)     skin    Cystocele, midline     Eustachian tube dysfunction, bilateral     Eustachian tube dysfunction, bilateral 6/15/2016    Gastro-esophageal reflux disease without esophagitis     Generalized anxiety disorder 8/25/2016    Hyperlipidemia     Hypertension     Liver cyst     Neuritis     Neuritis 6/5/2018    Nocturia     Old myocardial infarction     Osteoarthritis     Otalgia     Perennial allergic rhinitis     Pharyngeal inflammation     Primary osteoarthritis of right knee     Tinnitus of vascular origin 6/15/2016    Tinnitus, bilateral     Tobacco use     Urinary frequency     Urinary incontinence     Vaginal enterocele     Vaginal vault 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      Past Surgical History:   Past Surgical History:   Procedure Laterality Date    APPENDECTOMY      CHOLECYSTECTOMY      ELBOW DEBRIDEMENT      FOOT SURGERY      bilateral    HYSTERECTOMY, VAGINAL      KNEE ARTHROSCOPY Right 4/24/2019    RIGHT KNEE ARTHROSCOPY,, SYNOVECTOMY CHONDROPLASTY,MEDIAL AND LATERAL MENESECTOMY, REMOVAL LOOSE BODIES performed by Mirta Morales MD at [place] [address]      bilateral    LIVER SURGERY      cyst removal    NECK SURGERY      PAIN MANAGEMENT PROCEDURE Right 11/18/2021    RIGHT LUMBAR FOUR FIVE EPIDURAL STEROID INJECTION SITE CONFIRMED BY FLUOROSCOPY performed by Taylor Barrett MD at [place] [address] REPAIR Right 10/30/2020    VIDEO ARTHROSCOPY RIGHT SHOULDER SUBACROMIAL DECOMPRESSION LABRAL DEBRIDEMENT ROTATOR CUFF TEAR performed by Teresa Godoy MD at [place] [address] ARTHROSCOPY      TONSILLECTOMY      TUBAL LIGATION      URETHRA SURGERY      US BREAST LESION REMOVAL LEFT       Current Medications:  Current Outpatient Medications   Medication Sig Dispense Refill    onabotulinumtoxin A (BOTOX) 100 units injection Inject 100 Units into the muscle once for 1 dose 1 each 0    ascorbic acid (VITAMIN C) 100 MG tablet Take 100 mg by mouth      Magnesium Gluconate 550 MG TABS Take 550 mg by mouth      vitamin A 3 MG (86533 UT) capsule Take 1 capsule by mouth      diclofenac (VOLTAREN) 75 MG EC tablet Take 1 tablet by mouth 2 times daily 180 tablet 1    DICLOFENAC PO Take by mouth      carvedilol (COREG) 12.5 MG tablet Take 1 tablet by mouth 2 times daily (with meals) 180 tablet 3    amLODIPine (NORVASC) 5 MG tablet Take 1 tablet by mouth daily 90 tablet 3    omeprazole (PRILOSEC) 40 MG delayed release capsule Take 1 capsule by mouth daily 90 capsule 3    Multiple Vitamins-Minerals (MULTIVITAMIN ADULT PO) Take 1 tablet by mouth daily      aspirin 81 MG tablet Take 81 mg by mouth daily.  S-Adenosylmethionine (EFREN-E) 400 MG TABS Take  by mouth.  pravastatin (PRAVACHOL) 20 MG tablet Take 1 tablet by mouth daily (Patient not taking: Reported on 5/10/2022) 30 tablet 3     No current facility-administered medications for this visit. Allergies:    Allergies   Allergen Reactions    Adhesive Tape      Band aids blisters skin    Codeine     Crestor [Rosuvastatin Calcium] Other (See Comments)     Legs ache      Food      Black walnut    Lipitor [Atorvastatin Calcium] Other (See Comments)     Legs ache      Nitrofurantoin Itching    Penicillins Swelling     \"Throat closes\"    Sulfa Antibiotics     Tramadol Nausea Only     Social History:   Social History     Socioeconomic History    Marital status:      Spouse name: Not on file    Number of children: Not on file    Years of education: Not on file    Highest education level: Not on file   Occupational History    Not on file   Tobacco Use    Smoking status: Current Every Day Smoker     Packs/day: 0.25     Years: 40.00     Pack years: 10.00     Types: Cigarettes    Smokeless tobacco: Never Used    Tobacco comment: quit March 4, 2019   Vaping Use    Vaping Use: Never used   Substance and Sexual Activity    Alcohol use: No    Drug use: No    Sexual activity: Not Currently   Other Topics Concern    Not on file   Social History Narrative    Not on file     Social Determinants of Health     Financial Resource Strain: Low Risk     Difficulty of Paying Living Expenses: Not hard at all   Food Insecurity: No Food Insecurity    Worried About [employer] in the Last Year: Never true    [facility] [employer] in the Last Year: Never true   Transportation Needs:     Lack of Transportation (Medical): Not on file    Lack of Transportation (Non-Medical):  Not on file   Physical Activity:     Days of Exercise per Week: Not on file    Minutes of Exercise per Session: Not on file   Stress:     Feeling of Stress : Not on file   Social Connections:     Frequency of Communication with Friends and Family: Not on file    Frequency of Social Gatherings with Friends and Family: Not on file    Attends Christian Services: Not on file    Active Member of [address] [place] [address] or Organizations: Not on file    Attends Club or Organization Meetings: Not on file    Marital Status: Not on file   Intimate Partner Violence:     Fear of Current or Ex-Partner: Not on file    Emotionally Abused: Not on file    Physically Abused: Not on file    Sexually Abused: Not on file   Housing Stability:     Unable to Pay for Housing in the Last Year: Not on file    Number of [place] in the Last Year: Not on file    Unstable Housing in the Last Year: Not on file Family History:   Family History   Problem Relation Age of Onset    Breast Cancer Mother     Cancer Mother         Bone    Diabetes Mother     Heart Disease Mother         CHF    Arthritis Mother     Heart Attack Father     Prostate Cancer Brother     Cancer Maternal Grandmother     Cancer Brother         kidney    Cancer Brother         bladder         Objective:     Vital Signs  Vitals:    05/10/22 1419   BP: (!) 156/94   Pulse: 79   Resp: 16   Temp: 98.5 °F (36.9 °C)   SpO2: 98%      Physical Exam  Physical Exam  HENT:      Head: Normocephalic and atraumatic. Eyes:      Conjunctiva/sclera: Conjunctivae normal.   Pulmonary:      Effort: Pulmonary effort is normal.   Abdominal:      Palpations: Abdomen is soft. Musculoskeletal:      Cervical back: Normal range of motion and neck supple. Skin:     General: Skin is warm and dry. Neurological:      Mental Status: She is alert and oriented to person, place, and time. Procedure:  Patient was taken to the cystoscopy suite and allowed to void. Following this, the urethra was cleaned and then dilated to 14 [place] using lidocaine gel. A Soares catheter was placed using lidocaine gel. The remaining urine was drained and a dipstick urinalysis was performed with the following results: WBC negative, nitrates negative, RBC negative. The bladder was then instilled with 50ml of 2% lidocaine. The patient remained in the supine position for 20 minutes followed by alternating to the right and left side for 10 additional minutes each. Cystoscope was placed. The bladder wall and trigone were normal in appearance. Botox was injected through the bladder wall taking care to avoid the detrusor. Total units: 100 Total number of injections: 21    No results found for this visit on 05/10/22. Assessment/Plan:     Brandon Pastrana is a 74 y.o. female with urinary urgency, frequency and overactive bladder. Patient tolerated the procedure well.   Patient counseled on risk of UTI and urinary retention. She was educated on signs and symptoms of both. She is to call the office with any concerns.  The patient will follow up in 2 weeks for a post void residual.     Orders Placed This Encounter   Procedures    POCT Urinalysis no Micro    IL INSERT,NON-INDWELLING BLADDER CATHETER     Orders Placed This Encounter   Medications    onabotulinumtoxin A (BOTOX) 100 units injection     Sig: Inject 100 Units into the muscle once for 1 dose     Dispense:  1 each     Refill:  Esther Leone MD

## 2022-05-10 NOTE — PROGRESS NOTES
5/10/2022     HPI:     Name: Ama Mai  YOB: 1955    CC: Ama Mai is a 74 y.o. female presenting for an evaluation of overactive bladder. HPI: How long have you had this problem? Please rate the severity of your problem: moderate  Anything make it better? Ob/Gyn History:    OB History   No obstetric history on file.      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    Arthritis     Calculus of kidney     Cancer (HCC)     skin    Cystocele, midline     Eustachian tube dysfunction, bilateral     Eustachian tube dysfunction, bilateral 6/15/2016    Gastro-esophageal reflux disease without esophagitis     Generalized anxiety disorder 8/25/2016    Hyperlipidemia     Hypertension     Liver cyst     Neuritis     Neuritis 6/5/2018    Nocturia     Old myocardial infarction     Osteoarthritis     Otalgia     Perennial allergic rhinitis     Pharyngeal inflammation     Primary osteoarthritis of right knee     Tinnitus of vascular origin 6/15/2016    Tinnitus, bilateral     Tobacco use     Urinary frequency     Urinary incontinence     Vaginal enterocele     Vaginal vault 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      Past Surgical History:   Past Surgical History:   Procedure Laterality Date    APPENDECTOMY      CHOLECYSTECTOMY      ELBOW DEBRIDEMENT      FOOT SURGERY      bilateral    HYSTERECTOMY, VAGINAL      KNEE ARTHROSCOPY Right 4/24/2019    RIGHT KNEE ARTHROSCOPY,, SYNOVECTOMY CHONDROPLASTY,MEDIAL AND LATERAL MENESECTOMY, REMOVAL LOOSE BODIES performed by Cory Campos MD at [facility] 1      bilateral    LIVER SURGERY      cyst removal    NECK SURGERY      PAIN MANAGEMENT PROCEDURE Right 11/18/2021    RIGHT LUMBAR FOUR FIVE EPIDURAL STEROID INJECTION SITE CONFIRMED BY FLUOROSCOPY performed by Taylor Barrett MD at [facility] Right 10/30/2020    VIDEO ARTHROSCOPY RIGHT SHOULDER SUBACROMIAL DECOMPRESSION LABRAL DEBRIDEMENT ROTATOR CUFF TEAR performed by Nino Guajardo MD at [facility] 84 ARTHROSCOPY      TONSILLECTOMY      TUBAL LIGATION      URETHRA SURGERY      US BREAST LESION REMOVAL LEFT       Current Medications:  Current Outpatient Medications   Medication Sig Dispense Refill    ascorbic acid (VITAMIN C) 100 MG tablet Take 100 mg by mouth      Magnesium Gluconate 550 MG TABS Take 550 mg by mouth      vitamin A 3 MG (49898 UT) capsule Take 1 capsule by mouth      diclofenac (VOLTAREN) 75 MG EC tablet Take 1 tablet by mouth 2 times daily 180 tablet 1    DICLOFENAC PO Take by mouth      carvedilol (COREG) 12.5 MG tablet Take 1 tablet by mouth 2 times daily (with meals) 180 tablet 3    amLODIPine (NORVASC) 5 MG tablet Take 1 tablet by mouth daily 90 tablet 3    omeprazole (PRILOSEC) 40 MG delayed release capsule Take 1 capsule by mouth daily 90 capsule 3    Multiple Vitamins-Minerals (MULTIVITAMIN ADULT PO) Take 1 tablet by mouth daily      aspirin 81 MG tablet Take 81 mg by mouth daily.  S-Adenosylmethionine (EFREN-E) 400 MG TABS Take  by mouth.  pravastatin (PRAVACHOL) 20 MG tablet Take 1 tablet by mouth daily (Patient not taking: Reported on 5/10/2022) 30 tablet 3     No current facility-administered medications for this visit. Allergies: Allergies   Allergen Reactions    Adhesive Tape      Band aids blisters skin    Codeine     Crestor [Rosuvastatin Calcium] Other (See Comments)     Legs ache      Food      Black walnut    Lipitor [Atorvastatin Calcium] Other (See Comments)     Legs ache      Nitrofurantoin Itching    Penicillins Swelling     \"Throat closes\"    Sulfa Antibiotics     Tramadol Nausea Only     Social History:   Social History     Socioeconomic History    Marital status:   Spouse name: Not on file    Number of children: Not on file    Years of education: Not on file    Highest education level: Not on file   Occupational History    Not on file   Tobacco Use    Smoking status: Current Every Day Smoker     Packs/day: 0.25     Years: 40.00     Pack years: 10.00     Types: Cigarettes    Smokeless tobacco: Never Used    Tobacco comment: quit March 4, 2019   Vaping Use    Vaping Use: Never used   Substance and Sexual Activity    Alcohol use: No    Drug use: No    Sexual activity: Not Currently   Other Topics Concern    Not on file   Social History Narrative    Not on file     Social Determinants of Health     Financial Resource Strain: Low Risk     Difficulty of Paying Living Expenses: Not hard at all   Food Insecurity: No Food Insecurity    Worried About [address] in the Last Year: Never true    [address] [employer] in the Last Year: Never true   Transportation Needs:     Lack of Transportation (Medical): Not on file    Lack of Transportation (Non-Medical):  Not on file   Physical Activity:     Days of Exercise per Week: Not on file    Minutes of Exercise per Session: Not on file   Stress:     Feeling of Stress : Not on file   Social Connections:     Frequency of Communication with Friends and Family: Not on file    Frequency of Social Gatherings with Friends and Family: Not on file    Attends Mandaen Services: Not on file    Active Member of [address] [place] [address] or Organizations: Not on file    Attends Club or Organization Meetings: Not on file    Marital Status: Not on file   Intimate Partner Violence:     Fear of Current or Ex-Partner: Not on file    Emotionally Abused: Not on file    Physically Abused: Not on file    Sexually Abused: Not on file   Housing Stability:     Unable to Pay for Housing in the Last Year: Not on file    Number of [place] in the Last Year: Not on file    Unstable Housing in the Last Year: Not on file     Family History:   Family History   Problem Relation Age of Onset    Breast Cancer Mother     Cancer Mother         Bone    Diabetes Mother     Heart Disease Mother         CHF   Oliva Pallavi Arthritis Mother     Heart Attack Father     Prostate Cancer Brother     Cancer Maternal Grandmother     Cancer Brother         kidney    Cancer Brother         bladder         Objective:     Vital Signs  Vitals:    05/10/22 1419   BP: (!) 156/94   Pulse: 79   Resp: 16   Temp: 98.5 °F (36.9 °C)   SpO2: 98%      Physical Exam  Physical Exam    Procedure: The urethral was anesthesized with topical lidocaine jelly and dilated to a #14 [place] citizen. A 0-degree urethroscope was used to examine the urethra. A 70-degree cystoscope was used to evaluate the bladder. FINDINGS:  1. Urethra was {Norm Abnorm Urogyn:25403}  2. Bladder had {Bladder Urogyn:64334}  3. Trigone appeared {Trigone Urogyn:97879}  4. Ureters illustrated {Ureters Urogyn:59611} efflux  5. Patient exhibited spasms during the study {yes/no:459715}    Cough stress test: Bladder filled to *** mL. Patient {Desc; did/not:73949} leak with cough stress test.    No results found for this visit on 05/10/22. Assessment/Plan:     Claudette Guajardo is a 74 y.o. female with No diagnosis found. No orders of the defined types were placed in this encounter. No orders of the defined types were placed in this encounter.       Everett Snellen, RN

## 2022-05-25 ENCOUNTER — OFFICE VISIT (OUTPATIENT)
Dept: UROGYNECOLOGY | Age: 67
End: 2022-05-25
Payer: MEDICARE

## 2022-05-25 VITALS
SYSTOLIC BLOOD PRESSURE: 139 MMHG | HEART RATE: 77 BPM | OXYGEN SATURATION: 99 % | RESPIRATION RATE: 14 BRPM | DIASTOLIC BLOOD PRESSURE: 85 MMHG | TEMPERATURE: 98 F

## 2022-05-25 DIAGNOSIS — L29.2 VULVAR ITCHING: ICD-10-CM

## 2022-05-25 DIAGNOSIS — R39.15 URGENCY OF URINATION: ICD-10-CM

## 2022-05-25 DIAGNOSIS — N39.41 URGE INCONTINENCE: Primary | ICD-10-CM

## 2022-05-25 DIAGNOSIS — R39.15 URINARY URGENCY: ICD-10-CM

## 2022-05-25 PROCEDURE — 81002 URINALYSIS NONAUTO W/O SCOPE: CPT | Performed by: NURSE PRACTITIONER

## 2022-05-25 PROCEDURE — 4004F PT TOBACCO SCREEN RCVD TLK: CPT | Performed by: NURSE PRACTITIONER

## 2022-05-25 PROCEDURE — 1090F PRES/ABSN URINE INCON ASSESS: CPT | Performed by: NURSE PRACTITIONER

## 2022-05-25 PROCEDURE — G8417 CALC BMI ABV UP PARAM F/U: HCPCS | Performed by: NURSE PRACTITIONER

## 2022-05-25 PROCEDURE — G8400 PT W/DXA NO RESULTS DOC: HCPCS | Performed by: NURSE PRACTITIONER

## 2022-05-25 PROCEDURE — 1123F ACP DISCUSS/DSCN MKR DOCD: CPT | Performed by: NURSE PRACTITIONER

## 2022-05-25 PROCEDURE — 3017F COLORECTAL CA SCREEN DOC REV: CPT | Performed by: NURSE PRACTITIONER

## 2022-05-25 PROCEDURE — G8427 DOCREV CUR MEDS BY ELIG CLIN: HCPCS | Performed by: NURSE PRACTITIONER

## 2022-05-25 PROCEDURE — 51701 INSERT BLADDER CATHETER: CPT | Performed by: NURSE PRACTITIONER

## 2022-05-25 PROCEDURE — 0509F URINE INCON PLAN DOCD: CPT | Performed by: NURSE PRACTITIONER

## 2022-05-25 PROCEDURE — 99213 OFFICE O/P EST LOW 20 MIN: CPT | Performed by: NURSE PRACTITIONER

## 2022-05-25 NOTE — PROGRESS NOTES
5/25/2022       HPI:     Name: Dayday Dixon  YOB: 1955    CC: Patient is a 74 y.o. presenting for evaluation of OAB. Patient had botox injections on 5/10/22. Binh Maldonado HPI: How long have you had this problem? years  Please rate the severity of your problem: moderate  Anything make it better? Botox Injections. Pleased with outcomes    She is having a new itching problem on her mons and toward top of vagina. Denies use of new laundry detergents or soaps. Denies internal vaginal itching    Ob/Gyn History:    OB History   No obstetric history on file.      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    Arthritis     Calculus of kidney     Cancer (HCC)     skin    Cystocele, midline     Eustachian tube dysfunction, bilateral     Eustachian tube dysfunction, bilateral 6/15/2016    Gastro-esophageal reflux disease without esophagitis     Generalized anxiety disorder 8/25/2016    Hyperlipidemia     Hypertension     Liver cyst     Neuritis     Neuritis 6/5/2018    Nocturia     Old myocardial infarction     Osteoarthritis     Otalgia     Perennial allergic rhinitis     Pharyngeal inflammation     Primary osteoarthritis of right knee     Tinnitus of vascular origin 6/15/2016    Tinnitus, bilateral     Tobacco use     Urinary frequency     Urinary incontinence     Vaginal enterocele     Vaginal vault 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      Past Surgical History:   Past Surgical History:   Procedure Laterality Date    APPENDECTOMY      CHOLECYSTECTOMY      ELBOW DEBRIDEMENT      FOOT SURGERY      bilateral    HYSTERECTOMY, VAGINAL      KNEE ARTHROSCOPY Right 4/24/2019    RIGHT KNEE ARTHROSCOPY,, SYNOVECTOMY CHONDROPLASTY,MEDIAL AND LATERAL MENESECTOMY, REMOVAL LOOSE BODIES performed by Chloé Vargas, MD at [facility] 1      bilateral    LIVER SURGERY      cyst removal    NECK SURGERY      PAIN MANAGEMENT PROCEDURE Right 11/18/2021    RIGHT LUMBAR FOUR FIVE EPIDURAL STEROID INJECTION SITE CONFIRMED BY FLUOROSCOPY performed by David Abel MD at [place] [address] Right 10/30/2020    VIDEO ARTHROSCOPY RIGHT SHOULDER SUBACROMIAL DECOMPRESSION LABRAL DEBRIDEMENT ROTATOR CUFF TEAR performed by Rad Gary MD at [place] [address] ARTHROSCOPY      TONSILLECTOMY      TUBAL LIGATION      URETHRA SURGERY      US BREAST LESION REMOVAL LEFT       Allergies: Allergies   Allergen Reactions    Adhesive Tape      Band aids blisters skin    Codeine     Crestor [Rosuvastatin Calcium] Other (See Comments)     Legs ache      Food      Black walnut    Lipitor [Atorvastatin Calcium] Other (See Comments)     Legs ache      Nitrofurantoin Itching    Penicillins Swelling     \"Throat closes\"    Sulfa Antibiotics     Tramadol Nausea Only     Current Medications:  Current Outpatient Medications   Medication Sig Dispense Refill    ascorbic acid (VITAMIN C) 100 MG tablet Take 100 mg by mouth      Magnesium Gluconate 550 MG TABS Take 550 mg by mouth      vitamin A 3 MG (95030 UT) capsule Take 1 capsule by mouth      diclofenac (VOLTAREN) 75 MG EC tablet Take 1 tablet by mouth 2 times daily 180 tablet 1    pravastatin (PRAVACHOL) 20 MG tablet Take 1 tablet by mouth daily 30 tablet 3    DICLOFENAC PO Take by mouth      carvedilol (COREG) 12.5 MG tablet Take 1 tablet by mouth 2 times daily (with meals) 180 tablet 3    amLODIPine (NORVASC) 5 MG tablet Take 1 tablet by mouth daily 90 tablet 3    omeprazole (PRILOSEC) 40 MG delayed release capsule Take 1 capsule by mouth daily 90 capsule 3    Multiple Vitamins-Minerals (MULTIVITAMIN ADULT PO) Take 1 tablet by mouth daily      aspirin 81 MG tablet Take 81 mg by mouth daily.       S-Adenosylmethionine (EFREN-E) 400 MG TABS Take by mouth. No current facility-administered medications for this visit. Social History:   Social History     Socioeconomic History    Marital status:      Spouse name: Not on file    Number of children: Not on file    Years of education: Not on file    Highest education level: Not on file   Occupational History    Not on file   Tobacco Use    Smoking status: Current Every Day Smoker     Packs/day: 0.25     Years: 40.00     Pack years: 10.00     Types: Cigarettes    Smokeless tobacco: Never Used    Tobacco comment: quit March 4, 2019   Vaping Use    Vaping Use: Never used   Substance and Sexual Activity    Alcohol use: No    Drug use: No    Sexual activity: Not Currently   Other Topics Concern    Not on file   Social History Narrative    Not on file     Social Determinants of Health     Financial Resource Strain: Low Risk     Difficulty of Paying Living Expenses: Not hard at all   Food Insecurity: No Food Insecurity    Worried About [address] in the Last Year: Never true    [facility] in the Last Year: Never true   Transportation Needs:     Lack of Transportation (Medical): Not on file    Lack of Transportation (Non-Medical):  Not on file   Physical Activity:     Days of Exercise per Week: Not on file    Minutes of Exercise per Session: Not on file   Stress:     Feeling of Stress : Not on file   Social Connections:     Frequency of Communication with Friends and Family: Not on file    Frequency of Social Gatherings with Friends and Family: Not on file    Attends Religion Services: Not on file    Active Member of Clubs or Organizations: Not on file    Attends Club or Organization Meetings: Not on file    Marital Status: Not on file   Intimate Partner Violence:     Fear of Current or Ex-Partner: Not on file    Emotionally Abused: Not on file    Physically Abused: Not on file    Sexually Abused: Not on file   Housing Stability:     Unable to Pay for Housing in the Last Year: Not on file    Number of Places Lived in the Last Year: Not on file    Unstable Housing in the Last Year: Not on file     Family History:   Family History   Problem Relation Age of Onset    Breast Cancer Mother     Cancer Mother         Bone    Diabetes Mother     Heart Disease Mother         CHF   Jaret Spina Arthritis Mother     Heart Attack Father     Prostate Cancer Brother     Cancer Maternal Grandmother     Cancer Brother         kidney    Cancer Brother         bladder         Objective:     Vitals  Vitals:    05/25/22 1353   BP: 139/85   Pulse: 77   Resp: 14   Temp: 98 °F (36.7 °C)   SpO2: 99%     Physical Exam  Physical Exam  Vitals and nursing note reviewed. Constitutional:       Appearance: Normal appearance. HENT:      Head: Normocephalic. Eyes:      Conjunctiva/sclera: Conjunctivae normal.   Cardiovascular:      Rate and Rhythm: Normal rate. Pulmonary:      Effort: Pulmonary effort is normal.   Genitourinary:     Comments: Mons and clitoral bradley: No lesions, no obvious dry skin or dermatitis. Musculoskeletal:         General: Normal range of motion. Cervical back: Normal range of motion. Skin:     General: Skin is warm and dry. Neurological:      General: No focal deficit present. Mental Status: She is alert. Psychiatric:         Mood and Affect: Mood normal.         Behavior: Behavior normal.          Using sterile technique a catheter was placed. A post void residual (PVR) was noted to be 250ml. Results for POC orders placed in visit on 05/25/22   POCT Urinalysis no Micro   Result Value Ref Range    Color, UA yellow     Clarity, UA clear     Glucose, UA POC neg     Bilirubin, UA neg     Ketones, UA neg     Spec Grav, UA 1.015     Blood, UA POC neg     pH, UA 6.5     Protein, UA POC neg     Urobilinogen, UA neg     Leukocytes, UA neg     Nitrite, UA neg        Assessment/Plan:     Aleshia Ray is a 74 y.o. female with   1.  Urge incontinence 2. Urgency of urination    3. Urinary urgency    4. Vulvar itching      -UUI/ Frequency:  Patient has had Botox multiple times and consistently has an elevated PVR for a few weeks after treatment. She is asymptomatic of retention and is pleased with outcomes of Bladder botox. She is pleased with outcomes and generally has this repeated every 6 months    -Itching: Will try marine ocean sea salts sitz as she has this at home. Will use squirt bottle on mons. Follow with Eucerin, non scented lotion. If no better after 2 weeks will try a thin layer of 0.05% hydrocortisone cream for one week and return to see me. Demetrius Orellana, HUBERT - CNP    Orders Placed This Encounter   Procedures    Culture, Urine     Order Specific Question:   Specify (ex-cath, midstream, cysto, etc)? Answer:   straight cath    POCT Urinalysis no Micro    NC INSERT,NON-INDWELLING BLADDER CATHETER     No orders of the defined types were placed in this encounter.       Demetrius Orellana, HUBERT - CNP

## 2022-05-28 LAB
ORGANISM: ABNORMAL
URINE CULTURE, ROUTINE: ABNORMAL

## 2022-06-21 ENCOUNTER — TELEPHONE (OUTPATIENT)
Dept: PRIMARY CARE CLINIC | Age: 67
End: 2022-06-21
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
## 2022-06-21 RX ORDER — PRAVASTATIN SODIUM 40 MG
40 TABLET ORAL DAILY
Qty: 90 TABLET | Refills: 1 | Status: SHIPPED | OUTPATIENT
Start: 2022-06-21

## 2022-06-21 NOTE — TELEPHONE ENCOUNTER
Called patient to inform them of lab orders being placed and then follow up     Patient had a question about lipid medication that had been prescribed last year  Patient stated her pharmacy never informed her of the medication being ready for   Patient has not been on any medication and is asking if you would like to prescribe her something at this time

## 2022-06-22 NOTE — TELEPHONE ENCOUNTER
Patient has allergies to statins in the past.    I would like to trial one additional statin, pravastatin, before writing off the class totally. If unable to tolerate, will discontinue statins and start another medicine ezetimibe (Zetia) to lower her cholesterol and her risk for heart attack and stroke.     I sent pravastatin to the pharmacy for patient to trial.

## 2022-06-22 NOTE — TELEPHONE ENCOUNTER
Explained in great detailed and patient is agreeable to start the pravastatin and she will contact our office with any further concerns

## 2022-06-23 ENCOUNTER — HOSPITAL ENCOUNTER (OUTPATIENT)
Age: 67
Discharge: HOME | End: 2022-06-23
Payer: MEDICARE
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
## 2022-06-23 PROCEDURE — 86618 LYME DISEASE ANTIBODY: CPT

## 2022-06-23 PROCEDURE — 86617 LYME DISEASE ANTIBODY: CPT

## 2022-06-23 PROCEDURE — 86757 RICKETTSIA ANTIBODY: CPT

## 2022-06-25 LAB
ROCKY MOUNTAIN SPOTTED FEVER AB IGM,: NORMAL
ROCKY MOUNTAIN SPOTTED FEVER ANTIBODY IGG: NORMAL

## 2022-06-26 LAB
LYME (B. BURGDORFERI) AB IGG WB: NEGATIVE
LYME AB IGM BY WB:: POSITIVE
LYME, EIA: 1.52 LIV (ref 0–1.2)
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
## 2022-06-27 RX ORDER — DOXYCYCLINE HYCLATE 100 MG
100 TABLET ORAL 2 TIMES DAILY
Qty: 28 TABLET | Refills: 0 | Status: SHIPPED | OUTPATIENT
Start: 2022-06-27 | End: 2022-07-11

## 2022-07-12 ENCOUNTER — TELEPHONE (OUTPATIENT)
Dept: PRIMARY CARE CLINIC | Age: 67
End: 2022-07-12

## 2022-07-12 NOTE — TELEPHONE ENCOUNTER
Please review previous message   Patient states she is still having some tingling of her feet and fatigue   She is wanting to know if you have any further instruction for her  Since she has completed the medication for the Lyme Disease   Please instruct

## 2022-07-12 NOTE — TELEPHONE ENCOUNTER
Pt finished the medication that she was given for lyme disease. Pt would like to know what the Dr. Mena Fowler like her to do next.       [phone]

## 2022-07-26 ENCOUNTER — OFFICE VISIT (OUTPATIENT)
Dept: PRIMARY CARE CLINIC | Age: 67
End: 2022-07-26
Payer: MEDICARE

## 2022-07-26 VITALS
HEART RATE: 88 BPM | DIASTOLIC BLOOD PRESSURE: 88 MMHG | BODY MASS INDEX: 39.95 KG/M2 | HEIGHT: 64 IN | TEMPERATURE: 97.5 F | SYSTOLIC BLOOD PRESSURE: 138 MMHG | WEIGHT: 234 LBS | OXYGEN SATURATION: 96 %

## 2022-07-26 DIAGNOSIS — Z23 NEED FOR PROPHYLACTIC VACCINATION WITH TETANUS-DIPHTHERIA (TD): ICD-10-CM

## 2022-07-26 DIAGNOSIS — G62.9 PERIPHERAL POLYNEUROPATHY: ICD-10-CM

## 2022-07-26 DIAGNOSIS — Z00.00 HEALTH CARE MAINTENANCE: ICD-10-CM

## 2022-07-26 DIAGNOSIS — K21.9 GASTRO-ESOPHAGEAL REFLUX DISEASE WITHOUT ESOPHAGITIS: ICD-10-CM

## 2022-07-26 DIAGNOSIS — E78.2 MIXED HYPERLIPIDEMIA: ICD-10-CM

## 2022-07-26 DIAGNOSIS — Z23 NEED FOR PROPHYLACTIC VACCINATION AGAINST DIPHTHERIA-TETANUS-PERTUSSIS (DTP): ICD-10-CM

## 2022-07-26 DIAGNOSIS — M54.16 LUMBAR RADICULOPATHY: ICD-10-CM

## 2022-07-26 DIAGNOSIS — B94.8 POST-LYME DISEASE SYNDROME: Primary | ICD-10-CM

## 2022-07-26 DIAGNOSIS — I10 ESSENTIAL (PRIMARY) HYPERTENSION: ICD-10-CM

## 2022-07-26 PROCEDURE — 90715 TDAP VACCINE 7 YRS/> IM: CPT

## 2022-07-26 PROCEDURE — 99214 OFFICE O/P EST MOD 30 MIN: CPT

## 2022-07-26 PROCEDURE — 1123F ACP DISCUSS/DSCN MKR DOCD: CPT | Performed by: FAMILY MEDICINE

## 2022-07-26 PROCEDURE — 90471 IMMUNIZATION ADMIN: CPT

## 2022-07-26 RX ORDER — LISINOPRIL 20 MG/1
20 TABLET ORAL DAILY
Qty: 90 TABLET | Refills: 3 | Status: SHIPPED | OUTPATIENT
Start: 2022-07-26 | End: 2022-07-28 | Stop reason: SDUPTHER

## 2022-07-26 ASSESSMENT — PATIENT HEALTH QUESTIONNAIRE - PHQ9
SUM OF ALL RESPONSES TO PHQ QUESTIONS 1-9: 3
2. FEELING DOWN, DEPRESSED OR HOPELESS: 0
1. LITTLE INTEREST OR PLEASURE IN DOING THINGS: 0
SUM OF ALL RESPONSES TO PHQ9 QUESTIONS 1 & 2: 0
10. IF YOU CHECKED OFF ANY PROBLEMS, HOW DIFFICULT HAVE THESE PROBLEMS MADE IT FOR YOU TO DO YOUR WORK, TAKE CARE OF THINGS AT HOME, OR GET ALONG WITH OTHER PEOPLE: 1
6. FEELING BAD ABOUT YOURSELF - OR THAT YOU ARE A FAILURE OR HAVE LET YOURSELF OR YOUR FAMILY DOWN: 0
4. FEELING TIRED OR HAVING LITTLE ENERGY: 1
5. POOR APPETITE OR OVEREATING: 0
7. TROUBLE CONCENTRATING ON THINGS, SUCH AS READING THE NEWSPAPER OR WATCHING TELEVISION: 0
SUM OF ALL RESPONSES TO PHQ QUESTIONS 1-9: 3
3. TROUBLE FALLING OR STAYING ASLEEP: 2
9. THOUGHTS THAT YOU WOULD BE BETTER OFF DEAD, OR OF HURTING YOURSELF: 0
8. MOVING OR SPEAKING SO SLOWLY THAT OTHER PEOPLE COULD HAVE NOTICED. OR THE OPPOSITE, BEING SO FIGETY OR RESTLESS THAT YOU HAVE BEEN MOVING AROUND A LOT MORE THAN USUAL: 0
SUM OF ALL RESPONSES TO PHQ QUESTIONS 1-9: 3
SUM OF ALL RESPONSES TO PHQ QUESTIONS 1-9: 3

## 2022-07-26 ASSESSMENT — ANXIETY QUESTIONNAIRES
3. WORRYING TOO MUCH ABOUT DIFFERENT THINGS: 0-NOT AT ALL
7. FEELING AFRAID AS IF SOMETHING AWFUL MIGHT HAPPEN: 0-NOT AT ALL
6. BECOMING EASILY ANNOYED OR IRRITABLE: 0-NOT AT ALL
4. TROUBLE RELAXING: 0-NOT AT ALL
2. NOT BEING ABLE TO STOP OR CONTROL WORRYING: 0-NOT AT ALL
5. BEING SO RESTLESS THAT IT IS HARD TO SIT STILL: 0-NOT AT ALL
1. FEELING NERVOUS, ANXIOUS, OR ON EDGE: 0
GAD7 TOTAL SCORE: 0

## 2022-07-26 ASSESSMENT — ENCOUNTER SYMPTOMS
VOMITING: 0
NAUSEA: 0
ABDOMINAL PAIN: 0
CONSTIPATION: 0
SHORTNESS OF BREATH: 0
DIARRHEA: 0

## 2022-07-26 NOTE — PROGRESS NOTES
[address] and [facility]                                             [address] [facility], [address] [place] [address], [address] [place] [address]        Phone: [phone]      Name:  Chelo Wang  :    1955    Consultants:   Patient Care Team:  Enrrique Mota DO as PCP - General (Family Medicine)  Enrrique Mota DO as PCP - [facility] Empaneled Provider  Marley Del Toro MD as Surgeon (Orthopedic Surgery)    Chief Complaint:     Chelo Wang is a 74 y.o. female  who presents today for an established patient care visit with Personalized Prevention Plan Services as noted below. HPI:       Chelo Wang is a 74year old female with PMH of HTN, GERD, HLD, tobacco use, urinary incontinence, and Lyme disease who presents for follow up of the following conditions:     Lyme disease: Patient initially presented on 2022 for tick bite that occurred on 2022. The tick was removed from her back. Then on 2022, she discovered another tick on her scalp that was also removed. She believed they came from her indoor/outdoor cats and denied any symptoms at the time. On 2022, she contacted the office for overwhelming fatigue and nonhealing tick bite wound on her scalp. At that time, tick borne illness testing was initiated and Lyme Ab IgM returned positive. She completed a 14-day course of doxycycline 100mg without side effects. Today, her symptoms continue to improve. She continues to have daytime sleepiness and naps throughout the day. 2. HTN: Currently on carvedilol 12.5 mg BID and amlodipine 5mg daily. She reports increasing b/l lower extremity edema. She would like to discuss switching medications. Denies shortness of breath, chest pain. 3.  HLD: Last lipid panel on 2022 showed pronounced mixed hyperlipidemia, including total cholesterol 354, , , HDL 39.  On 2022, she was prescribed pravastatin 40mg daily. Of note, patient admits to allergy with statin (muscle cramp). She has tolerated pravastatin well to date. 4. Peripheral neuropathy: Patient states she has chronic neuropathy in b/l lower extremities, mainly limited to ankles/feet. With Lyme infection, her symptoms worsened to include b/l calves. She has not previously been worked up for neuropathy and would like to get lab testing today. Denies associated weakness or pain. 5. GERD: Continues to take omeprazole 40mg and symptoms are well controlled. Patient would also like to get a handicap placard for days when her back pain is severe. Patient denies any other acute complaints at this time.     Patient Active Problem List   Diagnosis    Primary osteoarthritis of right knee    Essential (primary) hypertension    Gastro-esophageal reflux disease without esophagitis    Hyperlipidemia    Liver cyst    Old myocardial infarction    Tobacco use    Urinary incontinence    Lumbar radiculopathy    Ptosis of right eyelid    Tick bite    Post-Lyme disease syndrome         Past Medical History:    Past Medical History:   Diagnosis Date    Acute laryngitis     Acute laryngitis 1/29/2015    Acute lymphadenitis of face, head and neck     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    Arthritis     Calculus of kidney     Cancer ([facility] [address]     skin    Cystocele, midline     Eustachian tube dysfunction, bilateral     Eustachian tube dysfunction, bilateral 6/15/2016    Gastro-esophageal reflux disease without esophagitis     Generalized anxiety disorder 8/25/2016    Hyperlipidemia     Hypertension     Liver cyst     Neuritis     Neuritis 6/5/2018    Nocturia     Old myocardial infarction     Osteoarthritis     Otalgia     Perennial allergic rhinitis     Pharyngeal inflammation     Primary osteoarthritis of right knee     Tinnitus of vascular origin 6/15/2016    Tinnitus, bilateral     Tobacco use     Urinary frequency     Urinary incontinence     Vaginal enterocele     Vaginal vault 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        Past Surgical History:  Past Surgical History:   Procedure Laterality Date    APPENDECTOMY      CHOLECYSTECTOMY      ELBOW DEBRIDEMENT      FOOT SURGERY      bilateral    HYSTERECTOMY, VAGINAL      KNEE ARTHROSCOPY Right 4/24/2019    RIGHT KNEE ARTHROSCOPY,, SYNOVECTOMY CHONDROPLASTY,MEDIAL AND LATERAL MENESECTOMY, REMOVAL LOOSE BODIES performed by Aliya Valdes MD at [facility] [address]      bilateral    LIVER SURGERY      cyst removal    NECK SURGERY      PAIN MANAGEMENT PROCEDURE Right 11/18/2021    RIGHT LUMBAR FOUR FIVE EPIDURAL STEROID INJECTION SITE CONFIRMED BY FLUOROSCOPY performed by Patricia Lakhani MD at [address] [facility] Right 10/30/2020    VIDEO ARTHROSCOPY RIGHT SHOULDER SUBACROMIAL DECOMPRESSION LABRAL DEBRIDEMENT ROTATOR CUFF TEAR performed by Nikita Rosen MD at [address] [place] ARTHROSCOPY      TONSILLECTOMY      TUBAL LIGATION      URETHRA SURGERY      US BREAST LESION REMOVAL LEFT         Home Meds:  Prior to Visit Medications    Medication Sig Taking? Authorizing Provider   lisinopril (PRINIVIL;ZESTRIL) 20 MG tablet Take 1 tablet by mouth in the morning.  Yes Krista Cobos MD   Handicap [facility] [address] [facility] by Does not apply route Yes Krista Cobos MD   pravastatin (PRAVACHOL) 40 MG tablet Take 1 tablet by mouth daily Yes Cami Wagner DO   ascorbic acid (VITAMIN C) 100 MG tablet Take 100 mg by mouth Yes Historical Provider, MD   Magnesium Gluconate 550 MG TABS Take 550 mg by mouth Yes Historical Provider, MD   vitamin A 3 MG (47910 UT) capsule Take 1 capsule by mouth Yes Historical Provider, MD   DICLOFENAC PO Take by mouth Yes Historical Provider, MD   carvedilol (COREG) 12.5 MG tablet Take 1 tablet by mouth 2 times daily (with meals) Yes Cami Wagner DO   amLODIPine (NORVASC) 5 MG tablet Take 1 tablet by mouth daily Yes Cami Wagner DO omeprazole (PRILOSEC) 40 MG delayed release capsule Take 1 capsule by mouth daily Yes Yenny Wagner, DO   Multiple Vitamins-Minerals (MULTIVITAMIN ADULT PO) Take 1 tablet by mouth daily Yes Historical Provider, MD   aspirin 81 MG tablet Take 81 mg by mouth daily. Yes Historical Provider, MD   S-Adenosylmethionine (EFREN-E) 400 MG TABS Take  by mouth.  Yes Historical Provider, MD       Allergies:    Adhesive tape, Codeine, Crestor [rosuvastatin calcium], Food, Lipitor [atorvastatin calcium], Nitrofurantoin, Penicillins, Sulfa antibiotics, and Tramadol    Family History:       Problem Relation Age of Onset    Breast Cancer Mother     Cancer Mother         Bone    Diabetes Mother     Heart Disease Mother         CHF    Arthritis Mother     Heart Attack Father     Prostate Cancer Brother     Cancer Maternal Grandmother     Cancer Brother         kidney    Cancer Brother         bladder         Health Maintenance Completed:  Health Maintenance   Topic Date Due    COVID-19 Vaccine (1) Never done    Colorectal Cancer Screen  Never done    DEXA (modify frequency per FRAX score)  Never done    Shingles vaccine (2 of 3) 12/29/2015    Flu vaccine (1) 09/01/2022    Breast cancer screen  09/20/2022    Annual Wellness Visit (AWV)  11/09/2022    A1C test (Diabetic or Prediabetic)  02/01/2023    Lipids  02/01/2023    Depression Screen  05/09/2023    DTaP/Tdap/Td vaccine (2 - Td or Tdap) 07/26/2032    Pneumococcal 65+ years Vaccine  Completed    Hepatitis C screen  Completed    Hepatitis A vaccine  Aged Out    Hepatitis B vaccine  Aged Out    Hib vaccine  Aged Out    Meningococcal (ACWY) vaccine  Aged [employer] History   Administered Date(s) Administered    Influenza Virus Vaccine 11/03/2015    Influenza, MDCK Quadv, with preserv IM (Flucelvax 2 yrs and older) 09/25/2019    Pneumococcal Conjugate 13-valent ([id number]) 09/25/2019    Pneumococcal Polysaccharide (Dddzpdasr17) 10/10/2020    Tdap (Boostrix, Adacel) 07/26/2022    Zoster Live (Zostavax) 11/03/2015         Review of Systems:  Review of Systems   Constitutional:  Positive for fatigue (improving, daytime sleepiness). Negative for fever. Respiratory:  Negative for shortness of breath. Cardiovascular:  Positive for leg swelling (with sitting, worse at night). Negative for chest pain and palpitations. Gastrointestinal:  Negative for abdominal pain, constipation, diarrhea, nausea and vomiting. Neurological:  Negative for weakness. Parasthesias, b/l LE      Physical Exam:   Vitals:    07/26/22 1325   BP: 138/88   Pulse: 88   Temp: 97.5 °F (36.4 °C)   TempSrc: Temporal   SpO2: 96%   Weight: 234 lb (106.1 kg)   Height: 5' 4\" (1.626 m)     Body mass index is 40.17 kg/m². Wt Readings from Last 3 Encounters:   07/26/22 234 lb (106.1 kg)   05/09/22 230 lb (104.3 kg)   02/01/22 233 lb 9.6 oz (106 kg)       BP Readings from Last 3 Encounters:   07/26/22 138/88   05/25/22 139/85   05/10/22 (!) 156/94       Physical Exam  Constitutional:       Appearance: Normal appearance. She is obese. HENT:      Head: Normocephalic and atraumatic. Cardiovascular:      Rate and Rhythm: Normal rate and regular rhythm. Heart sounds: Normal heart sounds. Pulmonary:      Effort: Pulmonary effort is normal.      Breath sounds: Normal breath sounds. Abdominal:      General: Abdomen is flat. Bowel sounds are normal.      Palpations: Abdomen is soft. Musculoskeletal:      Right lower leg: Edema (non pitting) present. Left lower leg: Edema (non pitting) present. Skin:     General: Skin is warm and dry. Findings: No bruising or rash. Neurological:      Mental Status: She is alert and oriented to person, place, and time. Sensory: Sensation is intact. Motor: No weakness. Psychiatric:         Mood and Affect: Mood normal.         Behavior: Behavior normal.         Thought Content:  Thought content normal.         Judgment: Judgment normal. Lab Review:   Hospital Outpatient Visit on 06/23/2022   Component Date Value    IgG,Candis Mtn Spotted Fe* 06/23/2022 <1:64     CANDIS MOUNTAIN SPOTTED F* 06/23/2022 <1:64     Lyme, EIA 06/23/2022 1.52 (A)    Lyme Ab IgM by WB: 06/23/2022 Positive (A)    Lyme (B. burgdorferi) Ab* 06/23/2022 Negative           Assessment/Plan:  Chandra Tsai was seen today for follow-up. Diagnoses and all orders for this visit:    Post-Lyme disease syndrome    Essential (primary) hypertension    Gastro-esophageal reflux disease without esophagitis    Lumbar radiculopathy    Mixed hyperlipidemia  -     LIPID PANEL; Future    Health care maintenance  -     DEXA AXIAL SKELETON W VERTEBRAL FX ASST; Future    Peripheral polyneuropathy  -     Ferritin; Future  -     Vitamin B12 & Folate; Future  -     Vitamin D 25 Hydroxy; Future  -     Syphilis Antibody Cascading Reflex; Future    Need for prophylactic vaccination against diphtheria-tetanus-pertussis (DTP)  -     Tdap, BOOSTRIX, (age 8 yrs+), IM    Need for prophylactic vaccination with tetanus-diphtheria (Td)    Other orders  -     Discontinue: Tetanus-Diphth-Acell Pertussis ([address]) 5-2.5-18.5 LF-MCG/0.5 injection; Inject 0.5 mLs into the muscle once for 1 dose  -     lisinopril (PRINIVIL;ZESTRIL) 20 MG tablet;  Take 1 tablet by mouth in the morning.  -     Handicap Placard MISC; by Does not apply route      Health Maintenance Due:  Health Maintenance Due   Topic Date Due    COVID-19 Vaccine (1) Never done    Colorectal Cancer Screen  Never done    DEXA (modify frequency per FRAX score)  Never done    Shingles vaccine (2 of 3) 12/29/2015      Soni Manzanares is a 74year old female with PMH of HTN, GERD, HLD, tobacco use, urinary incontinence, and Lyme disease who presents for follow up of the following conditions:     Post Treatment Lyme Disease Syndrome   -Uncontrolled   - Lyme Ab IgM 6/23/2022: Positive   - Prescribed doxycycline 100 mg daily BID for 14 days on 6/27/2022 (abx complete)   - Continues to have some fatigue, sleeps frequently during day   - Per Infectious Disease Society of [place] recommendations 2020 \"For patients who have persistent or recurring nonspecific symptoms such as fatigue, pain, or cognitive impairment following recommended treatment for Lyme disease, but who lack objective evidence of reinfection or treatment failure, we recommend against additional antibiotic therapy\"   - Discussed no further treatment indicated   - RTC for worsening symptoms    HTN   - Controlled   - On Carvedilol 12.5 mg BID, amlodipine 5mg daily   - Reports LE edema   - D/c amlodipine 5mg, add lisinopril 20mg daily   - Continue carvedilol 12.5 mg BID   - RTC in 6 weeks for BP f/u    HLD, mixed   -Uncontrolled   - Lipid panel 2/1/2022: total cholesterol 354, , HDL 39, direct    - ASCVD risk 25.5%   - Trial of pravastatin 40 mg began 6/2022 to see if pt tolerates (previous leg cramps with statin), tolerating well   - Continue pravastatin 40mg   - Moderate intensity statin therapy recommended given ASCVD risk per ACC guidelines (I,A)   - Ordered lipid panel for 4 weeks (8 weeks since treatment began)   - RTC in 6 weeks for follow up    4. Peripheral neuropathy, unspecified   - Uncontrolled   - Chronic, worsened with Lyme disease   - CBC 2/2022 showed no evidence anemia   - Ordered ferritin, vit B12, folate, vit D, syphilis antibody cascade   - Consider EMG order at next visit if negative lab tests   - RTC in 6 weeks to discuss symptoms, lab results    5. GERD   - Controlled   - Continue ompeprazole 40mg daily   - RTC as needed for symptoms    6. Handicap placard  - Pt requested today  - Form signed, instructed to take to DMV     7.    Health Maintenance:   - Mammogram: 9/20/2021 BI-RADS 3, grouped calcification in central lower R breast, recommended repeat diagnostic mammogram in 6 months; done at [facility]   - Cervical cancer screening: not indicated per age   - CRC screening: last colonoscopy at age 59, no abnormal results, follow up at age 77   - DEXA: ordered today   - Hep C: 2/1/2022 nonreactive   - Immunizations: Tdap given today    Health care decision maker:  up to date:  already done      Health Maintenance: (USPSTF Recommendations)  (F) Breast Cancer Screen: (40-49 (C), 50-74 biennial screening mammogram (B))  (F) Cervical Cancer Screen: (21-29 q3yr cytology alone; 30-65 q3yr cytology alone, q5yr with hrHPV alone, or q5yr cytology+hrHPV (A))  CRC/Colonoscopy Screening: (adults 45-49 (B), 50-75 (A))  Lung Ca Screening: Annual LDCT (+smoker age 49-80, smoked within 15 years, total of 20 pack yr history (B)):  DEXA Screen: (women >65 and older, <65 if at risk/postmenopausal (B))  HIV Screen: (16-65 yr old, and all pregnant patients (A)): Hep C Screen: (18-79 yr old (B)):  HCC Screen: (all pts with cirrhosis and high risk Hep B (US q6 mo)):  Immunizations:    RTC:  Return in about 6 weeks (around 9/6/2022) for chronic conditions. EMR Dragon/transcription disclaimer:  Much of this encounter note is electronic transcription/translation of spoken language to printed texts. The electronic translation of spoken language may be erroneous, or at times, nonsensical words or phrases may be inadvertently transcribed.   Although I have reviewed the note for such errors, some may still exist.

## 2022-07-27 ENCOUNTER — TELEPHONE (OUTPATIENT)
Dept: PRIMARY CARE CLINIC | Age: 67
End: 2022-07-27

## 2022-07-27 DIAGNOSIS — E88.9 METABOLIC DISORDER: Primary | ICD-10-CM

## 2022-07-27 DIAGNOSIS — Z13.820 SCREENING FOR OSTEOPOROSIS: Primary | ICD-10-CM

## 2022-07-27 DIAGNOSIS — M17.11 PRIMARY OSTEOARTHRITIS OF RIGHT KNEE: ICD-10-CM

## 2022-07-27 DIAGNOSIS — M85.821 OTHER SPECIFIED DISORDERS OF BONE DENSITY AND STRUCTURE, RIGHT UPPER ARM: ICD-10-CM

## 2022-07-27 NOTE — PROGRESS NOTES
Imaging location is calling and states the code provided yesterday is not approved with insurance   I have pended another order and placed the code Z13.820   However the code is not accepted and the site states that they need another code for the screening and stated insurance is requested a detailed request for the test to be completed and processed   Please add codes and then I can call Nadeen Britt @ [phone]    Thanks     Dexa scan order is pending however needs edited by you then sign

## 2022-07-28 RX ORDER — LISINOPRIL 20 MG/1
20 TABLET ORAL DAILY
Qty: 90 TABLET | Refills: 3 | Status: SHIPPED | OUTPATIENT
Start: 2022-07-28 | End: 2022-08-02 | Stop reason: SINTOL

## 2022-07-28 NOTE — TELEPHONE ENCOUNTER
Refill Request       Last Seen: Last Seen Department: 7/26/2022  Last Seen by PCP: 5/9/2022    Last Written: today, please resend to mail order pharmacy    Next Appointment:   Future Appointments   Date Time Provider Abhishek Miller   9/6/2022  1:30 PM MD Martina Barron [address]   10/25/2022 11:30 AM Mainor Gonsalves MD [facility]       Requested Prescriptions     Pending Prescriptions Disp Refills    lisinopril (PRINIVIL;ZESTRIL) 20 MG tablet 90 tablet 3     Sig: Take 1 tablet by mouth in the morning.

## 2022-07-29 RX ORDER — LISINOPRIL 20 MG/1
20 TABLET ORAL DAILY
Qty: 90 TABLET | Refills: 3 | OUTPATIENT
Start: 2022-07-29

## 2022-08-02 ENCOUNTER — TELEPHONE (OUTPATIENT)
Dept: PRIMARY CARE CLINIC | Age: 67
End: 2022-08-02

## 2022-08-02 RX ORDER — LOSARTAN POTASSIUM 50 MG/1
50 TABLET ORAL DAILY
Qty: 90 TABLET | Refills: 3 | Status: SHIPPED | OUTPATIENT
Start: 2022-08-02

## 2022-08-02 NOTE — TELEPHONE ENCOUNTER
I called pt and informed her of Dr. Donaldo Concepcion directions. Pt advised she already picked up the new medication.

## 2022-08-03 RX ORDER — DICLOFENAC SODIUM 75 MG/1
TABLET, DELAYED RELEASE ORAL
Qty: 180 TABLET | Refills: 0 | OUTPATIENT
Start: 2022-08-03

## 2022-08-26 ENCOUNTER — HOSPITAL ENCOUNTER (OUTPATIENT)
Age: 67
Discharge: HOME | End: 2022-08-26
Payer: MEDICARE

## 2022-08-26 DIAGNOSIS — G62.9 PERIPHERAL POLYNEUROPATHY: ICD-10-CM

## 2022-08-26 DIAGNOSIS — E78.2 MIXED HYPERLIPIDEMIA: ICD-10-CM

## 2022-08-26 LAB
CHOLESTEROL, TOTAL: 309 MG/DL (ref 0–199)
FERRITIN: 29.7 NG/ML (ref 15–150)
FOLATE: 13.87 NG/ML (ref 4.78–24.2)
HDLC SERPL-MCNC: 38 MG/DL (ref 40–60)
LDL CHOLESTEROL CALCULATED: ABNORMAL MG/DL
LDL CHOLESTEROL DIRECT: 177 MG/DL
TRIGL SERPL-MCNC: 445 MG/DL (ref 0–150)
VITAMIN B-12: 694 PG/ML (ref 211–911)
VITAMIN D 25-HYDROXY: 35 NG/ML
VLDLC SERPL CALC-MCNC: ABNORMAL MG/DL

## 2022-08-26 PROCEDURE — 82607 VITAMIN B-12: CPT

## 2022-08-26 PROCEDURE — 83721 ASSAY OF BLOOD LIPOPROTEIN: CPT

## 2022-08-26 PROCEDURE — 82728 ASSAY OF FERRITIN: CPT

## 2022-08-26 PROCEDURE — 80061 LIPID PANEL: CPT

## 2022-08-26 PROCEDURE — 36415 COLL VENOUS BLD VENIPUNCTURE: CPT

## 2022-08-26 PROCEDURE — 86780 TREPONEMA PALLIDUM: CPT

## 2022-08-26 PROCEDURE — 82306 VITAMIN D 25 HYDROXY: CPT

## 2022-08-26 PROCEDURE — 82746 ASSAY OF FOLIC ACID SERUM: CPT

## 2022-08-27 LAB — TOTAL SYPHILLIS IGG/IGM: NORMAL

## 2022-09-05 ASSESSMENT — ENCOUNTER SYMPTOMS
NAUSEA: 0
VOMITING: 0
ABDOMINAL PAIN: 0
SHORTNESS OF BREATH: 0
CONSTIPATION: 0
DIARRHEA: 0

## 2022-09-05 NOTE — PROGRESS NOTES
[address] and [facility]                                             [address] [facility], [address] [place], [place] [address], [address] [place] [id number]        Phone: [phone]      Name:  Kristi Pineda  :    1955    Consultants:   Patient Care Team:  Roberto Dejesus DO as PCP - General (Family Medicine)  Roberto Dejesus DO as PCP - [facility] Empaneled Provider  Trini Gabriel MD as Surgeon (Orthopedic Surgery)    Chief Complaint:     Kristi Pineda is a 74 y.o. female  who presents today for an established patient care visit with Personalized Prevention Plan Services as noted below. HPI:       Edie Kuhn is a 74year old female with PMH of HTN, GERD, HLD, tobacco use, urinary incontinence, and Lyme disease who presents for follow up of the following conditions:     Lyme disease: Patient initially presented on 2022 for tick bite that occurred on 2022. Asymptomatic at that time. Patient returned on 2022 for fatigue and nonhealing bite wound, Lyme Ab IgM returned positive. She completed a 14-day course of doxycycline 100mg without side effects. Today, her symptoms continue to improve. No complaints at this time. HTN: Stopped amlodipine at last visit due to LE edema. She is currently on losartan 50mg daily and carvedilol 12.5mg BID, however she ran out of carvedilol 12.5mg about 10 days ago. She has not taken her BP at home since she ran out of carvedilol. Reports her BP previously was around [id number] systolic. Denies symptoms including chest pain, SOB, headaches, vision changes. HLD: Currently on pravastatin 40mg, began approximately 2 months ago. Tolerating medication well, previous hx of myalgia with other statin. Last lipid panel 2022. Peripheral neuropathy: Patient states she has chronic neuropathy in b/l lower extremities, mainly limited to ankles/feet.  With Lyme infection, her symptoms worsened to include b/l calves. Workup for SPENCER, B12 deficiency, folate deficiency, vit D deficiency, and syphilis returned normal. Patient states her neuropathy is improving with recent increased activity. She had an EMG performed approximately 20 years ago, which was normal. She would like to hold off on further workup at this time and see if her symptoms improved. GERD: Previously on omeprazole 40mg, but has not taken medication for several days. She is not currently experiencing symptoms and would like to know if she can discontinue this medication. Smoking: Began cutting back on her smoking starting 3 weeks ago: was down to 2 packs per week. She is currently using a nonflavored, clear nicotine vape daily to cut her cravings. She is not interested in other assistance at this time due to previously side effects from nicotine gum and patches including chest pain and rash. Patient denies any other acute complaints at this time.     Patient Active Problem List   Diagnosis    Primary osteoarthritis of right knee    Essential (primary) hypertension    Gastro-esophageal reflux disease without esophagitis    Hyperlipidemia    Liver cyst    Old myocardial infarction    Tobacco use    Urinary incontinence    Lumbar radiculopathy    Ptosis of right eyelid    Tick bite    Post-Lyme disease syndrome         Past Medical History:    Past Medical History:   Diagnosis Date    Acute laryngitis     Acute laryngitis 1/29/2015    Acute lymphadenitis of face, head and neck     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    Arthritis     Calculus of kidney     Cancer ([facility] [address]     skin    Cystocele, midline     Eustachian tube dysfunction, bilateral     Eustachian tube dysfunction, bilateral 6/15/2016    Gastro-esophageal reflux disease without esophagitis     Generalized anxiety disorder 8/25/2016    Hyperlipidemia     Hypertension     Liver cyst     Neuritis     Neuritis 6/5/2018    Nocturia     Old myocardial infarction     Osteoarthritis     Otalgia     Perennial allergic rhinitis     Pharyngeal inflammation     Primary osteoarthritis of right knee     Tinnitus of vascular origin 6/15/2016    Tinnitus, bilateral     Tobacco use     Urinary frequency     Urinary incontinence     Vaginal enterocele     Vaginal vault 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        Past Surgical History:  Past Surgical History:   Procedure Laterality Date    APPENDECTOMY      CHOLECYSTECTOMY      ELBOW DEBRIDEMENT      FOOT SURGERY      bilateral    HYSTERECTOMY, VAGINAL      KNEE ARTHROSCOPY Right 4/24/2019    RIGHT KNEE ARTHROSCOPY,, SYNOVECTOMY CHONDROPLASTY,MEDIAL AND LATERAL MENESECTOMY, REMOVAL LOOSE BODIES performed by Bo Cardenas MD at [place] [address]      bilateral    LIVER SURGERY      cyst removal    NECK SURGERY      PAIN MANAGEMENT PROCEDURE Right 11/18/2021    RIGHT LUMBAR FOUR FIVE EPIDURAL STEROID INJECTION SITE CONFIRMED BY FLUOROSCOPY performed by Gricelda Gray MD at [place] [address] Right 10/30/2020    VIDEO ARTHROSCOPY RIGHT SHOULDER SUBACROMIAL DECOMPRESSION LABRAL DEBRIDEMENT ROTATOR CUFF TEAR performed by Mario Massey MD at [address] [place] [address] [facility]      TONSILLECTOMY      TUBAL LIGATION      URETHRA SURGERY      US BREAST LESION REMOVAL LEFT         Home Meds:  Prior to Visit Medications    Medication Sig Taking? Authorizing Provider   ezetimibe (ZETIA) 10 MG tablet Take 1 tablet by mouth daily Yes Jennifer Leblanc MD   losartan (COZAAR) 50 MG tablet Take 1 tablet by mouth in the morning.  Yes Yifan Wagner DO   Handicap Placard MISC by Does not apply route Yes Jennifer Leblanc MD   pravastatin (PRAVACHOL) 40 MG tablet Take 1 tablet by mouth daily Yes Yifan Wagner DO   ascorbic acid (VITAMIN C) 100 MG tablet Take 100 mg by mouth Yes Historical Provider, MD   Magnesium Gluconate 550 MG TABS Take 550 mg by mouth Yes Historical Provider, MD vitamin A 3 MG (55374 UT) capsule Take 1 capsule by mouth Yes Historical Provider, MD   DICLOFENAC PO Take by mouth Yes Historical Provider, MD   omeprazole (PRILOSEC) 40 MG delayed release capsule Take 1 capsule by mouth daily Yes Ruthie Wagner, DO   Multiple Vitamins-Minerals (MULTIVITAMIN ADULT PO) Take 1 tablet by mouth daily Yes Historical Provider, MD   aspirin 81 MG tablet Take 81 mg by mouth daily. Yes Historical Provider, MD   S-Adenosylmethionine (EFREN-E) 400 MG TABS Take  by mouth.  Yes Historical Provider, MD       Allergies:    Lisinopril, Adhesive tape, Codeine, Crestor [rosuvastatin calcium], Food, Lipitor [atorvastatin calcium], Nitrofurantoin, Penicillins, Sulfa antibiotics, and Tramadol    Family History:       Problem Relation Age of Onset    Breast Cancer Mother     Cancer Mother         Bone    Diabetes Mother     Heart Disease Mother         CHF    Arthritis Mother     Heart Attack Father     Prostate Cancer Brother     Cancer Maternal Grandmother     Cancer Brother         kidney    Cancer Brother         bladder         Health Maintenance Completed:  Health Maintenance   Topic Date Due    COVID-19 Vaccine (1) Never done    DEXA (modify frequency per FRAX score)  Never done    Shingles vaccine (2 of 3) 12/29/2015    Flu vaccine (1) 09/01/2022    Breast cancer screen  09/20/2022    Annual Wellness Visit (AWV)  11/09/2022    A1C test (Diabetic or Prediabetic)  02/01/2023    Depression Screen  07/26/2023    Lipids  08/26/2023    Colorectal Cancer Screen  12/26/2023    DTaP/Tdap/Td vaccine (2 - Td or Tdap) 07/26/2032    Pneumococcal 65+ years Vaccine  Completed    Hepatitis C screen  Completed    Hepatitis A vaccine  Aged Out    Hepatitis B vaccine  Aged Out    Hib vaccine  Aged Out    Meningococcal (ACWY) vaccine  Aged Out          Immunization History   Administered Date(s) Administered    Influenza Virus Vaccine 11/03/2015    Influenza, FLUCELVAX, (age 10 mo+), MDCK, MDV, 0.5mL 09/25/2019 Pneumococcal Conjugate 13-valent ([id number]) 09/25/2019    Pneumococcal Polysaccharide ([id number]) 10/10/2020    Tdap (Boostrix, Adacel) 07/26/2022    Zoster Live (Zostavax) 11/03/2015         Review of Systems:  Review of Systems   Constitutional:  Negative for activity change. Eyes:  Negative for visual disturbance. Respiratory:  Negative for shortness of breath. Cardiovascular:  Positive for leg swelling (with sitting, worse at night). Negative for chest pain and palpitations. Gastrointestinal:  Negative for abdominal pain, constipation, diarrhea, nausea and vomiting. Neurological:  Negative for weakness. Parasthesias, b/l LE      Physical Exam:   Vitals:    09/06/22 1314   BP: 138/78   Pulse: 80   Resp: 18   Temp: 97.1 °F (36.2 °C)   TempSrc: Infrared   SpO2: 96%   Weight: 234 lb (106.1 kg)   Height: 5' 4\" (1.626 m)     Body mass index is 40.17 kg/m². Wt Readings from Last 3 Encounters:   09/06/22 234 lb (106.1 kg)   07/26/22 234 lb (106.1 kg)   05/09/22 230 lb (104.3 kg)       BP Readings from Last 3 Encounters:   09/06/22 138/78   07/26/22 138/88   05/25/22 139/85       Physical Exam  Constitutional:       Appearance: Normal appearance. She is obese. HENT:      Head: Normocephalic and atraumatic. Cardiovascular:      Rate and Rhythm: Normal rate and regular rhythm. Heart sounds: Normal heart sounds. Pulmonary:      Effort: Pulmonary effort is normal.      Breath sounds: Normal breath sounds. Abdominal:      General: Abdomen is flat. Bowel sounds are normal.      Palpations: Abdomen is soft. Musculoskeletal:      Right lower leg: Edema (non pitting) present. Left lower leg: Edema (non pitting) present. Skin:     General: Skin is warm and dry. Findings: No bruising or rash. Neurological:      Mental Status: She is alert and oriented to person, place, and time. Sensory: Sensation is intact.    Psychiatric:         Mood and Affect: Mood normal. Behavior: Behavior normal.         Thought Content:  Thought content normal.         Judgment: Judgment normal.            Lab Review:   Hospital Outpatient Visit on 08/26/2022   Component Date Value    Cholesterol, Total 08/26/2022 309 (A)    Triglycerides 08/26/2022 445 (A)    HDL 08/26/2022 38 (A)    LDL Calculated 08/26/2022 see below     VLDL Cholesterol Calcula* 08/26/2022 see below     Ferritin 08/26/2022 29.7     Vitamin B-12 08/26/2022 694     Folate 08/26/2022 13.87     Vit D, 25-Hydroxy 08/26/2022 35.0     Total Syphillis IgG/IgM 08/26/2022 Non-Reactive     LDL Direct 08/26/2022 177 (A)          Assessment/Plan:  Tisha Ruelas is a 74year old female with PMH of HTN, GERD, HLD, tobacco use, urinary incontinence, and Lyme disease who presents for follow up of the following conditions:     Diagnoses and all orders for this visit:    Essential (primary) hypertension  - Controlled   - On Losartan 50mg daily  - Previously on carvedilol 12.5 mg BID (hx artery dissection), ran out 10 days ago   - BP today 138/78   - Unsure if BP cuff at home is accurate   - RTC in 2 weeks for nurse visit for BP check/cuff check  - Will increase dose of losartan if BP elevated at next visit/home reads    Mixed hyperlipidemia      -Uncontrolled   - Lipid panel 8/26/22: total cholesterol 309, , HDL 38,    - ASCVD risk 25.0%   - Continue pravastatin 40mg daily   - Prescribed ezetimide 10mg daily   - Moderate intensity statin therapy recommended given ASCVD risk per ACC guidelines (I,A)   - Ordered lipid panel for 3 months   - RTC in 3 months for follow up    Peripheral polyneuropathy   - Uncontrolled   - 8/26 workup: vit D, B12, folate, iron WNL, syphilis neg.   - Improving with physical activity with moving coming up   - EMG in past (20 years ago) normal   - Will continue to monitor at this time, consider future EMG   - RTC in 3 months for follow up of sxs    Gastro-esophageal reflux disease without esophagitis  - Controlled   - Fine to discontinue omeprazole 40mg    - RTC as needed for symptoms    Post-Lyme disease syndrome  -Controlled   - Lyme Ab IgM 6/23/2022: Positive   - 14 day course doxycycline complete   - Fatigue continues to improve   - RTC for worsening symptoms    Tobacco use   - Uncontrolled   - Quitting currently, using nicotine vape   - Previous SE from nicotine gum/patches   - Continue to cut down on nicotine use   - RTC in 3 months to discuss cessation progress    Health Maintenance  - Mammogram: 9/20/2021 BI-RADS 3, grouped calcification in central lower R breast, will schedule soon  - Cervical cancer screening: not indicated per age  - CRC screening: last colonoscopy at age 59, no abnormal results, follow up at age 77  - DEXA: 9/7/2022  - Hep C: 2/1/2022 nonreactive  - Immunizations: Tdap 7/26/2022    Health Maintenance Due:  Health Maintenance Due   Topic Date Due    COVID-19 Vaccine (1) Never done    DEXA (modify frequency per FRAX score)  Never done    Shingles vaccine (2 of 3) 12/29/2015    Flu vaccine (1) 09/01/2022    Breast cancer screen  09/20/2022        Health care decision maker:  up to date:  already done      Health Maintenance: (USPSTF Recommendations)  (F) Breast Cancer Screen: (40-49 (C), 50-74 biennial screening mammogram (B))  (F) Cervical Cancer Screen: (21-29 q3yr cytology alone; 30-65 q3yr cytology alone, q5yr with hrHPV alone, or q5yr cytology+hrHPV (A))  CRC/Colonoscopy Screening: (adults 45-49 (B), 50-75 (A))  Lung Ca Screening: Annual LDCT (+smoker age 49-80, smoked within 15 years, total of 20 pack yr history (B)):  DEXA Screen: (women >65 and older, <65 if at risk/postmenopausal (B))  HIV Screen: (16-65 yr old, and all pregnant patients (A)):   Hep C Screen: (18-79 yr old (B)):  HCC Screen: (all pts with cirrhosis and high risk Hep B (US q6 mo)):  Immunizations:    RTC:  Return in about 2 weeks (around 9/20/2022) for nurse visit for BP check/cuff check; 2) 3 months for chronic conditions. EMR Dragon/transcription disclaimer:  Much of this encounter note is electronic transcription/translation of spoken language to printed texts. The electronic translation of spoken language may be erroneous, or at times, nonsensical words or phrases may be inadvertently transcribed.   Although I have reviewed the note for such errors, some may still exist.

## 2022-09-06 ENCOUNTER — OFFICE VISIT (OUTPATIENT)
Dept: PRIMARY CARE CLINIC | Age: 67
End: 2022-09-06
Payer: MEDICARE

## 2022-09-06 VITALS
OXYGEN SATURATION: 96 % | DIASTOLIC BLOOD PRESSURE: 78 MMHG | WEIGHT: 234 LBS | TEMPERATURE: 97.1 F | HEART RATE: 80 BPM | RESPIRATION RATE: 18 BRPM | SYSTOLIC BLOOD PRESSURE: 138 MMHG | BODY MASS INDEX: 39.95 KG/M2 | HEIGHT: 64 IN

## 2022-09-06 DIAGNOSIS — B94.8 POST-LYME DISEASE SYNDROME: ICD-10-CM

## 2022-09-06 DIAGNOSIS — K21.9 GASTRO-ESOPHAGEAL REFLUX DISEASE WITHOUT ESOPHAGITIS: ICD-10-CM

## 2022-09-06 DIAGNOSIS — I10 ESSENTIAL (PRIMARY) HYPERTENSION: Primary | ICD-10-CM

## 2022-09-06 DIAGNOSIS — E78.2 MIXED HYPERLIPIDEMIA: ICD-10-CM

## 2022-09-06 DIAGNOSIS — Z72.0 TOBACCO USE: ICD-10-CM

## 2022-09-06 DIAGNOSIS — G62.9 PERIPHERAL POLYNEUROPATHY: ICD-10-CM

## 2022-09-06 PROCEDURE — G8427 DOCREV CUR MEDS BY ELIG CLIN: HCPCS

## 2022-09-06 PROCEDURE — 1123F ACP DISCUSS/DSCN MKR DOCD: CPT

## 2022-09-06 PROCEDURE — G8417 CALC BMI ABV UP PARAM F/U: HCPCS

## 2022-09-06 PROCEDURE — G8400 PT W/DXA NO RESULTS DOC: HCPCS

## 2022-09-06 PROCEDURE — 3017F COLORECTAL CA SCREEN DOC REV: CPT

## 2022-09-06 PROCEDURE — 1090F PRES/ABSN URINE INCON ASSESS: CPT

## 2022-09-06 PROCEDURE — 99214 OFFICE O/P EST MOD 30 MIN: CPT

## 2022-09-06 PROCEDURE — 4004F PT TOBACCO SCREEN RCVD TLK: CPT

## 2022-09-06 RX ORDER — EZETIMIBE 10 MG/1
10 TABLET ORAL DAILY
Qty: 90 TABLET | Refills: 3 | Status: SHIPPED | OUTPATIENT
Start: 2022-09-06

## 2022-09-06 RX ORDER — CARVEDILOL 12.5 MG/1
12.5 TABLET ORAL 2 TIMES DAILY WITH MEALS
Qty: 180 TABLET | Refills: 3 | Status: CANCELLED | OUTPATIENT
Start: 2022-09-06

## 2022-09-06 ASSESSMENT — ANXIETY QUESTIONNAIRES
2. NOT BEING ABLE TO STOP OR CONTROL WORRYING: 0
IF YOU CHECKED OFF ANY PROBLEMS ON THIS QUESTIONNAIRE, HOW DIFFICULT HAVE THESE PROBLEMS MADE IT FOR YOU TO DO YOUR WORK, TAKE CARE OF THINGS AT HOME, OR GET ALONG WITH OTHER PEOPLE: NOT DIFFICULT AT ALL
GAD7 TOTAL SCORE: 0
5. BEING SO RESTLESS THAT IT IS HARD TO SIT STILL: 0
1. FEELING NERVOUS, ANXIOUS, OR ON EDGE: 0
4. TROUBLE RELAXING: 0
6. BECOMING EASILY ANNOYED OR IRRITABLE: 0
3. WORRYING TOO MUCH ABOUT DIFFERENT THINGS: 0
7. FEELING AFRAID AS IF SOMETHING AWFUL MIGHT HAPPEN: 0

## 2022-09-06 ASSESSMENT — PATIENT HEALTH QUESTIONNAIRE - PHQ9
5. POOR APPETITE OR OVEREATING: 0
SUM OF ALL RESPONSES TO PHQ QUESTIONS 1-9: 0
10. IF YOU CHECKED OFF ANY PROBLEMS, HOW DIFFICULT HAVE THESE PROBLEMS MADE IT FOR YOU TO DO YOUR WORK, TAKE CARE OF THINGS AT HOME, OR GET ALONG WITH OTHER PEOPLE: 0
6. FEELING BAD ABOUT YOURSELF - OR THAT YOU ARE A FAILURE OR HAVE LET YOURSELF OR YOUR FAMILY DOWN: 0
4. FEELING TIRED OR HAVING LITTLE ENERGY: 0
2. FEELING DOWN, DEPRESSED OR HOPELESS: 0
SUM OF ALL RESPONSES TO PHQ QUESTIONS 1-9: 0
3. TROUBLE FALLING OR STAYING ASLEEP: 0
9. THOUGHTS THAT YOU WOULD BE BETTER OFF DEAD, OR OF HURTING YOURSELF: 0
8. MOVING OR SPEAKING SO SLOWLY THAT OTHER PEOPLE COULD HAVE NOTICED. OR THE OPPOSITE, BEING SO FIGETY OR RESTLESS THAT YOU HAVE BEEN MOVING AROUND A LOT MORE THAN USUAL: 0
1. LITTLE INTEREST OR PLEASURE IN DOING THINGS: 0
7. TROUBLE CONCENTRATING ON THINGS, SUCH AS READING THE NEWSPAPER OR WATCHING TELEVISION: 0
SUM OF ALL RESPONSES TO PHQ9 QUESTIONS 1 & 2: 0

## 2022-09-06 NOTE — PATIENT INSTRUCTIONS
Please get your lipid panel done before your next visit in 3 months    Please get your mammogram done at [facility] (last 9/20/2021)    Start ezetimide for high cholesterol/triglycerides; work on dietary reduction of fats    Please bring your BP cuff to your nurse visit in 2 weeks; take BP at home and bring a log, we can determine if you need to change your BP meds then.  For now, please take just your losaratan

## 2022-09-07 ENCOUNTER — HOSPITAL ENCOUNTER (OUTPATIENT)
Dept: WOMENS IMAGING | Age: 67
Discharge: HOME | End: 2022-09-07
Payer: MEDICARE

## 2022-09-07 DIAGNOSIS — M85.821 OTHER SPECIFIED DISORDERS OF BONE DENSITY AND STRUCTURE, RIGHT UPPER ARM: ICD-10-CM

## 2022-09-07 DIAGNOSIS — E88.9 METABOLIC DISORDER: ICD-10-CM

## 2022-09-07 PROCEDURE — 77085 DXA BONE DENSITY AXL VRT FX: CPT

## 2022-09-12 ENCOUNTER — TELEPHONE (OUTPATIENT)
Dept: ORTHOPEDIC SURGERY | Age: 67
End: 2022-09-12

## 2022-09-12 ENCOUNTER — TELEPHONE (OUTPATIENT)
Dept: PRIMARY CARE CLINIC | Age: 67
End: 2022-09-12

## 2022-09-12 NOTE — TELEPHONE ENCOUNTER
----- Message from [address] sent at 9/12/2022 12:49 PM EDT -----  Subject: Message to Provider    QUESTIONS  Information for Provider? would like dr. Aydee Heard to call her a steroid for   her sciatic nerve and a referral for dry needling therapy. please contact   pt with any questions.   ---------------------------------------------------------------------------  --------------  Priyank MENDOSA  [phone]; OK to leave message on voicemail  ---------------------------------------------------------------------------  --------------  SCRIPT ANSWERS  Relationship to Patient?  Self

## 2022-09-12 NOTE — TELEPHONE ENCOUNTER
General Question     Subject: PATIENT SAYS HER sciatic nerve pain IS BACK AND WOULD LIKE TO KNOW IF SHE CAN GET A STEROID CALLED IN AND PATIENT WOULD ALSO LIKE THE SOONEST APPOINTMENT AVAILABLE   Patient and /or Facility Request: Carmina Sandoval  Contact Number:  [phone]    PATIENT SAYS HER sciatic nerve pain IS BACK AND WOULD LIKE TO KNOW IF SHE CAN GET A STEROID CALLED IN AND PATIENT WOULD ALSO LIKE THE SOONEST APPOINTMENT AVAILABLE. PLEASE CALL PATIENT BACK AT THE ABOVE NUMBER.

## 2022-09-12 NOTE — TELEPHONE ENCOUNTER
Patient has also contacted orthopedics as well for a prescription and an appt. Ortho called to offer same day appt to patient. No answer by patient voicemail was full.      Please advise regarding referral.

## 2022-09-12 NOTE — TELEPHONE ENCOUNTER
Attempted to call patient to offer [facility] location that has same day and next day availability as we can not get patient in any sooner at the [facility] location. Patients voicemail is full and not accepting messages at this time.

## 2022-09-13 DIAGNOSIS — M54.16 LUMBAR RADICULOPATHY: Primary | ICD-10-CM

## 2022-09-13 RX ORDER — PREDNISONE 50 MG/1
50 TABLET ORAL DAILY
Qty: 5 TABLET | Refills: 0 | Status: SHIPPED | OUTPATIENT
Start: 2022-09-13 | End: 2022-09-18

## 2022-09-14 ENCOUNTER — TELEPHONE (OUTPATIENT)
Dept: PRIMARY CARE CLINIC | Age: 67
End: 2022-09-14

## 2022-09-14 ENCOUNTER — HOSPITAL ENCOUNTER (EMERGENCY)
Age: 67
Discharge: HOME | End: 2022-09-14
Payer: MEDICARE

## 2022-09-14 ENCOUNTER — HOSPITAL ENCOUNTER (OUTPATIENT)
Dept: PHYSICAL THERAPY | Age: 67
Setting detail: THERAPIES SERIES
Discharge: HOME | End: 2022-09-14
Payer: MEDICARE

## 2022-09-14 ENCOUNTER — APPOINTMENT (OUTPATIENT)
Dept: GENERAL RADIOLOGY | Age: 67
End: 2022-09-14
Payer: MEDICARE

## 2022-09-14 ENCOUNTER — APPOINTMENT (OUTPATIENT)
Dept: CT IMAGING | Age: 67
End: 2022-09-14
Payer: MEDICARE

## 2022-09-14 VITALS
RESPIRATION RATE: 20 BRPM | BODY MASS INDEX: 39.09 KG/M2 | HEIGHT: 64 IN | HEART RATE: 87 BPM | WEIGHT: 229 LBS | OXYGEN SATURATION: 96 % | TEMPERATURE: 98.7 F | SYSTOLIC BLOOD PRESSURE: 178 MMHG | DIASTOLIC BLOOD PRESSURE: 105 MMHG

## 2022-09-14 DIAGNOSIS — R03.0 ELEVATED BLOOD PRESSURE READING: ICD-10-CM

## 2022-09-14 DIAGNOSIS — R51.9 ACUTE INTRACTABLE HEADACHE, UNSPECIFIED HEADACHE TYPE: Primary | ICD-10-CM

## 2022-09-14 LAB
A/G RATIO: 1.4 (ref 1.1–2.2)
ALBUMIN SERPL-MCNC: 4.6 G/DL (ref 3.4–5)
ALP BLD-CCNC: 94 U/L (ref 40–129)
ALT SERPL-CCNC: 42 U/L (ref 10–40)
ANION GAP SERPL CALCULATED.3IONS-SCNC: 14 MMOL/L (ref 3–16)
AST SERPL-CCNC: 23 U/L (ref 15–37)
BASOPHILS ABSOLUTE: 0 K/UL (ref 0–0.2)
BASOPHILS RELATIVE PERCENT: 0.2 %
BILIRUB SERPL-MCNC: <0.2 MG/DL (ref 0–1)
BILIRUBIN URINE: NEGATIVE
BLOOD, URINE: ABNORMAL
BUN BLDV-MCNC: 22 MG/DL (ref 7–20)
CALCIUM SERPL-MCNC: 10.9 MG/DL (ref 8.3–10.6)
CHLORIDE BLD-SCNC: 101 MMOL/L (ref 99–110)
CLARITY: CLEAR
CO2: 20 MMOL/L (ref 21–32)
COLOR: YELLOW
CREAT SERPL-MCNC: 0.8 MG/DL (ref 0.6–1.2)
EOSINOPHILS ABSOLUTE: 0 K/UL (ref 0–0.6)
EOSINOPHILS RELATIVE PERCENT: 0 %
EPITHELIAL CELLS, UA: ABNORMAL /HPF (ref 0–5)
GFR AFRICAN AMERICAN: >60
GFR NON-AFRICAN AMERICAN: >60
GLUCOSE BLD-MCNC: 122 MG/DL (ref 70–99)
GLUCOSE URINE: NEGATIVE MG/DL
HCT VFR BLD CALC: 44.4 % (ref 36–48)
HEMOGLOBIN: 14.6 G/DL (ref 12–16)
HYALINE CASTS: ABNORMAL /LPF (ref 0–2)
KETONES, URINE: NEGATIVE MG/DL
LEUKOCYTE ESTERASE, URINE: NEGATIVE
LYMPHOCYTES ABSOLUTE: 1.4 K/UL (ref 1–5.1)
LYMPHOCYTES RELATIVE PERCENT: 9.4 %
MCH RBC QN AUTO: 28 PG (ref 26–34)
MCHC RBC AUTO-ENTMCNC: 32.8 G/DL (ref 31–36)
MCV RBC AUTO: 85.5 FL (ref 80–100)
MICROSCOPIC EXAMINATION: YES
MONOCYTES ABSOLUTE: 0.4 K/UL (ref 0–1.3)
MONOCYTES RELATIVE PERCENT: 2.7 %
MUCUS: ABNORMAL /LPF
NEUTROPHILS ABSOLUTE: 13 K/UL (ref 1.7–7.7)
NEUTROPHILS RELATIVE PERCENT: 87.7 %
NITRITE, URINE: NEGATIVE
PDW BLD-RTO: 16.2 % (ref 12.4–15.4)
PH UA: 6 (ref 5–8)
PLATELET # BLD: 219 K/UL (ref 135–450)
PMV BLD AUTO: 8.3 FL (ref 5–10.5)
POTASSIUM REFLEX MAGNESIUM: 4.2 MMOL/L (ref 3.5–5.1)
PROTEIN UA: 30 MG/DL
RBC # BLD: 5.19 M/UL (ref 4–5.2)
RBC UA: ABNORMAL /HPF (ref 0–4)
RENAL EPITHELIAL, UA: ABNORMAL /HPF (ref 0–1)
SODIUM BLD-SCNC: 135 MMOL/L (ref 136–145)
SPECIFIC GRAVITY UA: 1.02 (ref 1–1.03)
TOTAL PROTEIN: 8 G/DL (ref 6.4–8.2)
TROPONIN: <0.01 NG/ML
URINE REFLEX TO CULTURE: ABNORMAL
URINE TYPE: ABNORMAL
UROBILINOGEN, URINE: 0.2 E.U./DL
WBC # BLD: 14.8 K/UL (ref 4–11)
WBC UA: ABNORMAL /HPF (ref 0–5)

## 2022-09-14 PROCEDURE — 85025 COMPLETE CBC W/AUTO DIFF WBC: CPT

## 2022-09-14 PROCEDURE — 70450 CT HEAD/BRAIN W/O DYE: CPT

## 2022-09-14 PROCEDURE — 80053 COMPREHEN METABOLIC PANEL: CPT

## 2022-09-14 PROCEDURE — 6370000000 HC RX 637 (ALT 250 FOR IP): Performed by: PHYSICIAN ASSISTANT

## 2022-09-14 PROCEDURE — 81001 URINALYSIS AUTO W/SCOPE: CPT

## 2022-09-14 PROCEDURE — 93005 ELECTROCARDIOGRAM TRACING: CPT | Performed by: PHYSICIAN ASSISTANT

## 2022-09-14 PROCEDURE — 84484 ASSAY OF TROPONIN QUANT: CPT

## 2022-09-14 PROCEDURE — 71046 X-RAY EXAM CHEST 2 VIEWS: CPT

## 2022-09-14 RX ORDER — ACETAMINOPHEN 325 MG/1
650 TABLET ORAL ONCE
Status: COMPLETED | OUTPATIENT
Start: 2022-09-14 | End: 2022-09-14

## 2022-09-14 RX ADMIN — ACETAMINOPHEN 650 MG: 325 TABLET ORAL at 18:16

## 2022-09-14 ASSESSMENT — ENCOUNTER SYMPTOMS
ABDOMINAL PAIN: 0
SORE THROAT: 0
SHORTNESS OF BREATH: 0
NAUSEA: 0
CHEST TIGHTNESS: 0
COUGH: 0
BACK PAIN: 0
VOMITING: 0
DIARRHEA: 0

## 2022-09-14 ASSESSMENT — PAIN DESCRIPTION - LOCATION
LOCATION: HEAD
LOCATION: HEAD

## 2022-09-14 ASSESSMENT — PAIN DESCRIPTION - ORIENTATION
ORIENTATION: LEFT;ANTERIOR;POSTERIOR;MID
ORIENTATION: LEFT;ANTERIOR

## 2022-09-14 ASSESSMENT — PAIN DESCRIPTION - FREQUENCY: FREQUENCY: INTERMITTENT

## 2022-09-14 ASSESSMENT — PAIN DESCRIPTION - DESCRIPTORS
DESCRIPTORS: DULL
DESCRIPTORS: DULL;ACHING

## 2022-09-14 ASSESSMENT — PAIN - FUNCTIONAL ASSESSMENT
PAIN_FUNCTIONAL_ASSESSMENT: NONE - DENIES PAIN
PAIN_FUNCTIONAL_ASSESSMENT: 0-10

## 2022-09-14 ASSESSMENT — PAIN SCALES - GENERAL
PAINLEVEL_OUTOF10: 4
PAINLEVEL_OUTOF10: 1
PAINLEVEL_OUTOF10: 3

## 2022-09-14 NOTE — ED PROVIDER NOTES
**ADVANCED PRACTICE PROVIDER, I HAVE EVALUATED THIS [facility]  ED  EMERGENCY DEPARTMENT ENCOUNTER      Pt Name: Lashell Barker  AOZ:[id number]  [place] 1955  Date of evaluation: 9/14/2022  Provider: ADTIYA Rashid      Chief Complaint:    Chief Complaint   Patient presents with    Hypertension     Pt reports BP was reading elevated at therapy today. Pt reports headache since last night. Pt reports feels like she has swelling under her eyes. Nursing Notes, Past Medical Hx, Past Surgical Hx, Social Hx, Allergies, and Family Hx were all reviewed and agreed with or any disagreements were addressed in the HPI.    HPI: (Location, Duration, Timing, Severity, Quality, Assoc Sx, Context, Modifying factors)    Chief Complaint of elevated blood pressure. This is a  74 y.o. female who presents via private vehicle complaining of elevated blood pressure. The patient has past medical history of Lyme disease, hypertension, hyperlipidemia, peripheral neuropathy, acid reflux, and smoking. The patient currently takes losartan 50 mg daily and carvedilol 12.5 mg twice daily, she states that her blood pressure typically runs 1 32-1 50 systolic. However yesterday she saw her primary care physician for sciatic pain and was prescribed prednisone 50 mg for 5 days. She states she is taken 2 doses of the prednisone beginning last night she developed a headache. She continues to complain of a headache today. She did have a dermatology procedure on her right wrist today. She then went to physical therapy for her back. Upon arriving to physical therapy they took her vitals and found that she had an elevated blood pressure 173/104 and her pulse was elevated with a heart rate of 105. The patient then was referred to the emergency department for evaluation. She denies any chest pain, shortness of breath or cough. No recent fevers or chills.     PastMedical/Surgical History: MG TABLET    Take 81 mg by mouth daily. DICLOFENAC PO    Take by mouth    EZETIMIBE (ZETIA) 10 MG TABLET    Take 1 tablet by mouth daily    HANDICAP PLACARD MISC    by Does not apply route    LOSARTAN (COZAAR) 50 MG TABLET    Take 1 tablet by mouth in the morning. MAGNESIUM GLUCONATE 550 MG TABS    Take 550 mg by mouth    MULTIPLE VITAMINS-MINERALS (MULTIVITAMIN ADULT PO)    Take 1 tablet by mouth daily    OMEPRAZOLE (PRILOSEC) 40 MG DELAYED RELEASE CAPSULE    Take 1 capsule by mouth daily    PRAVASTATIN (PRAVACHOL) 40 MG TABLET    Take 1 tablet by mouth daily    PREDNISONE (DELTASONE) 50 MG TABLET    Take 1 tablet by mouth daily for 5 days    S-ADENOSYLMETHIONINE (EFREN-E) 400 MG TABS    Take  by mouth. VITAMIN A 3 MG (35229 UT) CAPSULE    Take 1 capsule by mouth         Review of Systems:  (2-9 systems needed)  Review of Systems   Constitutional:  Negative for chills and fever. HENT:  Negative for congestion, nosebleeds and sore throat. Eyes:  Negative for visual disturbance. Respiratory:  Negative for cough, chest tightness and shortness of breath. Cardiovascular:  Negative for chest pain and palpitations. Gastrointestinal:  Negative for abdominal pain, diarrhea, nausea and vomiting. Genitourinary:  Negative for dysuria, frequency, hematuria and urgency. Musculoskeletal:  Negative for back pain and neck pain. Skin:  Negative for rash. Neurological:  Positive for headaches. Negative for dizziness, weakness and light-headedness. \"Positives and Pertinent negatives as per HPI\"    Physical Exam:  Physical Exam  Vitals and nursing note reviewed. Constitutional:       Appearance: Normal appearance. She is not diaphoretic. HENT:      Head: Normocephalic and atraumatic. Nose: Nose normal.      Mouth/Throat:      Mouth: Mucous membranes are moist.      Pharynx: Oropharynx is clear. No oropharyngeal exudate. Eyes:      General:         Right eye: No discharge.          Left eye: No discharge. Extraocular Movements: Extraocular movements intact. Pupils: Pupils are equal, round, and reactive to light. Cardiovascular:      Rate and Rhythm: Normal rate and regular rhythm. Pulses: Normal pulses. Heart sounds: Normal heart sounds. No murmur heard. No friction rub. No gallop. Pulmonary:      Effort: Pulmonary effort is normal. No respiratory distress. Breath sounds: Normal breath sounds. No stridor. No wheezing, rhonchi or rales. Abdominal:      General: Abdomen is flat. Palpations: Abdomen is soft. Tenderness: There is no abdominal tenderness. There is no guarding or rebound. Musculoskeletal:         General: Normal range of motion. Cervical back: Normal range of motion and neck supple. Skin:     General: Skin is warm and dry. Coloration: Skin is not pale. Neurological:      General: No focal deficit present. Mental Status: She is alert and oriented to person, place, and time. GCS: GCS eye subscore is 4. GCS verbal subscore is 5. GCS motor subscore is 6. Cranial Nerves: Cranial nerves are intact. Sensory: Sensation is intact. Motor: Motor function is intact. Gait: Gait is intact.    Psychiatric:         Mood and Affect: Mood normal.         Behavior: Behavior normal.       MEDICAL DECISION MAKING    Vitals:    Vitals:    09/14/22 1630 09/14/22 1839 09/14/22 1840 09/14/22 1950   BP: (!) 163/112 (!) 164/100 (!) 164/100    Pulse: 96 86 80    Resp: 18 15 16 13   Temp: 98.7 °F (37.1 °C)      TempSrc: Oral      SpO2: 94% 93% 96% 96%   Weight: 229 lb (103.9 kg)      Height: 5' 4\" (1.626 m)          LABS:  Labs Reviewed   CBC WITH AUTO DIFFERENTIAL - Abnormal; Notable for the following components:       Result Value    WBC 14.8 (*)     RDW 16.2 (*)     Neutrophils Absolute 13.0 (*)     All other components within normal limits   COMPREHENSIVE METABOLIC PANEL W/ REFLEX TO MG FOR LOW K - Abnormal; Notable for the following components:    Sodium 135 (*)     CO2 20 (*)     Glucose 122 (*)     BUN 22 (*)     Calcium 10.9 (*)     ALT 42 (*)     All other components within normal limits   URINALYSIS WITH REFLEX TO CULTURE - Abnormal; Notable for the following components:    Blood, Urine TRACE-INTACT (*)     Protein, UA 30 (*)     All other components within normal limits   MICROSCOPIC URINALYSIS - Abnormal; Notable for the following components:    Mucus, UA 1+ (*)     RBC, UA 5-10 (*)     All other components within normal limits   TROPONIN        Remainder of labs reviewed and were negative at this time or not returned at the time of this note. RADIOLOGY:   Non-plain film images such as CT, Ultrasound and MRI are read by the radiologist. ADITYA Malone have directly visualized the radiologic plain film image(s) with the below findings:      Interpretation per the Radiologist below, if available at the time of this note:    CT Head W/O Contrast   Final Result   Minimal atrophy and mild chronic microischemic changes in the deep white   matter with no acute abnormality seen. XR CHEST (2 VW)   Final Result   No radiographic evidence of an acute cardiopulmonary process. XR CHEST (2 VW)    Result Date: 9/14/2022  EXAMINATION: TWO XRAY VIEWS OF THE CHEST 9/14/2022 2:38 pm COMPARISON: 11/23/2009. HISTORY: ORDERING SYSTEM PROVIDED HISTORY: hypertension TECHNOLOGIST PROVIDED HISTORY: Reason for exam:->hypertension Reason for Exam: Hypertension FINDINGS: The lungs and costophrenic angles are clear. The cardiomediastinal silhouette and pulmonary vessels appear normal.     No radiographic evidence of an acute cardiopulmonary process. DEXA AXIAL SKELETON W VERTEBRAL FX ASST    Result Date: 9/8/2022  EXAMINATION: BONE DENSITOMETRY 9/7/2022 2:26 pm TECHNIQUE: A bone density dual x-ray absorptiometry (DXA) scan was performed of the lumbar spine and left hip on a [employer] System.  A lateral image of the spine was obtained of the lower thoracic region to the pelvis. COMPARISON: None. HISTORY: ORDERING SYSTEM PROVIDED HISTORY: Metabolic disorder TECHNOLOGIST PROVIDED HISTORY: Reason for exam:->Need for DEXA, metabolic disorder Gender: F Age: 75 y/o FINDINGS: LUMBAR SPINE: L1, L3 and L4 BMD: 1.134 g/cm2 T-score: 0.7 Z-score: 2.8 Scattered levels of disc space narrowing and disc space spur formation are seen. There is atherosclerosis in the aorta. LEFT TOTAL HIP: BMD: 0.839 g/cm2 T-score: -0.8 Z-score: 0.6 LEFT FEMORAL NECK: BMD: 0.756 g/cm2 T-score: -0.8 Z-score: 0.9 Vertebral body heights appear maintained and without compression deformity. FRAX: Not Indicated. Normal bone mineral density by WHO criteria. No compression fractures are identified. RECOMMENDATIONS: 1. All patients should optimize their calcium and vitamin D intake. 2. Consider FDA-approved medical therapies in postmenopausal women and men aged 48 years and older, based on the following: - A hip or vertebral (clinical or morphometric) fracture - T-score less than or equal to -2.5 at the femoral neck or spine after appropriate evaluation to exclude secondary causes - Low bone density (T-score between -1.0 and -2.5 at the femoral neck or spine) and a 10-year probability of a hip fracture greater than or equal to 3% or a 10-year probability of a major osteoporosis-related fracture greater than or equal to 20% based on FRAX calculation. - Clinician judgment and/or patient preferences may indicate treatment for people with 10-year fracture probabilities above or below these levels - Further guidance on treatment can be found at the National Osteoporosis Foundation's website 318-451-7322. 3. Patients with diagnosis of osteoporosis or at high risk for fracture should have regular bone mineral density tests. For patients eligible for Medicare, routine testing is allowed once every 2 years.  The testing frequency can be increased to one year for patients who have rapidly progressing disease, those who are receiving or discontinuing medical therapy to restore bone mass or have additional risk factors. Template code:  [id number]          MEDICAL DECISION MAKING / ED COURSE:      PROCEDURES:   Procedures    None    Patient was given:  Medications   acetaminophen (TYLENOL) tablet 650 mg (650 mg Oral Given 9/14/22 1816)       The patient was evaluated in the emergency department today for headache and elevated blood pressure reading. Upon arrival to the emergency department her blood pressure is elevated at 163/112. Initially heart rate slightly elevated at 96 although this did improve to 80 bpm following treatment of her headache. She received Tylenol for her headache with good relief. Work-up results today include:  EKG interpreted by attending physician found to have normal sinus rhythm. Chest x-ray shows no acute cardiopulmonary abnormality. CT head shows minimal atrophy and mild chronic micro ischemic changes and deep white matter with no acute abnormality seen. CBC with slight leukocytosis of 14.8, I suspect this is due to the patient's recent steroid use. CMP with sodium 135, glucose slightly elevated 122, BUN 22  Calcium is slightly elevated at 10.9. I suspect this is due to dehydration. The patient reports she is planning to drink anything all day. Troponin is less than 0.01  Urinalysis without evidence of infection, there is protein present which again supports dehydration. Upon reevaluation the patient has had complete resolution of her headache. She continues to deny any chest pain, change in vision, lightheadedness or dizziness. Her blood pressure continues to remain elevated at 164/100. I did discuss with the patient that given that she is asymptomatic I do not feel additional treatment for elevated blood pressure is indicated at this time. She will continue to monitor her blood pressure at home and follow-up with her primary care physician. She will discontinue the prednisone and plans to see her primary care physician tomorrow. The patient feels comfortable with being discharged home at this time. She is given very strict return precautions should she develop any new or worsening symptoms she will immediately return to the emergency department. The patient tolerated their visit well. I evaluated the patient. The physician was available for consultation as needed. The patient and / or the family were informed of the results of any tests, a time was given to answer questions, a plan was proposed and they agreed with plan. I am the Primary Clinician of Record. CLINICAL IMPRESSION:  1. Acute intractable headache, unspecified headache type    2.  Elevated blood pressure reading      Results for orders placed or performed during the hospital encounter of 09/14/22   CBC with Auto Differential   Result Value Ref Range    WBC 14.8 (H) 4.0 - 11.0 K/uL    RBC 5.19 4.00 - 5.20 M/uL    Hemoglobin 14.6 12.0 - 16.0 g/dL    Hematocrit 44.4 36.0 - 48.0 %    MCV 85.5 80.0 - 100.0 fL    MCH 28.0 26.0 - 34.0 pg    MCHC 32.8 31.0 - 36.0 g/dL    RDW 16.2 (H) 12.4 - 15.4 %    Platelets 439 796 - 126 K/uL    MPV 8.3 5.0 - 10.5 fL    Neutrophils % 87.7 %    Lymphocytes % 9.4 %    Monocytes % 2.7 %    Eosinophils % 0.0 %    Basophils % 0.2 %    Neutrophils Absolute 13.0 (H) 1.7 - 7.7 K/uL    Lymphocytes Absolute 1.4 1.0 - 5.1 K/uL    Monocytes Absolute 0.4 0.0 - 1.3 K/uL    Eosinophils Absolute 0.0 0.0 - 0.6 K/uL    Basophils Absolute 0.0 0.0 - 0.2 K/uL   CMP w/ Reflex to MG   Result Value Ref Range    Sodium 135 (L) 136 - 145 mmol/L    Potassium reflex Magnesium 4.2 3.5 - 5.1 mmol/L    Chloride 101 99 - 110 mmol/L    CO2 20 (L) 21 - 32 mmol/L    Anion Gap 14 3 - 16    Glucose 122 (H) 70 - 99 mg/dL    BUN 22 (H) 7 - 20 mg/dL    Creatinine 0.8 0.6 - 1.2 mg/dL    GFR Non-African American >60 >60    GFR African American >60 >60    Calcium 10.9 (H) 8.3 - 10.6 mg/dL Total Protein 8.0 6.4 - 8.2 g/dL    Albumin 4.6 3.4 - 5.0 g/dL    Albumin/Globulin Ratio 1.4 1.1 - 2.2    Total Bilirubin <0.2 0.0 - 1.0 mg/dL    Alkaline Phosphatase 94 40 - 129 U/L    ALT 42 (H) 10 - 40 U/L    AST 23 15 - 37 U/L   Urinalysis with Reflex to Culture    Specimen: Urine   Result Value Ref Range    Color, UA Yellow Straw/Yellow    Clarity, UA Clear Clear    Glucose, Ur Negative Negative mg/dL    Bilirubin Urine Negative Negative    Ketones, Urine Negative Negative mg/dL    Specific Gravity, UA 1.020 1.005 - 1.030    Blood, Urine TRACE-INTACT (A) Negative    pH, UA 6.0 5.0 - 8.0    Protein, UA 30 (A) Negative mg/dL    Urobilinogen, Urine 0.2 <2.0 E.U./dL    Nitrite, Urine Negative Negative    Leukocyte Esterase, Urine Negative Negative    Microscopic Examination YES     Urine Type NotGiven     Urine Reflex to Culture Not Indicated    Troponin   Result Value Ref Range    Troponin <0.01 <0.01 ng/mL   Microscopic Urinalysis   Result Value Ref Range    Hyaline Casts, UA 0-2 0 - 2 /LPF    Mucus, UA 1+ (A) None Seen /LPF    WBC, UA 0-2 0 - 5 /HPF    RBC, UA 5-10 (A) 0 - 4 /HPF    Epithelial Cells, UA 2-5 0 - 5 /HPF    Renal Epithelial, UA 0-1 0 - 1 /HPF       I estimate there is LOW risk for SUBARACHNOID HEMORRHAGE, hypertensive emergency, MENINGITIS, INTRACRANIAL HEMORRHAGE, SUBDURAL HEMATOMA, OR STROKE, thus I consider the discharge disposition reasonable. Manuel Wetzel and I have discussed the diagnosis and risks, and we agree with discharging home to follow-up with their primary doctor. We also discussed returning to the Emergency Department immediately if new or worsening symptoms occur. We have discussed the symptoms which are most concerning (e.g., changing or worsening pain, weakness, vomiting, fever) that necessitate immediate return. Final Impression    1. Acute intractable headache, unspecified headache type    2.  Elevated blood pressure reading        Discharge Vital Signs:  Blood pressure (!) 164/100, pulse 80, temperature 98.7 °F (37.1 °C), temperature source Oral, resp. rate 13, height 5' 4\" (1.626 m), weight 229 lb (103.9 kg), SpO2 96 %.        DISPOSITION Decision To Discharge 09/14/2022 08:28:20 PM      PATIENT REFERRED TO:  [facility]  ED  [address]  Go to   If symptoms worsen    Braulio Hope, DO  [address] [phone]          DISCHARGE MEDICATIONS:  New Prescriptions    No medications on file       DISCONTINUED MEDICATIONS:  Discontinued Medications    No medications on file              (Please note the MDM and HPI sections of this note were completed with a voice recognition program.  Efforts were made to edit the dictations but occasionally words are mis-transcribed.)    Electronically signed, Batsheva Smith,           Batsheva Smith  09/14/22 2040

## 2022-09-14 NOTE — FLOWSHEET NOTE
[facility], [address] [facility]  [address] [place] [address]  Phone: [phone]  Fax [phone]    Bernardo Grier    Dear Dr. Hilaria Brady,    We had the pleasure of evaluating the following patient for physical therapy services at [address] [place] [address]. A summary of our findings can be found in the initial assessment below. This includes our plan of care. If you have any questions or concerns regarding these findings, please do not hesitate to contact me at the office phone number checked above. Thank you for the referral.     Patient stated complaint:she got out of bed on Saturday and had severe pain. On Friday she was painting dining room chairs for 2 hours (sitting in awkward positions the whole time). She reports she used a walker to get around the house because she could not put weight on her right leg and had difficulty standing up straight. She called her doctor Monday and he called in prednisone (5 days) and referred her to PT. Today she can walk with a cane. She thinks the prednisone is helping. She currently has a headache and was shaking earlier in the day. She reports it has been here since last night and is currently in front of forehead and like a tight band around back of neck. This has happened in the past 1 year ago, and at that time she had symptoms down back of leg and wrapped around into ankle. She did therapy last year and 2 epidurals and that helped. During course of taking subjective, patient reported she was having a headache (see above) and also confessed to feeling sweaty and had been being shakey earlier in the day. At this point we took BP which was 173/104, Pulso ox revelealed O2 at 97% and Hr of 105. I contact her primary care office and they advised she go to ED for assessment.       Current pulse 105 bpm, O2 97%  /104      We will perform PT once this has resolved.         Physician Signature:_______________________________Date:__________________  By signing above (or electronic signature), therapists plan is approved by physician    Patient: Carmella Young   : 1955   MRN: [id number]  Referring Physician: Dr. Brittany Iyer      Evaluation Date: 2022      Medical Diagnosis Information:  Diagnosis: M54.16 (ICD-10-CM) - Lumbar radiculopathy                                             Insurance information: PT Insurance Information: [employer] with [employer] supplement       Electronically signed by:  Mary Vann PT

## 2022-09-14 NOTE — ED NOTES
Pt ambulates to restroom with cane (baseline) steady gait, to provide urine sample. Pt reports after Tylenol HA has improved and back pain resolved. Pt in ED cart, locked lowest position, rails up for safety, call light within reach, no signs of distress noted at this time.       Ayesha Cox, DARIANA  09/14/22 [id number]

## 2022-09-14 NOTE — TELEPHONE ENCOUNTER
Laura Estrella from [facility] called stating pt came in for therapy today c/o EDOUARD, took pt bp 173/104 . Laura Estrella will advise pt to go to ED for evaluation.     DONNA

## 2022-09-14 NOTE — ED NOTES
Pt reports she had a spot of keratosis removed today from her right hand and was experiencing elevated BP with HA. Pt denies vision changes and reports taking BP meds today. Pt also reports she has had a sciatica flare-up for the last week making it painful to even get up to shower. Pt reports a stressful day today. Pt A&Ox4, calm, cooperative, and pleasant, in ED cart, locked lowest position, rails up for safety, call light within reach, no signs of distress noted at this time.       Argenis Farrar RN  09/14/22 546 [address] Ata, DARIANA  09/14/22 4122

## 2022-09-15 LAB
EKG ATRIAL RATE: 83 BPM
EKG DIAGNOSIS: NORMAL
EKG P AXIS: 68 DEGREES
EKG P-R INTERVAL: 190 MS
EKG Q-T INTERVAL: 390 MS
EKG QRS DURATION: 94 MS
EKG QTC CALCULATION (BAZETT): 458 MS
EKG R AXIS: -6 DEGREES
EKG T AXIS: 58 DEGREES
EKG VENTRICULAR RATE: 83 BPM

## 2022-09-15 PROCEDURE — 93010 ELECTROCARDIOGRAM REPORT: CPT | Performed by: INTERNAL MEDICINE

## 2022-09-15 NOTE — DISCHARGE INSTRUCTIONS
You were seen in the emergency department today for concerns of elevated blood pressure and a headache. Your work-up is very reassuring in the emergency department. Your blood pressure continues to remain elevated although I do believe that this is due to the prednisone that you have been taking. Please discontinue prednisone. Continue taking your blood pressure medication as you have been prescribed. If you develop any new or worsening symptoms such as worsening headache, lightheadedness, dizziness, change in vision, chest pain, shortness of breath, immediately return to the emergency department for reevaluation. Follow up with your primary care physician for re-evaluation. Continue to monitor your blood pressure at home and report blood pressure logs to your primary care physician.

## 2022-09-16 NOTE — PROGRESS NOTES
Post-Discharge Transitional Care  Follow Up      Rossy Gibbs   YOB: 1955    Date of Office Visit:  9/20/2022  Date of Hospital Admission: 9/14/22  Date of Hospital Discharge: 9/14/22  Risk of hospital readmission (high >=14%.  Medium >=10%) :No data recorded    Care management risk score Rising risk (score 2-5) and Complex Care (Scores >=6): No Risk Score On File     Non face to face  following discharge, date last encounter closed (first attempt may have been earlier): *No documented post hospital discharge outreach found in the last 14 days    Call initiated 2 business days of discharge: *No response recorded in the last 14 days    ASSESSMENT/PLAN:   Essential (primary) hypertension  -Uncontrolled  -BP today 130/82  -Continue losartan 50 mg daily  -Prescribed carvedilol 6.25 mg twice daily  -Referral placed for [facility]  -Encouraged home BP log, bring in BP cuff at next visit  -RTC in 2 weeks for blood pressure check    Abnormal EKG  - Uncontrolled  - Reviewed EKG from 2013 and ED visit in 2022  - Possible LVH and delta waves appreciated  - Last ECHO 2020 at [facility]: EF 01%, Grade 1 diastolic dysfunction  - Ordered ECHO repeat to assess for anatomic abnormalities contributing to abnormal EKG and longstanding HTN  - Patient requested referral to [facility] to be closer to home  - RTC after ECHO and cardiology to discuss results    Acute nonintractable headache, unspecified headache type  Coronary artery dissection  - Controlled  - Associated with HTN urgency during ED visit and for 24 hours after  - Resolved with tylenol  - CT head negative in ED  - RTC PRN for recurrent symptoms    Sciatic neuropathy, unspecified laterality  - Controlled  - S/p 2 day course of prednisone 20mg daily  - Currently in PT treatment with improvement of symptoms  - RTC PRN for worsening symptoms, evaluation after finished course of PT    Hematuria, microscopic  - Uncontrolled  - Review UA from ED, proteinuria and hematuria noted   - May be related to HTN  - No gross blood in urine per patient  - No evidence of renal dysfunction at this time, Cr 0.8, GFR >60  - Negative for UTI without symptoms at this time  - Will closely follow blood pressure  - Will continue to follow with urine microalbumin annually and further workup if necessary    Hospital discharge follow-up  -     AR DISCHARGE MEDS RECONCILED W/ CURRENT OUTPATIENT MED LIST    Need for prophylactic vaccination and inoculation against varicella    Medical Decision Making: moderate complexity  Return in 2 weeks (on 10/4/2022) for BP check. Subjective:   HPI:  Follow up of Hospital problems/diagnosis(es): Hypertensive urgency with associated headache, abnormal EKG in ED, and microscopic hematuria with proteinuria on ED UA    Inpatient course: Discharge summary reviewed- see chart. Patient was evaluated in clinic on 9/7 and had normal blood pressure at the time at 138/78. She called back the office for worsening sciatic pain for which her PCP prescribed prednisone 20 mg p.o. and cemented a PT referral.  Patient took 2 doses of prednisone and presented for her PT evaluation. At that time she was found to have extremely elevated blood pressure at 170/100 and was sent to the ED. She also noted headache at this time. Patient was worked up with CT head and CXR which were both negative. No interventions for blood pressure at that time. UA showed microscopic hematuria and proteinuria. WBC elevated at that time likely related to steroid use. EKG at the time showed possible LVH and possible delta waves. Interval history/Current status: Patient reports that 24 hours after her ED visit, her headache resolved. She currently has no complaints. Denies vision changes, chest pain, shortness of breath, ongoing headache. Her blood pressure at home has improved. She has not taken any prednisone since with 2 doses before her ED visit.   She is currently on losartan 50 mg at home for BP management. Patient previously followed with cardiologist at [facility].  Patient has known history of coronary dissection in 2010. Patient Active Problem List   Diagnosis    Primary osteoarthritis of right knee    Essential (primary) hypertension    Gastro-esophageal reflux disease without esophagitis    Hyperlipidemia    Liver cyst    Old myocardial infarction    Tobacco use    Urinary incontinence    Lumbar radiculopathy    Ptosis of right eyelid    Tick bite    Post-Lyme disease syndrome    Peripheral polyneuropathy    Sciatic neuropathy    Coronary artery dissection       Medications listed as ordered at the time of discharge from hospital     Medication List            Accurate as of September 20, 2022  4:45 PM. If you have any questions, ask your nurse or doctor. START taking these medications      carvedilol 6.25 MG tablet  Commonly known as: COREG  Take 1 tablet by mouth 2 times daily  Started by: Andrea Jackson MD     diclofenac 75 MG EC tablet  Commonly known as: VOLTAREN  Take 1 tablet by mouth 2 times daily  Started by: Andrea Jackson MD            CONTINUE taking these medications      ascorbic acid 100 MG tablet  Commonly known as: VITAMIN C     aspirin 81 MG tablet     DICLOFENAC PO     ezetimibe 10 MG tablet  Commonly known as: ZETIA  Take 1 tablet by mouth daily     Handicap Placard Misc  by Does not apply route     losartan 50 MG tablet  Commonly known as: COZAAR  Take 1 tablet by mouth in the morning.      Magnesium Gluconate 550 MG Tabs     MULTIVITAMIN ADULT PO     omeprazole 40 MG delayed release capsule  Commonly known as: PRILOSEC  Take 1 capsule by mouth daily     pravastatin 40 MG tablet  Commonly known as: PRAVACHOL  Take 1 tablet by mouth daily     EFREN-e 400 MG Tabs     vitamin A 3 MG (65787 UT) capsule               Where to Get Your Medications        These medications were sent to [facility] [address] MARY GALARZA [address] [facility], [address] [facility]  [address] [facility], [address] [place] [address] [place]      Phone: [phone]   carvedilol 6.25 MG tablet  diclofenac 75 MG EC tablet           Medications marked \"taking\" at this time  Outpatient Medications Marked as Taking for the 9/20/22 encounter (Office Visit) with James Ponce MD   Medication Sig Dispense Refill    carvedilol (COREG) 6.25 MG tablet Take 1 tablet by mouth 2 times daily 60 tablet 3    diclofenac (VOLTAREN) 75 MG EC tablet Take 1 tablet by mouth 2 times daily 60 tablet 3    ezetimibe (ZETIA) 10 MG tablet Take 1 tablet by mouth daily 90 tablet 3    losartan (COZAAR) 50 MG tablet Take 1 tablet by mouth in the morning. 90 tablet 3    pravastatin (PRAVACHOL) 40 MG tablet Take 1 tablet by mouth daily 90 tablet 1    ascorbic acid (VITAMIN C) 100 MG tablet Take 100 mg by mouth      Magnesium Gluconate 550 MG TABS Take 550 mg by mouth      vitamin A 3 MG (18369 UT) capsule Take 1 capsule by mouth      DICLOFENAC PO Take by mouth      Multiple Vitamins-Minerals (MULTIVITAMIN ADULT PO) Take 1 tablet by mouth daily      aspirin 81 MG tablet Take 81 mg by mouth daily. S-Adenosylmethionine (EFREN-E) 400 MG TABS Take  by mouth. Medications patient taking as of now reconciled against medications ordered at time of hospital discharge: Yes    A comprehensive review of systems was negative except for what was noted in the HPI.     Objective:    /82 (Site: Left Upper Arm, Position: Sitting, Cuff Size: Medium Adult)   Pulse 92   Temp 98.2 °F (36.8 °C) (Temporal)   Ht 5' 4\" (1.626 m)   Wt 233 lb (105.7 kg)   SpO2 96%   BMI 39.99 kg/m²   General Appearance: alert and oriented to person, place and time, well-developed and well-nourished, in no acute distress and obese  Head: normocephalic and atraumatic  Pulmonary/Chest: clear to auscultation bilaterally- no wheezes, rales or rhonchi, normal air movement, no respiratory distress  Cardiovascular: normal rate, regular rhythm, normal S1 and S2, no murmurs, rubs, clicks or gallops  Musculoskeletal: normal range of motion, no joint swelling, deformity or tenderness  Neurologic: no cranial nerve deficit, gait and coordination normal, speech normal, and muscle strength normal      An electronic signature was used to authenticate this note.   --Andrea Jackson MD

## 2022-09-16 NOTE — TELEPHONE ENCOUNTER
Harper Walker, Manager tried to get [place] of patient on 9/12/2022, un able to leave voicemail due to mailbox being full

## 2022-09-20 ENCOUNTER — HOSPITAL ENCOUNTER (OUTPATIENT)
Dept: PHYSICAL THERAPY | Age: 67
Setting detail: THERAPIES SERIES
Discharge: HOME | End: 2022-09-20
Payer: MEDICARE

## 2022-09-20 ENCOUNTER — OFFICE VISIT (OUTPATIENT)
Dept: PRIMARY CARE CLINIC | Age: 67
End: 2022-09-20
Payer: MEDICARE

## 2022-09-20 VITALS
HEART RATE: 92 BPM | BODY MASS INDEX: 39.78 KG/M2 | OXYGEN SATURATION: 96 % | HEIGHT: 64 IN | SYSTOLIC BLOOD PRESSURE: 130 MMHG | TEMPERATURE: 98.2 F | WEIGHT: 233 LBS | DIASTOLIC BLOOD PRESSURE: 82 MMHG

## 2022-09-20 DIAGNOSIS — Z23 NEED FOR PROPHYLACTIC VACCINATION AND INOCULATION AGAINST VARICELLA: ICD-10-CM

## 2022-09-20 DIAGNOSIS — Z09 HOSPITAL DISCHARGE FOLLOW-UP: ICD-10-CM

## 2022-09-20 DIAGNOSIS — R94.31 ABNORMAL EKG: ICD-10-CM

## 2022-09-20 DIAGNOSIS — R51.9 ACUTE NONINTRACTABLE HEADACHE, UNSPECIFIED HEADACHE TYPE: ICD-10-CM

## 2022-09-20 DIAGNOSIS — I25.42 CORONARY ARTERY DISSECTION: ICD-10-CM

## 2022-09-20 DIAGNOSIS — R31.29 HEMATURIA, MICROSCOPIC: ICD-10-CM

## 2022-09-20 DIAGNOSIS — G57.00 SCIATIC NEUROPATHY, UNSPECIFIED LATERALITY: ICD-10-CM

## 2022-09-20 DIAGNOSIS — I10 ESSENTIAL (PRIMARY) HYPERTENSION: Primary | ICD-10-CM

## 2022-09-20 PROCEDURE — 97140 MANUAL THERAPY 1/> REGIONS: CPT | Performed by: PHYSICAL THERAPIST

## 2022-09-20 PROCEDURE — 97161 PT EVAL LOW COMPLEX 20 MIN: CPT | Performed by: PHYSICAL THERAPIST

## 2022-09-20 PROCEDURE — 97110 THERAPEUTIC EXERCISES: CPT | Performed by: PHYSICAL THERAPIST

## 2022-09-20 PROCEDURE — 1111F DSCHRG MED/CURRENT MED MERGE: CPT

## 2022-09-20 PROCEDURE — 99214 OFFICE O/P EST MOD 30 MIN: CPT

## 2022-09-20 RX ORDER — DICLOFENAC SODIUM 75 MG/1
75 TABLET, DELAYED RELEASE ORAL 2 TIMES DAILY
Qty: 60 TABLET | Refills: 3 | Status: SHIPPED | OUTPATIENT
Start: 2022-09-20

## 2022-09-20 RX ORDER — CARVEDILOL 6.25 MG/1
6.25 TABLET ORAL 2 TIMES DAILY
Qty: 60 TABLET | Refills: 3 | Status: SHIPPED | OUTPATIENT
Start: 2022-09-20

## 2022-09-20 ASSESSMENT — PATIENT HEALTH QUESTIONNAIRE - PHQ9
1. LITTLE INTEREST OR PLEASURE IN DOING THINGS: 0
3. TROUBLE FALLING OR STAYING ASLEEP: 0
SUM OF ALL RESPONSES TO PHQ9 QUESTIONS 1 & 2: 0
8. MOVING OR SPEAKING SO SLOWLY THAT OTHER PEOPLE COULD HAVE NOTICED. OR THE OPPOSITE, BEING SO FIGETY OR RESTLESS THAT YOU HAVE BEEN MOVING AROUND A LOT MORE THAN USUAL: 0
2. FEELING DOWN, DEPRESSED OR HOPELESS: 0
10. IF YOU CHECKED OFF ANY PROBLEMS, HOW DIFFICULT HAVE THESE PROBLEMS MADE IT FOR YOU TO DO YOUR WORK, TAKE CARE OF THINGS AT HOME, OR GET ALONG WITH OTHER PEOPLE: 0
6. FEELING BAD ABOUT YOURSELF - OR THAT YOU ARE A FAILURE OR HAVE LET YOURSELF OR YOUR FAMILY DOWN: 0
SUM OF ALL RESPONSES TO PHQ QUESTIONS 1-9: 0
SUM OF ALL RESPONSES TO PHQ QUESTIONS 1-9: 0
9. THOUGHTS THAT YOU WOULD BE BETTER OFF DEAD, OR OF HURTING YOURSELF: 0
4. FEELING TIRED OR HAVING LITTLE ENERGY: 0
SUM OF ALL RESPONSES TO PHQ QUESTIONS 1-9: 0
7. TROUBLE CONCENTRATING ON THINGS, SUCH AS READING THE NEWSPAPER OR WATCHING TELEVISION: 0
SUM OF ALL RESPONSES TO PHQ QUESTIONS 1-9: 0
5. POOR APPETITE OR OVEREATING: 0

## 2022-09-20 ASSESSMENT — ANXIETY QUESTIONNAIRES
5. BEING SO RESTLESS THAT IT IS HARD TO SIT STILL: 0
2. NOT BEING ABLE TO STOP OR CONTROL WORRYING: 0
4. TROUBLE RELAXING: 0
IF YOU CHECKED OFF ANY PROBLEMS ON THIS QUESTIONNAIRE, HOW DIFFICULT HAVE THESE PROBLEMS MADE IT FOR YOU TO DO YOUR WORK, TAKE CARE OF THINGS AT HOME, OR GET ALONG WITH OTHER PEOPLE: NOT DIFFICULT AT ALL
1. FEELING NERVOUS, ANXIOUS, OR ON EDGE: 0
3. WORRYING TOO MUCH ABOUT DIFFERENT THINGS: 0
GAD7 TOTAL SCORE: 0
7. FEELING AFRAID AS IF SOMETHING AWFUL MIGHT HAPPEN: 0
6. BECOMING EASILY ANNOYED OR IRRITABLE: 0

## 2022-09-20 NOTE — PLAN OF CARE
[facility], [address]  Phone: [phone]  Fax [phone]    [facility]    Dear Dr. Heidy Weber    We had the pleasure of evaluating the following patient for physical therapy services at [address] [place] [address]. A summary of our findings can be found in the initial assessment below. This includes our plan of care. If you have any questions or concerns regarding these findings, please do not hesitate to contact me at the office phone number checked above. Thank you for the referral.       Physician Signature:_______________________________Date:__________________  By signing above (or electronic signature), therapists plan is approved by physician    Patient: Claudia Lnadon  : 1955   MRN: [id number]  Referring Physician: Dr. Teresa Roca      Evaluation Date: 2022      Medical Diagnosis Information:  M54.16 (ICD-10-CM) - Lumbar radiculopathy   LBP M54.5, abnormal posture R29.3                                     Insurance information: [employer]       Precautions/ Contra-indications: HTN/cholesterol, previous cervical fusions    C-SSRS Triggered by Intake questionnaire (Past 2 wk assessment):   [x] No, Questionnaire did not trigger screening.   [] Yes, Patient intake triggered further evaluation      [] C-SSRS Screening completed  [] PCP notified via Plan of Care  [] Emergency services notified     Latex Allergy:  [x]NO      []YES  Preferred Language for Healthcare:   [x]English       []other:    SUBJECTIVE: Patient stated complaint:Patient stated complaint:she got out of bed on Saturday a week ago and had severe pain. On that Friday she was painting dining room chairs for 2 hours (sitting in awkward positions the whole time).   She reports she used a walker to get around the house because she could not put weight on her right leg and had difficulty standing up straight. She called her doctor on that Monday and he called in prednisone (5 days) and referred her to PT. Last week she presented in PT walking with a cane. She thought the prednisone was  helping the back . She reported during her first session of PT last week that she had a headache and was shaking earlier in the day. She reports it has been here since the night before and was in front of forehead and like a tight band around back of neck. This has happened in the past 1 year ago, and at that time she had symptoms down back of leg and wrapped around into ankle. She did therapy last year and 2 epidurals and that helped. At that session, therapist assessed BP and it was 173/104. She contact Dr. Frank Rivera office and they referred her to ED. Clari reports that in the ED, they did blood work, urinalysis, x ray of lungs,  and performed a CAT scan of her head, also performed EKG. They gave her Tylenol for the headache which helped some. They advised her to D/C the predisone and follow up with Dr. Denisha Mancilla. She presents to therapy today without cane. She reports she is feeling improved but her back still hurts. She gets her pain most of the time in the right side of her lumbar area. She denies symptoms down the leg. She reports her feet have always tingled for the last 25 yrs. She denies having diabetes.       Relevant Medical History:no previous back, had cervical fusion of C3-6 in 2000, right RC repair 2020, bilateral knee scopes in the past  Functional Disability Index/G-Codes:  FOTO 34    Height 5'4\" Weight 229  Pain Scale: 0-3/10  Easing factors: sitting down and elevating legs, using cane if painful with walking, lying down  Provocative factors: standing more than 5 min causes back pain, bending, left side bending causes right low back pain, walking more than 10 min    Type: []Constant   [x]Intermittent  []Radiating []Localized []other:     Numbness/Tingling: bilateral feet (present for years)    Occupation/School: retired from [employer] work    Living Status/Prior Level of Function: Independent with ADLs and IADLs, walking in  her neighborhood, swim at [employer], clean her house    OBJECTIVE:     ROM     LUMBAR FLEX Finger to ankles    LUMBAR EXT Dec slightly    Sidebend Fingers to knees bilat    Rotation WNL bilat          LEFT RIGHT   HIP Flex WNL WNL   HIP Abd     HIP ER WNL WNL   HIP IR WNL WNL   Knee Flex WNL WNL   Knee ext WNL WNL   Hamstring FLEX WNL WNL   Piriformis  WNL WNL   Prone quad WNL Slightly reduced        Strength  LEFT RIGHT   MfA     TrA     HIP Flexors 4/5 4/5   HIP Abductors     HIP ER     Hip IR     Knee EXT (quad) 5/5 5/5   Knee Flex (HS) 5/5 5/5   Ankle DF 5/5 5/5   Ankle PF     Great Toe Ext       Reflexes/Sensation:    [x]Dermatomes/Myotomes intact    []UE Reflexes     []Normal []Hypo      []Hyper   []LE Reflexes     []Normal [x]Hypo      []Hyper   []Babinski/Clonus/Hoffmans:    []Other:    Joint mobility:    []Normal    [x]Hypo: lumbar spine   []Hyper    Palpation: min palpable tenderness to right lumbar paraspinals at L2-4    Functional Mobility/Transfers: independent    Posture: stands with more weight on left leg, but is able to correct. Her lumbar spine appears a little rotated to the right (right lumbar paraspinals more pronounced)    Bandages/Dressings/Incisions: n/a    Gait: (include devices/WB status) slight waddling gait    Orthopedic Special Tests: negative SLR/crossed slr                       [x] Patient history, allergies, meds reviewed. Medical chart reviewed. See intake form. Review Of Systems (ROS):  [x]Performed Review of systems (Integumentary, CardioPulmonary, Neurological) by intake and observation. Intake form has been scanned into medical record. Patient has been instructed to contact their primary care physician regarding ROS issues if not already being addressed at this time.       Co-morbidities/Complexities (which will affect course of []Excellent   [x]Good    []Fair   []Poor    Tolerance of evaluation/treatment:    []Excellent   [x]Good    []Fair   []Poor  Physical Therapy Evaluation Complexity Justification  [x] A history of present problem with:  [] no personal factors and/or comorbidities that impact the plan of care;  [x]1-2 personal factors and/or comorbidities that impact the plan of care  []3 personal factors and/or comorbidities that impact the plan of care  [x] An examination of body systems using standardized tests and measures addressing any of the following: body structures and functions (impairments), activity limitations, and/or participation restrictions;:  [] a total of 1-2 or more elements   [] a total of 3 or more elements   [x] a total of 4 or more elements   [x] A clinical presentation with:  [x] stable and/or uncomplicated characteristics   [] evolving clinical presentation with changing characteristics  [] unstable and unpredictable characteristics;   [x] Clinical decision making of [x] low, [] moderate, [] high complexity using standardized patient assessment instrument and/or measurable assessment of functional outcome. [x] EVAL (LOW) 29980 (typically 20 minutes face-to-face)  [] EVAL (MOD) 27196 (typically 30 minutes face-to-face)  [] EVAL (HIGH) 25827 (typically 45 minutes face-to-face)  [] RE-EVAL         PLAN: Begin PT focusing on: proximal hip mobilizations, LB mobs, LB core activation, proximal hip activation, and HEP    Frequency/Duration:  2 days per week for 5 Weeks:  Interventions:  []  Therapeutic exercise including: strength training, ROM, for LE, Glutes and core   []  NMR activation and proprioception for glutes , LE and Core   []  Manual therapy as indicated for Hip complex, LE and spine to include: Dry Needling/IASTM, STM, PROM, Gr I-IV mobilizations, manipulation.    []  Modalities as needed that may include: thermal agents, E-stim, Biofeedback, US, iontophoresis as indicated  []  Patient education on joint protection, postural re-education, activity modification, progression of HEP. HEP instruction: Access Code: [email or web address]  URL: [email or web address]  Date: 09/20/2022  Prepared by: Evangelina Phan    Exercises  Lower Trunk Rotations - 2-3 x daily - 7 x weekly - 10 reps - 5 hold  Supine Transversus Abdominis Bracing with Pelvic Floor Contraction - 2-3 x daily - 7 x weekly - 10 reps - 10 hold  Quadruped Cat Cow - 2-3 x daily - 7 x weekly - 10 reps    Patient Education  Lifting Techniques    GOALS:  Patient stated goal: clean her house without pain and return to walking for exercise  [] Progressing: [] Met: [] Not Met: [] Adjusted    Therapist goals for Patient:   Short Term Goals: To be achieved in: 2 weeks  1. Independent in HEP and progression per patient tolerance, in order to prevent re-injury. [] Progressing: [] Met: [] Not Met: [] Adjusted  2. Patient will have a decrease in pain to facilitate improvement in movement, function, and ADLs as indicated by Functional Deficits. [] Progressing: [] Met: [] Not Met: [] Adjusted      Long Term Goals: To be achieved in: 5 weeks  1. Disability index score of 59% or more per FOTO to assist with reaching prior level of function. [] Progressing: [] Met: [] Not Met: [] Adjusted  2. Patient will demonstrate increased AROM to WNL, good LS mobility, good hip ROM to allow for proper joint functioning as indicated by patients Functional Deficits. [] Progressing: [] Met: [] Not Met: [] Adjusted  3. Patient will demonstrate an increase in Strength to good proximal hip and core activation to allow for proper functional mobility as indicated by patients Functional Deficits. [] Progressing: [] Met: [] Not Met: [] Adjusted  4. Patient will return to walking 15 min on level surfaces without increased symptoms or restriction. [] Progressing: [] Met: [] Not Met: [] Adjusted  5.  Patient will report she will be able to clean her house with minimal pain   [] Progressing: [] Met: [] Not Met: [] Adjusted       Electronically signed by:  Grady Montes, PT

## 2022-09-20 NOTE — FLOWSHEET NOTE
[facility],  [address] [place]  Phone: [phone]  Fax [phone]    Physical Therapy Treatment Note/ Progress Report:           Date:  2022    Patient Name:  Estephanie Sneed    :  1955  MRN: [id number]  Restrictions/Precautions:    Medical/Treatment Diagnosis Information:  M54.16 (ICD-10-CM) - Lumbar radiculopathy  LBP M54.5, abnormal posture L13.3  Insurance/Certification information:  /[facility]  Physician Information:  Dr. Anoop Marin  Has the plan of care been signed (Y/N):        []  Yes  [x]  No     Date of Patient follow up with Physician: 22      Is this a Progress Report:     []  Yes  [x]  No        If Yes:  Date Range for reporting period:  Beginning 22  Sxnlxn21/20/22    Progress report will be due (10 Rx or 30 days whichever is less):        Recertification will be due (POC Duration  / 90 days whichever is less): 10/25/22        Visit # Insurance Allowable Auth Required   In-person [address] []  Yes []  No    Telehealth   []  Yes []  No    Total            Functional Scale: FOTO 34%    Date assessed:  22      Therapy Diagnosis/Practice Pattern:      Number of Comorbidities:  []0     [x]1-2    []3+    Latex Allergy:  [x]NO      []YES  Preferred Language for Healthcare:   [x]English       []other:      Pain level:  0-3/10    SUBJECTIVE:  See eval    OBJECTIVE: See eval  Observation:   Test measurements:      RESTRICTIONS/PRECAUTIONS: none noted    Exercises/Interventions:     Therapeutic Ex (48352) Sets/sec Notes/CUES   Supine lower trunk rotation 5 sec 10x each side hep   Hooklying abdominal brace 10x 10x hep   Quadruped cat/cow 10x hep                                      Pt education regarding changing positions frequently and proper lifting techniques 5 min         Manual Intervention (80901)     Prone PAs and rotational mobs to lumbar spine 10 min    Prone knee flexion stretches NV                        NMR re-education (55994)  CUES NEEDED                            Therapeutic Activity (32061)                                   Access Code: [email or web address]  URL: [email or web address]  Date: 09/20/2022  Prepared by: Janeth Collet    Exercises  Lower Trunk Rotations - 2-3 x daily - 7 x weekly - 10 reps - 5 hold  Supine Transversus Abdominis Bracing with Pelvic Floor Contraction - 2-3 x daily - 7 x weekly - 10 reps - 10 hold  Quadruped Cat Cow - 2-3 x daily - 7 x weekly - 10 reps    Patient Education  Lifting Techniques    Therapeutic Exercise and NMR EXR  [] (83694) Provided verbal/tactile cueing for activities related to strengthening, flexibility, endurance, ROM  for improvements in proximal hip and core control with self care, mobility, lifting and ambulation.  [] (98613) Provided verbal/tactile cueing for activities related to improving balance, coordination, kinesthetic sense, posture, motor skill, proprioception  to assist with core control in self care, mobility, lifting, and ambulation.      Therapeutic Activities:    [] (59872 or 44431) Provided verbal/tactile cueing for activities related to improving balance, coordination, kinesthetic sense, posture, motor skill, proprioception and motor activation to allow for proper function  with self care and ADLs  [] (67419) Provided training and instruction to the patient for proper core and proximal hip recruitment and positioning with ambulation re-education     Home Exercise Program:    [x] (99309) Reviewed/Progressed HEP activities related to strengthening, flexibility, endurance, ROM of core, proximal hip and LE for functional self-care, mobility, lifting and ambulation   [] (43652) Reviewed/Progressed HEP activities related to improving balance, coordination, kinesthetic sense, posture, motor skill, proprioception of core, proximal hip and LE for self care, mobility, lifting, and ambulation      Manual Treatments:  PROM / STM / Oscillations-Mobs:  G-I, II, III, IV (PA's, Inf., Post.)  [] (57159) Provided manual therapy to mobilize proximal hip and LS spine soft tissue/joints for the purpose of modulating pain, promoting relaxation,  increasing ROM, reducing/eliminating soft tissue swelling/inflammation/restriction, improving soft tissue extensibility and allowing for proper ROM for normal function with self care, mobility, lifting and ambulation. Modalities:       Charges  Timed Code Treatment Minutes: 30   Total Treatment Minutes: 55     Medicare total (add KX at $2000)         [x] EVAL (LOW) 20096   [] EVAL (MOD) 55546   [] EVAL (HIGH) 29593   [] RE-EVAL     [x] QV(78074) x   1  [] IONTO  [] NMR (12442) x     [] VASO  [x] Manual (72733) x  1    [] Other:  [] TA x      [] Mech Traction (62242)  [] ES(attended) (67433)      [] ES (un) (44468):     Goals: GOALS:  Patient stated goal: clean her house without pain and return to walking for exercise  [] Progressing: [] Met: [] Not Met: [] Adjusted    Therapist goals for Patient:   Short Term Goals: To be achieved in: 2 weeks  1. Independent in HEP and progression per patient tolerance, in order to prevent re-injury. [] Progressing: [] Met: [] Not Met: [] Adjusted  2. Patient will have a decrease in pain to facilitate improvement in movement, function, and ADLs as indicated by Functional Deficits. [] Progressing: [] Met: [] Not Met: [] Adjusted      Long Term Goals: To be achieved in: 5 weeks  1. Disability index score of 59% or more per FOTO to assist with reaching prior level of function. [] Progressing: [] Met: [] Not Met: [] Adjusted  2. Patient will demonstrate increased AROM to WNL, good LS mobility, good hip ROM to allow for proper joint functioning as indicated by patients Functional Deficits. [] Progressing: [] Met: [] Not Met: [] Adjusted  3.  Patient will demonstrate an increase in Strength to good proximal hip and core activation to allow for proper functional mobility as signed by:  Rosa Sandoval PT    Note: If patient does not return for scheduled/ recommended follow up visits, this note will serve as a discharge from care along with most recent update on progress.

## 2022-09-22 ENCOUNTER — HOSPITAL ENCOUNTER (OUTPATIENT)
Dept: PHYSICAL THERAPY | Age: 67
Setting detail: THERAPIES SERIES
Discharge: HOME | End: 2022-09-22
Payer: MEDICARE

## 2022-09-22 NOTE — FLOWSHEET NOTE
[facility],  [address]        Physical Therapy  Cancellation/No-show Note  Patient Name:  Love Hart  :  1955   Date:  2022  Cancelled visits to date: 1  No-shows to date: 0    For today's appointment patient:  [x]  Cancelled  []  Rescheduled appointment  []  No-show     Reason given by patient:  []  Patient ill  []  Conflicting appointment  []  No transportation    []  Conflict with work  [x]  No reason given  []  Other:     Comments: The patient called the  and left a voice message that they could not come in today. The patient did not say why they could not coming in today.      Electronically signed by:  Cheyanne Weaver PT

## 2022-09-27 ENCOUNTER — HOSPITAL ENCOUNTER (OUTPATIENT)
Dept: PHYSICAL THERAPY | Age: 67
Setting detail: THERAPIES SERIES
Discharge: HOME | End: 2022-09-27
Payer: MEDICARE

## 2022-09-27 PROCEDURE — 97140 MANUAL THERAPY 1/> REGIONS: CPT | Performed by: PHYSICAL THERAPIST

## 2022-09-27 PROCEDURE — 97110 THERAPEUTIC EXERCISES: CPT | Performed by: PHYSICAL THERAPIST

## 2022-09-27 NOTE — FLOWSHEET NOTE
[facility], [address]  Phone: [phone]  Fax [phone]    Physical Therapy Treatment Note/ Progress Report:           Date:  2022    Patient Name:  Huy Contreras    :  1955  MRN: [id number]  Restrictions/Precautions:    Medical/Treatment Diagnosis Information:  M54.16 (ICD-10-CM) - Lumbar radiculopathy  LBP M54.5, abnormal posture H31.6  Insurance/Certification information:  /[facility]  Physician Information:  Dr. Sanchez Sensor  Has the plan of care been signed (Y/N):        []  Yes  [x]  No     Date of Patient follow up with Physician: 10/4/22      Is this a Progress Report:     []  Yes  [x]  No        If Yes:  Date Range for reporting period:  Beginning 22  Nmczff08/20/22    Progress report will be due (10 Rx or 30 days whichever is less):        Recertification will be due (POC Duration  / 90 days whichever is less): 10/25/22        Visit # Insurance Allowable Auth Required   In-person [facility] []  Yes []  No    Telehealth   []  Yes []  No    Total            Functional Scale: FOTO 34%    Date assessed:  22      Therapy Diagnosis/Practice Pattern:      Number of Comorbidities:  []0     [x]1-2    []3+    Latex Allergy:  [x]NO      []YES  Preferred Language for Healthcare:   [x]English       []other:      Pain level:  0-3/10    SUBJECTIVE:  She reports that she is moving on Saturday, and she has been so busy and not able to do her home program.  She is remembering to perform abdominal bracing while being active. Overall her back might feeling a tad better. She reports that when she sits for about 30 min and gets up and has a stabbing pain in right buttock, but nothing goes down the leg. She is able to walk it off. She did follow up with her primary care MD about her episode the previous week.   She reports they concluded that the predisone probably caused her BP spike.    OBJECTIVE: See eval  Observation:   Test measurements:      RESTRICTIONS/PRECAUTIONS: none noted    Exercises/Interventions:     Therapeutic Ex (63592) Sets/sec Notes/CUES   Hand heel rocking 10x    Supine lower trunk rotation 5 sec 10x each side hep   Hooklying abdominal brace 10x 5x hep   hep   Bent knee fallout with ab brace 2X10 each leg    Hooklying marches with ab brace x10    Lateral walking with ab brace 1x up and back Length of wall  Hurts her knees   Recumbent bike  6 min                   Pt education regarding changing positions frequently and proper lifting techniques 5 min         Manual Intervention (63700)     Prone PAs and rotational mobs to lumbar spine 10 min    Prone knee flexion stretches 3x15 sec each leg                        NMR re-education (96481)  CUES NEEDED                            Therapeutic Activity (94892)                                   Access Code: [email or web address]  URL: [email or web address]  Date: 09/20/2022  Prepared by: Lynne Wick    Exercises  Lower Trunk Rotations - 2-3 x daily - 7 x weekly - 10 reps - 5 hold  Supine Transversus Abdominis Bracing with Pelvic Floor Contraction - 2-3 x daily - 7 x weekly - 10 reps - 10 hold  Quadruped Cat Cow - 2-3 x daily - 7 x weekly - 10 reps    Patient Education  Lifting Techniques    Therapeutic Exercise and NMR EXR  [x] (29254) Provided verbal/tactile cueing for activities related to strengthening, flexibility, endurance, ROM  for improvements in proximal hip and core control with self care, mobility, lifting and ambulation.  [] (47530) Provided verbal/tactile cueing for activities related to improving balance, coordination, kinesthetic sense, posture, motor skill, proprioception  to assist with core control in self care, mobility, lifting, and ambulation.      Therapeutic Activities:    [] (25849 or 52638) Provided verbal/tactile cueing for activities related to improving balance, coordination, kinesthetic sense, posture, motor skill, proprioception and motor activation to allow for proper function  with self care and ADLs  [] (56661) Provided training and instruction to the patient for proper core and proximal hip recruitment and positioning with ambulation re-education     Home Exercise Program:    [x] (77359) Reviewed/Progressed HEP activities related to strengthening, flexibility, endurance, ROM of core, proximal hip and LE for functional self-care, mobility, lifting and ambulation   [] (40090) Reviewed/Progressed HEP activities related to improving balance, coordination, kinesthetic sense, posture, motor skill, proprioception of core, proximal hip and LE for self care, mobility, lifting, and ambulation      Manual Treatments:  PROM / STM / Oscillations-Mobs:  G-I, II, III, IV (PA's, Inf., Post.)  [x] (57600) Provided manual therapy to mobilize proximal hip and LS spine soft tissue/joints for the purpose of modulating pain, promoting relaxation,  increasing ROM, reducing/eliminating soft tissue swelling/inflammation/restriction, improving soft tissue extensibility and allowing for proper ROM for normal function with self care, mobility, lifting and ambulation. Modalities:       Charges  Timed Code Treatment Minutes: 42   Total Treatment Minutes: 42     Medicare total (add KX at $2000)         [] EVAL (LOW) 84987   [] EVAL (MOD) 71587   [] EVAL (HIGH) 40848   [] RE-EVAL     [x] YR(34792) x   2  [] IONTO  [] NMR (04617) x     [] VASO  [x] Manual (22337) x  1    [] Other:  [] TA x      [] Mech Traction (68932)  [] ES(attended) (93953)      [] ES (un) (99919):     Goals: GOALS:  Patient stated goal: clean her house without pain and return to walking for exercise  [] Progressing: [] Met: [] Not Met: [] Adjusted    Therapist goals for Patient:   Short Term Goals: To be achieved in: 2 weeks  1. Independent in HEP and progression per patient tolerance, in order to prevent re-injury.    [] Progressing: [] Met: [] Not Met: [] Adjusted  2. Patient will have a decrease in pain to facilitate improvement in movement, function, and ADLs as indicated by Functional Deficits. [] Progressing: [] Met: [] Not Met: [] Adjusted      Long Term Goals: To be achieved in: 5 weeks  1. Disability index score of 59% or more per FOTO to assist with reaching prior level of function. [] Progressing: [] Met: [] Not Met: [] Adjusted  2. Patient will demonstrate increased AROM to WNL, good LS mobility, good hip ROM to allow for proper joint functioning as indicated by patients Functional Deficits. [] Progressing: [] Met: [] Not Met: [] Adjusted  3. Patient will demonstrate an increase in Strength to good proximal hip and core activation to allow for proper functional mobility as indicated by patients Functional Deficits. [] Progressing: [] Met: [] Not Met: [] Adjusted  4. Patient will return to walking 15 min on level surfaces without increased symptoms or restriction. [] Progressing: [] Met: [] Not Met: [] Adjusted  5. Patient will report she will be able to clean her house with minimal pain   [] Progressing: [] Met: [] Not Met: [] Adjusted    (cut and paste from eval)    Progression Towards Functional goals:  [] Patient is progressing as expected towards functional goals listed. [] Progression is slowed due to complexities listed. [] Progression has been slowed due to co-morbidities. [x] Plan just implemented, too soon to assess goals progression  [] Other:     Overall Progression Towards Functional goals/ Treatment Progress Update:  [] Patient is progressing as expected towards functional goals listed. [] Progression is slowed due to complexities/Impairments listed. [] Progression has been slowed due to co-morbidities.   [x] Plan just implemented, too soon to assess goals progression <30days   [] Goals require adjustment due to lack of progress  [] Patient is not progressing as expected and requires additional follow up with physician  [] Other    Prognosis for POC: [x] Good [] Fair  [] Poor      Patient requires continued skilled intervention: [x] Yes  [] No    Treatment/Activity Tolerance:  [x] Patient able to complete treatment  [] Patient limited by fatigue  [] Patient limited by pain    [] Patient limited by other medical complications  [] Other:     ASSESSMENT: Clari tolerated increased activity today. She had some tenderness on spinous processes in lumbar spine during manipulation. She was able to tolerate increased activity today. PLAN: Cont Pt 1-2 times per week for up to 5 weeks to progress functional mobility, decrease episodes of pain, and progress functional strength  [x] Continue per plan of care [] Alter current plan (see comments above)  [] Plan of care initiated [] Hold pending MD visit [] Discharge      Electronically signed by:  Gume Ramirez PT    Note: If patient does not return for scheduled/ recommended follow up visits, this note will serve as a discharge from care along with most recent update on progress.

## 2022-10-03 ENCOUNTER — HOSPITAL ENCOUNTER (OUTPATIENT)
Dept: PHYSICAL THERAPY | Age: 67
Setting detail: THERAPIES SERIES
Discharge: HOME | End: 2022-10-03

## 2022-10-03 NOTE — PROGRESS NOTES
[address] and [facility]                                             [address] [facility], [facility], [address] [place] [id number]        Phone: [phone]      Name:  Gayle Smart  :    1955    Consultants:   Patient Care Team:  Sergio Narvaez DO as PCP - General (Family Medicine)  Sergio Narvaez DO as PCP - [address] [place] [address]  [facility] Provider  Barbra Soto MD as Surgeon (Orthopedic Surgery)    Chief Complaint:     Gayle Smart is a 74 y.o. female  who presents today for an established patient care visit with Personalized Prevention Plan Services as noted below. HPI:     Princess Castano is a 74year old female with a PMH of HTN, coronary artery dissection, sciatic neuropathy, and hematuria who presents to the office today for BP follow up    Hypertension  Patient was recently placed on carvedilol 6.25 mg twice daily for blood pressure management. She is also on losartan 50 mg. Blood pressure in office today is 140/88. Patient has not been able to take her blood pressure at home, she reports that she just moved into a new home and her cuff has been boxed up and she has been too busy with her new home. Patient denies any side effects with new medication, denies headache, lightheadedness, dyspnea, cough, tremor, palpitations, nausea, vomiting, diarrhea, edema, or syncope    She was referred to a new cardiologist and has her first appointment on . She was ordered an echo at her last visit but has not yet scheduled. Hyperlipidemia  Patient is taking pravastatin 40 mg and Zetia 10 mg. She reports doing well on these medications, in the past she was on a different statin and it caused her muscle aches however she denies any issue with pravastatin. Patient last lipid panel done on 2022 showed a total cholesterol of 309, and LDL of 177, and a triglyceride level of 445.   Patient has been trying to eat healthier however with the move she has been stressed out and admits she could do better. Lumbar sciatica   Patient has been using Voltaren gel for back pain and knee pain. This has been extremely useful to her and she notices her pain has improved since starting the Voltaren. She still occasionally has some back pains that will cause her ill, especially as she has been lifting boxes and moving.   Denies any acute complaints at this time, no numbness tingling, weakness, saddle anesthesia, incontinence, fever, dizziness, balance issues      Patient Active Problem List   Diagnosis    Primary osteoarthritis of right knee    Essential (primary) hypertension    Gastro-esophageal reflux disease without esophagitis    Hyperlipidemia    Liver cyst    Old myocardial infarction    Tobacco use    Urinary incontinence    Lumbar radiculopathy    Ptosis of right eyelid    Tick bite    Post-Lyme disease syndrome    Peripheral polyneuropathy    Sciatic neuropathy    Coronary artery dissection         Past Medical History:    Past Medical History:   Diagnosis Date    Acute laryngitis     Acute laryngitis 1/29/2015    Acute lymphadenitis of face, head and neck     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    Arthritis     Calculus of kidney     Cancer (HCC)     skin    Cystocele, midline     Eustachian tube dysfunction, bilateral     Eustachian tube dysfunction, bilateral 6/15/2016    Gastro-esophageal reflux disease without esophagitis     Generalized anxiety disorder 8/25/2016    Hyperlipidemia     Hypertension     Liver cyst     Neuritis     Neuritis 6/5/2018    Nocturia     Old myocardial infarction     Osteoarthritis     Otalgia     Perennial allergic rhinitis     Pharyngeal inflammation     Primary osteoarthritis of right knee     Tinnitus of vascular origin 6/15/2016    Tinnitus, bilateral     Tobacco use     Urinary frequency     Urinary incontinence     Vaginal enterocele Vaginal vault 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        Past Surgical History:  Past Surgical History:   Procedure Laterality Date    APPENDECTOMY      CHOLECYSTECTOMY      ELBOW DEBRIDEMENT      FOOT SURGERY      bilateral    HYSTERECTOMY, VAGINAL      KNEE ARTHROSCOPY Right 4/24/2019    RIGHT KNEE ARTHROSCOPY,, SYNOVECTOMY CHONDROPLASTY,MEDIAL AND LATERAL MENESECTOMY, REMOVAL LOOSE BODIES performed by Romayne Dow, MD at [facility]      bilateral    LIVER SURGERY      cyst removal    NECK SURGERY      PAIN MANAGEMENT PROCEDURE Right 11/18/2021    RIGHT LUMBAR FOUR FIVE EPIDURAL STEROID INJECTION SITE CONFIRMED BY FLUOROSCOPY performed by King Elaine MD at [address] [facility] Right 10/30/2020    VIDEO ARTHROSCOPY RIGHT SHOULDER SUBACROMIAL DECOMPRESSION LABRAL DEBRIDEMENT ROTATOR CUFF TEAR performed by Chucky Trinidad MD at [address] [place] ARTHROSCOPY      TONSILLECTOMY      TUBAL LIGATION      URETHRA SURGERY      US BREAST LESION REMOVAL LEFT         Home Meds:  Prior to Visit Medications    Medication Sig Taking? Authorizing Provider   carvedilol (COREG) 6.25 MG tablet Take 1 tablet by mouth 2 times daily Yes Palak Underwood MD   diclofenac (VOLTAREN) 75 MG EC tablet Take 1 tablet by mouth 2 times daily Yes Palak Underwood MD   ezetimibe (ZETIA) 10 MG tablet Take 1 tablet by mouth daily Yes Palak Underwood MD   losartan (COZAAR) 50 MG tablet Take 1 tablet by mouth in the morning.  Yes David Wagner DO   Handicap Placard MISC by Does not apply route Yes Palak Underwood MD   pravastatin (PRAVACHOL) 40 MG tablet Take 1 tablet by mouth daily Yes David Wagner DO   ascorbic acid (VITAMIN C) 100 MG tablet Take 100 mg by mouth Yes Historical Provider, MD   vitamin A 3 MG (98092 UT) capsule Take 1 capsule by mouth Yes Historical Provider, MD   Multiple Vitamins-Minerals (MULTIVITAMIN ADULT PO) Take 1 tablet by mouth daily Yes Historical Provider, MD   aspirin 81 MG tablet Take 81 mg by mouth daily. Yes Historical Provider, MD   S-Adenosylmethionine (EFREN-E) 400 MG TABS Take  by mouth. Yes Historical Provider, MD       Allergies:    Lisinopril, Adhesive tape, Codeine, Crestor [rosuvastatin calcium], Food, Lipitor [atorvastatin calcium], Nitrofurantoin, Penicillins, Sulfa antibiotics, and Tramadol    Family History:       Problem Relation Age of Onset    Breast Cancer Mother     Cancer Mother         Bone    Diabetes Mother     Heart Disease Mother         CHF    Arthritis Mother     Heart Attack Father     Prostate Cancer Brother     Cancer Maternal Grandmother     Cancer Brother         kidney    Cancer Brother         bladder         Health Maintenance Completed:  Health Maintenance   Topic Date Due    Shingles vaccine (2 of 3) 12/29/2015    Breast cancer screen  09/20/2022    Flu vaccine (1) 09/20/2023 (Originally 8/1/2022)    COVID-19 Vaccine (1) 09/20/2024 (Originally 6/26/1956)    Annual Wellness Visit (AWV)  11/09/2022    A1C test (Diabetic or Prediabetic)  02/01/2023    Lipids  08/26/2023    Depression Screen  09/20/2023    Colorectal Cancer Screen  12/26/2023    DTaP/Tdap/Td vaccine (2 - Td or Tdap) 07/26/2032    DEXA (modify frequency per FRAX score)  Completed    Pneumococcal 65+ years Vaccine  Completed    Hepatitis C screen  Completed    Hepatitis A vaccine  Aged Out    Hepatitis B vaccine  Aged Out    Hib vaccine  Aged Out    Meningococcal (ACWY) vaccine  Aged Out          Immunization History   Administered Date(s) Administered    Influenza Virus Vaccine 11/03/2015    Influenza, FLUCELVAX, (age 10 mo+), MDCK, MDV, 0.5mL 09/25/2019    Pneumococcal Conjugate 13-valent ([id number]) 09/25/2019    Pneumococcal Polysaccharide ([id number]) 10/10/2020    Tdap (Boostrix, Adacel) 07/26/2022    Zoster Live (Zostavax) 11/03/2015         Review of Systems:  Review of Systems   Constitutional:  Negative for chills and fatigue.    HENT:  Negative for ear pain, rhinorrhea and sore throat. Eyes:  Negative for visual disturbance. Respiratory:  Negative for cough, shortness of breath and wheezing. Cardiovascular:  Negative for chest pain, palpitations and leg swelling. Gastrointestinal:  Negative for abdominal pain, constipation, diarrhea, nausea and vomiting. Genitourinary:  Negative for dysuria and flank pain. Musculoskeletal:  Positive for back pain. Negative for myalgias and neck pain. Neurological:  Negative for dizziness, tremors, light-headedness, numbness and headaches. Physical Exam:   Vitals:    10/04/22 1013   BP: (!) 140/88   Site: Left Upper Arm   Position: Sitting   Cuff Size: Large Adult   Pulse: 79   Temp: 97.9 °F (36.6 °C)   TempSrc: Temporal   SpO2: 96%   Weight: 233 lb 9.6 oz (106 kg)     Body mass index is 40.1 kg/m². Wt Readings from Last 3 Encounters:   10/04/22 233 lb 9.6 oz (106 kg)   09/20/22 233 lb (105.7 kg)   09/14/22 229 lb (103.9 kg)       BP Readings from Last 3 Encounters:   10/04/22 (!) 140/88   09/20/22 130/82   09/14/22 (!) 178/105       Physical Exam  Constitutional:       Appearance: Normal appearance. HENT:      Head: Normocephalic and atraumatic. Nose: Nose normal. No congestion or rhinorrhea. Eyes:      Conjunctiva/sclera: Conjunctivae normal.   Cardiovascular:      Rate and Rhythm: Normal rate and regular rhythm. Pulses: Normal pulses. Heart sounds: Normal heart sounds. No murmur heard. No gallop. Pulmonary:      Effort: Pulmonary effort is normal.      Breath sounds: Normal breath sounds. No wheezing, rhonchi or rales. Abdominal:      General: Abdomen is flat. Bowel sounds are normal.      Palpations: Abdomen is soft. Tenderness: There is no abdominal tenderness. There is no guarding or rebound. Musculoskeletal:         General: Normal range of motion. Cervical back: Normal range of motion. Skin:     General: Skin is warm and dry.    Neurological:      General: Negative     Microscopic Examination 09/14/2022 YES     Urine Type 09/14/2022 NotGiven     Urine Reflex to Culture 09/14/2022 Not Indicated     Troponin 09/14/2022 <0.01     Ventricular Rate 09/14/2022 83     Atrial Rate 09/14/2022 83     P-R Interval 09/14/2022 190     QRS Duration 09/14/2022 94     Q-T Interval 09/14/2022 390     QTc Calculation (Bazett) 09/14/2022 458     P Axis 09/14/2022 68     R Axis 09/14/2022 -6     T Axis 09/14/2022 58     Diagnosis 09/14/2022 Normal sinus rhythmMinimal voltage criteria for LVH, may be normal variantBorderline ECGWhen compared with ECG of 11-JAN-2013 15:54,No significant change was foundConfirmed by Nafisa Forman MD, [address] ([id number]) on 9/15/2022 7:51:51 AM     Hyaline Casts, UA 09/14/2022 0-2     Mucus, UA 09/14/2022 1+ (A)     WBC, UA 09/14/2022 0-2     RBC, UA 09/14/2022 5-10 (A)     Epithelial Cells, UA 09/14/2022 2-5     Renal Epithelial, UA 09/14/2022 0-1    Hospital Outpatient Visit on 08/26/2022   Component Date Value    Cholesterol, Total 08/26/2022 309 (A)     Triglycerides 08/26/2022 445 (A)     HDL 08/26/2022 38 (A)     LDL Calculated 08/26/2022 see below     VLDL Cholesterol Calcula* 08/26/2022 see below     Ferritin 08/26/2022 29.7     Vitamin B-12 08/26/2022 694     Folate 08/26/2022 13.87     Vit D, 25-Hydroxy 08/26/2022 35.0     Total Syphillis IgG/IgM 08/26/2022 Non-Reactive     LDL Direct 08/26/2022 177 (A)    Hospital Outpatient Visit on 06/23/2022   Component Date Value    IgG,Mars Mtn Spotted Fe* 06/23/2022 <1:64     MARS MOUNTAIN SPOTTED F* 06/23/2022 <1:64     Lyme, EIA 06/23/2022 1.52 (A)     Lyme Ab IgM by WB: 06/23/2022 Positive (A)     Lyme (B. burgdorferi) Ab* 06/23/2022 Negative    Office Visit on 05/25/2022   Component Date Value    Color, UA 05/25/2022 yellow     Clarity, UA 05/25/2022 clear     Glucose, UA POC 05/25/2022 neg     Bilirubin, UA 05/25/2022 neg     Ketones, UA 05/25/2022 neg     Spec Grav, UA 05/25/2022 1.015     Blood, UA POC 05/25/2022 neg     pH, UA 05/25/2022 6.5     Protein, UA POC 05/25/2022 neg     Urobilinogen, UA 05/25/2022 neg     Leukocytes, UA 05/25/2022 neg     Nitrite, UA 05/25/2022 neg     Organism 05/25/2022 Aerococcus species (A)     Urine Culture, Routine 05/25/2022                      Value:>100,000 CFU/ml  Susceptibility testing not routinely done. No further work up. Procedure visit on 05/10/2022   Component Date Value    Color, UA 05/10/2022 yellow     Clarity, UA 05/10/2022 clear     Glucose, UA POC 05/10/2022 neg     Bilirubin, UA 05/10/2022 neg     Ketones, UA 05/10/2022 neg     Spec Grav, UA 05/10/2022 1.015     Blood, UA POC 05/10/2022 neg     pH, UA 05/10/2022 6.5     Protein, UA POC 05/10/2022 neg     Urobilinogen, UA 05/10/2022 neg     Leukocytes, UA 05/10/2022 neg     Nitrite, UA 05/10/2022 neg           Assessment/Plan:  Ryanne Jeffers is a 74year old female with a PMH of HTN, coronary artery dissection, sciatic neuropathy, and hematuria who presents to the office today for BP follow up    Diagnoses and all orders for this visit:    Essential (primary) hypertension  -Improving  -Continue losartan 50 mg and carvedilol 6.25 mg twice daily  -Although patient's blood pressure was elevated in office at 140/88, she has not been taking BP at home and has been under a lot of stress with moving. We will allow more time for the carvedilol to reach maximum efficacy and patient will take blood pressures at home before next visit to compare home blood pressures with office visit.  -If blood pressure still elevated at next visit, can consider increasing dose of carvedilol  -We will order lab work at next visit as patient had recent labs done on 9/14/2022  -Patient has appointment in December with Dr. June Leblanc    2.  Mixed hyperlipidemia  -Mildly controlled  -Continue on Zetia 10 mg and pravastatin 40 mg  -We will repeat lipid panel at next visit, previous lipid panel done 8/26/2022 showed total cholesterol of 309 and LDL 177  -Recommend 150 minutes of cardiovascular activity a week, moderate intensity  -Increase intake of fruits and vegetables  -Minimize packaged foods    3. Lumbar radiculopathy  -Well-controlled  -Advised patient to avoid lifting heavy moving boxes if possible  -Continue with Voltaren gel as needed  -Has been seeing physical therapy with improvement of symptoms, she has had 4 sessions    -----Problems not addressed this visit-----    Hematuria  - Will monitor BP and order another UA at next visit, patient without any dysuria    Abnormal EKG  - Patient to follow up on scheduling the ECHO ordered by Dr. Judah Williamson  - Has upcoming appointment with cardiology on 10/18/22    Health Maintenance Due:  Health Maintenance Due   Topic Date Due    Shingles vaccine (2 of 3) 12/29/2015    Breast cancer screen  09/20/2022          Health care decision maker:   Patient has a primary decision maker in chart however no ACP documents on file, will address next visit      Health Maintenance: (USPSTF Recommendations)  (F) Breast Cancer Screen: (40-49 (C), 50-74 biennial screening mammogram (B))  (F) Cervical Cancer Screen: (21-29 q3yr cytology alone; 30-65 q3yr cytology alone, q5yr with hrHPV alone, or q5yr cytology+hrHPV (A))  CRC/Colonoscopy Screening: (adults 45-49 (B), 50-75 (A))  Lung Ca Screening: Annual LDCT (+smoker age 49-80, smoked within 15 years, total of 20 pack yr history (B)):  DEXA Screen: (women >65 and older, <65 if at risk/postmenopausal (B))  HIV Screen: (16-65 yr old, and all pregnant patients (A)): Hep C Screen: (18-79 yr old (B)):  Immunizations:    RTC:  No follow-ups on file. EMR Dragon/transcription disclaimer:  Much of this encounter note is electronic transcription/translation of spoken language to printed texts. The electronic translation of spoken language may be erroneous, or at times, nonsensical words or phrases may be inadvertently transcribed.   Although I have reviewed the note for such errors, some may still exist.     Manuel Gallagher, DO  PGY1 Family Medicine Resident  [address] [facility] and [facility] Medicine Res[facility]

## 2022-10-03 NOTE — FLOWSHEET NOTE
[place] [address] [facility],  [address]        Physical Therapy  Cancellation/No-show Note  Patient Name:  Britta Marcus  :  1955   Date:  10/3/2022  Cancelled visits to date: 2  No-shows to date: 0    For today's appointment patient:  [x]  Cancelled  []  Rescheduled appointment  []  No-show     Reason given by patient:  []  Patient ill  []  Conflicting appointment  []  No transportation    []  Conflict with work  []  No reason given  [x]  Other:     Comments:  Patient called and cancelled therapy today because she moved this weekend and cannot find the box of her clothes.   She will keep next appointment    Electronically signed by:  Mariann Hess PT

## 2022-10-04 ENCOUNTER — OFFICE VISIT (OUTPATIENT)
Dept: PRIMARY CARE CLINIC | Age: 67
End: 2022-10-04
Payer: MEDICARE

## 2022-10-04 VITALS
DIASTOLIC BLOOD PRESSURE: 88 MMHG | SYSTOLIC BLOOD PRESSURE: 140 MMHG | OXYGEN SATURATION: 96 % | HEART RATE: 79 BPM | WEIGHT: 233.6 LBS | TEMPERATURE: 97.9 F | BODY MASS INDEX: 40.1 KG/M2

## 2022-10-04 DIAGNOSIS — I10 ESSENTIAL (PRIMARY) HYPERTENSION: Primary | ICD-10-CM

## 2022-10-04 DIAGNOSIS — E78.2 MIXED HYPERLIPIDEMIA: ICD-10-CM

## 2022-10-04 DIAGNOSIS — M54.16 LUMBAR RADICULOPATHY: ICD-10-CM

## 2022-10-04 PROCEDURE — 3017F COLORECTAL CA SCREEN DOC REV: CPT

## 2022-10-04 PROCEDURE — 1123F ACP DISCUSS/DSCN MKR DOCD: CPT

## 2022-10-04 PROCEDURE — 99214 OFFICE O/P EST MOD 30 MIN: CPT

## 2022-10-04 PROCEDURE — G8427 DOCREV CUR MEDS BY ELIG CLIN: HCPCS

## 2022-10-04 PROCEDURE — G8484 FLU IMMUNIZE NO ADMIN: HCPCS

## 2022-10-04 PROCEDURE — G8400 PT W/DXA NO RESULTS DOC: HCPCS

## 2022-10-04 PROCEDURE — 1090F PRES/ABSN URINE INCON ASSESS: CPT

## 2022-10-04 PROCEDURE — 4004F PT TOBACCO SCREEN RCVD TLK: CPT

## 2022-10-04 PROCEDURE — G8417 CALC BMI ABV UP PARAM F/U: HCPCS

## 2022-10-04 ASSESSMENT — PATIENT HEALTH QUESTIONNAIRE - PHQ9
3. TROUBLE FALLING OR STAYING ASLEEP: 0
SUM OF ALL RESPONSES TO PHQ QUESTIONS 1-9: 0
7. TROUBLE CONCENTRATING ON THINGS, SUCH AS READING THE NEWSPAPER OR WATCHING TELEVISION: 0
8. MOVING OR SPEAKING SO SLOWLY THAT OTHER PEOPLE COULD HAVE NOTICED. OR THE OPPOSITE, BEING SO FIGETY OR RESTLESS THAT YOU HAVE BEEN MOVING AROUND A LOT MORE THAN USUAL: 0
9. THOUGHTS THAT YOU WOULD BE BETTER OFF DEAD, OR OF HURTING YOURSELF: 0
4. FEELING TIRED OR HAVING LITTLE ENERGY: 0
SUM OF ALL RESPONSES TO PHQ QUESTIONS 1-9: 0
SUM OF ALL RESPONSES TO PHQ9 QUESTIONS 1 & 2: 0
6. FEELING BAD ABOUT YOURSELF - OR THAT YOU ARE A FAILURE OR HAVE LET YOURSELF OR YOUR FAMILY DOWN: 0
SUM OF ALL RESPONSES TO PHQ QUESTIONS 1-9: 0
5. POOR APPETITE OR OVEREATING: 0
2. FEELING DOWN, DEPRESSED OR HOPELESS: 0
SUM OF ALL RESPONSES TO PHQ QUESTIONS 1-9: 0
10. IF YOU CHECKED OFF ANY PROBLEMS, HOW DIFFICULT HAVE THESE PROBLEMS MADE IT FOR YOU TO DO YOUR WORK, TAKE CARE OF THINGS AT HOME, OR GET ALONG WITH OTHER PEOPLE: 0
1. LITTLE INTEREST OR PLEASURE IN DOING THINGS: 0

## 2022-10-04 ASSESSMENT — ANXIETY QUESTIONNAIRES
6. BECOMING EASILY ANNOYED OR IRRITABLE: 0-NOT AT ALL
7. FEELING AFRAID AS IF SOMETHING AWFUL MIGHT HAPPEN: 0-NOT AT ALL
GAD7 TOTAL SCORE: 0
2. NOT BEING ABLE TO STOP OR CONTROL WORRYING: 0-NOT AT ALL
1. FEELING NERVOUS, ANXIOUS, OR ON EDGE: 0
3. WORRYING TOO MUCH ABOUT DIFFERENT THINGS: 0-NOT AT ALL
5. BEING SO RESTLESS THAT IT IS HARD TO SIT STILL: 0-NOT AT ALL
4. TROUBLE RELAXING: 0-NOT AT ALL

## 2022-10-04 ASSESSMENT — ENCOUNTER SYMPTOMS
SORE THROAT: 0
ABDOMINAL PAIN: 0
SHORTNESS OF BREATH: 0
NAUSEA: 0
VOMITING: 0
CONSTIPATION: 0
COUGH: 0
WHEEZING: 0
DIARRHEA: 0
RHINORRHEA: 0
BACK PAIN: 1

## 2022-10-17 NOTE — PROGRESS NOTES
[facility]  [phone]      Attending Physician: Dianna Kebede MD    Reason for Consultation/Chief Complaint: New Patient, HTN, HLD, Coronary artery dissection    Subjective   History of Present Illness:  Gauri Aranda is a 74 y.o. patient who presents today a new patient referrral from her PCP Dianna Kebede MD for further cardiac evaluation of uncontrolled hypertension and abnormal EKG. ECHO from 2020 done at [facility] showed an EF of 53% and Grade I DD. Today she reports that she was given prednisone and then had a hypertensive emergency. She reports that she was diagnosed with coronary dissection back in 2009. She was started on Plavix for 6 months and carvedilol. She reports that she smokes, but is trying to quite. She reports that she previously seen Dr. Natalie Quick MD Cardiology at [facility]. She denies chest pain, sob, dizziness, syncope and edema. Past Medical History:   has a past medical history of Acute laryngitis, Acute laryngitis, Acute lymphadenitis of face, head and neck, Artery dissection (HCC), Arthritis, Calculus of kidney, Cancer ([address] Cystocele, midline, Eustachian tube dysfunction, bilateral, Eustachian tube dysfunction, bilateral, Gastro-esophageal reflux disease without esophagitis, Generalized anxiety disorder, Hyperlipidemia, Hypertension, Liver cyst, Neuritis, Neuritis, Nocturia, Old myocardial infarction, Osteoarthritis, Otalgia, Perennial allergic rhinitis, Pharyngeal inflammation, Primary osteoarthritis of right knee, Tinnitus of vascular origin, Tinnitus, bilateral, Tobacco use, Urinary frequency, Urinary incontinence, Vaginal enterocele, and Vaginal vault 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. Surgical History:   has a past surgical history that includes Cholecystectomy; Appendectomy; knee surgery; Foot surgery; Neck surgery; Urethra surgery; Liver surgery; Knee arthroscopy (Right, 4/24/2019);  Elbow Debridement; US BREAST LESION REMOVAL LEFT; Hysterectomy, vaginal; Tonsillectomy; Tubal ligation; Rotator cuff repair (Right, 10/30/2020); Shoulder arthroscopy; and Pain management procedure (Right, 11/18/2021). Social History:   reports that she has been smoking cigarettes. She has a 10.00 pack-year smoking history. She has never used smokeless tobacco. She reports current alcohol use. She reports that she does not use drugs. Family History:  family history includes Arthritis in her mother; Breast Cancer in her mother; Cancer in her brother, brother, maternal grandmother, and mother; Diabetes in her mother; Heart Attack in her father; Heart Disease in her mother; Prostate Cancer in her brother. Home Medications:  Were reviewed and are listed in nursing record and/or below  Prior to Admission medications    Medication Sig Start Date End Date Taking? Authorizing Provider   Nutritional Supplements (VITAMIN D BOOSTER PO) Take by mouth   Yes Historical Provider, MD   Omega-3 Fatty Acids (FISH OIL PO) Take by mouth   Yes Historical Provider, MD   carvedilol (COREG) 6.25 MG tablet Take 1 tablet by mouth 2 times daily 9/20/22  Yes Fili Khan MD   diclofenac (VOLTAREN) 75 MG EC tablet Take 1 tablet by mouth 2 times daily 9/20/22  Yes Fili Khan MD   ezetimibe (ZETIA) 10 MG tablet Take 1 tablet by mouth daily 9/6/22  Yes Fili Khan MD   losartan (COZAAR) 50 MG tablet Take 1 tablet by mouth in the morning.  8/2/22  Yes Joshua Morris DO   Handicap Placard MISC by Does not apply route 7/26/22  Yes Fili Khan MD   pravastatin (PRAVACHOL) 40 MG tablet Take 1 tablet by mouth daily 6/21/22  Yes Gustave Bamberger Rankin, DO   ascorbic acid (VITAMIN C) 100 MG tablet Take 100 mg by mouth   Yes Historical Provider, MD   vitamin A 3 MG (38798 UT) capsule Take 1 capsule by mouth   Yes Historical Provider, MD   Multiple Vitamins-Minerals (MULTIVITAMIN ADULT PO) Take 1 tablet by mouth daily   Yes Historical Provider, MD   aspirin 81 MG tablet Take 81 mg by mouth daily. Yes Historical Provider, MD   S-Adenosylmethionine (EFREN-E) 400 MG TABS Take  by mouth. Yes Historical Provider, MD        CURRENT Medications:  No current facility-administered medications for this visit. Allergies:  Prednisone, Lisinopril, Adhesive tape, Codeine, Crestor [rosuvastatin calcium], Food, Lipitor [atorvastatin calcium], Nitrofurantoin, Penicillins, Sulfa antibiotics, and Tramadol     Review of Systems:   A 14 point review of symptoms completed. Pertinent positives identified in the HPI, all other review of symptoms negative as below.       Objective   PHYSICAL EXAM:    Vitals:    10/18/22 1451   BP: 118/76   Pulse: 79   SpO2: 95%    Weight: 233 lb (105.7 kg)         General Appearance:  Alert, cooperative, no distress, appears stated age   Head:  Normocephalic, without obvious abnormality, atraumatic   Eyes:  PERRL, conjunctiva/corneas clear   Nose: Nares normal, no drainage or sinus tenderness   Throat: Lips, mucosa, and tongue normal   Neck: Supple, symmetrical, trachea midline, no adenopathy, thyroid: not enlarged, symmetric, no tenderness/mass/nodules, no carotid bruit or JVD   Lungs:   Clear to auscultation bilaterally, respirations unlabored   Chest Wall:  No deformity or tenderness   Heart:  Regular rate and rhythm, S1, S2 normal, no murmur, rub or gallop   Abdomen:   Soft, non-tender, bowel sounds active all four quadrants,  no masses, no organomegaly   Extremities: Extremities normal, atraumatic, no cyanosis or edema   Pulses: 2+ and symmetric   Skin: Skin color, texture, turgor normal, no rashes or lesions   Pysch: Normal mood and affect   Neurologic: Normal gross motor and sensory exam.         Labs   CBC:   Lab Results   Component Value Date/Time    WBC 14.8 09/14/2022 06:24 PM    RBC 5.19 09/14/2022 06:24 PM    HGB 14.6 09/14/2022 06:24 PM    HCT 44.4 09/14/2022 06:24 PM    MCV 85.5 2022 06:24 PM    RDW 16.2 2022 06:24 PM     2022 06:24 PM     CMP:  Lab Results   Component Value Date/Time     2022 06:24 PM    K 4.2 2022 06:24 PM     2022 06:24 PM    CO2 20 2022 06:24 PM    BUN 22 2022 06:24 PM    CREATININE 0.8 2022 06:24 PM    GFRAA >60 2022 06:24 PM    GFRAA 42 2013 02:40 PM    AGRATIO 1.4 2022 06:24 PM    LABGLOM >60 2022 06:24 PM    GLUCOSE 122 2022 06:24 PM    PROT 8.0 2022 06:24 PM    PROT 7.5 2013 02:40 PM    CALCIUM 10.9 2022 06:24 PM    BILITOT <0.2 2022 06:24 PM    ALKPHOS 94 2022 06:24 PM    AST 23 2022 06:24 PM    ALT 42 2022 06:24 PM     PT/INR:  No results found for: PTINR  HgBA1c:  Lab Results   Component Value Date    LABA1C 5.9 2022     Lab Results   Component Value Date    TROPONINI <0.01 2022         Cardiac Data     Last EK22  NSR, HR 83    Echo:3/3/20 at [facility]  Study Conclusions     - Left ventricle: The cavity size is normal. Normal relative wall     thickness. Systolic function was normal. The estimated ejection     fraction was 53%. Doppler parameters are consistent with abnormal     left ventricular relaxation (grade 1 diastolic dysfunction). The     global longitudinal strain was -21%. - Aortic valve: The mean systolic gradient is 6mm Hg. The peak     systolic gradient is 41ZE Hg. - Inferior vena cava: The vessel was normal in size; the     respirophasic diameter changes were blunted (&lt; 50%); findings are     consistent with mildly elevated central venous pressure. - Pulmonary arteries: The peak pressure during systole by Doppler     is 34mm Hg. Stress Test:    Cath:    Studies:       Cxr       Impression   No radiographic evidence of an acute cardiopulmonary process. I have reviewed labs and imaging/xray/diagnostic testing in this note. Assessment and Plan        1. Essential (primary) hypertension    2. Mixed hyperlipidemia    3. Coronary artery dissection      PLAN:  Echo to evaluate for heart function, coronary artery dissection, will need to get old records and scan them in from [facility] to evaluate for heart strength, spontaneous coronary dissection  Call [phone] to schedule echo and stress myoview-will call with results  Lipids-done in August 2022  Follow up with NP 3 months          Scribe's attestation: This note was scribed in the presence of Dr. Angie Santiago MD   by Chung Eckert LPN    I, Dr Angie Santiago, personally performed the services described in this documentation, as scribed by the above signed scribe in my presence. It is both accurate and complete to my knowledge. I agree with the details independently gathered by the clinical support staff and the scribed note accurately describes my personal service to the patient. Thank you for allowing us to participate in the care of Tiana Hernandez. Please call me with any questions [phone].     Angie Santiago MD, [facility]   Interventional Cardiologist  [facility]  [phone] [facility]  [phone] [address] [place] [address]  10/19/2022 10:14 AM

## 2022-10-18 ENCOUNTER — OFFICE VISIT (OUTPATIENT)
Dept: CARDIOLOGY CLINIC | Age: 67
End: 2022-10-18
Payer: MEDICARE

## 2022-10-18 VITALS
HEIGHT: 64 IN | SYSTOLIC BLOOD PRESSURE: 118 MMHG | OXYGEN SATURATION: 95 % | BODY MASS INDEX: 39.78 KG/M2 | HEART RATE: 79 BPM | DIASTOLIC BLOOD PRESSURE: 76 MMHG | WEIGHT: 233 LBS

## 2022-10-18 DIAGNOSIS — I10 ESSENTIAL (PRIMARY) HYPERTENSION: Primary | ICD-10-CM

## 2022-10-18 DIAGNOSIS — I25.42 CORONARY ARTERY DISSECTION: ICD-10-CM

## 2022-10-18 DIAGNOSIS — E78.2 MIXED HYPERLIPIDEMIA: ICD-10-CM

## 2022-10-18 PROCEDURE — G8484 FLU IMMUNIZE NO ADMIN: HCPCS | Performed by: INTERNAL MEDICINE

## 2022-10-18 PROCEDURE — G8400 PT W/DXA NO RESULTS DOC: HCPCS | Performed by: INTERNAL MEDICINE

## 2022-10-18 PROCEDURE — 1123F ACP DISCUSS/DSCN MKR DOCD: CPT | Performed by: INTERNAL MEDICINE

## 2022-10-18 PROCEDURE — G8427 DOCREV CUR MEDS BY ELIG CLIN: HCPCS | Performed by: INTERNAL MEDICINE

## 2022-10-18 PROCEDURE — G8417 CALC BMI ABV UP PARAM F/U: HCPCS | Performed by: INTERNAL MEDICINE

## 2022-10-18 PROCEDURE — 99204 OFFICE O/P NEW MOD 45 MIN: CPT | Performed by: INTERNAL MEDICINE

## 2022-10-18 PROCEDURE — 3017F COLORECTAL CA SCREEN DOC REV: CPT | Performed by: INTERNAL MEDICINE

## 2022-10-18 PROCEDURE — 4004F PT TOBACCO SCREEN RCVD TLK: CPT | Performed by: INTERNAL MEDICINE

## 2022-10-18 PROCEDURE — 1090F PRES/ABSN URINE INCON ASSESS: CPT | Performed by: INTERNAL MEDICINE

## 2022-10-18 NOTE — PATIENT INSTRUCTIONS
PLAN:  Echo to evaluate for heart function  Stress myoview to evaluate for heart strength  Call [phone] to schedule echo and stress myoview-will call with results  Lipids-done in August 2022  Follow up with NP 3 months

## 2022-10-19 ENCOUNTER — TELEPHONE (OUTPATIENT)
Dept: CARDIOLOGY | Age: 67
End: 2022-10-19

## 2022-10-19 NOTE — TELEPHONE ENCOUNTER
Patient reportedly had prior care at [facility] with possible coronary dissection, lets see if we can get reports from [facility] including any cardiac records of testing there. Lets get those records scanned into the media tab. Thank you.

## 2022-10-20 NOTE — TELEPHONE ENCOUNTER
2009 cath report and 2020 echo done at [facility] has been scanned into chart and is located in Media tab.

## 2022-10-25 ENCOUNTER — OFFICE VISIT (OUTPATIENT)
Dept: UROGYNECOLOGY | Age: 67
End: 2022-10-25
Payer: MEDICARE

## 2022-10-25 DIAGNOSIS — R39.15 URGENCY OF URINATION: Primary | ICD-10-CM

## 2022-10-25 DIAGNOSIS — R39.15 URINARY URGENCY: ICD-10-CM

## 2022-10-25 DIAGNOSIS — R35.0 URINARY FREQUENCY: ICD-10-CM

## 2022-10-25 DIAGNOSIS — N39.41 URGE INCONTINENCE: ICD-10-CM

## 2022-10-25 PROCEDURE — 1090F PRES/ABSN URINE INCON ASSESS: CPT | Performed by: OBSTETRICS & GYNECOLOGY

## 2022-10-25 PROCEDURE — 0509F URINE INCON PLAN DOCD: CPT | Performed by: OBSTETRICS & GYNECOLOGY

## 2022-10-25 PROCEDURE — 4004F PT TOBACCO SCREEN RCVD TLK: CPT | Performed by: OBSTETRICS & GYNECOLOGY

## 2022-10-25 PROCEDURE — 99214 OFFICE O/P EST MOD 30 MIN: CPT | Performed by: OBSTETRICS & GYNECOLOGY

## 2022-10-25 PROCEDURE — G8427 DOCREV CUR MEDS BY ELIG CLIN: HCPCS | Performed by: OBSTETRICS & GYNECOLOGY

## 2022-10-25 PROCEDURE — G8400 PT W/DXA NO RESULTS DOC: HCPCS | Performed by: OBSTETRICS & GYNECOLOGY

## 2022-10-25 PROCEDURE — 1123F ACP DISCUSS/DSCN MKR DOCD: CPT | Performed by: OBSTETRICS & GYNECOLOGY

## 2022-10-25 PROCEDURE — G8417 CALC BMI ABV UP PARAM F/U: HCPCS | Performed by: OBSTETRICS & GYNECOLOGY

## 2022-10-25 PROCEDURE — G8484 FLU IMMUNIZE NO ADMIN: HCPCS | Performed by: OBSTETRICS & GYNECOLOGY

## 2022-10-25 PROCEDURE — 3017F COLORECTAL CA SCREEN DOC REV: CPT | Performed by: OBSTETRICS & GYNECOLOGY

## 2022-10-25 RX ORDER — LISINOPRIL 20 MG/1
TABLET ORAL
Status: ON HOLD | COMMUNITY
Start: 2022-10-24 | End: 2022-11-28 | Stop reason: HOSPADM

## 2022-10-25 NOTE — PROGRESS NOTES
10/25/2022       HPI:     Name: Dipika Reeves  YOB: 1955    CC: Patient is a 74 y.o. presenting for evaluation of  OAB .   HPI: How long have you had this problem? years  Please rate the severity of your problem: moderate  Anything make it better? Botox, here to sign updated consent    Ob/Gyn History:    OB History   No obstetric history on file.      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    Arthritis     Calculus of kidney     Cancer (HCC)     skin    Cystocele, midline     Eustachian tube dysfunction, bilateral     Eustachian tube dysfunction, bilateral 6/15/2016    Gastro-esophageal reflux disease without esophagitis     Generalized anxiety disorder 8/25/2016    Hyperlipidemia     Hypertension     Liver cyst     Neuritis     Neuritis 6/5/2018    Nocturia     Old myocardial infarction     Osteoarthritis     Otalgia     Perennial allergic rhinitis     Pharyngeal inflammation     Primary osteoarthritis of right knee     Tinnitus of vascular origin 6/15/2016    Tinnitus, bilateral     Tobacco use     Urinary frequency     Urinary incontinence     Vaginal enterocele     Vaginal vault 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      Past Surgical History:   Past Surgical History:   Procedure Laterality Date    APPENDECTOMY      CHOLECYSTECTOMY      ELBOW DEBRIDEMENT      FOOT SURGERY      bilateral    HYSTERECTOMY, VAGINAL      KNEE ARTHROSCOPY Right 4/24/2019    RIGHT KNEE ARTHROSCOPY,, SYNOVECTOMY CHONDROPLASTY,MEDIAL AND LATERAL MENESECTOMY, REMOVAL LOOSE BODIES performed by Oni Dudley MD at [place] [address]      bilateral    LIVER SURGERY      cyst removal    NECK SURGERY      PAIN MANAGEMENT PROCEDURE Right 11/18/2021    RIGHT LUMBAR FOUR FIVE EPIDURAL STEROID INJECTION SITE CONFIRMED BY FLUOROSCOPY performed by Tejinder Clemente MD at [facility] Right 10/30/2020    VIDEO ARTHROSCOPY RIGHT SHOULDER SUBACROMIAL DECOMPRESSION LABRAL DEBRIDEMENT ROTATOR CUFF TEAR performed by Chhaya Lockwood MD at [address] ARTHROSCOPY      TONSILLECTOMY      TUBAL LIGATION      URETHRA SURGERY      US BREAST LESION REMOVAL LEFT       Allergies: Allergies   Allergen Reactions    Prednisone      Hypertension     Lisinopril Cough    Adhesive Tape      Band aids blisters skin    Codeine     Crestor [Rosuvastatin Calcium] Other (See Comments)     Legs ache      Food      Black walnut    Lipitor [Atorvastatin Calcium] Other (See Comments)     Legs ache      Nitrofurantoin Itching    Penicillins Swelling     \"Throat closes\"    Sulfa Antibiotics     Tramadol Nausea Only     Current Medications:  Current Outpatient Medications   Medication Sig Dispense Refill    lisinopril (PRINIVIL;ZESTRIL) 20 MG tablet       Nutritional Supplements (VITAMIN D BOOSTER PO) Take by mouth      Omega-3 Fatty Acids (FISH OIL PO) Take by mouth      carvedilol (COREG) 6.25 MG tablet Take 1 tablet by mouth 2 times daily 60 tablet 3    diclofenac (VOLTAREN) 75 MG EC tablet Take 1 tablet by mouth 2 times daily 60 tablet 3    ezetimibe (ZETIA) 10 MG tablet Take 1 tablet by mouth daily 90 tablet 3    losartan (COZAAR) 50 MG tablet Take 1 tablet by mouth in the morning. 90 tablet 3    Handicap Placard MISC by Does not apply route 1 each 0    pravastatin (PRAVACHOL) 40 MG tablet Take 1 tablet by mouth daily 90 tablet 1    ascorbic acid (VITAMIN C) 100 MG tablet Take 100 mg by mouth      vitamin A 3 MG (50471 UT) capsule Take 1 capsule by mouth      Multiple Vitamins-Minerals (MULTIVITAMIN ADULT PO) Take 1 tablet by mouth daily      aspirin 81 MG tablet Take 81 mg by mouth daily. S-Adenosylmethionine (EFREN-E) 400 MG TABS Take  by mouth. No current facility-administered medications for this visit.      Social History:   Social History     Socioeconomic History    Marital status:      Spouse name: Not on file    Number of children: Not on file    Years of education: Not on file    Highest education level: Not on file   Occupational History    Not on file   Tobacco Use    Smoking status: Every Day     Packs/day: 0.25     Years: 40.00     Pack years: 10.00     Types: Cigarettes    Smokeless tobacco: Never    Tobacco comments:     quit March 4, 2019   Vaping Use    Vaping Use: Never used   Substance and Sexual Activity    Alcohol use: Yes     Comment: occ    Drug use: No    Sexual activity: Not Currently   Other Topics Concern    Not on file   Social History Narrative    Not on file     Social Determinants of Health     Financial Resource Strain: Low Risk     Difficulty of Paying Living Expenses: Not hard at all   Food Insecurity: No Food Insecurity    Worried About Running Out of Food in the Last Year: Never true    Ran Out of Food in the Last Year: Never true   Transportation Needs: Not on file   Physical Activity: Not on file   Stress: Not on file   Social Connections: Not on file   Intimate Partner Violence: Not on file   Housing Stability: Not on file     Family History:   Family History   Problem Relation Age of Onset    Breast Cancer Mother     Cancer Mother         Bone    Diabetes Mother     Heart Disease Mother         CHF    Arthritis Mother     Heart Attack Father     Prostate Cancer Brother     Cancer Maternal Grandmother     Cancer Brother         kidney    Cancer Brother         bladder         Objective:     Vitals  There were no vitals filed for this visit. Physical Exam  Physical Exam  HENT:      Head: Normocephalic and atraumatic. Eyes:      Conjunctiva/sclera: Conjunctivae normal.   Pulmonary:      Effort: Pulmonary effort is normal.   Abdominal:      Palpations: Abdomen is soft. Musculoskeletal:      Cervical back: Normal range of motion and neck supple. Skin:     General: Skin is warm and dry. Neurological:      Mental Status: She is alert and oriented to person, place, and time. No results found for this visit on 10/25/22. Assessment/Plan:     Jimbo Rush is a 74 y.o. female with   1. Urgency of urination    2. Urinary urgency    3. Urge incontinence    4. Urinary frequency    Old records reviewed and outside records reviewed    The patient was counseled on surgical and non-surgical options. The patient elected to move forward with surgery because of worsening symptoms. The risks and benefits of surgery including but not limited to bleeding, infection, injury to bowel, bladder, ureter, or other internal organs, transfusion, pain, bowel dysfunction, urinary incontinence, and sexual dysfunction were discussed at length. All questions were answered to the patients satisfaction. The patient elected to undergo cystourethroscopy and botox 100 units . The risk and benefits owere explained to the patient and all questions were answered. No orders of the defined types were placed in this encounter. No orders of the defined types were placed in this encounter.       Jarvis Kong MD

## 2022-11-05 ENCOUNTER — HOSPITAL ENCOUNTER (OUTPATIENT)
Dept: NUCLEAR MEDICINE | Age: 67
Discharge: HOME | End: 2022-11-05
Payer: MEDICARE

## 2022-11-05 ENCOUNTER — HOSPITAL ENCOUNTER (OUTPATIENT)
Dept: NON INVASIVE DIAGNOSTICS | Age: 67
Discharge: HOME | End: 2022-11-05
Payer: MEDICARE

## 2022-11-05 DIAGNOSIS — I25.42 CORONARY ARTERY DISSECTION: ICD-10-CM

## 2022-11-05 LAB
LV EF: 54 %
LVEF MODALITY: NORMAL

## 2022-11-05 PROCEDURE — 6360000002 HC RX W HCPCS: Performed by: INTERNAL MEDICINE

## 2022-11-05 PROCEDURE — A9502 TC99M TETROFOSMIN: HCPCS | Performed by: INTERNAL MEDICINE

## 2022-11-05 PROCEDURE — 3430000000 HC RX DIAGNOSTIC RADIOPHARMACEUTICAL: Performed by: INTERNAL MEDICINE

## 2022-11-05 PROCEDURE — 78452 HT MUSCLE IMAGE SPECT MULT: CPT

## 2022-11-05 PROCEDURE — 93017 CV STRESS TEST TRACING ONLY: CPT

## 2022-11-05 RX ADMIN — REGADENOSON 0.4 MG: 0.08 INJECTION, SOLUTION INTRAVENOUS at 12:59

## 2022-11-05 RX ADMIN — TETROFOSMIN 10.1 MILLICURIE: 1.38 INJECTION, POWDER, LYOPHILIZED, FOR SOLUTION INTRAVENOUS at 11:36

## 2022-11-05 RX ADMIN — TETROFOSMIN 31.1 MILLICURIE: 1.38 INJECTION, POWDER, LYOPHILIZED, FOR SOLUTION INTRAVENOUS at 12:59

## 2022-11-05 NOTE — PROGRESS NOTES
A GXT MYOVIEW stress test was completed on this patient. The patient had SOB but was able to meet her THR, recovered well.

## 2022-11-05 NOTE — PROGRESS NOTES
Due to Pts high BP and painful knees and back, Pt's GXT MYOVIEW was changed to a Lexiscan Myoview stress test. Pt's BP improved and Pt tolerated the procedure well.

## 2022-11-08 ENCOUNTER — TELEPHONE (OUTPATIENT)
Dept: CARDIOLOGY CLINIC | Age: 67
End: 2022-11-08

## 2022-11-08 NOTE — TELEPHONE ENCOUNTER
----- Message from Radha Mireles MD sent at 11/5/2022  3:16 PM EDT -----  Let patient know their stress test is abnormal, rec: ht cath, lets set that up if pt agreeable. Thanks.

## 2022-11-08 NOTE — TELEPHONE ENCOUNTER
Spoke with pt regarding SRJ stress test results. Pt v/u and would like to schedule procedure. Pt requesting to schedule before thanksgiving or as soon after thanksgiving if possible.

## 2022-11-08 NOTE — TELEPHONE ENCOUNTER
Spoke with patient. Patient is scheduled with Dr. Raymundo Pearce for Left Heart Cath on 11/14/22 at [facility], arrival time of 8:30am to the Cath Lab. Please have patient arrive to the main entrance of [facility] and check in with the registration desk. Please call patient regarding medication instructions. Remind patient to be NPO after midnight (8 hours prior). Do not apply lotions/creams on skin the day of procedure.

## 2022-11-09 RX ORDER — CIPROFLOXACIN 500 MG/1
500 TABLET, FILM COATED ORAL 2 TIMES DAILY
Qty: 14 TABLET | Refills: 0 | Status: SHIPPED | OUTPATIENT
Start: 2022-11-09 | End: 2022-11-16

## 2022-11-09 RX ORDER — IBUPROFEN 600 MG/1
600 TABLET ORAL ONCE
Qty: 1 TABLET | Refills: 0 | Status: SHIPPED | OUTPATIENT
Start: 2022-11-09 | End: 2022-11-28

## 2022-11-09 NOTE — PROGRESS NOTES
Spoke with patient over the phone. Allergic to Macrobid. Sent in Cipro 500 mg to take twice a day for 7 days starting 3 days prior to Botox procedure. Ibuprofen 600 mg 1 tab sent to take 1 hour prior to procedure. Patient did not want valium as she will not have a . No other concerns at this time.

## 2022-11-10 NOTE — TELEPHONE ENCOUNTER
Called pt.  Left VM asking for return call to office to review instructions prior to [facility] on 11/14    NO medications to be held    She MAY take any morning meds with a sip of water only    Arrive to [facility] main entrance at 8:30 AM, check in with registration desk    NPO (8 hours) prior to procedure    NO lotion/creams to skin day of procedure    Bring a bag in case of 24 hour stay in hospital

## 2022-11-11 ENCOUNTER — TELEPHONE (OUTPATIENT)
Dept: PRIMARY CARE CLINIC | Age: 67
End: 2022-11-11

## 2022-11-11 NOTE — TELEPHONE ENCOUNTER
Care Everywhere audit shows patient had bilateral mammogram done 9/3/21. Needed right diagnostic mammogram, that was done on 9/20/21.  has been updated with frequency updated to 6 months per diagnostic results.

## 2022-11-11 NOTE — TELEPHONE ENCOUNTER
Called and spoke with pt. Reviewed instructions including medications with pt. Pt verbalized understanding of all information given.

## 2022-11-14 ENCOUNTER — TELEPHONE (OUTPATIENT)
Dept: CARDIOLOGY | Age: 67
End: 2022-11-14

## 2022-11-14 ENCOUNTER — HOSPITAL ENCOUNTER (OUTPATIENT)
Dept: CARDIAC CATH/INVASIVE PROCEDURES | Age: 67
Discharge: HOME | End: 2022-11-14
Attending: INTERNAL MEDICINE | Admitting: INTERNAL MEDICINE
Payer: MEDICARE

## 2022-11-14 VITALS — HEIGHT: 64 IN | BODY MASS INDEX: 39.16 KG/M2 | WEIGHT: 229.4 LBS

## 2022-11-14 LAB
ANION GAP SERPL CALCULATED.3IONS-SCNC: 10 MMOL/L (ref 3–16)
BUN BLDV-MCNC: 21 MG/DL (ref 7–20)
CALCIUM SERPL-MCNC: 9.2 MG/DL (ref 8.3–10.6)
CHLORIDE BLD-SCNC: 105 MMOL/L (ref 99–110)
CHOLESTEROL, TOTAL: 223 MG/DL (ref 0–199)
CO2: 24 MMOL/L (ref 21–32)
CREAT SERPL-MCNC: 0.7 MG/DL (ref 0.6–1.2)
EKG ATRIAL RATE: 66 BPM
EKG DIAGNOSIS: NORMAL
EKG P AXIS: 50 DEGREES
EKG P-R INTERVAL: 182 MS
EKG Q-T INTERVAL: 430 MS
EKG QRS DURATION: 94 MS
EKG QTC CALCULATION (BAZETT): 450 MS
EKG R AXIS: -13 DEGREES
EKG T AXIS: 85 DEGREES
EKG VENTRICULAR RATE: 66 BPM
GFR SERPL CREATININE-BSD FRML MDRD: >60 ML/MIN/{1.73_M2}
GLUCOSE BLD-MCNC: 102 MG/DL (ref 70–99)
HCT VFR BLD CALC: 41.5 % (ref 36–48)
HDLC SERPL-MCNC: 36 MG/DL (ref 40–60)
HEMOGLOBIN: 13.7 G/DL (ref 12–16)
INR BLD: 0.99 (ref 0.87–1.14)
LDL CHOLESTEROL CALCULATED: ABNORMAL MG/DL
LDL CHOLESTEROL DIRECT: 125 MG/DL
MCH RBC QN AUTO: 28.3 PG (ref 26–34)
MCHC RBC AUTO-ENTMCNC: 33.1 G/DL (ref 31–36)
MCV RBC AUTO: 85.5 FL (ref 80–100)
PDW BLD-RTO: 16.5 % (ref 12.4–15.4)
PLATELET # BLD: 183 K/UL (ref 135–450)
PMV BLD AUTO: 7.9 FL (ref 5–10.5)
POTASSIUM REFLEX MAGNESIUM: 3.6 MMOL/L (ref 3.5–5.1)
PROTHROMBIN TIME: 13 SEC (ref 11.7–14.5)
RBC # BLD: 4.86 M/UL (ref 4–5.2)
SODIUM BLD-SCNC: 139 MMOL/L (ref 136–145)
TRIGL SERPL-MCNC: 313 MG/DL (ref 0–150)
VLDLC SERPL CALC-MCNC: ABNORMAL MG/DL
WBC # BLD: 7.6 K/UL (ref 4–11)

## 2022-11-14 PROCEDURE — 85027 COMPLETE CBC AUTOMATED: CPT

## 2022-11-14 PROCEDURE — 80061 LIPID PANEL: CPT

## 2022-11-14 PROCEDURE — 80048 BASIC METABOLIC PNL TOTAL CA: CPT

## 2022-11-14 PROCEDURE — 93005 ELECTROCARDIOGRAM TRACING: CPT

## 2022-11-14 PROCEDURE — 6370000000 HC RX 637 (ALT 250 FOR IP)

## 2022-11-14 PROCEDURE — 85610 PROTHROMBIN TIME: CPT

## 2022-11-14 RX ORDER — ASPIRIN 81 MG/1
81 TABLET, CHEWABLE ORAL DAILY
Status: DISCONTINUED | OUTPATIENT
Start: 2022-11-14 | End: 2022-11-14 | Stop reason: HOSPADM

## 2022-11-14 RX ORDER — ASPIRIN 325 MG
325 TABLET ORAL ONCE
Status: DISCONTINUED | OUTPATIENT
Start: 2022-11-14 | End: 2022-11-14

## 2022-11-14 RX ORDER — ONDANSETRON 2 MG/ML
4 INJECTION INTRAMUSCULAR; INTRAVENOUS EVERY 6 HOURS PRN
Status: DISCONTINUED | OUTPATIENT
Start: 2022-11-14 | End: 2022-11-14 | Stop reason: HOSPADM

## 2022-11-14 RX ORDER — SODIUM CHLORIDE 0.9 % (FLUSH) 0.9 %
5-40 SYRINGE (ML) INJECTION EVERY 12 HOURS SCHEDULED
Status: DISCONTINUED | OUTPATIENT
Start: 2022-11-14 | End: 2022-11-14 | Stop reason: HOSPADM

## 2022-11-14 RX ORDER — LORAZEPAM 0.5 MG/1
0.5 TABLET ORAL
Status: COMPLETED | OUTPATIENT
Start: 2022-11-14 | End: 2022-11-14

## 2022-11-14 RX ORDER — SODIUM CHLORIDE 0.9 % (FLUSH) 0.9 %
5-40 SYRINGE (ML) INJECTION PRN
Status: DISCONTINUED | OUTPATIENT
Start: 2022-11-14 | End: 2022-11-14 | Stop reason: HOSPADM

## 2022-11-14 RX ORDER — SODIUM CHLORIDE 9 MG/ML
INJECTION, SOLUTION INTRAVENOUS PRN
Status: DISCONTINUED | OUTPATIENT
Start: 2022-11-14 | End: 2022-11-14 | Stop reason: HOSPADM

## 2022-11-14 RX ADMIN — LORAZEPAM 0.5 MG: 0.5 TABLET ORAL at 09:09

## 2022-11-14 RX ADMIN — ASPIRIN 81 MG: 81 TABLET, CHEWABLE ORAL at 09:12

## 2022-11-14 NOTE — TELEPHONE ENCOUNTER
Steffanie, Per Dr. Nadine Leblanc can you please reschedule the patients cath with for a PICC line insertion the morning of the cath. She has poor IV access. PICC line department number is [id number].   Thanks

## 2022-11-15 ENCOUNTER — TELEPHONE (OUTPATIENT)
Dept: CARDIOLOGY CLINIC | Age: 67
End: 2022-11-15

## 2022-11-15 ENCOUNTER — PROCEDURE VISIT (OUTPATIENT)
Dept: UROGYNECOLOGY | Age: 67
End: 2022-11-15
Payer: MEDICARE

## 2022-11-15 VITALS
RESPIRATION RATE: 18 BRPM | HEART RATE: 72 BPM | SYSTOLIC BLOOD PRESSURE: 174 MMHG | OXYGEN SATURATION: 97 % | DIASTOLIC BLOOD PRESSURE: 106 MMHG | TEMPERATURE: 98.2 F

## 2022-11-15 DIAGNOSIS — R39.15 URGENCY OF URINATION: Primary | ICD-10-CM

## 2022-11-15 DIAGNOSIS — N39.41 URGE INCONTINENCE: ICD-10-CM

## 2022-11-15 DIAGNOSIS — R39.15 URINARY URGENCY: ICD-10-CM

## 2022-11-15 DIAGNOSIS — R35.0 URINARY FREQUENCY: ICD-10-CM

## 2022-11-15 PROCEDURE — 52287 CYSTOSCOPY CHEMODENERVATION: CPT | Performed by: OBSTETRICS & GYNECOLOGY

## 2022-11-15 PROCEDURE — 81002 URINALYSIS NONAUTO W/O SCOPE: CPT | Performed by: OBSTETRICS & GYNECOLOGY

## 2022-11-15 RX ORDER — LIDOCAINE HYDROCHLORIDE 10 MG/ML
50 INJECTION, SOLUTION INFILTRATION; PERINEURAL ONCE
Status: COMPLETED | OUTPATIENT
Start: 2022-11-15 | End: 2022-11-15

## 2022-11-15 RX ADMIN — LIDOCAINE HYDROCHLORIDE 50 ML: 10 INJECTION, SOLUTION INFILTRATION; PERINEURAL at 10:03

## 2022-11-15 NOTE — PROGRESS NOTES
SUBACROMIAL DECOMPRESSION LABRAL DEBRIDEMENT ROTATOR CUFF TEAR performed by Melody Ahumada, MD at [facility] BREAST LESION REMOVAL LEFT       Current Medications:  Current Outpatient Medications   Medication Sig Dispense Refill    ciprofloxacin (CIPRO) 500 MG tablet Take 1 tablet by mouth 2 times daily for 7 days Start taking 3 days prior to procedure and continue for the rest of the week 14 tablet 0    Nutritional Supplements (VITAMIN D BOOSTER PO) Take by mouth      Omega-3 Fatty Acids (FISH OIL PO) Take by mouth      carvedilol (COREG) 6.25 MG tablet Take 1 tablet by mouth 2 times daily 60 tablet 3    diclofenac (VOLTAREN) 75 MG EC tablet Take 1 tablet by mouth 2 times daily 60 tablet 3    ezetimibe (ZETIA) 10 MG tablet Take 1 tablet by mouth daily 90 tablet 3    losartan (COZAAR) 50 MG tablet Take 1 tablet by mouth in the morning. 90 tablet 3    pravastatin (PRAVACHOL) 40 MG tablet Take 1 tablet by mouth daily 90 tablet 1    ascorbic acid (VITAMIN C) 100 MG tablet Take 100 mg by mouth      vitamin A 3 MG (82353 UT) capsule Take 1 capsule by mouth      Multiple Vitamins-Minerals (MULTIVITAMIN ADULT PO) Take 1 tablet by mouth daily      aspirin 81 MG tablet Take 81 mg by mouth daily. S-Adenosylmethionine (EFREN-E) 400 MG TABS Take  by mouth. ibuprofen (ADVIL;MOTRIN) 600 MG tablet Take 1 tablet by mouth once for 1 dose Take 1 hour prior to Botox procedure 1 tablet 0    lisinopril (PRINIVIL;ZESTRIL) 20 MG tablet  (Patient not taking: No sig reported)      Handicap Placard MISC by Does not apply route 1 each 0     No current facility-administered medications for this visit. Allergies:    Allergies   Allergen Reactions    Prednisone      Hypertension     Lisinopril Cough    Adhesive Tape      Band aids blisters skin    Codeine     Crestor [Rosuvastatin Calcium] Other (See Comments)     Legs ache      Food      Black walnut    Lipitor [Atorvastatin Calcium] Other (See Comments)     Legs ache      Nitrofurantoin Itching    Penicillins Swelling     \"Throat closes\"    Sulfa Antibiotics     Tramadol Nausea Only     Social History:   Social History     Socioeconomic History    Marital status:       Spouse name: Not on file    Number of children: Not on file    Years of education: Not on file    Highest education level: Not on file   Occupational History    Not on file   Tobacco Use    Smoking status: Every Day     Packs/day: 0.25     Years: 40.00     Pack years: 10.00     Types: Cigarettes    Smokeless tobacco: Never    Tobacco comments:     quit March 4, 2019   Vaping Use    Vaping Use: Never used   Substance and Sexual Activity    Alcohol use: Yes     Comment: occ    Drug use: No    Sexual activity: Not Currently   Other Topics Concern    Not on file   Social History Narrative    Not on file     Social Determinants of Health     Financial Resource Strain: Low Risk     Difficulty of Paying Living Expenses: Not hard at all   Food Insecurity: No Food Insecurity    Worried About Running Out of Food in the Last Year: Never true    Ran Out of Food in the Last Year: Never true   Transportation Needs: Not on file   Physical Activity: Not on file   Stress: Not on file   Social Connections: Not on file   Intimate Partner Violence: Not on file   Housing Stability: Not on file     Family History:   Family History   Problem Relation Age of Onset    Breast Cancer Mother     Cancer Mother         Bone    Diabetes Mother     Heart Disease Mother         CHF    Arthritis Mother     Heart Attack Father     Prostate Cancer Brother     Cancer Maternal Grandmother     Cancer Brother         kidney    Cancer Brother         bladder         Objective:     Vital Signs  Vitals:    11/15/22 0947 11/15/22 0949   BP: (!) 180/106 (!) 174/106   Pulse: 72    Resp: 18    Temp: 98.2 °F (36.8 °C)    SpO2: 97%       Physical Exam  Physical Exam  HENT: Head: Normocephalic and atraumatic. Eyes:      Conjunctiva/sclera: Conjunctivae normal.   Pulmonary:      Effort: Pulmonary effort is normal.   Abdominal:      Palpations: Abdomen is soft. Musculoskeletal:      Cervical back: Normal range of motion and neck supple. Skin:     General: Skin is warm and dry. Neurological:      Mental Status: She is alert and oriented to person, place, and time. Procedure:  Patient was taken to the cystoscopy suite and allowed to void. Following this, the urethra was cleaned and then dilated to 14 [place]ankan using lidocaine gel. A Soares catheter was placed using lidocaine gel. The remaining urine was drained and a dipstick urinalysis was performed with the following results: WBC negative, nitrates negative, RBC negative. The bladder was then instilled with 50ml of 2% lidocaine. The patient remained in the supine position for 20 minutes followed by alternating to the right and left side for 10 additional minutes each. Cystoscope was placed. The bladder wall and trigone were normal in appearance. Botox was injected through the bladder wall taking care to avoid the detrusor. Total units: 100 Total number of injections: 21    Results for POC orders placed in visit on 11/15/22   POCT Urinalysis no Micro   Result Value Ref Range    Color, UA yellow     Clarity, UA clear     Glucose, UA POC neg     Bilirubin, UA neg     Ketones, UA neg     Spec Grav, UA 1.015     Blood, UA POC neg     pH, UA 6.5     Protein, UA POC neg     Urobilinogen, UA neg     Leukocytes, UA neg     Nitrite, UA neg        Assessment/Plan:     Manuel Wetzel is a 74 y.o. female with urinary urgency, frequency and overactive bladder. Patient tolerated the procedure well. Patient counseled on risk of UTI and urinary retention. She was educated on signs and symptoms of both. She is to call the office with any concerns.  The patient will follow up in 2 weeks for a post void residual.     Orders Placed This Encounter   Procedures    POCT Urinalysis no Micro       Orders Placed This Encounter   Medications    lidocaine 1 % injection 50 mL    onabotulinumtoxin A (BOTOX) injection 100 Units         Lele Perrin MD

## 2022-11-15 NOTE — TELEPHONE ENCOUNTER
Lets place orders for mid line or central line. PICC line is an option but central line or mid line is preferred. Thank you.

## 2022-11-15 NOTE — TELEPHONE ENCOUNTER
Spoke with pt regarding [facility] cholesterol result note. Pt stated she cannot take crestor or lipitor due to allergic reactions to these medications. Pt states she is already taking pravastatin 40mg and zetia 10mg for cholesterol. Pt also states she can is getting bloodwork for PCP in December. Please advise.      ----- Message from Roxie Schaffer MD sent at 11/14/2022  9:02 PM EST -----  Let patient know their chol test is high, rec: switching to crestor 40mg po qhs, lets put that through if pt agreeable and get f/u lipids/lfts in 1-2mo. Thanks.

## 2022-11-15 NOTE — TELEPHONE ENCOUNTER
Spoke with patient. Patient is rescheduled with Dr. Cisco Almodovar for Left Heart Cath on 11/28/22 at [facility], arrival time of 7:30am to the Cath Lab. Please have patient arrive to the main entrance of [facility] and check in with the registration desk. Remind patient to be NPO after midnight (8 hours prior). Do not apply lotions/creams on skin the day of procedure. She is scheduled for PICC line @ 8:30am. She will arrive @ 7:30am to [facility] and they will direct her to the [address] [place] [address]. Order needed for PICC line. Please comment if midline or central is needed. Thank you.

## 2022-11-16 NOTE — TELEPHONE ENCOUNTER
Lets see if pt wants to consider pcsk9 inhib meds and if so, lets start preauth process for that. If pt declines that, then continue w/ current med regimen. Thank you.

## 2022-11-21 NOTE — TELEPHONE ENCOUNTER
Let pt know typically take once every 2-4 wks. Usually take it for one year and then reassess. Thank you.

## 2022-11-21 NOTE — TELEPHONE ENCOUNTER
Called pt.  Left DANNY asking for return call to office to relay [facility] message regarding Dudley Mireles

## 2022-11-21 NOTE — TELEPHONE ENCOUNTER
Pt stated she is willing to try the pcsk9 inhib meds, but would like more information on how long she may have to be on this medication and how often she would have to take it. Repatha pre authorization forms completed and sent to pharmacy. Please advise.

## 2022-11-25 RX ORDER — DICLOFENAC SODIUM 75 MG/1
TABLET, DELAYED RELEASE ORAL
Qty: 180 TABLET | OUTPATIENT
Start: 2022-11-25

## 2022-11-28 ENCOUNTER — HOSPITAL ENCOUNTER (OUTPATIENT)
Dept: INTERVENTIONAL RADIOLOGY/VASCULAR | Age: 67
Discharge: HOME | End: 2022-11-28
Payer: MEDICARE

## 2022-11-28 ENCOUNTER — HOSPITAL ENCOUNTER (OUTPATIENT)
Dept: CARDIAC CATH/INVASIVE PROCEDURES | Age: 67
Discharge: HOME | End: 2022-11-28
Attending: INTERNAL MEDICINE | Admitting: INTERNAL MEDICINE
Payer: MEDICARE

## 2022-11-28 VITALS — HEIGHT: 64 IN | BODY MASS INDEX: 38.93 KG/M2 | WEIGHT: 228 LBS

## 2022-11-28 LAB
ANION GAP SERPL CALCULATED.3IONS-SCNC: 9 MMOL/L (ref 3–16)
BUN BLDV-MCNC: 22 MG/DL (ref 7–20)
CALCIUM SERPL-MCNC: 10.2 MG/DL (ref 8.3–10.6)
CHLORIDE BLD-SCNC: 105 MMOL/L (ref 99–110)
CO2: 26 MMOL/L (ref 21–32)
CREAT SERPL-MCNC: 0.9 MG/DL (ref 0.6–1.2)
EKG ATRIAL RATE: 70 BPM
EKG DIAGNOSIS: NORMAL
EKG P AXIS: 48 DEGREES
EKG P-R INTERVAL: 178 MS
EKG Q-T INTERVAL: 420 MS
EKG QRS DURATION: 96 MS
EKG QTC CALCULATION (BAZETT): 453 MS
EKG R AXIS: -11 DEGREES
EKG T AXIS: 95 DEGREES
EKG VENTRICULAR RATE: 70 BPM
GFR SERPL CREATININE-BSD FRML MDRD: >60 ML/MIN/{1.73_M2}
GLUCOSE BLD-MCNC: 102 MG/DL (ref 70–99)
HCT VFR BLD CALC: 42.9 % (ref 36–48)
HEMOGLOBIN: 14.1 G/DL (ref 12–16)
INR BLD: 1.01 (ref 0.87–1.14)
MCH RBC QN AUTO: 28.3 PG (ref 26–34)
MCHC RBC AUTO-ENTMCNC: 32.9 G/DL (ref 31–36)
MCV RBC AUTO: 86 FL (ref 80–100)
PDW BLD-RTO: 16.7 % (ref 12.4–15.4)
PLATELET # BLD: 192 K/UL (ref 135–450)
PMV BLD AUTO: 8.1 FL (ref 5–10.5)
POTASSIUM REFLEX MAGNESIUM: 3.9 MMOL/L (ref 3.5–5.1)
PROTHROMBIN TIME: 13.2 SEC (ref 11.7–14.5)
RBC # BLD: 4.99 M/UL (ref 4–5.2)
SODIUM BLD-SCNC: 140 MMOL/L (ref 136–145)
WBC # BLD: 9.4 K/UL (ref 4–11)

## 2022-11-28 PROCEDURE — 93458 L HRT ARTERY/VENTRICLE ANGIO: CPT | Performed by: INTERNAL MEDICINE

## 2022-11-28 PROCEDURE — C1753 CATH, INTRAVAS ULTRASOUND: HCPCS

## 2022-11-28 PROCEDURE — 36410 VNPNXR 3YR/> PHY/QHP DX/THER: CPT

## 2022-11-28 PROCEDURE — 92978 ENDOLUMINL IVUS OCT C 1ST: CPT

## 2022-11-28 PROCEDURE — 93572 IV DOP VEL&/PRESS C FLO EA: CPT | Performed by: INTERNAL MEDICINE

## 2022-11-28 PROCEDURE — 6360000002 HC RX W HCPCS

## 2022-11-28 PROCEDURE — C1894 INTRO/SHEATH, NON-LASER: HCPCS

## 2022-11-28 PROCEDURE — 6370000000 HC RX 637 (ALT 250 FOR IP)

## 2022-11-28 PROCEDURE — C1769 GUIDE WIRE: HCPCS

## 2022-11-28 PROCEDURE — 2709999900 HC NON-CHARGEABLE SUPPLY

## 2022-11-28 PROCEDURE — 85610 PROTHROMBIN TIME: CPT

## 2022-11-28 PROCEDURE — 80061 LIPID PANEL: CPT

## 2022-11-28 PROCEDURE — 80048 BASIC METABOLIC PNL TOTAL CA: CPT

## 2022-11-28 PROCEDURE — 93005 ELECTROCARDIOGRAM TRACING: CPT | Performed by: INTERNAL MEDICINE

## 2022-11-28 PROCEDURE — 93458 L HRT ARTERY/VENTRICLE ANGIO: CPT

## 2022-11-28 PROCEDURE — 92978 ENDOLUMINL IVUS OCT C 1ST: CPT | Performed by: INTERNAL MEDICINE

## 2022-11-28 PROCEDURE — 93571 IV DOP VEL&/PRESS C FLO 1ST: CPT | Performed by: INTERNAL MEDICINE

## 2022-11-28 PROCEDURE — 99152 MOD SED SAME PHYS/QHP 5/>YRS: CPT | Performed by: INTERNAL MEDICINE

## 2022-11-28 PROCEDURE — 93571 IV DOP VEL&/PRESS C FLO 1ST: CPT

## 2022-11-28 PROCEDURE — C1887 CATHETER, GUIDING: HCPCS

## 2022-11-28 PROCEDURE — C1751 CATH, INF, PER/CENT/MIDLINE: HCPCS

## 2022-11-28 PROCEDURE — 2500000003 HC RX 250 WO HCPCS: Performed by: INTERNAL MEDICINE

## 2022-11-28 PROCEDURE — 6360000004 HC RX CONTRAST MEDICATION

## 2022-11-28 PROCEDURE — 2500000003 HC RX 250 WO HCPCS

## 2022-11-28 PROCEDURE — 93010 ELECTROCARDIOGRAM REPORT: CPT | Performed by: INTERNAL MEDICINE

## 2022-11-28 PROCEDURE — 76937 US GUIDE VASCULAR ACCESS: CPT

## 2022-11-28 PROCEDURE — 85027 COMPLETE CBC AUTOMATED: CPT

## 2022-11-28 PROCEDURE — 2580000003 HC RX 258

## 2022-11-28 PROCEDURE — 85347 COAGULATION TIME ACTIVATED: CPT

## 2022-11-28 RX ORDER — PRAVASTATIN SODIUM 40 MG
TABLET ORAL
Qty: 90 TABLET | Refills: 1 | Status: SHIPPED | OUTPATIENT
Start: 2022-11-28

## 2022-11-28 RX ORDER — HEPARIN SODIUM 1000 [USP'U]/ML
INJECTION, SOLUTION INTRAVENOUS; SUBCUTANEOUS
Status: COMPLETED | OUTPATIENT
Start: 2022-11-28 | End: 2022-11-28

## 2022-11-28 RX ORDER — MIDAZOLAM HYDROCHLORIDE 1 MG/ML
INJECTION INTRAMUSCULAR; INTRAVENOUS
Status: COMPLETED | OUTPATIENT
Start: 2022-11-28 | End: 2022-11-28

## 2022-11-28 RX ORDER — SODIUM CHLORIDE 9 MG/ML
INJECTION, SOLUTION INTRAVENOUS PRN
Status: DISCONTINUED | OUTPATIENT
Start: 2022-11-28 | End: 2022-11-28 | Stop reason: HOSPADM

## 2022-11-28 RX ORDER — SODIUM CHLORIDE 0.9 % (FLUSH) 0.9 %
5-40 SYRINGE (ML) INJECTION PRN
Status: DISCONTINUED | OUTPATIENT
Start: 2022-11-28 | End: 2022-11-28 | Stop reason: HOSPADM

## 2022-11-28 RX ORDER — SODIUM CHLORIDE 0.9 % (FLUSH) 0.9 %
5-40 SYRINGE (ML) INJECTION EVERY 12 HOURS SCHEDULED
Status: DISCONTINUED | OUTPATIENT
Start: 2022-11-28 | End: 2022-11-28 | Stop reason: HOSPADM

## 2022-11-28 RX ORDER — FENTANYL CITRATE 50 UG/ML
INJECTION, SOLUTION INTRAMUSCULAR; INTRAVENOUS
Status: COMPLETED | OUTPATIENT
Start: 2022-11-28 | End: 2022-11-28

## 2022-11-28 RX ORDER — ONDANSETRON 2 MG/ML
4 INJECTION INTRAMUSCULAR; INTRAVENOUS EVERY 6 HOURS PRN
Status: DISCONTINUED | OUTPATIENT
Start: 2022-11-28 | End: 2022-11-28 | Stop reason: HOSPADM

## 2022-11-28 RX ORDER — ISOSORBIDE MONONITRATE 30 MG/1
30 TABLET, EXTENDED RELEASE ORAL DAILY
Qty: 30 TABLET | Refills: 3 | Status: SHIPPED | OUTPATIENT
Start: 2022-11-28 | End: 2022-11-30 | Stop reason: SINTOL

## 2022-11-28 RX ORDER — LORAZEPAM 0.5 MG/1
0.5 TABLET ORAL
Status: COMPLETED | OUTPATIENT
Start: 2022-11-28 | End: 2022-11-28

## 2022-11-28 RX ORDER — SODIUM CHLORIDE 9 MG/ML
25 INJECTION, SOLUTION INTRAVENOUS PRN
Status: DISCONTINUED | OUTPATIENT
Start: 2022-11-28 | End: 2022-11-28 | Stop reason: HOSPADM

## 2022-11-28 RX ORDER — LIDOCAINE HYDROCHLORIDE 10 MG/ML
5 INJECTION, SOLUTION EPIDURAL; INFILTRATION; INTRACAUDAL; PERINEURAL ONCE
Status: DISCONTINUED | OUTPATIENT
Start: 2022-11-28 | End: 2022-11-28 | Stop reason: HOSPADM

## 2022-11-28 RX ORDER — ASPIRIN 325 MG
325 TABLET ORAL ONCE
Status: DISCONTINUED | OUTPATIENT
Start: 2022-11-28 | End: 2022-11-28

## 2022-11-28 RX ORDER — ASPIRIN 81 MG/1
81 TABLET, CHEWABLE ORAL ONCE
Status: COMPLETED | OUTPATIENT
Start: 2022-11-28 | End: 2022-11-28

## 2022-11-28 RX ORDER — ACETAMINOPHEN 325 MG/1
650 TABLET ORAL EVERY 4 HOURS PRN
Status: DISCONTINUED | OUTPATIENT
Start: 2022-11-28 | End: 2022-11-28 | Stop reason: HOSPADM

## 2022-11-28 RX ADMIN — MIDAZOLAM HYDROCHLORIDE 2 MG: 1 INJECTION INTRAMUSCULAR; INTRAVENOUS at 12:41

## 2022-11-28 RX ADMIN — HEPARIN SODIUM 5000 UNITS: 1000 INJECTION, SOLUTION INTRAVENOUS; SUBCUTANEOUS at 12:47

## 2022-11-28 RX ADMIN — FENTANYL CITRATE 50 MCG: 50 INJECTION, SOLUTION INTRAMUSCULAR; INTRAVENOUS at 12:58

## 2022-11-28 RX ADMIN — HEPARIN SODIUM 1000 UNITS: 1000 INJECTION, SOLUTION INTRAVENOUS; SUBCUTANEOUS at 12:57

## 2022-11-28 RX ADMIN — FENTANYL CITRATE 50 MCG: 50 INJECTION, SOLUTION INTRAMUSCULAR; INTRAVENOUS at 12:41

## 2022-11-28 RX ADMIN — HEPARIN SODIUM 2000 UNITS: 1000 INJECTION, SOLUTION INTRAVENOUS; SUBCUTANEOUS at 13:03

## 2022-11-28 RX ADMIN — LORAZEPAM 0.5 MG: 0.5 TABLET ORAL at 10:47

## 2022-11-28 RX ADMIN — MIDAZOLAM HYDROCHLORIDE 1 MG: 1 INJECTION INTRAMUSCULAR; INTRAVENOUS at 13:19

## 2022-11-28 RX ADMIN — ASPIRIN 81 MG: 81 TABLET, CHEWABLE ORAL at 09:32

## 2022-11-28 RX ADMIN — MIDAZOLAM HYDROCHLORIDE 1 MG: 1 INJECTION INTRAMUSCULAR; INTRAVENOUS at 12:48

## 2022-11-28 NOTE — PROGRESS NOTES
Midline insertion Procedure Note  Caryl Nipple   Admitted- 11/28/2022  8:30 AM  Admission diagnosis- No admission diagnoses are documented for this encounter. Attending Physician- No att. providers found  Ordering Physician- DR Ale Mckeon  Indication for Insertion: Limited Access    Lab Results   Component Value Date    INR 0.99 11/14/2022    PROTIME 13.0 11/14/2022     Lab Results   Component Value Date    WBC 7.6 11/14/2022    HGB 13.7 11/14/2022    HCT 41.5 11/14/2022     11/14/2022     Lab Results   Component Value Date    CREATININE 0.7 11/14/2022    BUN 21 (H) 11/14/2022    GFR 35 (L) 01/11/2013       Catheter Insertion Date- 11/28/2022   Catheter Brand- Arrowg+lux Blue Advance Midline  Lot Number- [id number]  Lumen-Dual  Expiration date- 3/16/2024    Insertion Site- Left Basilic NO BRACHIAL ON LEFT SIDE.    Vein Diameter- 0.57 cm  [place] Size- 5.5  Catheter Length- 15 cm  Exposed Catheter Length- 0cm   Easy insertion- Yes  Able to Aspirate Blood- Yes  Easy Flush- Yes    Midline insertion successful- Yes  Ultrasound- yes    Okay To Use Midline- \"Yes  Report called to-  RN    SPOKE TO DR Aguilera Setting ON THE PHONE OKAY WITH MIDLINE PT JUST NEEDS ACCESS    Electronically signed by Indy Phillips, RN, RN on 11/28/2022 at 9:03 AM

## 2022-11-28 NOTE — TELEPHONE ENCOUNTER
Refill Request       Last Seen: Last Seen Department: 10/4/2022  Last Seen by PCP: 5/9/2022    Last Written: 6/21    Next Appointment:   Future Appointments   Date Time Provider Abhishek Miller   11/28/2022  8:30 AM [facility] SPECIAL PROCEDURES RM 1 [facility] RAD SARWAT [facility]   11/28/2022 11:00 AM SCHEDULE, [facility] CATH LAB ROOM 1 [facility] WOLF Boyd [facility]   11/30/2022  1:45 PM HUBERT Hernandes CNP UROGYN EMMANUEL   12/6/2022 12:30 PM aPt Alvarenga MD [facility] AND RES [facility]   1/18/2023 10:30 AM HUBERT Tapia CNP       Future appointment scheduled      Requested Prescriptions     Pending Prescriptions Disp Refills    pravastatin (PRAVACHOL) 40 MG tablet [Pharmacy Med Name: PRAVASTATIN SODIUM 40 MG Tablet] 90 tablet 1     Sig: TAKE 1 TABLET EVERY DAY   '

## 2022-11-28 NOTE — H&P
Brief Pre-Op Note/Sedation Assessment      Claribel Rapp  1955  [id number]  12:31 PM    Planned Procedure: Cardiac Catheterization Procedure  Post Procedure Plan: Return to same level of care  Consent: I have discussed with the patient and/or the patient representative the indication, alternatives, and the possible risks and/or complications of the planned procedure and the anesthesia methods. The patient and/or patient representative appear to understand and agree to proceed. DISCUSSION OF CARDIAC CATHETERIZATION PROCEDURES: The procedures, indications, risks and alternatives have been discussed with the patient and, as appropriate, with the patient's guardian . Risks discussed included, but are not limited to, bleeding, development of blood clots/emboli, damage to blood vessels, renal failure, malignant cardiac arrhythmias, stroke, heart attack, emergent coronary bypass surgery, death, dye allergy. The patient (and guardian as appropriate) expressed understanding of the aforementioned and wished to proceed. Chief Complaint:   Anginal Equivalent  Fatigue      Indications for Cath Procedure:  Presentation:  Worsening Angina  2. Anginal Classification within 2 weeks:  CCS III - Symptoms with everyday living activities, i.e., moderate limitation  3. Angina Symptoms Assessment:  Atypical Chest Pain  4. Heart Failure Class within last 2 weeks:  No symptoms  5. Cardiovascular Instability:  No    Prior Ischemic Workup/Eval:  Pre-Procedural Medications: Yes: Aspirin and Beta Blockers  2. Stress Test Completed? Yes:  Stress or Imaging Studies Performed (within ANY time period):   Type:  Stress Nuclear  Results:  Positive:  Myocardial Perfusion Defects (Nuclear) Extent of Ischemia:  Intermediate    Does Patient need surgery?   Cath Valve Surgery:  No    Pre-Procedure Medical History:  Vital Signs:  Ht 5' 4\" (1.626 m)   Wt 228 lb (103.4 kg)   BMI 39.14 kg/m²     Bp 156/89  HR 77    Allergies: Otalgia     Perennial allergic rhinitis     Pharyngeal inflammation     Primary osteoarthritis of right knee     Tinnitus of vascular origin 6/15/2016    Tinnitus, bilateral     Tobacco use     Urinary frequency     Urinary incontinence     Vaginal enterocele     Vaginal vault 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        Surgical History:    Past Surgical History:   Procedure Laterality Date    APPENDECTOMY      CHOLECYSTECTOMY      ELBOW DEBRIDEMENT      FOOT SURGERY      bilateral    HYSTERECTOMY, VAGINAL      IR MIDLINE CATH  11/28/2022    IR MIDLINE CATH 11/28/2022 [facility] SPECIAL PROCEDURES    KNEE ARTHROSCOPY Right 4/24/2019    RIGHT KNEE ARTHROSCOPY,, SYNOVECTOMY CHONDROPLASTY,MEDIAL AND LATERAL MENESECTOMY, REMOVAL LOOSE BODIES performed by Ceferino Clancy MD at [address]      bilateral    LIVER SURGERY      cyst removal    NECK SURGERY      PAIN MANAGEMENT PROCEDURE Right 11/18/2021    RIGHT LUMBAR FOUR FIVE EPIDURAL STEROID INJECTION SITE CONFIRMED BY FLUOROSCOPY performed by Anjana Garcia MD at [facility] Right 10/30/2020    VIDEO ARTHROSCOPY RIGHT SHOULDER SUBACROMIAL DECOMPRESSION LABRAL DEBRIDEMENT ROTATOR CUFF TEAR performed by Carol Faulkner MD at [address] [place] ARTHROSCOPY      TONSILLECTOMY      TUBAL LIGATION      URETHRA SURGERY      US BREAST LESION REMOVAL LEFT               Pre-Sedation:  Pre-Sedation Documentation and Exam:  I have personally completed a history, physical exam & review of systems for this patient (see notes). Prior History of Anesthesia Complications:   none    Modified Mallampati:  III (soft palate, base of uvula visible)    ASA Classification:  Class 3 - A patient with severe systemic disease that limits activity but is not incapacitating    Sahra Scale:   Activity:  2 - Able to move 4 extremities voluntarily on command  Respiration:  2 - Able to breathe deeply and cough freely  Circulation:  2 - BP+/- 20mmHg of normal  Consciousness:  2 - Fully awake  Oxygen Saturation (color):  2 - Able to maintain oxygen saturation >92% on room air    Sedation/Anesthesia Plan:  Guard the patient's safety and welfare. Minimize physical discomfort and pain. Minimize negative psychological responses to treatment by providing sedation and analgesia and maximize the potential amnesia. Patient to meet pre-procedure discharge plan.     Medication Planned:  midazolam intravenously and fentanyl intravenously    Patient is an appropriate candidate for plan of sedation:   yes      Electronically signed by Brea Bahena MD on 11/28/2022 at 12:31 PM

## 2022-11-28 NOTE — DISCHARGE INSTRUCTIONS
FOLLOW-UP APPOINTMENTS    Follow-up appointment on 12/12/22 at 2:30 PM with Heavenly Pedro NP. [address]: [phone]. If you are unable to make this appointment, please call to reschedule [phone]. Please arrive 15 minutes early to complete necessary paperwork. Directions to [place] [address] towards [place]. [address] exit. Right off exit. Cross over [employer]. Right on [address]. Left into hospital. Follow the signs to the emergency room ( turn left toward the Emergency room). Go right at the first stop sign. Just past the Emergency room at the second stop sign turn right and go up the ramp and park on the top level if possible. Go in the glass doors of the [facility] [place] on the top level of the garage [address]. As soon as you get in the door turn left and our office is the one with the glass doors. Cath Labs at  [facility]   Discharge Instructions        11/28/2022  Radha Alvares   Date of Birth 1955     Activity:  No driving for 24 hours. In 24 hours you may remove dressing and shower, wash site gently with soap and water and leave open to air  Avoid submerging your arm in sitting water for 5 days. Do not use your right hand for 24 hours, then  No lifting more than 5 pounds for 5 days. No lotions, powders, or ointments near site for 5 days. No work/school for 5 days unless instructed otherwise by your cardiologist.    Diet:   Resume previous diet, if a cardiac diet is specified you will receive a handout with  general guidelines. Drink extra non-alcoholic/decaffienated fluids for first 24 hours after your procedure.     Arm Management:  If bleeding occurs from the site or a hematoma (lump) begins to increase in size, apply pressure directly over the site, call 911 to return to the hospital.    Special Instructions:  Report any coolness or numbness in the arm  Report any chills, fever, itching, red bumps or rash   Report any of the following to the MD: drainage from the site, redness and/or swelling at the site, increased tenderness at the site   If you are currently taking Metformin or Metformin combination medications for Diabetes, hold your dose for 48 hours after your procedure. Consult your Cardiologist before taking any NSAIDS, vitamin supplements, estrogen, or estrogen plus progestin. Do not stop taking Plavix, Brilinta or Effient, without first consulting your cardiologist.    Sedation Discharge Instructions: For the next 24 hours do not drive a car, operate machinery, power tools or kitchen appliances. Do not drink alcohol; including beer or wine. Do not make any important decisions or sign any important papers. For the next 24 hours you can expect drowsiness, light-headed or dizziness, nausea/ vomiting, inability to concentrate, fatigue and desire to sleep. We strongly suggest that a responsible adult be with for the next 24 hours, for your protection and safety. You are not allowed to drive yourself home, or take any type of public transportation. Cardiac Rehab: If your doctor recommends Cardiac rehab, you will receive a call from that department to schedule your 1st appointment. Your Care Instructions  There are many things that cause chest pain. Some are not serious and will get better on their own in a few days. But some kinds of chest pain need more testing and treatment. Your doctor may have recommended follow-up visit in the next 4 to 8 weeks. If you are not feeling better, you may need more tests or treatment. Even though your doctor has released you, you still need to watch for any problems. The doctor carefully checked you, but sometimes problems can develop later. If you have new symptoms or if your symptoms do not improve, get medical help right away.  If you have worse or different chest pain or pressure that lasts more than 5 minutes or you passed out (lost consciouness), call 911 or seek other emergency help right away. A medical visit is only one step in your treatment. Even if you feel better, you still need to do what your doctor recommends, such as going to all suggested follow-up appointments and taking medications exactly as directed. This will help you recover and help prevent future problems. How can you care for yourself at home? Rest until you feel better. Take your medicine exactly as prescribed. Call your doctor if you think you are having problems with your medicine. Do not drive after taking a prescription pain medication. When should you call for help? Call 911 if: You passed out (lost consciousness). You have severe difficulty breathing. You have symptoms of a heart attack. These may include:  Chest pain or pressure, or a strange feeling in your chest.  Sweating. Shortness of breath. Nausea or vomiting. Pain, pressure, or a strange feeling in your back, neck or jaw, or upper belly or in one or both shoulders or arms. Lightheadedness or sudden weakness. A fast or irregular heartbeat. After you call 911, the  may tell you to chew 1 adult-strength or 2 to 4                  low-dose aspirin. Wait for an ambulance. Do not try to drive yourself. Call your doctor today if :  You have any trouble breathing. Your chest pain gets worse. You are dizzy or lightheaded, or you feel like you may faint. You are not getting better as expected. You are having new or different chest pain.

## 2022-11-28 NOTE — PROCEDURES
CARDIAC CATHETERIZATION REPORT     Procedure Date:  2022  Patient Name: Dennys Gandhi  MRN: [id number] : 1955      INDICATION     Abnormal stress test    PROCEDURES PERFORMED     Left heart catheterization  LVgram  Coronary angiogam  Coronary cath  Monitoring of moderate conscious sedation    DFR (flow reserve assessment) of LAD  DFR (flow reserve assessment) of Lcx    IVUS of circumflex    PROCEDURE DESCRIPTION   Risks/benefits/alternatives/outcomes were discussed with patient and/or family and informed consent was obtained. Using the [facility] scale, the patient's right radial artery was found to be a level B. Patient was prepped draped in the usual sterile fashion. Local anaesthetic was applied over puncture site. Using a front wall technique, a 4/5 Cymro Terumo sheath was inserted into right radial artery. Verapamil, nitroglycerin, nicardipine were administered through the sheath. Heparin was administered. There was significant radial spasm, this was treated with vasodilators both proximally and distally using a glide catheter. Diagnostic 5 Romanian pigtail, ultra catheters were used for diagnostic angiograms. At the conclusion of the procedure, a TR band was placed over the puncture site and hemostasis was obtained. There were no immediate complications. I supervised sedation from 12:30 PM to 1:30 PM with versed 5 mg/fentanyl 100 mcg during the procedure. An independent trained observer pushed meds at my direction. We monitored the patient's level of consciousness and vital signs/physiologic status throughout the procedure duration (see times listed previously). 215 cc contrast was utilized. <20cc EBL. Findings/plans were discussed with patient's brother by phone as per patient request.      FINDINGS         LVGRAM    LVEDP 16   GRADIENT ACROSS AORTIC VALVE No significant gradient   LV FUNCTION EF 60%, difficult to fully evaluate as LV could not be fully opacified.    WALL MOTION Difficult to assess on the study   MITRAL REGURGITATION Difficult to assess on the study           CORONARY ARTERIES    LM Short, less than 10% [id number]-rznpln stenosis. LAD Mild to moderately calcified, proximal-mid 40 to 50% stenosis. Distal less than 10% stenosis. LCX Large vessel, OM1 is a very small vessel with 40 to 50% [id number] stenosis    OM 2 is a medium to large size OM with ostial/proximal-mid 60% stenosis. Distal less than 10% stenosis. Circumflex has mid 30 to 40% stenosis. RCA Large vessel, dominant, proximal-mid 40 to 50% stenosis, distal less than 10% stenosis. Flow reserve assessment and IVUS DESCRIPTION     Heparin was used anticoagulation, a 5 Dydra EBU 3.0 guide catheter was used for flow reserve assessment as well as IVUS. [employer] comet wire was taken and this was appropriate calibrated and ultimately equalized in the ascending aorta. Wire was used to cross the lesions in the LAD as well as into OM 2. Flow reserve assessment was 0.98 in the LAD and 1.0 in the circumflex/OM 2. There was spasm noted within the circumflex and this was treated with intracoronary vasodilators and follow-up assessment then was made with IVUS of this area which showed 50% stenosis. These findings were most consistent with moderate nonobstructive CAD/ASHD and PCI was felt to be able to be deferred at this time.         CONCLUSIONS:     Patient had to have midline placed before the procedure with interventional radiology due to difficult venous access  Patient had radial spasm which responded to vasodilators  Moderate CAD/ASHD  Treat medically  Add Imdur 30 mg daily

## 2022-11-29 ENCOUNTER — TELEPHONE (OUTPATIENT)
Dept: CARDIOLOGY CLINIC | Age: 67
End: 2022-11-29

## 2022-11-29 LAB
POC ACT LR: 266 SEC
POC ACT LR: 277 SEC
POC ACT LR: 349 SEC

## 2022-11-29 NOTE — TELEPHONE ENCOUNTER
----- Message from Cailin Ruiz MD sent at 11/29/2022  8:02 AM EST -----  Lets make sure pt had mid line removed/discarded after procedure and lets remind her to get imdur as new medicine. Thank you.

## 2022-11-30 ENCOUNTER — TELEPHONE (OUTPATIENT)
Dept: CARDIOLOGY CLINIC | Age: 67
End: 2022-11-30

## 2022-11-30 DIAGNOSIS — I25.42 CORONARY ARTERY DISSECTION: Primary | ICD-10-CM

## 2022-11-30 DIAGNOSIS — I10 ESSENTIAL (PRIMARY) HYPERTENSION: ICD-10-CM

## 2022-11-30 DIAGNOSIS — E78.2 MIXED HYPERLIPIDEMIA: ICD-10-CM

## 2022-11-30 LAB
CHOLESTEROL, TOTAL: 269 MG/DL (ref 0–199)
HDLC SERPL-MCNC: 43 MG/DL (ref 40–60)
LDL CHOLESTEROL CALCULATED: ABNORMAL MG/DL
LDL CHOLESTEROL DIRECT: 166 MG/DL
TRIGL SERPL-MCNC: 315 MG/DL (ref 0–150)
VLDLC SERPL CALC-MCNC: ABNORMAL MG/DL

## 2022-11-30 NOTE — TELEPHONE ENCOUNTER
Spoke with pt relayed message per [facility]. Pt would like to continue statin and recheck labs in 3 months, as she says she read that it takes 3 months for statin. She would like to give statin a chance to help. Lab orders placed in epic for pt to recheck in 3 months. Pt would like [facility] to know she will not be taking imdur as it gave her a terrible headache when she took it.

## 2022-11-30 NOTE — TELEPHONE ENCOUNTER
----- Message from Brittanie Niño MD sent at 11/30/2022 10:29 AM EST -----  Let patient know their chol test is high, lets see if pt still wants to pursue pcsk9 inhib. And also would rec: fish oil 1g po daily. And get f/u lipids/lfts. Thank you.

## 2022-12-05 SDOH — HEALTH STABILITY: PHYSICAL HEALTH: ON AVERAGE, HOW MANY DAYS PER WEEK DO YOU ENGAGE IN MODERATE TO STRENUOUS EXERCISE (LIKE A BRISK WALK)?: 1 DAY

## 2022-12-05 SDOH — HEALTH STABILITY: PHYSICAL HEALTH: ON AVERAGE, HOW MANY MINUTES DO YOU ENGAGE IN EXERCISE AT THIS LEVEL?: 10 MIN

## 2022-12-05 ASSESSMENT — PATIENT HEALTH QUESTIONNAIRE - PHQ9
SUM OF ALL RESPONSES TO PHQ QUESTIONS 1-9: 0
2. FEELING DOWN, DEPRESSED OR HOPELESS: 0
SUM OF ALL RESPONSES TO PHQ9 QUESTIONS 1 & 2: 0
1. LITTLE INTEREST OR PLEASURE IN DOING THINGS: 0
SUM OF ALL RESPONSES TO PHQ QUESTIONS 1-9: 0

## 2022-12-05 ASSESSMENT — LIFESTYLE VARIABLES
HOW OFTEN DO YOU HAVE SIX OR MORE DRINKS ON ONE OCCASION: 1
HOW MANY STANDARD DRINKS CONTAINING ALCOHOL DO YOU HAVE ON A TYPICAL DAY: PATIENT DOES NOT DRINK
HOW MANY STANDARD DRINKS CONTAINING ALCOHOL DO YOU HAVE ON A TYPICAL DAY: 0

## 2022-12-06 ENCOUNTER — OFFICE VISIT (OUTPATIENT)
Dept: PRIMARY CARE CLINIC | Age: 67
End: 2022-12-06
Payer: MEDICARE

## 2022-12-06 VITALS
BODY MASS INDEX: 40.86 KG/M2 | DIASTOLIC BLOOD PRESSURE: 100 MMHG | WEIGHT: 230.6 LBS | SYSTOLIC BLOOD PRESSURE: 158 MMHG | HEIGHT: 63 IN | OXYGEN SATURATION: 94 % | HEART RATE: 82 BPM | TEMPERATURE: 97 F | RESPIRATION RATE: 18 BRPM

## 2022-12-06 DIAGNOSIS — I25.118 CORONARY ARTERY DISEASE OF NATIVE ARTERY OF NATIVE HEART WITH STABLE ANGINA PECTORIS (HCC): ICD-10-CM

## 2022-12-06 DIAGNOSIS — Z12.31 ENCOUNTER FOR SCREENING MAMMOGRAM FOR BREAST CANCER: ICD-10-CM

## 2022-12-06 DIAGNOSIS — Z00.00 MEDICARE ANNUAL WELLNESS VISIT, SUBSEQUENT: Primary | ICD-10-CM

## 2022-12-06 DIAGNOSIS — Z71.89 COUNSELING FOR LIVING WILL: ICD-10-CM

## 2022-12-06 DIAGNOSIS — R73.03 PREDIABETES: ICD-10-CM

## 2022-12-06 DIAGNOSIS — H91.93 BILATERAL HEARING LOSS, UNSPECIFIED HEARING LOSS TYPE: ICD-10-CM

## 2022-12-06 DIAGNOSIS — R73.9 HYPERGLYCEMIA, UNSPECIFIED: ICD-10-CM

## 2022-12-06 DIAGNOSIS — I10 ESSENTIAL (PRIMARY) HYPERTENSION: ICD-10-CM

## 2022-12-06 DIAGNOSIS — H93.13 TINNITUS OF BOTH EARS: ICD-10-CM

## 2022-12-06 DIAGNOSIS — G60.3 IDIOPATHIC PROGRESSIVE NEUROPATHY: ICD-10-CM

## 2022-12-06 DIAGNOSIS — G58.9 MONONEUROPATHY: ICD-10-CM

## 2022-12-06 PROCEDURE — 3017F COLORECTAL CA SCREEN DOC REV: CPT

## 2022-12-06 PROCEDURE — 99214 OFFICE O/P EST MOD 30 MIN: CPT

## 2022-12-06 PROCEDURE — G8427 DOCREV CUR MEDS BY ELIG CLIN: HCPCS

## 2022-12-06 PROCEDURE — G8417 CALC BMI ABV UP PARAM F/U: HCPCS

## 2022-12-06 PROCEDURE — G8400 PT W/DXA NO RESULTS DOC: HCPCS

## 2022-12-06 PROCEDURE — 1090F PRES/ABSN URINE INCON ASSESS: CPT

## 2022-12-06 PROCEDURE — 3078F DIAST BP <80 MM HG: CPT

## 2022-12-06 PROCEDURE — 3074F SYST BP LT 130 MM HG: CPT

## 2022-12-06 PROCEDURE — G8484 FLU IMMUNIZE NO ADMIN: HCPCS

## 2022-12-06 PROCEDURE — 4004F PT TOBACCO SCREEN RCVD TLK: CPT

## 2022-12-06 PROCEDURE — 1123F ACP DISCUSS/DSCN MKR DOCD: CPT

## 2022-12-06 ASSESSMENT — PATIENT HEALTH QUESTIONNAIRE - PHQ9
3. TROUBLE FALLING OR STAYING ASLEEP: 0
5. POOR APPETITE OR OVEREATING: 0
2. FEELING DOWN, DEPRESSED OR HOPELESS: 0
7. TROUBLE CONCENTRATING ON THINGS, SUCH AS READING THE NEWSPAPER OR WATCHING TELEVISION: 0
7. TROUBLE CONCENTRATING ON THINGS, SUCH AS READING THE NEWSPAPER OR WATCHING TELEVISION: 0
9. THOUGHTS THAT YOU WOULD BE BETTER OFF DEAD, OR OF HURTING YOURSELF: 0
9. THOUGHTS THAT YOU WOULD BE BETTER OFF DEAD, OR OF HURTING YOURSELF: 0
SUM OF ALL RESPONSES TO PHQ QUESTIONS 1-9: 0
SUM OF ALL RESPONSES TO PHQ QUESTIONS 1-9: 0
1. LITTLE INTEREST OR PLEASURE IN DOING THINGS: 0
SUM OF ALL RESPONSES TO PHQ9 QUESTIONS 1 & 2: 0
SUM OF ALL RESPONSES TO PHQ QUESTIONS 1-9: 0
1. LITTLE INTEREST OR PLEASURE IN DOING THINGS: 0
9. THOUGHTS THAT YOU WOULD BE BETTER OFF DEAD, OR OF HURTING YOURSELF: 0
8. MOVING OR SPEAKING SO SLOWLY THAT OTHER PEOPLE COULD HAVE NOTICED. OR THE OPPOSITE, BEING SO FIGETY OR RESTLESS THAT YOU HAVE BEEN MOVING AROUND A LOT MORE THAN USUAL: 0
8. MOVING OR SPEAKING SO SLOWLY THAT OTHER PEOPLE COULD HAVE NOTICED. OR THE OPPOSITE, BEING SO FIGETY OR RESTLESS THAT YOU HAVE BEEN MOVING AROUND A LOT MORE THAN USUAL: 0
SUM OF ALL RESPONSES TO PHQ QUESTIONS 1-9: 0
5. POOR APPETITE OR OVEREATING: 0
6. FEELING BAD ABOUT YOURSELF - OR THAT YOU ARE A FAILURE OR HAVE LET YOURSELF OR YOUR FAMILY DOWN: 0
SUM OF ALL RESPONSES TO PHQ QUESTIONS 1-9: 0
DEPRESSION UNABLE TO ASSESS: FUNCTIONAL CAPACITY MOTIVATION LIMITS ACCURACY
5. POOR APPETITE OR OVEREATING: 0
4. FEELING TIRED OR HAVING LITTLE ENERGY: 0
7. TROUBLE CONCENTRATING ON THINGS, SUCH AS READING THE NEWSPAPER OR WATCHING TELEVISION: 0
6. FEELING BAD ABOUT YOURSELF - OR THAT YOU ARE A FAILURE OR HAVE LET YOURSELF OR YOUR FAMILY DOWN: 0
SUM OF ALL RESPONSES TO PHQ QUESTIONS 1-9: 0
SUM OF ALL RESPONSES TO PHQ9 QUESTIONS 1 & 2: 0
4. FEELING TIRED OR HAVING LITTLE ENERGY: 0
6. FEELING BAD ABOUT YOURSELF - OR THAT YOU ARE A FAILURE OR HAVE LET YOURSELF OR YOUR FAMILY DOWN: 0
SUM OF ALL RESPONSES TO PHQ QUESTIONS 1-9: 0
4. FEELING TIRED OR HAVING LITTLE ENERGY: 0
SUM OF ALL RESPONSES TO PHQ QUESTIONS 1-9: 0
SUM OF ALL RESPONSES TO PHQ QUESTIONS 1-9: 0
2. FEELING DOWN, DEPRESSED OR HOPELESS: 0
SUM OF ALL RESPONSES TO PHQ QUESTIONS 1-9: 0

## 2022-12-06 ASSESSMENT — ANXIETY QUESTIONNAIRES
3. WORRYING TOO MUCH ABOUT DIFFERENT THINGS: 0
4. TROUBLE RELAXING: 0
1. FEELING NERVOUS, ANXIOUS, OR ON EDGE: 0
6. BECOMING EASILY ANNOYED OR IRRITABLE: 0
2. NOT BEING ABLE TO STOP OR CONTROL WORRYING: 0
GAD7 TOTAL SCORE: 0
7. FEELING AFRAID AS IF SOMETHING AWFUL MIGHT HAPPEN: 0
5. BEING SO RESTLESS THAT IT IS HARD TO SIT STILL: 0

## 2022-12-06 ASSESSMENT — LIFESTYLE VARIABLES
HOW MANY STANDARD DRINKS CONTAINING ALCOHOL DO YOU HAVE ON A TYPICAL DAY: PATIENT DOES NOT DRINK
HOW OFTEN DO YOU HAVE A DRINK CONTAINING ALCOHOL: NEVER

## 2022-12-06 NOTE — PROGRESS NOTES
Medicare Annual Wellness Visit    Ilene Hdz is a 74year old female with past medical history of hypertension, coronary artery disease, peripheral neuropathy, tinnitus, prediabetes, and hyperlipidemia who presents for management of the following conditions:    Assessment & Plan     Medicare annual wellness visit, subsequent  - Addressed lack of living will, packet provided, referral to spiritual care services for going over living will  - Hearing concerns: as outlined below, referred to ENT  - Concern for falls: no falls in last year, patient is agreeable to increasing physical activity and strength exercise through   - Nutrition concerns: discussed DASH diet in setting of chronic hypertension     Essential (primary) hypertension  - Uncontrolled  - BP today 158/100- patient reports secondary to stress  - Home BP usually controlled at 679-106 systolic  - Sometimes forgetting to take morning medications including losartan and carvedilol  - Seen by Dr. Nadine Leblanc cardiology, started on isosorbide mononitrate but patient could not tolerate secondary to headache last whole day  - Continue losartan 50mg and carvedilol 6.25 BID  - Discussed DASH diet and exercise recommendations including 150 minutes of moderate intensity physical activity per week  - Discussed interventions to increase medication compliance including setting alarm on phone and keeping pill organizer filled and by   - RTC in 1 month to recheck BP, continue follow up with cardiology    Prediabetes  - Uncontrolled  - Last A1c 5.9 in 2/2022  - Repeat ordered today in setting of worsening peripheral neuropathy as potential cause  - Discussed dietary interventions including DASH diet for concurrent hypertension, recommended 150 minutes of moderate intensity physical activity per week  - RTC in 1 month to discuss lab results    Peripheral mononeuropathy  - Uncontrolled  - Reports neuropathy in R great toe, sensory deficits noted on exam  - No issues with balance, motor weakness  - Prior workup for LE extremity neuropathy in 8/2022 including ferritin, TSH, B12, folate unremarkable  - Placed order for EMG   - A1C ordered  - RTC in 1 month to discuss EMG results    Bilateral hearing loss, unspecified hearing loss type  - Uncontrolled  - Reports prior evaluation at [facility] years ago, R side 25% deficit and L 38%  - Reports recent worsening in hearing  - Referral to ENT placed  - RTC in 1 month to discuss referral    Tinnitus of both ears  - Uncontrolled  - Chronic tinnitus that has not worsened  - Patient not bothered by tinnitus  - Referral placed to ENT to evaluate concurrent hearing loss and tinnitus  - RTC in 1 month to discuss referral    Coronary artery disease of native artery of native heart with stable angina pectoris ([facility] [address]  - Uncontrolled  - S/p cardiac cath with Dr. Rohini Alfred on 11/28, no intervention at that time  - Currently on losartan  - Last lipid panel 11/28: , , HDL 43,   - Continue ezetimibe, pravastatin  - Patient is to continue follow up with cardiology, discuss pcsk9 inhibitor in future   - RTC in 1 month to discuss dietary habits, medication    Health Maintenance  - Last mammogram in 2021, referral provided  - Declines influenza vaccine        Recommendations for Preventive Services Due: see orders and patient instructions/AVS.  Recommended screening schedule for the next 5-10 years is provided to the patient in written form: see Patient Instructions/AVS.     Return in about 4 weeks (around 1/3/2023) for follow up EMG, BP. Subjective   The following acute and/or chronic problems were also addressed today:    Toe numbness: Patient reports 6 week history of progressive R great toe numbness. No falls or balance issues. No other sensory deficits noted. Previously had history of bilateral lower extremity neuropathy with workup in August including TSH, ferritin, B12, folate, syphillis, which was negative.      CAD: Was seen at our office for follow up of hypertensive emergency evaluated at ED. Was then referred to cardiology and underwent cardiac cath on 11/28. No interventions at time of cath. Was started on isosorbide mononitrate per cardiology, but only took once due to horrible headache secondary to medication. HTN: Patient has a history of hypertensive emergency in Sept 2022 after receiving course of prednisone. Since then, patient notes home BP have ranged in systolics from 200-346X and very occasionally in 150s when she forgets to take her medications in the morning. Hearing loss/tinnitus: Patient reports recent worsening of hearing. Evaluated several years ago at [facility] and found to have 25% decrease in R sided hearing and 38% decrease in L side. Has chronic tinnitus that does not both her. She requests referral to audiology for further evaluation. Patient's complete Health Risk Assessment and screening values have been reviewed and are found in Flowsheets. The following problems were reviewed today and where indicated follow up appointments were made and/or referrals ordered.     Positive Risk Factor Screenings with Interventions:      Depression:  Depression Unable to Assess: Functional capacity motivation limits accuracy  PHQ-2 Score: 0  PHQ-9 Total Score: 0    Severity:1-4 = minimal depression, 5-9 = mild depression, 10-14 = moderate depression, 15-19 = moderately severe depression, 20-27 = severe depression  Tobacco Use:  Tobacco Use: High Risk    Smoking Tobacco Use: Every Day    Smokeless Tobacco Use: Never    Passive Exposure: Not on file     E-cigarette/Vaping       Questions Responses    E-cigarette/Vaping Use Never User    Start Date     Passive Exposure     Quit Date     Counseling Given     Comments           Substance Use - Tobacco Interventions:  patient agrees to start an exercise program to relieve stress and help avoid weight gain associated with tobacco cessation         General Health and ACP:  General  In general, how would you say your health is?: Good  In the past 7 days, have you experienced any of the following: New or Increased Pain, New or Increased Fatigue, Loneliness, Social Isolation, Stress or Anger?: No  Do you get the social and emotional support that you need?: Yes  Do you have a Living Will?: (!) No    Advance Directives       Power of  Living Will ACP-Advance Directive ACP-Power of     Not on File Not on File Not on File Not on File        General Health Risk Interventions:  No Living Will: Patient referred to spiritual services, given packet for living will    Health Habits/Nutrition:  Physical Activity: Insufficiently Active    Days of Exercise per Week: 1 day    Minutes of Exercise per Session: 10 min     Have you lost any weight without trying in the past 3 months?: No  Body mass index: (!) 41.11  Have you seen the dentist within the past year?: Yes  Health Habits/Nutrition Interventions:  Inadequate physical activity:  patient agrees to increase physical activity as follows: Silver sneakers- will participate in strength training classes and walking for aerobic activity, educational materials provided to promote increased physical activity  Nutritional issues:  educational materials for healthy, well-balanced diet provided, DASH diet given    Hearing/Vision:  Do you or your family notice any trouble with your hearing that hasn't been managed with hearing aids?: (!) Yes  Do you have difficulty driving, watching TV, or doing any of your daily activities because of your eyesight?: No  Have you had an eye exam within the past year?: Yes  No results found.   Hearing/Vision Interventions:  Hearing concerns:  ENT referral provided            Objective   Vitals:    12/06/22 1228   BP: (!) 158/100   Site: Left Upper Arm   Position: Sitting   Cuff Size: Large Adult   Pulse: 82   Resp: 18   Temp: 97 °F (36.1 °C)   TempSrc: Temporal   SpO2: 94%   Weight: 230 lb 9.6 oz (104.6 kg)   Height: 5' 2.8\" (1.595 m)      Body mass index is 41.12 kg/m². General Appearance: alert and oriented to person, place and time, well-developed and well-nourished, in no acute distress  Pulmonary/Chest: clear to auscultation bilaterally- no wheezes, rales or rhonchi, normal air movement, no respiratory distress  Cardiovascular: normal rate, normal S1 and S2, no gallops, and intact distal pulses  Musculoskeletal: normal range of motion, no joint swelling, deformity or tenderness  Neurologic: gait and coordination normal, speech normal, muscle strength normal, and decreased sensation in R great toe to base of toe with normal sensation in rest of foot       Allergies   Allergen Reactions    Prednisone      Hypertension     Lisinopril Cough    Adhesive Tape      Band aids blisters skin    Codeine     Crestor [Rosuvastatin Calcium] Other (See Comments)     Legs ache      Food      Black walnut    Lipitor [Atorvastatin Calcium] Other (See Comments)     Legs ache      Nitrofurantoin Itching    Penicillins Swelling     \"Throat closes\"    Sulfa Antibiotics     Tramadol Nausea Only     Prior to Visit Medications    Medication Sig Taking? Authorizing Provider   pravastatin (PRAVACHOL) 40 MG tablet TAKE 1 TABLET EVERY DAY Yes Jaymie Wagner,    Nutritional Supplements (VITAMIN D BOOSTER PO) Take by mouth Yes Historical Provider, MD   Omega-3 Fatty Acids (FISH OIL PO) Take by mouth Yes Historical Provider, MD   carvedilol (COREG) 6.25 MG tablet Take 1 tablet by mouth 2 times daily Yes Jimmy Hancock MD   diclofenac (VOLTAREN) 75 MG EC tablet Take 1 tablet by mouth 2 times daily Yes Jimmy Hancock MD   ezetimibe (ZETIA) 10 MG tablet Take 1 tablet by mouth daily Yes Jimmy Hancock MD   losartan (COZAAR) 50 MG tablet Take 1 tablet by mouth in the morning.  Yes Jaymie Wagner DO   Handicap Placard MISC by Does not apply route Yes Jimmy Hancock MD   ascorbic acid (VITAMIN C) 100 MG tablet Take 100 mg by mouth Yes Historical Provider, MD   vitamin A 3 MG (05243 UT) capsule Take 1 capsule by mouth Yes Historical Provider, MD   Multiple Vitamins-Minerals (MULTIVITAMIN ADULT PO) Take 1 tablet by mouth daily Yes Historical Provider, MD   aspirin 81 MG tablet Take 81 mg by mouth daily. Yes Historical Provider, MD   S-Adenosylmethionine (EFREN-E) 400 MG TABS Take  by mouth.  Yes Historical Provider, MD   ibuprofen (ADVIL;MOTRIN) 600 MG tablet Take 1 tablet by mouth once for 1 dose Take 1 hour prior to Botox procedure  [facility] (Including outside providers/suppliers regularly involved in providing care):   Patient Care Team:  Roel Nguyen DO as PCP - General (Family Medicine)  Roel Nguyen DO as PCP - REHABILITATION [facility] Empaneled Provider  Patricia Centeno MD as Surgeon (Orthopedic Surgery)     Reviewed and updated this visit:  Allergies  Meds

## 2022-12-09 ENCOUNTER — OFFICE VISIT (OUTPATIENT)
Dept: UROGYNECOLOGY | Age: 67
End: 2022-12-09

## 2022-12-09 VITALS
TEMPERATURE: 98.1 F | SYSTOLIC BLOOD PRESSURE: 174 MMHG | OXYGEN SATURATION: 97 % | RESPIRATION RATE: 16 BRPM | DIASTOLIC BLOOD PRESSURE: 113 MMHG | HEART RATE: 94 BPM

## 2022-12-09 DIAGNOSIS — R39.15 URINARY URGENCY: Primary | ICD-10-CM

## 2022-12-09 DIAGNOSIS — R35.0 URINARY FREQUENCY: ICD-10-CM

## 2022-12-09 NOTE — PROGRESS NOTES
12/9/2022       HPI:     Name: Melvin Gonzalez  YOB: 1955    CC: Patient is a 74 y.o. presenting for evaluation of  post void residual after Botox injections 2 weeks ago . HPI: How long have you had this problem? Please rate the severity of your problem:   Anything make it better? Pleased with outcomes    Ob/Gyn History:    OB History   No obstetric history on file.      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    Arthritis     Calculus of kidney     Cancer ([facility] [address]     skin    Cystocele, midline     Eustachian tube dysfunction, bilateral     Eustachian tube dysfunction, bilateral 6/15/2016    Gastro-esophageal reflux disease without esophagitis     Generalized anxiety disorder 8/25/2016    Hyperlipidemia     Hypertension     Liver cyst     Neuritis     Neuritis 6/5/2018    Nocturia     Old myocardial infarction     Osteoarthritis     Otalgia     Perennial allergic rhinitis     Pharyngeal inflammation     Primary osteoarthritis of right knee     Tinnitus of vascular origin 6/15/2016    Tinnitus, bilateral     Tobacco use     Urinary frequency     Urinary incontinence     Vaginal enterocele     Vaginal vault 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      Past Surgical History:   Past Surgical History:   Procedure Laterality Date    APPENDECTOMY      CHOLECYSTECTOMY      ELBOW DEBRIDEMENT      FOOT SURGERY      bilateral    HYSTERECTOMY, VAGINAL      IR MIDLINE CATH  11/28/2022    IR MIDLINE CATH 11/28/2022 [facility] SPECIAL PROCEDURES    KNEE ARTHROSCOPY Right 4/24/2019    RIGHT KNEE ARTHROSCOPY,, SYNOVECTOMY CHONDROPLASTY,MEDIAL AND LATERAL MENESECTOMY, REMOVAL LOOSE BODIES performed by Steven Chance MD at [place] [address]      bilateral    LIVER SURGERY      cyst removal    NECK SURGERY      PAIN MANAGEMENT PROCEDURE Right 11/18/2021    RIGHT LUMBAR FOUR FIVE EPIDURAL STEROID INJECTION SITE CONFIRMED BY FLUOROSCOPY performed by Syed Johnson MD at [address] [facility] Right 10/30/2020    VIDEO ARTHROSCOPY RIGHT SHOULDER SUBACROMIAL DECOMPRESSION LABRAL DEBRIDEMENT ROTATOR CUFF TEAR performed by Jessica Kapadia MD at [address] [place] ARTHROSCOPY      TONSILLECTOMY      TUBAL LIGATION      URETHRA SURGERY      US BREAST LESION REMOVAL LEFT       Allergies: Allergies   Allergen Reactions    Prednisone      Hypertension     Lisinopril Cough    Adhesive Tape      Band aids blisters skin    Codeine     Crestor [Rosuvastatin Calcium] Other (See Comments)     Legs ache      Food      Black walnut    Lipitor [Atorvastatin Calcium] Other (See Comments)     Legs ache      Nitrofurantoin Itching    Penicillins Swelling     \"Throat closes\"    Sulfa Antibiotics     Tramadol Nausea Only     Current Medications:  Current Outpatient Medications   Medication Sig Dispense Refill    pravastatin (PRAVACHOL) 40 MG tablet TAKE 1 TABLET EVERY DAY 90 tablet 1    Nutritional Supplements (VITAMIN D BOOSTER PO) Take by mouth      Omega-3 Fatty Acids (FISH OIL PO) Take by mouth      carvedilol (COREG) 6.25 MG tablet Take 1 tablet by mouth 2 times daily 60 tablet 3    diclofenac (VOLTAREN) 75 MG EC tablet Take 1 tablet by mouth 2 times daily 60 tablet 3    ezetimibe (ZETIA) 10 MG tablet Take 1 tablet by mouth daily 90 tablet 3    losartan (COZAAR) 50 MG tablet Take 1 tablet by mouth in the morning. 90 tablet 3    ascorbic acid (VITAMIN C) 100 MG tablet Take 100 mg by mouth      vitamin A 3 MG (13605 UT) capsule Take 1 capsule by mouth      Multiple Vitamins-Minerals (MULTIVITAMIN ADULT PO) Take 1 tablet by mouth daily      aspirin 81 MG tablet Take 81 mg by mouth daily. S-Adenosylmethionine (EFREN-E) 400 MG TABS Take  by mouth.       ibuprofen (ADVIL;MOTRIN) 600 MG tablet Take 1 tablet by mouth once for 1 dose Take 1 hour prior to Botox procedure 1 tablet 0    Handicap Placard MISC by Does not apply route 1 each 0     No current facility-administered medications for this visit. Social History:   Social History     Socioeconomic History    Marital status:       Spouse name: Not on file    Number of children: Not on file    Years of education: Not on file    Highest education level: Not on file   Occupational History    Not on file   Tobacco Use    Smoking status: Every Day     Packs/day: 0.25     Years: 40.00     Pack years: 10.00     Types: Cigarettes    Smokeless tobacco: Never    Tobacco comments:     quit March 4, 2019   Vaping Use    Vaping Use: Never used   Substance and Sexual Activity    Alcohol use: Yes     Comment: occ    Drug use: No    Sexual activity: Not Currently   Other Topics Concern    Not on file   Social History Narrative    Not on file     Social Determinants of Health     Financial Resource Strain: Low Risk     Difficulty of Paying Living Expenses: Not hard at all   Food Insecurity: No Food Insecurity    Worried About Running Out of Food in the Last Year: Never true    Ran Out of Food in the Last Year: Never true   Transportation Needs: Not on file   Physical Activity: Insufficiently Active    Days of Exercise per Week: 1 day    Minutes of Exercise per Session: 10 min   Stress: Not on file   Social Connections: Not on file   Intimate Partner Violence: Not on file   Housing Stability: Not on file     Family History:   Family History   Problem Relation Age of Onset    Breast Cancer Mother     Cancer Mother         Bone    Diabetes Mother     Heart Disease Mother         CHF    Arthritis Mother     Heart Attack Father     Prostate Cancer Brother     Cancer Maternal Grandmother     Cancer Brother         kidney    Cancer Brother         bladder         Objective:     Vitals  Vitals:    12/09/22 1400 12/09/22 1404   BP: (!) 178/106 (!) 174/113   Pulse: 94    Resp: 16    Temp: 98.1 °F (36.7 °C)    SpO2: 97% Physical Exam  Physical Exam    Using sterile technique a catheter was placed. A post void residual (PVR) was noted to be 200 ml. Results for POC orders placed in visit on 12/09/22   POCT Urinalysis no Micro   Result Value Ref Range    Color, UA yellow     Clarity, UA clear     Glucose, UA POC neg     Bilirubin, UA neg     Ketones, UA neg     Spec Grav, UA 1.015     Blood, UA POC neg     pH, UA 6.5     Protein, UA POC neg     Urobilinogen, UA neg     Leukocytes, UA neg     Nitrite, UA neg        Assessment/Plan:     Gauri Aranda is a 74 y.o. female with   1. Urinary urgency    2. Urinary frequency    -HTN: Followed by PCP and Cardiology:  uncontrolled. - BP elevated today - patient reports secondary to stress/running late  States her home BP usually controlled at 763-366 systolic. Denies headache or visual changes  Has follow up scheduled with PCP.    -UUI/ Frequency:  Patient has had Botox multiple times and consistently has an elevated PVR for a few weeks after treatment. She is asymptomatic of retention and is pleased with outcomes of Bladder botox. She is pleased with outcomes and generally has this repeated every 6 months     -Itching: Has had this in the past. Will try marine ocean sea salts sitz as she has this at home. Will use squirt bottle on mons. Follow with Eucerin, non scented lotion. If no better after 2 weeks will try a thin layer of 0.05% hydrocortisone cream for one week and return to see me. HUBERT Gupta CNP    Orders Placed This Encounter   Procedures    Culture, Urine     Order Specific Question:   Specify (ex-cath, midstream, cysto, etc)? Answer:   straight cath    POCT Urinalysis no Micro    OK INSERT,NON-INDWELLING BLADDER CATHETER     No orders of the defined types were placed in this encounter.       HUBERT Gupta CNP

## 2022-12-12 ENCOUNTER — OFFICE VISIT (OUTPATIENT)
Dept: CARDIOLOGY CLINIC | Age: 67
End: 2022-12-12
Payer: MEDICARE

## 2022-12-12 VITALS
OXYGEN SATURATION: 97 % | BODY MASS INDEX: 41.11 KG/M2 | DIASTOLIC BLOOD PRESSURE: 88 MMHG | HEART RATE: 84 BPM | HEIGHT: 63 IN | WEIGHT: 232 LBS | SYSTOLIC BLOOD PRESSURE: 146 MMHG

## 2022-12-12 DIAGNOSIS — E78.2 MIXED HYPERLIPIDEMIA: ICD-10-CM

## 2022-12-12 DIAGNOSIS — I25.42 CORONARY ARTERY DISSECTION: Primary | ICD-10-CM

## 2022-12-12 DIAGNOSIS — Z72.0 TOBACCO USE: ICD-10-CM

## 2022-12-12 DIAGNOSIS — I10 ESSENTIAL (PRIMARY) HYPERTENSION: ICD-10-CM

## 2022-12-12 PROCEDURE — 3017F COLORECTAL CA SCREEN DOC REV: CPT | Performed by: NURSE PRACTITIONER

## 2022-12-12 PROCEDURE — G8484 FLU IMMUNIZE NO ADMIN: HCPCS | Performed by: NURSE PRACTITIONER

## 2022-12-12 PROCEDURE — G8417 CALC BMI ABV UP PARAM F/U: HCPCS | Performed by: NURSE PRACTITIONER

## 2022-12-12 PROCEDURE — 99214 OFFICE O/P EST MOD 30 MIN: CPT | Performed by: NURSE PRACTITIONER

## 2022-12-12 PROCEDURE — G8400 PT W/DXA NO RESULTS DOC: HCPCS | Performed by: NURSE PRACTITIONER

## 2022-12-12 PROCEDURE — 3074F SYST BP LT 130 MM HG: CPT | Performed by: NURSE PRACTITIONER

## 2022-12-12 PROCEDURE — 4004F PT TOBACCO SCREEN RCVD TLK: CPT | Performed by: NURSE PRACTITIONER

## 2022-12-12 PROCEDURE — 1090F PRES/ABSN URINE INCON ASSESS: CPT | Performed by: NURSE PRACTITIONER

## 2022-12-12 PROCEDURE — 3078F DIAST BP <80 MM HG: CPT | Performed by: NURSE PRACTITIONER

## 2022-12-12 PROCEDURE — G8427 DOCREV CUR MEDS BY ELIG CLIN: HCPCS | Performed by: NURSE PRACTITIONER

## 2022-12-12 PROCEDURE — 1123F ACP DISCUSS/DSCN MKR DOCD: CPT | Performed by: NURSE PRACTITIONER

## 2022-12-12 RX ORDER — LOSARTAN POTASSIUM 50 MG/1
50 TABLET ORAL 2 TIMES DAILY
Qty: 180 TABLET | Refills: 3 | Status: SHIPPED | OUTPATIENT
Start: 2022-12-12

## 2022-12-12 RX ORDER — CARVEDILOL 12.5 MG/1
12.5 TABLET ORAL 2 TIMES DAILY
Qty: 60 TABLET | Refills: 3 | Status: SHIPPED | OUTPATIENT
Start: 2022-12-12

## 2022-12-12 NOTE — PROGRESS NOTES
[address]   Cardiac Evaluation    Primary Care Doctor:  Bhavani Christopher DO    Chief Complaint   Patient presents with    Follow-Up from Hospital    Hypertension    Hyperlipidemia        Assessment:    1. Coronary artery dissection    2. Essential (primary) hypertension    3. Mixed hyperlipidemia    4. Tobacco use        Plan:   Increase the carvedilol (Coreg) to 12.5 mg twice daily  Increase the losartan 50 mg to twice daily  Repeat fasting cholesterol panel in January  Follow up with me in January as scheduled    Vitals:    12/12/22 1441 12/12/22 1450   BP: (!) 144/88 (!) 146/88   Site: Right Upper Arm Right Upper Arm   Position: Sitting Sitting   Cuff Size:  Medium Adult   Pulse: 84    SpO2: 97%    Weight: 232 lb (105.2 kg)    Height: 5' 2.8\" (1.595 m)        Primary Cardiologist: Dr. Radha Mireles     History of Present Illness:   I had the pleasure of seeing Jose Griffin (74 y.o.) in follow up for CAD (hx of coronary dissection 2009), HYPERTENSION, HLD, smoker. Stress test was abnormal.  [facility] with moderate CAD - recommended medical treatment, started on Imdur 30 mg daily. She is not taking the Imdur as it caused a severe headache affecting her ability to function and eat. She denies any chest pain or shortness of breath. Not very active right now. Just had a house built so still moving in and adopted 2 kittens. All this keeps her busy but no dedicated exercise. Plans to start back with silver sneakers with swimming and walking (if back will allow - has bulging disc and sciatica). No chest pain with this activity (shopping, lifting/ moving boxes). Admits to some shortness of breath with increased exertional activity. BP at home 140-150/ 70-90's. Coreg dose was decreased to 6.25 mg in September, unclear why. Jose Griffin describes symptoms including dyspnea, fatigue but denies chest pain, palpitations, orthopnea, PND, early saiety, edema, syncope.      NYHA: II  ACC/ AHA Stage:    C    Past Medical History:   has a past medical history of Acute laryngitis, Acute laryngitis, Acute lymphadenitis of face, head and neck, Artery dissection (HCC), Arthritis, Calculus of kidney, Cancer ([facility] [address] Cystocele, midline, Eustachian tube dysfunction, bilateral, Eustachian tube dysfunction, bilateral, Gastro-esophageal reflux disease without esophagitis, Generalized anxiety disorder, Hyperlipidemia, Hypertension, Liver cyst, Neuritis, Neuritis, Nocturia, Old myocardial infarction, Osteoarthritis, Otalgia, Perennial allergic rhinitis, Pharyngeal inflammation, Primary osteoarthritis of right knee, Tinnitus of vascular origin, Tinnitus, bilateral, Tobacco use, Urinary frequency, Urinary incontinence, Vaginal enterocele, and Vaginal vault 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. Surgical History:   has a past surgical history that includes Cholecystectomy; Appendectomy; knee surgery; Foot surgery; Neck surgery; Urethra surgery; Liver surgery; Knee arthroscopy (Right, 4/24/2019); Elbow Debridement; US BREAST LESION REMOVAL LEFT; Hysterectomy, vaginal; Tonsillectomy; Tubal ligation; Rotator cuff repair (Right, 10/30/2020); Shoulder arthroscopy; Pain management procedure (Right, 11/18/2021); and IR MIDLINE CATH (11/28/2022). Social History:   reports that she has been smoking cigarettes. She has a 10.00 pack-year smoking history. She has never used smokeless tobacco. She reports current alcohol use. She reports that she does not use drugs. Family History:   Family History   Problem Relation Age of Onset    Breast Cancer Mother     Cancer Mother         Bone    Diabetes Mother     Heart Disease Mother         CHF    Arthritis Mother     Heart Attack Father     Prostate Cancer Brother     Cancer Maternal Grandmother     Cancer Brother         kidney    Cancer Brother         bladder       Home Medications:  Prior to Admission medications    Medication Sig Start Date End Date Taking?  Authorizing Provider   pravastatin (PRAVACHOL) 40 MG tablet TAKE 1 TABLET EVERY DAY 11/28/22  Yes Sherie Wagner,    Nutritional Supplements (VITAMIN D BOOSTER PO) Take by mouth   Yes Historical Provider, MD   Omega-3 Fatty Acids (FISH OIL PO) Take by mouth   Yes Historical Provider, MD   carvedilol (COREG) 6.25 MG tablet Take 1 tablet by mouth 2 times daily 9/20/22  Yes Camryn Stock MD   diclofenac (VOLTAREN) 75 MG EC tablet Take 1 tablet by mouth 2 times daily 9/20/22  Yes Camryn Stock MD   ezetimibe (ZETIA) 10 MG tablet Take 1 tablet by mouth daily 9/6/22  Yes Camryn Stock MD   losartan (COZAAR) 50 MG tablet Take 1 tablet by mouth in the morning. 8/2/22  Yes Sergio Narvaez,    Handicap Placard MISC by Does not apply route 7/26/22  Yes Camryn Stock MD   ascorbic acid (VITAMIN C) 100 MG tablet Take 100 mg by mouth   Yes Historical Provider, MD   vitamin A 3 MG (25471 UT) capsule Take 1 capsule by mouth   Yes Historical Provider, MD   Multiple Vitamins-Minerals (MULTIVITAMIN ADULT PO) Take 1 tablet by mouth daily   Yes Historical Provider, MD   aspirin 81 MG tablet Take 81 mg by mouth daily. Yes Historical Provider, MD   S-Adenosylmethionine (EFREN-E) 400 MG TABS Take  by mouth.    Yes Historical Provider, MD   ibuprofen (ADVIL;MOTRIN) 600 MG tablet Take 1 tablet by mouth once for 1 dose Take 1 hour prior to Botox procedure 11/9/22 11/28/22  Kathy Youssef MD        Allergies:  Prednisone, Lisinopril, Adhesive tape, Codeine, Crestor [rosuvastatin calcium], Food, Lipitor [atorvastatin calcium], Nitrofurantoin, Penicillins, Sulfa antibiotics, and Tramadol     Physical Examination:    Vitals:    12/12/22 1441 12/12/22 1450   BP: (!) 144/88 (!) 146/88   Site: Right Upper Arm Right Upper Arm   Position: Sitting Sitting   Cuff Size:  Medium Adult   Pulse: 84    SpO2: 97%    Weight: 232 lb (105.2 kg)    Height: 5' 2.8\" (1.595 m)      Constitutional and General Appearance: Warm and dry, no apparent distress, normal coloration  HEENT:  Normocephalic, atraumatic  Respiratory:  Normal excursion and expansion without use of accessory muscles  Resp Auscultation: Clear to auscultation   Cardiovascular: The apical impulses not displaced  Heart tones are crisp and normal  JVP 8 cm H2O  Regular rate and rhythm, normal S1S2, no m/g/r  Peripheral pulses are symmetrical and full  There is no clubbing, cyanosis of the extremities.   No BLE edema  Pedal Pulses: 2+ and equal   right radial site without ooze, bruise or hematoma, 2+ pulse   Abdomen:  No masses or tenderness  Liver/Spleen: No Abnormalities Noted  Neurological/Psychiatric:  Alert and oriented in all spheres  Moves all extremities well  Exhibits normal gait balance and coordination  No abnormalities of mood, affect, memory, mentation, or behavior are noted    Lab Data:  Most recent lab results below reviewed in office    CBC:   Lab Results   Component Value Date/Time    WBC 9.4 11/28/2022 09:24 AM    WBC 7.6 11/14/2022 09:38 AM    WBC 14.8 09/14/2022 06:24 PM    RBC 4.99 11/28/2022 09:24 AM    RBC 4.86 11/14/2022 09:38 AM    RBC 5.19 09/14/2022 06:24 PM    HGB 14.1 11/28/2022 09:24 AM    HGB 13.7 11/14/2022 09:38 AM    HGB 14.6 09/14/2022 06:24 PM    HCT 42.9 11/28/2022 09:24 AM    HCT 41.5 11/14/2022 09:38 AM    HCT 44.4 09/14/2022 06:24 PM    MCV 86.0 11/28/2022 09:24 AM    MCV 85.5 11/14/2022 09:38 AM    MCV 85.5 09/14/2022 06:24 PM    RDW 16.7 11/28/2022 09:24 AM    RDW 16.5 11/14/2022 09:38 AM    RDW 16.2 09/14/2022 06:24 PM     11/28/2022 09:24 AM     11/14/2022 09:38 AM     09/14/2022 06:24 PM     BMP:  Lab Results   Component Value Date/Time     11/28/2022 09:24 AM     11/14/2022 09:38 AM     09/14/2022 06:24 PM    K 3.9 11/28/2022 09:24 AM    K 3.6 11/14/2022 09:38 AM    K 4.2 09/14/2022 06:24 PM     11/28/2022 09:24 AM     11/14/2022 09:38 AM     09/14/2022 06:24 PM CO2 26 11/28/2022 09:24 AM    CO2 24 11/14/2022 09:38 AM    CO2 20 09/14/2022 06:24 PM    BUN 22 11/28/2022 09:24 AM    BUN 21 11/14/2022 09:38 AM    BUN 22 09/14/2022 06:24 PM    CREATININE 0.9 11/28/2022 09:24 AM    CREATININE 0.7 11/14/2022 09:38 AM    CREATININE 0.8 09/14/2022 06:24 PM     BNP: No results found for: PROBNP  LIPID:   Lab Results   Component Value Date/Time    TRIG 315 11/28/2022 09:24 AM    TRIG 313 11/14/2022 09:38 AM    HDL 43 11/28/2022 09:24 AM    HDL 36 11/14/2022 09:38 AM    LDLCALC see below 11/28/2022 09:24 AM    LDLCALC see below 11/14/2022 09:38 AM    LDLDIRECT 166 11/28/2022 09:24 AM    LDLDIRECT 125 11/14/2022 09:38 AM       Cardiac Imaging:  CARDIAC CATH 11/28/2022:  FINDINGS   LVGRAM   LVEDP 16   GRADIENT ACROSS AORTIC VALVE No significant gradient   LV FUNCTION EF 60%, difficult to fully evaluate as LV could not be fully opacified. WALL MOTION Difficult to assess on the study   MITRAL REGURGITATION Difficult to assess on the study     CORONARY ARTERIES   LM Short, less than 10% [id number] stenosis. LAD Mild to moderately calcified, proximal-mid 40 to 50% stenosis. Distal less than 10% stenosis. LCX Large vessel, OM1 is a very small vessel with 40 to 50% [id number] stenosis    OM 2 is a medium to large size OM with ostial/proximal-mid 60% stenosis. Distal less than 10% stenosis. Circumflex has mid 30 to 40% stenosis. RCA Large vessel, dominant, proximal-mid 40 to 50% stenosis, distal less than 10% stenosis. Flow reserve assessment and IVUS DESCRIPTION      Heparin was used anticoagulation, a 5 [employer] [place] EBU 3.0 guide catheter was used for flow reserve assessment as well as IVUS. The Hitch comet wire was taken and this was appropriate calibrated and ultimately equalized in the ascending aorta. Wire was used to cross the lesions in the LAD as well as into OM 2.   Flow reserve assessment was 0.98 in the LAD and 1.0 in the circumflex/OM 2. There was spasm noted within the circumflex and this was treated with intracoronary vasodilators and follow-up assessment then was made with IVUS of this area which showed 50% stenosis. These findings were most consistent with moderate nonobstructive CAD/ASHD and PCI was felt to be able to be deferred at this time. CONCLUSIONS:   Patient had to have midline placed before the procedure with interventional radiology due to difficult venous access  Patient had radial spasm which responded to vasodilators  Moderate CAD/ASHD  Treat medically  Add Imdur 30 mg daily      STRESS TEST 11/5/2022:       Summary    Left ventricle is mildly enlarged. Normal LV function. Left ventricular ejection fraction of 54%. There is a moderate sized, mild to moderate intensity, partially reversible    anterior wall defect, with no associated wall motion abnormality. Breast    attenuation present; however, suspect myocardium at risk in the territory of    the LAD. Recommendation    Recommend cardiac catheterization depending on clinical appropriateness. Echo:3/3/20 at [facility]  Study Conclusions   - Left ventricle: The cavity size is normal. Normal relative wall     thickness. Systolic function was normal. The estimated ejection     fraction was 53%. Doppler parameters are consistent with abnormal     left ventricular relaxation (grade 1 diastolic dysfunction). The     global longitudinal strain was -21%. - Aortic valve: The mean systolic gradient is 6mm Hg. The peak     systolic gradient is 45QI Hg. - Inferior vena cava: The vessel was normal in size; the     respirophasic diameter changes were blunted (&lt; 50%); findings are     consistent with mildly elevated central venous pressure. - Pulmonary arteries: The peak pressure during systole by Doppler     is 34mm Hg.      I appreciate the opportunity of cooperating in the care of this individual.    HUBERT Cleveland - CNP, 12/12/2022, 3:13 PM

## 2022-12-12 NOTE — PATIENT INSTRUCTIONS
Increase the carvedilol (Coreg) to 12.5 mg twice daily  Increase the losartan 50 mg to twice daily  Repeat fasting cholesterol panel in January  Follow up with me in January as scheduled

## 2022-12-15 ENCOUNTER — HOSPITAL ENCOUNTER (OUTPATIENT)
Dept: WOMENS IMAGING | Age: 67
Discharge: HOME | End: 2022-12-15
Payer: MEDICARE

## 2022-12-15 DIAGNOSIS — Z12.31 ENCOUNTER FOR SCREENING MAMMOGRAM FOR BREAST CANCER: ICD-10-CM

## 2022-12-15 PROCEDURE — 77067 SCR MAMMO BI INCL CAD: CPT

## 2022-12-19 NOTE — PROGRESS NOTES
Radha Alvares   1955, 74 y.o. female   [id number]       Referring Provider: Marni Butt DO  Referral Type: In an order in [address] [place] [address]    Reason for Visit: Evaluation of suspected change in hearing and tinnitus    ADULT AUDIOLOGIC EVALUATION      Radha Alvares is a 74 y.o. female seen today, 12/21/2022 , for an initial audiologic evaluation. Patient was seen by Marni Butt DO prior to today's evaluation. AUDIOLOGIC AND OTHER PERTINENT MEDICAL HISTORY:      Radha Alvares noted a perceived gradual decline in hearing and tinnitus, bilaterally with right ear worse than left. She notes previous hearing evaluation about 10-15 years ago revealed asymmetric hearing loss with right worse than left. Patient also reports family history of hearing loss in mother. No additional significant otologic or medical history was reported. Radha Alvares denied otalgia, aural fullness, otorrhea, dizziness, imbalance, history of falls, history of occupational/recreational noise exposure, history of head trauma, and history of ear surgery. Date: 12/21/2022     IMPRESSIONS:      Today's results revealed a symmetric sensorineural hearing loss with excellent word recognition, bilaterally. Hearing loss significant enough to create hearing difficulty in at least some listening situations. Discussed benefits of amplification. Follow medical recommendations of Marni Butt DO.     ASSESSMENT AND FINDINGS:     Otoscopy revealed: Clear ear canals bilaterally    RIGHT EAR:  Hearing Sensitivity: Within normal limits at 250Hz sloping to a mild to severe sensorineural hearing loss. Speech Recognition Threshold: 30 dB HL  Word Recognition: Excellent 96%, based on NU-6 25-word list at 80 dBHL masked using recorded speech stimuli. Tympanometry: Normal peak pressure with high compliance, Type Ad tympanogram, consistent with hypermobile tympanic membrane.   Acoustic Reflexes: Ipsilateral: Could not maintain seal. Contralateral: Could not maintain seal.      LEFT EAR:  Hearing Sensitivity: Within normal limits through 4000Hz sloping to a moderate to severe sensorineural hearing loss. Speech Recognition Threshold: 35 dB HL  Word Recognition: Excellent 100%, based on NU-6 25-word list at 65 dBHL using recorded speech stimuli. Tympanometry: Normal peak pressure with high compliance, Type Ad tympanogram, consistent with hypermobile tympanic membrane. Acoustic Reflexes: Ipsilateral: Could not maintain seal. Contralateral: Could not maintain seal.      Reliability: Good   Transducer: Inserts    See scanned audiogram dated 12/21/2022  for results. PATIENT EDUCATION:       The following items were discussed with the patient:   - Good Communication Strategies  - Hearing Loss and Hearing Aids  - Tinnitus Management Strategies      Educational information was shared in the After Visit Summary. RECOMMENDATIONS:                                                                                                                                                                                                                                                            The following items are recommended based on patient report and results from today's appointment:   - Continue medical follow-up with Hilary Earl DO.   - Retest hearing as medically indicated and/or sooner if a change in hearing is noted. - If desired, schedule a Hearing Aid Evaluation (HAE) appointment to discuss hearing aid options. - Utilize \"Good Communication Strategies\" as discussed to assist in speech understanding with communication partners. - Maintain a sound enriched environment to assist in the management of tinnitus symptoms.        Jose Hope  Audiologist    Chart CC'd to: Hilary Earl DO      Degree of   Hearing Sensitivity dB Range   Within Normal Limits (WNL) 0 - 20   Mild 20 - 40 Moderate 40 - 55   Moderately-Severe 55 - 70   Severe 70 - 90   Profound 90 +

## 2022-12-21 ENCOUNTER — OFFICE VISIT (OUTPATIENT)
Dept: ENT CLINIC | Age: 67
End: 2022-12-21
Payer: MEDICARE

## 2022-12-21 ENCOUNTER — PROCEDURE VISIT (OUTPATIENT)
Dept: AUDIOLOGY | Age: 67
End: 2022-12-21
Payer: MEDICARE

## 2022-12-21 VITALS
BODY MASS INDEX: 41.46 KG/M2 | TEMPERATURE: 97.6 F | OXYGEN SATURATION: 94 % | SYSTOLIC BLOOD PRESSURE: 144 MMHG | HEART RATE: 75 BPM | DIASTOLIC BLOOD PRESSURE: 83 MMHG | WEIGHT: 234 LBS | HEIGHT: 63 IN

## 2022-12-21 DIAGNOSIS — H90.3 SENSORINEURAL HEARING LOSS, BILATERAL: Primary | ICD-10-CM

## 2022-12-21 DIAGNOSIS — H93.13 TINNITUS OF BOTH EARS: Primary | ICD-10-CM

## 2022-12-21 DIAGNOSIS — H93.13 TINNITUS, BILATERAL: ICD-10-CM

## 2022-12-21 DIAGNOSIS — H90.3 SENSORINEURAL HEARING LOSS (SNHL) OF BOTH EARS: ICD-10-CM

## 2022-12-21 PROCEDURE — 92567 TYMPANOMETRY: CPT | Performed by: AUDIOLOGIST

## 2022-12-21 PROCEDURE — 92557 COMPREHENSIVE HEARING TEST: CPT | Performed by: AUDIOLOGIST

## 2022-12-21 PROCEDURE — 3074F SYST BP LT 130 MM HG: CPT | Performed by: OTOLARYNGOLOGY

## 2022-12-21 PROCEDURE — 3078F DIAST BP <80 MM HG: CPT | Performed by: OTOLARYNGOLOGY

## 2022-12-21 PROCEDURE — 1123F ACP DISCUSS/DSCN MKR DOCD: CPT | Performed by: OTOLARYNGOLOGY

## 2022-12-21 PROCEDURE — 3017F COLORECTAL CA SCREEN DOC REV: CPT | Performed by: OTOLARYNGOLOGY

## 2022-12-21 PROCEDURE — 99204 OFFICE O/P NEW MOD 45 MIN: CPT | Performed by: OTOLARYNGOLOGY

## 2022-12-21 PROCEDURE — G8400 PT W/DXA NO RESULTS DOC: HCPCS | Performed by: OTOLARYNGOLOGY

## 2022-12-21 PROCEDURE — G8484 FLU IMMUNIZE NO ADMIN: HCPCS | Performed by: OTOLARYNGOLOGY

## 2022-12-21 PROCEDURE — 1090F PRES/ABSN URINE INCON ASSESS: CPT | Performed by: OTOLARYNGOLOGY

## 2022-12-21 PROCEDURE — G8427 DOCREV CUR MEDS BY ELIG CLIN: HCPCS | Performed by: OTOLARYNGOLOGY

## 2022-12-21 PROCEDURE — 4004F PT TOBACCO SCREEN RCVD TLK: CPT | Performed by: OTOLARYNGOLOGY

## 2022-12-21 PROCEDURE — G8417 CALC BMI ABV UP PARAM F/U: HCPCS | Performed by: OTOLARYNGOLOGY

## 2022-12-21 ASSESSMENT — ENCOUNTER SYMPTOMS
TROUBLE SWALLOWING: 0
EYE ITCHING: 0
SORE THROAT: 0
SHORTNESS OF BREATH: 0
VOICE CHANGE: 0
FACIAL SWELLING: 0
SINUS PRESSURE: 0
APNEA: 0
COUGH: 0

## 2022-12-21 NOTE — PROGRESS NOTES
[facility], [address], [address], [address] [place] [address]  P: [phone]       Patient     Sharri Houston  1955    ChiefComplaint     Chief Complaint   Patient presents with    Hearing Problem     Hearing loss has been gradual over last 15 years. RT ear is worse. Has constant tinnitus       History of Present Illness     Isaias Nicole is a 67-year-old female here today for evaluation of bilateral hearing loss and tinnitus. Reports hearing loss for 15 years had hearing test 10 years ago with right greater than left hearing loss. Reports constant bilateral high-pitched ringing nonintrusive. No previous ear surgeries. Denies otalgia, otorrhea.     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    Arthritis     Calculus of kidney     Cancer ([facility] [address]     skin    Cystocele, midline     Eustachian tube dysfunction, bilateral     Eustachian tube dysfunction, bilateral 6/15/2016    Gastro-esophageal reflux disease without esophagitis     Generalized anxiety disorder 8/25/2016    Hyperlipidemia     Hypertension     Liver cyst     Neuritis     Neuritis 6/5/2018    Nocturia     Old myocardial infarction     Osteoarthritis     Otalgia     Perennial allergic rhinitis     Pharyngeal inflammation     Primary osteoarthritis of right knee     Tinnitus of vascular origin 6/15/2016    Tinnitus, bilateral     Tobacco use     Urinary frequency     Urinary incontinence     Vaginal enterocele     Vaginal vault 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        Past Surgical History     Past Surgical History:   Procedure Laterality Date    APPENDECTOMY      CHOLECYSTECTOMY      ELBOW DEBRIDEMENT      FOOT SURGERY      bilateral    HYSTERECTOMY, VAGINAL      IR MIDLINE CATH  11/28/2022    IR MIDLINE CATH 11/28/2022 [facility] PROCEDURES    KNEE ARTHROSCOPY Right 4/24/2019    RIGHT KNEE ARTHROSCOPY,, SYNOVECTOMY CHONDROPLASTY,MEDIAL AND LATERAL MENESECTOMY, REMOVAL LOOSE BODIES performed by Sisi Olvera MD at [facility]      bilateral    LIVER SURGERY      cyst removal    NECK SURGERY      PAIN MANAGEMENT PROCEDURE Right 11/18/2021    RIGHT LUMBAR FOUR FIVE EPIDURAL STEROID INJECTION SITE CONFIRMED BY FLUOROSCOPY performed by Stephanie Olivia MD at [address] [facility] Right 10/30/2020    VIDEO ARTHROSCOPY RIGHT SHOULDER SUBACROMIAL DECOMPRESSION LABRAL DEBRIDEMENT ROTATOR CUFF TEAR performed by Italia Hall MD at [address] [place] ARTHROSCOPY      TONSILLECTOMY      TUBAL LIGATION      URETHRA SURGERY      US BREAST LESION REMOVAL LEFT         Family History     Family History   Problem Relation Age of Onset    Breast Cancer Mother     Cancer Mother         Bone    Diabetes Mother     Heart Disease Mother         CHF    Arthritis Mother     Heart Attack Father     Prostate Cancer Brother     Cancer Brother         kidney    Cancer Brother         bladder    Cancer Maternal Grandmother     Breast Cancer Maternal Aunt        Social History     Social History     Tobacco Use    Smoking status: Every Day     Packs/day: 0.25     Years: 40.00     Pack years: 10.00     Types: Cigarettes    Smokeless tobacco: Never    Tobacco comments:     quit March 4, 2019   Vaping Use    Vaping Use: Some days    Substances: Nicotine    Devices: Disposable   Substance Use Topics    Alcohol use: Yes     Comment: occ    Drug use: No        Allergies     Allergies   Allergen Reactions    Prednisone      Hypertension     Lisinopril Cough    Adhesive Tape      Band aids blisters skin    Codeine     Crestor [Rosuvastatin Calcium] Other (See Comments)     Legs ache      Food      Black walnut    Lipitor [Atorvastatin Calcium] Other (See Comments)     Legs ache      Nitrofurantoin Itching    Penicillins Swelling     \"Throat closes\"    Sulfa Antibiotics     Tramadol Nausea Only       Medications     Current Outpatient Medications   Medication Sig Dispense Refill    carvedilol (COREG) 12.5 MG tablet Take 1 tablet by mouth 2 times daily 60 tablet 3    losartan (COZAAR) 50 MG tablet Take 1 tablet by mouth in the morning and at bedtime 180 tablet 3    pravastatin (PRAVACHOL) 40 MG tablet TAKE 1 TABLET EVERY DAY 90 tablet 1    Nutritional Supplements (VITAMIN D BOOSTER PO) Take by mouth      Omega-3 Fatty Acids (FISH OIL PO) Take by mouth      diclofenac (VOLTAREN) 75 MG EC tablet Take 1 tablet by mouth 2 times daily 60 tablet 3    ezetimibe (ZETIA) 10 MG tablet Take 1 tablet by mouth daily 90 tablet 3    Handicap Placard MISC by Does not apply route 1 each 0    ascorbic acid (VITAMIN C) 100 MG tablet Take 100 mg by mouth      vitamin A 3 MG (09359 UT) capsule Take 1 capsule by mouth      Multiple Vitamins-Minerals (MULTIVITAMIN ADULT PO) Take 1 tablet by mouth daily      aspirin 81 MG tablet Take 81 mg by mouth daily. S-Adenosylmethionine (EFREN-E) 400 MG TABS Take  by mouth. ibuprofen (ADVIL;MOTRIN) 600 MG tablet Take 1 tablet by mouth once for 1 dose Take 1 hour prior to Botox procedure 1 tablet 0     No current facility-administered medications for this visit. Review of Systems     Review of Systems   Constitutional:  Negative for appetite change, chills, fatigue, fever and unexpected weight change. HENT:  Positive for hearing loss and tinnitus. Negative for congestion, ear discharge, ear pain, facial swelling, nosebleeds, postnasal drip, sinus pressure, sneezing, sore throat, trouble swallowing and voice change. Eyes:  Negative for itching. Respiratory:  Negative for apnea, cough and shortness of breath. Endocrine: Negative for cold intolerance and heat intolerance. Musculoskeletal:  Negative for myalgias and neck pain. Skin:  Negative for rash. Allergic/Immunologic: Negative for environmental allergies.    Neurological: Negative for dizziness and headaches. Psychiatric/Behavioral:  Negative for confusion, decreased concentration and sleep disturbance. PhysicalExam     Vitals:    12/21/22 1100   BP: (!) 144/83   Site: Right Upper Arm   Position: Sitting   Cuff Size: Large Adult   Pulse: 75   Temp: 97.6 °F (36.4 °C)   TempSrc: Oral   SpO2: 94%   Weight: 234 lb (106.1 kg)   Height: 5' 2.8\" (1.595 m)       Physical Exam  Constitutional:       General: She is not in acute distress. Appearance: She is well-developed. HENT:      Head: Normocephalic and atraumatic. Right Ear: Tympanic membrane, ear canal and external ear normal. No drainage. No middle ear effusion. Tympanic membrane is not bulging. Tympanic membrane has normal mobility. Left Ear: Tympanic membrane, ear canal and external ear normal. No drainage. No middle ear effusion. Tympanic membrane is not bulging. Tympanic membrane has normal mobility. Nose: No mucosal edema or rhinorrhea. Mouth/Throat:      Lips: Pink. Mouth: Mucous membranes are moist.      Tongue: No lesions. Palate: No mass. Pharynx: Uvula midline. Eyes:      Pupils: Pupils are equal, round, and reactive to light. Neck:      Thyroid: No thyroid mass or thyromegaly. Trachea: Trachea and phonation normal.   Cardiovascular:      Pulses: Normal pulses. Pulmonary:      Effort: Pulmonary effort is normal. No accessory muscle usage or respiratory distress. Breath sounds: No stridor. Musculoskeletal:      Cervical back: Full passive range of motion without pain. Lymphadenopathy:      Head:      Right side of head: No submental or submandibular adenopathy. Left side of head: No submental or submandibular adenopathy. Cervical: No cervical adenopathy. Right cervical: No superficial, deep or posterior cervical adenopathy. Left cervical: No superficial, deep or posterior cervical adenopathy. Skin:     General: Skin is warm and dry. Neurological:      Mental Status: She is alert and oriented to person, place, and time. Cranial Nerves: No cranial nerve deficit. Coordination: Coordination normal.      Gait: Gait normal.   Psychiatric:         Thought Content: Thought content normal.           Assessment and Plan     1. Sensorineural hearing loss (SNHL) of both ears  -Audiogram reviewed and independently interpreted-left ear normal downsloping to severe high-frequency sensorineural hearing loss right ear mild downsloping to severe high-frequency sensorineural hearing loss type a tympanograms bilaterally with preserved word recognition score  -Discussed asymmetry between ears that has been stable x10 years. Discussed possibility of acoustic neuroma and MRI for evaluation. Patient does not wish to proceed at this time  -Plan for HAE    2. Tinnitus of both ears  -Secondary to high-frequency loss  -Minimize salt, caffeine, alcohol and stress    Plan for HAE        Gale Duncan DO  12/21/22      Portions of this note were dictated using Dragon.  There may be linguistic errors secondary to the use of this program.

## 2022-12-21 NOTE — PATIENT INSTRUCTIONS
Good Communication Strategies    Communication can be challenging for anyone, but can be especially difficult for those with some degree of hearing loss. While we may not be able to control every factor that may lead to difficulty with communication, there are Good Communication Strategies that we can all use in our day-to-day lives. Communication takes both parties working together for it to be successful. Tips as a Listener:   Control your environment. It is important to limit the amount of background noise in the room when possible. You should also consider having a good light source in the room to best see the other person. Ask for clarification. Instead of saying \"What?\", you can use parts of what you heard to make a new question. For example, if you heard the word \"Thursday\" but not the rest of the week, you may ask \"What was that about Thursday? \" or \"What did you want to do Thursday? \". This shows the person talking that you are listening and will help them better explain what they are saying. Be an advocate for yourself. If you are hearing but not understanding, tell the other person \"I can hear you, but I need you to slow down when you speak. \"  Or if someone is facing the other direction, say \"I cannot hear you when you are not looking at me when we talk. \"       Tips as a Talker:   - Sit or stand 3 to 6 feet away to maximize audibility         -- It is unrealistic to believe someone else will fully hear your message if you are speaking from across the room or in a different room in the house   - Stay at eye level to help with visual cues   - Make sure you have the persons attention before speaking   - Use facial expressions and gestures to accentuate your message   - Raise your voice slightly (do not scream)   - Speak slowly and distinctly   - Use short, simple sentences   - Rephrase your words if the person is having a hard time understanding you    - To avoid distortion, dont speak directly into a persons ear      Some additional items that may be helpful:   - Remain patient - this is important for both parties   - Write down items that still cannot be heard/understood. You may write with pen/paper or consider typing/texting on a cell phone or smart device. - If background noise is unavoidable, try to keep yourself in a good position in the room. By sitting at a dias on the side of the restaurant (preferably a corner), it will be easier to communicate than if you were sitting at a table in the middle with background noise surrounding you. Keep yourself positioned away from music speakers or heavy foot traffic. Tinnitus: Overview and Management Strategies          Many people have some ringing sounds in their ears once in a while. You may hear a roar, a hiss, a tinkle, or a buzz. The sound usually lasts only a few minutes. If it goes on all the time, you may have tinnitus. Tinnitus is usually caused by long-term exposure to loud noise. This damages the nerves in the inner ear. It can occur with all types of hearing loss. It may be a symptom of almost any ear problem. Tinnitus may be caused by a buildup of earwax. Or, it may be caused by ear infections or certain medicines (especially antibiotics or large amounts of aspirin). You can also hear noises in your ears because of an injury to the ears, drinking too much alcohol or caffeine, or a medical condition. Other conditions may also contribute to tinnitus, including: head and neck trauma, temporomandibular joint disorder (TMJ), sinus pressure and barometric trauma, traumatic brain injury, metabolic disorders, autoimmune disorders, stress, and high blood pressure. You may need tests to evaluate your hearing and to find causes of long-lasting tinnitus. Your doctor may suggest one or more treatments to help you cope with the tinnitus. You can also do things at home to help reduce symptoms.     Follow-up care is a key part of your treatment and safety. Be sure to make and go to all appointments, and call your doctor if you are having problems. It's also a good idea to know your test results and keep a list of the medicines you take. How can you care for yourself at home? Limit or cut out alcohol, caffeine, and sodium. They can make your symptoms worse. Do not smoke or use other tobacco products. Nicotine reduces blood flow to the ear and makes tinnitus worse. If you need help quitting, talk to your doctor about stop-smoking programs and medicines. These can increase your chances of quitting for good. Talk to your doctor about whether to stop taking aspirin and similar products such as ibuprofen or naproxen. Get exercise often. It can help improve blood flow to the ear. Ways to manage/cope with tinnitus  Some tinnitus may last a long time. To manage your tinnitus, try to: Avoid noises that you think caused your tinnitus. If you can't avoid loud noises, wear earplugs or earmuffs. Ignore the sound by paying attention to other things. Keeping your brain busy with other tasks or background noise can help your brain not focus on the tinnitus. Try to not give the tinnitus an emotional reaction. Do your best to ignore the sound and not let it bother you. Relax using biofeedback, meditation, or yoga. Feeling stressed and being tired can make tinnitus worse. Play music or white noise to help you sleep. Background noise may cover up the noise that you hear in your ears. You can buy a tabletop machine or a device that sits under your pillow to play soothing sounds, like ocean waves. Smart phones have free apps, such as Whist, Relax Melodies, ReSound Relief, and [employer]. These apps have different types of sounds/noise, some of which you can blend together to find sounds that are most soothing to you.   Hearing aid technology, especially when there is some hearing loss, may help reduce tinnitus symptoms by giving your brain better access to the sounds it is missing. There are some hearing aids with built-in noise generator programs, which may help when amplification alone is not enough. Additional resources may be found through the [employer] at www.josé miguel.org    When should you call for help? Call 911 anytime you think you may need emergency care. For example, call if:    You have symptoms of a stroke. These may include:  Sudden numbness, tingling, weakness, or loss of movement in your face, arm, or leg, especially on only one side of your body. Sudden vision changes. Sudden trouble speaking. Sudden confusion or trouble understanding simple statements. Sudden problems with walking or balance. A sudden, severe headache that is different from past headaches. Call your doctor now or seek immediate medical care if:    You develop other symptoms. These may include hearing loss (or worse hearing loss), balance problems, dizziness, nausea, or vomiting. Watch closely for changes in your health, and be sure to contact your doctor if:    Your tinnitus moves from both ears to one ear. Your hearing loss gets worse within 1 day after an ear injury. Your tinnitus or hearing loss does not get better within 1 week after an ear injury. Your tinnitus bothers you enough that you want to take medicines to help you cope with it. If you notice changes in your tinnitus and/or your hearing, it is recommended that you have your hearing tested by your audiologist and to follow-up with your physician that manages your hearing loss (such as your ENT or Primary Care doctor). Hearing Loss: Care Instructions  Your Care Instructions      Hearing loss is a sudden or slow decrease in how well you hear. It can range from mild to profound. Permanent hearing loss can occur with aging, and it can happen when you are exposed long-term to loud noise.  Examples include listening to loud music, riding motorcycles, or being around other loud machines. Hearing loss can affect your work and home life. It can make you feel lonely or depressed. You may feel that you have lost your independence. But hearing aids and other devices can help you hear better and feel connected to others. Follow-up care is a key part of your treatment and safety. Be sure to make and go to all appointments, and call your doctor if you are having problems. It's also a good idea to know your test results and keep a list of the medicines you take. How can you care for yourself at home? Avoid loud noises whenever possible. This helps keep your hearing from getting worse. Always wear hearing protection around loud noises. If appropriate, wear hearing aid(s) as directed. It is recommended that hearing aids are worn during all waking hours to keep your brain active and give it access to the sounds it is missing. If you are beginning your process with hearing aid(s), schedule a \"Hearing Aid Evaluation\" with an audiologist to discuss your lifestyle, features of hearing aid technology, and styles of hearing aids available. It is recommended that you contact your insurance company to determine if you have a hearing aid benefit, as this may dictate who you can see for these services. Have hearing tests as your doctor suggests. They can show whether your hearing has changed. Your hearing aid may need to be adjusted. Use other assistive devices as needed. These may include:  Telephone amplifiers and hearing aids that can connect to a television, stereo, radio, or microphone. Devices that use lights or vibrations. These alert you to the doorbell, a ringing telephone, or a baby monitor. Television closed-captioning. This shows the words at the bottom of the screen. Most new TVs can do this. TTY (text telephone). This lets you type messages back and forth on the telephone instead of talking or listening. These devices are also called TDD.  When messages are typed on the keyboard, they are sent over the phone line to a receiving TTY. The message is shown on a monitor. Use pagers, fax machines, text, and email if it is hard for you to communicate by telephone. Try to learn a listening technique called speech-reading. It is not lip-reading. You pay attention to people's gestures, expressions, posture, and tone of voice. These clues can help you understand what a person is saying. Face the person you are talking to, and have him or her face you. Make sure the lighting is good. You need to see the other person's face clearly. Think about counseling if you need help to adjust to your hearing loss. When should you call for help? Watch closely for changes in your health, and be sure to contact your doctor if:    You think your hearing is getting worse. You have new symptoms, such as dizziness or nausea.

## 2022-12-23 RX ORDER — EVOLOCUMAB 140 MG/ML
INJECTION, SOLUTION SUBCUTANEOUS
COMMUNITY
Start: 2022-12-13

## 2022-12-28 ENCOUNTER — HOSPITAL ENCOUNTER (OUTPATIENT)
Dept: WOMENS IMAGING | Age: 67
Discharge: HOME | End: 2022-12-28
Payer: MEDICARE

## 2022-12-28 DIAGNOSIS — R92.8 ABNORMAL MAMMOGRAM OF RIGHT BREAST: ICD-10-CM

## 2022-12-28 PROCEDURE — G0279 TOMOSYNTHESIS, MAMMO: HCPCS

## 2023-01-03 ENCOUNTER — OFFICE VISIT (OUTPATIENT)
Dept: PRIMARY CARE CLINIC | Age: 68
End: 2023-01-03
Payer: MEDICARE

## 2023-01-03 VITALS
RESPIRATION RATE: 18 BRPM | DIASTOLIC BLOOD PRESSURE: 84 MMHG | WEIGHT: 229.8 LBS | TEMPERATURE: 98.1 F | HEIGHT: 62 IN | OXYGEN SATURATION: 97 % | SYSTOLIC BLOOD PRESSURE: 140 MMHG | BODY MASS INDEX: 42.29 KG/M2 | HEART RATE: 84 BPM

## 2023-01-03 DIAGNOSIS — E78.2 MIXED HYPERLIPIDEMIA: ICD-10-CM

## 2023-01-03 DIAGNOSIS — I25.118 CORONARY ARTERY DISEASE OF NATIVE ARTERY OF NATIVE HEART WITH STABLE ANGINA PECTORIS (HCC): ICD-10-CM

## 2023-01-03 DIAGNOSIS — E66.01 OBESITY, CLASS III, BMI 40-49.9 (MORBID OBESITY) (HCC): ICD-10-CM

## 2023-01-03 DIAGNOSIS — G58.9 MONONEUROPATHY: ICD-10-CM

## 2023-01-03 DIAGNOSIS — H90.3 BILATERAL SENSORINEURAL HEARING LOSS: ICD-10-CM

## 2023-01-03 DIAGNOSIS — I10 ESSENTIAL (PRIMARY) HYPERTENSION: Primary | ICD-10-CM

## 2023-01-03 DIAGNOSIS — R73.03 PREDIABETES: ICD-10-CM

## 2023-01-03 PROCEDURE — 3077F SYST BP >= 140 MM HG: CPT

## 2023-01-03 PROCEDURE — 3079F DIAST BP 80-89 MM HG: CPT

## 2023-01-03 PROCEDURE — 99214 OFFICE O/P EST MOD 30 MIN: CPT

## 2023-01-03 PROCEDURE — 4004F PT TOBACCO SCREEN RCVD TLK: CPT

## 2023-01-03 PROCEDURE — 3017F COLORECTAL CA SCREEN DOC REV: CPT

## 2023-01-03 PROCEDURE — 1090F PRES/ABSN URINE INCON ASSESS: CPT

## 2023-01-03 PROCEDURE — 1123F ACP DISCUSS/DSCN MKR DOCD: CPT

## 2023-01-03 PROCEDURE — G8427 DOCREV CUR MEDS BY ELIG CLIN: HCPCS

## 2023-01-03 PROCEDURE — G8417 CALC BMI ABV UP PARAM F/U: HCPCS

## 2023-01-03 PROCEDURE — G8400 PT W/DXA NO RESULTS DOC: HCPCS

## 2023-01-03 PROCEDURE — G8484 FLU IMMUNIZE NO ADMIN: HCPCS

## 2023-01-03 ASSESSMENT — ANXIETY QUESTIONNAIRES
7. FEELING AFRAID AS IF SOMETHING AWFUL MIGHT HAPPEN: 0
4. TROUBLE RELAXING: 0
3. WORRYING TOO MUCH ABOUT DIFFERENT THINGS: 0
2. NOT BEING ABLE TO STOP OR CONTROL WORRYING: 0
1. FEELING NERVOUS, ANXIOUS, OR ON EDGE: 0
6. BECOMING EASILY ANNOYED OR IRRITABLE: 0
5. BEING SO RESTLESS THAT IT IS HARD TO SIT STILL: 0
GAD7 TOTAL SCORE: 0

## 2023-01-03 ASSESSMENT — PATIENT HEALTH QUESTIONNAIRE - PHQ9
4. FEELING TIRED OR HAVING LITTLE ENERGY: 0
SUM OF ALL RESPONSES TO PHQ QUESTIONS 1-9: 0
6. FEELING BAD ABOUT YOURSELF - OR THAT YOU ARE A FAILURE OR HAVE LET YOURSELF OR YOUR FAMILY DOWN: 0
10. IF YOU CHECKED OFF ANY PROBLEMS, HOW DIFFICULT HAVE THESE PROBLEMS MADE IT FOR YOU TO DO YOUR WORK, TAKE CARE OF THINGS AT HOME, OR GET ALONG WITH OTHER PEOPLE: 0
SUM OF ALL RESPONSES TO PHQ QUESTIONS 1-9: 0
2. FEELING DOWN, DEPRESSED OR HOPELESS: 0
9. THOUGHTS THAT YOU WOULD BE BETTER OFF DEAD, OR OF HURTING YOURSELF: 0
1. LITTLE INTEREST OR PLEASURE IN DOING THINGS: 0
5. POOR APPETITE OR OVEREATING: 0
3. TROUBLE FALLING OR STAYING ASLEEP: 0
7. TROUBLE CONCENTRATING ON THINGS, SUCH AS READING THE NEWSPAPER OR WATCHING TELEVISION: 0
SUM OF ALL RESPONSES TO PHQ QUESTIONS 1-9: 0
8. MOVING OR SPEAKING SO SLOWLY THAT OTHER PEOPLE COULD HAVE NOTICED. OR THE OPPOSITE, BEING SO FIGETY OR RESTLESS THAT YOU HAVE BEEN MOVING AROUND A LOT MORE THAN USUAL: 0
SUM OF ALL RESPONSES TO PHQ9 QUESTIONS 1 & 2: 0
SUM OF ALL RESPONSES TO PHQ QUESTIONS 1-9: 0

## 2023-01-03 ASSESSMENT — ENCOUNTER SYMPTOMS: SHORTNESS OF BREATH: 0

## 2023-01-03 NOTE — PROGRESS NOTES
[address] and [facility]idency [facility]                                             [address] [facility], [facility], [address] [place] [address]        Phone: [phone]      Name:  Adelina Betancourt  :    1955    Consultants:   Patient Care Team:  Nanci Mejias DO as PCP - General (Family Medicine)  Nanci Mejias DO as PCP - [facility]  Margarita Toney MD as Surgeon (Orthopedic Surgery)    Chief Complaint:     Adelina Betancourt is a 69 y.o. female  who presents today for an established patient care visit with Personalized Prevention Plan Services as noted below. HPI:     Adelina Betancourt is a 74year old female with past medical history of hypertension, coronary artery disease, peripheral neuropathy, tinnitus, prediabetes, and hyperlipidemia who presents for management of the following conditions:     Toe numbness: Patient reports 12 week history of progressive R great toe numbness. No falls or balance issues. No other sensory deficits noted. Previously had history of bilateral lower extremity neuropathy with workup in August including TSH, ferritin, B12, folate, syphillis, which was negative. Today, she reports new numbness in the base of the second toe that began within the last several days. She has an upcoming appointment with her podiatrist and is working on scheduling her EMG. CAD: Was seen at our office for follow up of hypertensive emergency evaluated at ED. Was then referred to cardiology and underwent cardiac cath on . No interventions at time of cath. Was started on isosorbide mononitrate per cardiology, but only took once due to horrible headache secondary to medication. Following with cardiology for hypertension management as stated below and for cardiovascular risk reduction with aspirin and statin therapy.      HTN: Patient law saw cardiology on  and her dose of losartan was increased to 50mg BID and carvedilol to 12.5 mg BID. She reports home systolic BP in 578U. Denies symptoms of hypertension or hypotension. Hearing loss/tinnitus: Patient established care with ENT on 12/21 and was found to have bilateral sensorineural hearing loss. She is scheduled to get hearing aids this month. Mixed hyperlipidemia: Last lipid panel 11/28/2022 showed , , HDL 43, . Patient is currently on ezetimibe 10mg daily and pravastatin 40mg daily. Repeat lipid panel was ordered by her cardiologist to be completed before their next appointment.     Patient Active Problem List   Diagnosis    Primary osteoarthritis of right knee    Essential (primary) hypertension    Gastro-esophageal reflux disease without esophagitis    Hyperlipidemia    Liver cyst    Old myocardial infarction    Tobacco use    Urinary incontinence    Lumbar radiculopathy    Ptosis of right eyelid    Tick bite    Post-Lyme disease syndrome    Peripheral polyneuropathy    Sciatic neuropathy    Coronary artery dissection    Prediabetes    Coronary artery disease of native artery of native heart with stable angina pectoris [facility])         Past Medical History:    Past Medical History:   Diagnosis Date    Acute laryngitis     Acute laryngitis 1/29/2015    Acute lymphadenitis of face, head and neck     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    Arthritis     Calculus of kidney     Cancer (HCC)     skin    Cystocele, midline     Eustachian tube dysfunction, bilateral     Eustachian tube dysfunction, bilateral 6/15/2016    Gastro-esophageal reflux disease without esophagitis     Generalized anxiety disorder 8/25/2016    Hyperlipidemia     Hypertension     Liver cyst     Neuritis     Neuritis 6/5/2018    Nocturia     Old myocardial infarction     Osteoarthritis     Otalgia     Perennial allergic rhinitis     Pharyngeal inflammation     Primary osteoarthritis of right knee     Tinnitus of vascular origin 6/15/2016    Tinnitus, bilateral     Tobacco use     Urinary frequency     Urinary incontinence     Vaginal enterocele     Vaginal vault 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        Past Surgical History:  Past Surgical History:   Procedure Laterality Date    APPENDECTOMY      CHOLECYSTECTOMY      ELBOW DEBRIDEMENT      FOOT SURGERY      bilateral    HYSTERECTOMY, VAGINAL      IR MIDLINE CATH  11/28/2022    IR MIDLINE CATH 11/28/2022 [facility] SPECIAL PROCEDURES    KNEE ARTHROSCOPY Right 4/24/2019    RIGHT KNEE ARTHROSCOPY,, SYNOVECTOMY CHONDROPLASTY,MEDIAL AND LATERAL MENESECTOMY, REMOVAL LOOSE BODIES performed by Eilleen Apley, MD at [facility]      bilateral    LIVER SURGERY      cyst removal    NECK SURGERY      PAIN MANAGEMENT PROCEDURE Right 11/18/2021    RIGHT LUMBAR FOUR FIVE EPIDURAL STEROID INJECTION SITE CONFIRMED BY FLUOROSCOPY performed by Isaiah Garcia MD at [address] [facility] Right 10/30/2020    VIDEO ARTHROSCOPY RIGHT SHOULDER SUBACROMIAL DECOMPRESSION LABRAL DEBRIDEMENT ROTATOR CUFF TEAR performed by Sera Michelle MD at [address] [place] ARTHROSCOPY      TONSILLECTOMY      TUBAL LIGATION      URETHRA SURGERY      US BREAST LESION REMOVAL LEFT         Home Meds:  Prior to Visit Medications    Medication Sig Taking?  Authorizing Provider   Gail Crespo 246 MG/ML SOAJ INJECT ONE PEN UNDER THE SKIN EVERY 14 DAYS Yes Historical Provider, MD   carvedilol (COREG) 12.5 MG tablet Take 1 tablet by mouth 2 times daily Yes UHBERT Diggs CNP   losartan (COZAAR) 50 MG tablet Take 1 tablet by mouth in the morning and at bedtime Yes HUBERT Diggs CNP   pravastatin (PRAVACHOL) 40 MG tablet TAKE 1 TABLET EVERY DAY Yes Reagan Aschoff Rankin, DO   Nutritional Supplements (VITAMIN D BOOSTER PO) Take by mouth Yes Historical Provider, MD   Omega-3 Fatty Acids (FISH OIL PO) Take by mouth Yes Historical Provider, MD   diclofenac (VOLTAREN) 75 MG EC tablet Take 1 tablet by mouth 2 times daily Yes Viktoriya Monaco MD   ezetimibe (ZETIA) 10 MG tablet Take 1 tablet by mouth daily Yes Viktoriya Monaco MD   Handicap Placard MISC by Does not apply route Yes Viktoriya Monaco MD   ascorbic acid (VITAMIN C) 100 MG tablet Take 100 mg by mouth Yes Historical Provider, MD   vitamin A 3 MG (73837 UT) capsule Take 1 capsule by mouth Yes Historical Provider, MD   Multiple Vitamins-Minerals (MULTIVITAMIN ADULT PO) Take 1 tablet by mouth daily Yes Historical Provider, MD   aspirin 81 MG tablet Take 81 mg by mouth daily. Yes Historical Provider, MD   S-Adenosylmethionine (EFREN-E) 400 MG TABS Take  by mouth.  Yes Historical Provider, MD   ibuprofen (ADVIL;MOTRIN) 600 MG tablet Take 1 tablet by mouth once for 1 dose Take 1 hour prior to Botox procedure  Jasbir Marie MD       Allergies:    Prednisone, Lisinopril, Adhesive tape, Codeine, Crestor [rosuvastatin calcium], Food, Lipitor [atorvastatin calcium], Nitrofurantoin, Penicillins, Sulfa antibiotics, and Tramadol    Family History:       Problem Relation Age of Onset    Breast Cancer Mother     Cancer Mother         Bone    Diabetes Mother     Heart Disease Mother         CHF    Arthritis Mother     Heart Attack Father     Prostate Cancer Brother     Cancer Brother         kidney    Cancer Brother         bladder    Cancer Maternal Grandmother     Breast Cancer Maternal Aunt          Health Maintenance Completed:  Health Maintenance   Topic Date Due    Annual Wellness Visit (AWV)  11/09/2022    A1C test (Diabetic or Prediabetic)  02/01/2023    Flu vaccine (1) 09/20/2023 (Originally 8/1/2022)    Shingles vaccine (2 of 3) 12/06/2023 (Originally 12/29/2015)    COVID-19 Vaccine (1) 09/20/2024 (Originally 6/26/1956)    Breast cancer screen  06/28/2023    Lipids  11/28/2023    Depression Screen  12/06/2023    Colorectal Cancer Screen  12/26/2023    DTaP/Tdap/Td vaccine (2 - Td or Tdap) 07/26/2032    DEXA (modify frequency per FRAX score)  Completed    Pneumococcal 65+ years Vaccine  Completed    Hepatitis C screen  Completed    Hepatitis A vaccine  Aged Out    Hib vaccine  Aged Out    Meningococcal (ACWY) vaccine  Aged Out          Immunization History   Administered Date(s) Administered    Influenza Virus Vaccine 11/03/2015    Influenza, FLUCELVAX, (age 10 mo+), MDCK, MDV, 0.5mL 09/25/2019    Pneumococcal Conjugate 13-valent ([id number]) 09/25/2019    Pneumococcal Polysaccharide ([id number]) 10/10/2020    Tdap (Boostrix, Adacel) 07/26/2022    Zoster Live (Zostavax) 11/03/2015         Review of Systems:  Review of Systems   HENT:  Positive for hearing loss and tinnitus. Respiratory:  Negative for shortness of breath. Cardiovascular:  Negative for chest pain, palpitations and leg swelling. Musculoskeletal:  Negative for myalgias. Neurological:  Positive for numbness (R great toe and second toe). Negative for dizziness, weakness, light-headedness and headaches. Physical Exam:   Vitals:    01/03/23 1435 01/03/23 1523   BP: (!) 152/90 (!) 140/84   Site: Right Upper Arm    Position: Sitting    Cuff Size: Large Adult    Pulse: 84    Resp: 18    Temp: 98.1 °F (36.7 °C)    TempSrc: Oral    SpO2: 97%    Weight: 229 lb 12.8 oz (104.2 kg)    Height: 5' 2\" (1.575 m)      Body mass index is 42.03 kg/m². Wt Readings from Last 3 Encounters:   01/03/23 229 lb 12.8 oz (104.2 kg)   12/21/22 234 lb (106.1 kg)   12/12/22 232 lb (105.2 kg)       BP Readings from Last 3 Encounters:   01/03/23 (!) 140/84   12/21/22 (!) 144/83   12/12/22 (!) 146/88       Physical Exam  Constitutional:       Appearance: Normal appearance. She is obese. HENT:      Head: Normocephalic and atraumatic. Cardiovascular:      Rate and Rhythm: Normal rate and regular rhythm. Heart sounds: Normal heart sounds. Pulmonary:      Effort: Pulmonary effort is normal.      Breath sounds: Normal breath sounds.    Neurological:      Mental Status: She is alert.      Sensory: Sensory deficit (decreased sensation to light touch on base on R great and second toe) present.        Lab Review:   Lab Results   Component Value Date/Time     11/28/2022 09:24 AM    K 3.9 11/28/2022 09:24 AM     11/28/2022 09:24 AM    CO2 26 11/28/2022 09:24 AM    BUN 22 11/28/2022 09:24 AM    CREATININE 0.9 11/28/2022 09:24 AM    GLUCOSE 102 11/28/2022 09:24 AM    CALCIUM 10.2 11/28/2022 09:24 AM     Lab Results   Component Value Date/Time    WBC 9.4 11/28/2022 09:24 AM    HGB 14.1 11/28/2022 09:24 AM    HCT 42.9 11/28/2022 09:24 AM    MCV 86.0 11/28/2022 09:24 AM     11/28/2022 09:24 AM     Lab Results   Component Value Date/Time    CHOL 269 11/28/2022 09:24 AM    TRIG 315 11/28/2022 09:24 AM    HDL 43 11/28/2022 09:24 AM    LDLDIRECT 166 11/28/2022 09:24 AM          Assessment/Plan:  Joselyn Barrett is a 66 year old female with past medical history of hypertension, coronary artery disease, peripheral neuropathy, tinnitus, prediabetes, and hyperlipidemia who presents for management of the following conditions:     Diagnoses and all orders for this visit:    Peripheral mononeuropathy  - Uncontrolled  - Reports neuropathy in R great and 2nd toe, sensory deficits noted on exam  - No issues with balance, motor weakness  - Prior workup for LE extremity neuropathy in 8/2022 including ferritin, TSH, B12, folate unremarkable  - Placed order for EMG, patient to schedule  - A1C ordered  - RTC in 3 months to discuss EMG results    Essential (primary) hypertension  - Uncontrolled  - BP today 140/84  - Home BP usually controlled in systolic 130s  - Following with cardiology, increased losartan to 50mg BID and carvedilol to 12.5 mg BID on 12/12  - Discussed DASH diet and exercise recommendations including 150 minutes of moderate intensity physical activity per week  - Discussed interventions to increase medication compliance including setting alarm on phone and keeping pill organizer  filled and by   - RTC in 3 month for chronic conditions, continue follow up with cardiology     Prediabetes  - Uncontrolled  - Last A1c 5.9 in 2/2022  - Repeat ordered in setting of worsening peripheral neuropathy as potential cause  - Discussed dietary interventions including DASH diet for concurrent hypertension, recommended 150 minutes of moderate intensity physical activity per week  - RTC in 3 months to discuss lab results     Bilateral sensorineural hearing loss, chronic tinnitus  - Uncontrolled  - Evaluated by ENT in Dec 2022  - Scheduled for hearing aid placement  - RTC in 3 month to discuss hearing improvement     Coronary artery disease of native artery of native heart with stable angina pectoris ([facility] [address]  - Uncontrolled  - S/p cardiac cath with Dr. Janet Castillo on 11/28, no intervention at that time  - Currently on losartan 50mg BID and carvedilol 12.5 mg BID  - Last lipid panel 11/28: , , HDL 43,   - Continue ezetimibe, pravastatin 40mg   - Patient is to continue follow up with cardiology, discuss pcsk9 inhibitor in future   - RTC in 3 months to discuss dietary habits, medication     Obesity, Class III, BMI 42  - Uncontrolled  - Given handout for DASH diet  - Recommend 150 minutes of moderate intensity exercise weekly  - Is working on developing an exercise routine with walking and using Silver Sneakers at [address] [place] [address] in 3 months to discuss chronic conditions    Health Maintenance  - Last mammogram in 2022, diagnostic ordered for evaluation of progressing calcifications found to be benign; routine follow up in 1 year per ACR guidelines   - Declines influenza vaccine    Health Maintenance Due:  Health Maintenance Due   Topic Date Due    Annual Wellness Visit (AWV)  11/09/2022    A1C test (Diabetic or Prediabetic)  02/01/2023          Health care decision maker:  up to date:  already done      Health Maintenance: (USPSTF Recommendations)  (F) Breast Cancer Screen: (40-49 (C), 50-74 biennial screening mammogram (B))  (F) Cervical Cancer Screen: (21-29 q3yr cytology alone; 30-65 q3yr cytology alone, q5yr with hrHPV alone, or q5yr cytology+hrHPV (A))  CRC/Colonoscopy Screening: (adults 45-49 (B), 50-75 (A))  Lung Ca Screening: Annual LDCT (+smoker age 49-80, smoked within 15 years, total of 20 pack yr history (B)):  DEXA Screen: (women >65 and older, <65 if at risk/postmenopausal (B))  HIV Screen: (16-65 yr old, and all pregnant patients (A)): Hep C Screen: (18-79 yr old (B)):  HCC Screen: (all pts with cirrhosis and high risk Hep B (US q6 mo)):  Immunizations:    RTC:  Return in about 3 months (around 4/3/2023). EMR Dragon/transcription disclaimer:  Much of this encounter note is electronic transcription/translation of spoken language to printed texts. The electronic translation of spoken language may be erroneous, or at times, nonsensical words or phrases may be inadvertently transcribed.   Although I have reviewed the note for such errors, some may still exist.

## 2023-01-04 RX ORDER — CARVEDILOL 6.25 MG/1
TABLET ORAL
Qty: 60 TABLET | Refills: 0 | Status: SHIPPED | OUTPATIENT
Start: 2023-01-04

## 2023-01-04 NOTE — TELEPHONE ENCOUNTER
Refill Request       Last Seen: Last Seen Department: 1/3/2023  Last Seen by PCP: 1/3/2023    Last Written: 12/12/22, by cardiology    Next Appointment:   Future Appointments   Date Time Provider Abhishek Velasquezi   1/11/2023  9:30 AM [address] AND AUDIO [facility]   1/18/2023 10:30 AM Vanesa Alatorre, APRN - CNP Valorie Mai [facility]   3/27/2023 10:30 AM Rohan Persaud DO [facility] AND RES [facility]   5/9/2023  1:45 PM MD Nadege Mckeon [facility]       Future appointment scheduled      Requested Prescriptions     Pending Prescriptions Disp Refills    carvedilol (COREG) 6.25 MG tablet [Pharmacy Med Name: Carvedilol 6.25 MG Oral Tablet] 60 tablet 0     Sig: Take 1 tablet by mouth twice daily

## 2023-01-05 ENCOUNTER — CLINICAL DOCUMENTATION (OUTPATIENT)
Dept: SPIRITUAL SERVICES | Age: 68
End: 2023-01-05

## 2023-01-05 NOTE — PROGRESS NOTES
Advance Care Planning   Advance Care Planning Note  Ambulatory Spiritual Care Services    Date:  1/5/2023    Received request from [facility] Provider. Consultation conversation participants:   Patient who understands ACP conversation     Goals of ACP Conversation:  Discuss advance care planning documents  Facilitate a discussion related to patient's goals of care as they align with the patient's values and beliefs. Health Care Decision Makers:      Primary Decision Maker: Natty Devon - Child - [phone]    Secondary Decision Maker: Alison Moreau Child - [phone]   Summary:  [place]    Advance Care Planning Documents (Patient Wishes)  Currently on file:   None    Assessment: This very pleasant pt returned this 's voicemail. I had a pleasant chat with her and was able to document her health care wishes regarding hospitalization, ventilation and CPR, as well as her health care proxies. The pt has also agreed to meet me after her [facility] Aud[facility] appointment at 1045hrs in that office to complete her advance directives on 1/11/23. Pt also did some life review and shared that she is , active at her Buddhism and has good support from her son and stepdaughter. Interventions:  Provided education on documents for clarity and greater understanding  Discussed and provided education on state decision maker hierarchy  Encouraged ongoing ACP conversation with future decision makers and loved ones    Care Preferences Communicated:     Hospitalization:  If the patient's health worsens and it becomes clear that the chance of recovery is unlikely,     the patient prefers comfort-focused treatment without hospitalization.     Ventilation:   If the patient, in their present state of health, suddenly became very ill and unable to breathe on their own,     the patient would desire the use of a ventilator (breathing machine). If their health worsens and it becomes clear that the change of recovery is unlikely,     the patient would NOT desire the use of a ventilator (breathing machine). Resuscitation:  In the event the patient's heart stopped as a result of an underlying serious health condition, the patient communicates a preference for      resuscitative attempts (CPR). Outcomes:  ACP Discussion: Postponed    Patient / Healthcare Decision Maker Instructions: Follow up with  to continue their ACP conversation.     Electronically signed by EDMUND [address]  on 1/5/2023 at 4:28 PM.

## 2023-01-05 NOTE — ACP (ADVANCE CARE PLANNING)
Advance Care Planning   Ambulatory ACP Specialist Patient Outreach    Date:  1/5/2023  ACP Specialist:  Diann Simmons    Outreach call to patient in follow-up to ACP Specialist referral from: Noble Gaona DO    [x] PCP  [] Provider   [] Ambulatory Care Management [] Other for Reason:    [x] Advance Directive Assistance  [] Code Status Discussion  [] Complete Portable DNR Order  [] Discuss Goals of Care  [] Complete POST/MOST  [] Early ACP Decision-Making  [] Other    Date Referral Received:12/6/22    Today's Outreach:  [x] First   [] Second  [] Third                               Third outreach made by []  phone  [] email []   Pressgramt     Intervention:  [] Spoke with Patient  [x] Left VM requesting return call      Outcome: Outpatient Spiritual Care Services (SCS) team member attempted telephone call to patient. There was no answer and voice message was left encouraging patient to contact Outpatient SCS. Contact information for Outpatient SCS provided. Next Step:   [] ACP scheduled conversation  [x] Outreach again in one week               [] Email / Mail ACP Info Sheets  [] Email / Mail Advance Directive            [] Close Referral. Routing closure to referring provider/staff and to ACP Specialist . [] Closure Letter mailed to Patient with Invitation to Contact ACP Specialist if/when ready.     Thank you for this referral.

## 2023-01-11 ENCOUNTER — CLINICAL DOCUMENTATION (OUTPATIENT)
Dept: SPIRITUAL SERVICES | Age: 68
End: 2023-01-11

## 2023-01-11 ENCOUNTER — PROCEDURE VISIT (OUTPATIENT)
Dept: AUDIOLOGY | Age: 68
End: 2023-01-11

## 2023-01-11 DIAGNOSIS — H90.3 SENSORINEURAL HEARING LOSS, BILATERAL: Primary | ICD-10-CM

## 2023-01-11 DIAGNOSIS — H93.13 TINNITUS, BILATERAL: ICD-10-CM

## 2023-01-11 PROCEDURE — 99999 PR OFFICE/OUTPT VISIT,PROCEDURE ONLY: CPT | Performed by: AUDIOLOGIST

## 2023-01-11 NOTE — ACP (ADVANCE CARE PLANNING)
Advance Care Planning   Advance Care Planning Note  Ambulatory Spiritual Care Services    Date:  1/11/2023    Received request from [facility] Provider. Consultation conversation participants:   Patient who understands ACP conversation     Goals of ACP Conversation:  Discuss advance care planning documents  Facilitate a discussion related to patient's goals of care as they align with the patient's values and beliefs. Health Care Decision Makers:      Primary Decision Maker: James Jameson - Child - [phone]    Secondary Decision Maker: Arnuzhat Silvianobrennonradha Norbert Prieto Child - [phone]   Summary:  Completed [address]    Advance Care Planning Documents (Patient Wishes)  Currently on file:   Healthcare Power of /Advance Directive Appointment of Health Care Agent  Living Will/Advance Directive    Assessment: This very pleasant pt met this  after her audiology visit in their office and completed her medical POA and Living Will paperwork. She was grateful for the assistance. Interventions:  Provided education on documents for clarity and greater understanding  Assisted in the completion of documents according to patient's wishes at this time  Encouraged ongoing ACP conversation with future decision makers and loved ones    Care Preferences Communicated:   Care preferences were documented at last tele-visit with this . Outcomes:  ACP Discussion: Completed  New advance directive completed. Returned original document(s) to patient, as well as copies for distribution to appointed agents  Copy of advance directive given to staff to scan into medical record. Routed ACP note to attending provider or other IDT member.   Teach Back Method used to verify the patient's and/or Healthcare Decision Maker's understanding of key information in the advance directive documents    Patient / Torrey Zepeda Instructions:  Review completed ACP document(s) and update, if needed, with changes health or future preferences    Electronically signed by EDMUND Silva, MDiv, [facility]  on 1/11/2023 at 3:23 PM.

## 2023-01-23 ENCOUNTER — OFFICE VISIT (OUTPATIENT)
Dept: PRIMARY CARE CLINIC | Age: 68
End: 2023-01-23

## 2023-01-23 ENCOUNTER — TELEPHONE (OUTPATIENT)
Dept: PRIMARY CARE CLINIC | Age: 68
End: 2023-01-23

## 2023-01-23 VITALS
HEART RATE: 85 BPM | DIASTOLIC BLOOD PRESSURE: 78 MMHG | WEIGHT: 233.6 LBS | SYSTOLIC BLOOD PRESSURE: 124 MMHG | RESPIRATION RATE: 22 BRPM | HEIGHT: 62 IN | BODY MASS INDEX: 42.99 KG/M2 | TEMPERATURE: 98.3 F | OXYGEN SATURATION: 97 %

## 2023-01-23 DIAGNOSIS — N23 RENAL COLIC ON RIGHT SIDE: Primary | ICD-10-CM

## 2023-01-23 DIAGNOSIS — I10 ESSENTIAL (PRIMARY) HYPERTENSION: ICD-10-CM

## 2023-01-23 LAB
BACTERIA: ABNORMAL /HPF
BILIRUBIN URINE: NEGATIVE
BILIRUBIN, POC: NEGATIVE
BLOOD URINE, POC: ABNORMAL
BLOOD, URINE: NEGATIVE
CLARITY, POC: CLEAR
CLARITY: CLEAR
COLOR, POC: YELLOW
COLOR: ABNORMAL
EPITHELIAL CELLS, UA: 7 /HPF (ref 0–5)
GLUCOSE URINE, POC: NEGATIVE
GLUCOSE URINE: NEGATIVE MG/DL
HYALINE CASTS: 1 /LPF (ref 0–8)
KETONES, POC: NEGATIVE
KETONES, URINE: NEGATIVE MG/DL
LEUKOCYTE EST, POC: ABNORMAL
LEUKOCYTE ESTERASE, URINE: ABNORMAL
MICROSCOPIC EXAMINATION: YES
NITRITE, POC: NEGATIVE
NITRITE, URINE: NEGATIVE
PH UA: 6 (ref 5–8)
PH, POC: 6
PROTEIN UA: 30 MG/DL
PROTEIN, POC: ABNORMAL
RBC UA: 8 /HPF (ref 0–4)
SPECIFIC GRAVITY UA: 1.02 (ref 1–1.03)
SPECIFIC GRAVITY, POC: 1.02
URINE TYPE: ABNORMAL
UROBILINOGEN, POC: ABNORMAL
UROBILINOGEN, URINE: 0.2 E.U./DL
WBC UA: 2 /HPF (ref 0–5)

## 2023-01-23 RX ORDER — KETOROLAC TROMETHAMINE 30 MG/ML
60 INJECTION, SOLUTION INTRAMUSCULAR; INTRAVENOUS ONCE
Qty: 2 ML | Refills: 0
Start: 2023-01-23 | End: 2023-01-23

## 2023-01-23 RX ORDER — CLINDAMYCIN PHOSPHATE 10 UG/ML
LOTION TOPICAL
COMMUNITY
Start: 2023-01-11

## 2023-01-23 SDOH — ECONOMIC STABILITY: TRANSPORTATION INSECURITY
IN THE PAST 12 MONTHS, HAS LACK OF TRANSPORTATION KEPT YOU FROM MEETINGS, WORK, OR FROM GETTING THINGS NEEDED FOR DAILY LIVING?: NO

## 2023-01-23 SDOH — ECONOMIC STABILITY: TRANSPORTATION INSECURITY
IN THE PAST 12 MONTHS, HAS THE LACK OF TRANSPORTATION KEPT YOU FROM MEDICAL APPOINTMENTS OR FROM GETTING MEDICATIONS?: NO

## 2023-01-23 ASSESSMENT — PATIENT HEALTH QUESTIONNAIRE - PHQ9
SUM OF ALL RESPONSES TO PHQ QUESTIONS 1-9: 0
SUM OF ALL RESPONSES TO PHQ QUESTIONS 1-9: 0
SUM OF ALL RESPONSES TO PHQ9 QUESTIONS 1 & 2: 0
SUM OF ALL RESPONSES TO PHQ QUESTIONS 1-9: 0
1. LITTLE INTEREST OR PLEASURE IN DOING THINGS: 0
2. FEELING DOWN, DEPRESSED OR HOPELESS: 0
SUM OF ALL RESPONSES TO PHQ QUESTIONS 1-9: 0

## 2023-01-23 NOTE — TELEPHONE ENCOUNTER
Pt states that she thinks her kidney stones are popping up again. Symptoms:   Right lower back/waist pain that goes down around her hip and is now going around to her stomach/abdomin. No blood in urine    Pt states it is different this time that it is a Dull continuous pain that last a few days and then goes away. Normally her pain is very sharp & takes her to her knees.         Pt scheduled for this afternoon with

## 2023-01-23 NOTE — PATIENT INSTRUCTIONS
You will go to the lab and get urine collected so that a urinalysis and urine culture can be done  You will get imaging done - CT scan without contrast  Return to clinic within a week for follow up regarding the results of the labs and imaging. We will then decide the next course of action at that point, depending on the results and how you are feeling.

## 2023-01-23 NOTE — PROGRESS NOTES
[address] [facility]                                             [address] [facility], [facility], [address] [place] [address]        Phone: [phone]      Name:  Binh Tang  :    1955    Consultants:   Patient Care Team:  Vignesh He DO as PCP - General (Family Medicine)  Citrus NurseDO [facility] PCP [facility]  Nishant Dunlap MD as Surgeon (Orthopedic Surgery)    Chief Complaint:     Binh Tang is a 69 y.o. female  who presents today for an established patient care visit with Personalized Prevention Plan Services as noted below. HPI:     Ms. Jenna Basurto is a 68 yo female who has a past medical history of Artery dissection ([address] Arthritis, Calculus of kidney, Cancer ([address] Cystocele, Gastro-esophageal reflux disease without esophagitis, Generalized anxiety disorder, Hyperlipidemia, Hypertension, Tobacco use, Urinary frequency, Urinary incontinence, Vaginal enterocele, and Vaginal vault 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. She is here today with a chief complaint of 3 days of right flank pain.     Renal colic on right side  -reports since Saturday morning, her right flank has had dull constant pain  -this pain radiates down right hip and across right suprapubic region  -denies hematuria, dysuria, nausea, vomiting, abdominal pain, intolerance to keep down food after eating  -reports inability to lay on left side, and says today is first time sitting wont relieve it and now no position helps  -tried advil 800 mg without relief, takes diclofenac 75 mg bid for arthritis - this combination still gave no relief  -reports that she did have had lithotripsy twice --- the last time was 6 or 7 years ago   -she says the previous nephrolithiasis-induced pain from that episode was a sharp pain that shot to her knees, whereas this is dull and constant    Essential (primary) hypertension  -believes she may be eating too much salt in her diet  -is on carvedilol 12.5 mg bid, losartan 50 mg nightly  -no complaints of hypotension or other side effects from medications        Patient Active Problem List   Diagnosis    Primary osteoarthritis of right knee    Essential (primary) hypertension    Gastro-esophageal reflux disease without esophagitis    Hyperlipidemia    Liver cyst    Old myocardial infarction    Tinnitus of both ears    Tobacco use    Urinary incontinence    Lumbar radiculopathy    Ptosis of right eyelid    Tick bite    Post-Lyme disease syndrome    Peripheral polyneuropathy    Sciatic neuropathy    Coronary artery dissection    Prediabetes    Coronary artery disease of native artery of native heart with stable angina pectoris (HCC)    Bilateral sensorineural hearing loss         Past Medical History:    Past Medical History:   Diagnosis Date    Acute laryngitis     Acute laryngitis 1/29/2015    Acute lymphadenitis of face, head and neck     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    Arthritis     Calculus of kidney     Cancer (HCC)     skin    Cystocele, midline     Eustachian tube dysfunction, bilateral     Eustachian tube dysfunction, bilateral 6/15/2016    Gastro-esophageal reflux disease without esophagitis     Generalized anxiety disorder 8/25/2016    Hyperlipidemia     Hypertension     Liver cyst     Neuritis     Neuritis 6/5/2018    Nocturia     Old myocardial infarction     Osteoarthritis     Otalgia     Perennial allergic rhinitis     Pharyngeal inflammation     Primary osteoarthritis of right knee     Tinnitus of vascular origin 6/15/2016    Tinnitus, bilateral     Tobacco use     Urinary frequency     Urinary incontinence     Vaginal enterocele     Vaginal vault 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        Past Surgical History:  Past Surgical History:   Procedure Laterality Date    APPENDECTOMY      CHOLECYSTECTOMY      ELBOW DEBRIDEMENT      FOOT SURGERY bilateral    HYSTERECTOMY, VAGINAL      IR MIDLINE CATH  11/28/2022    IR MIDLINE CATH 11/28/2022 [place] SPECIAL PROCEDURES    KNEE ARTHROSCOPY Right 4/24/2019    RIGHT KNEE ARTHROSCOPY,, SYNOVECTOMY CHONDROPLASTY,MEDIAL AND LATERAL MENESECTOMY, REMOVAL LOOSE BODIES performed by Vignesh Jordan MD at [facility]      bilateral    LIVER SURGERY      cyst removal    NECK SURGERY      PAIN MANAGEMENT PROCEDURE Right 11/18/2021    RIGHT LUMBAR FOUR FIVE EPIDURAL STEROID INJECTION SITE CONFIRMED BY FLUOROSCOPY performed by Duane Dolores, MD at [address] [facility] Right 10/30/2020    VIDEO ARTHROSCOPY RIGHT SHOULDER SUBACROMIAL DECOMPRESSION LABRAL DEBRIDEMENT ROTATOR CUFF TEAR performed by Kay Marks MD at [address] [place] ARTHROSCOPY      TONSILLECTOMY      TUBAL LIGATION      URETHRA SURGERY      US BREAST LESION REMOVAL LEFT         Home Meds:  Prior to Visit Medications    Medication Sig Taking?  Authorizing Provider   clindamycin (CLEOCIN T) 1 % lotion APPLY LOTION TOPICALLY TO AFFECTED AREA TWICE DAILY (DO NOT APPLY TO OPEN AREAS) Yes Historical Provider, MD   ketorolac (TORADOL) 60 MG/2ML injection Inject 2 mLs into the muscle once for 1 dose Yes Alexis Li,    REPATHA SURECLICK 821 MG/ML SOAJ INJECT ONE PEN UNDER THE SKIN EVERY 14 DAYS Yes Historical Provider, MD   carvedilol (COREG) 12.5 MG tablet Take 1 tablet by mouth 2 times daily Yes HUBERT Eli CNP   losartan (COZAAR) 50 MG tablet Take 1 tablet by mouth in the morning and at bedtime Yes HUBERT Eli CNP   pravastatin (PRAVACHOL) 40 MG tablet TAKE 1 TABLET EVERY DAY Yes Deepti Wagner,    Nutritional Supplements (VITAMIN D BOOSTER PO) Take by mouth Yes Historical Provider, MD   Omega-3 Fatty Acids (FISH OIL PO) Take by mouth Yes Historical Provider, MD   diclofenac (VOLTAREN) 75 MG EC tablet Take 1 tablet by mouth 2 times daily Yes James Ponce MD ezetimibe (ZETIA) 10 MG tablet Take 1 tablet by mouth daily Yes Ayla Benton MD   Handicap Placard MISC by Does not apply route Yes Ayla Benton MD   ascorbic acid (VITAMIN C) 100 MG tablet Take 100 mg by mouth Yes Historical Provider, MD   vitamin A 3 MG (84996 UT) capsule Take 1 capsule by mouth Yes Historical Provider, MD   Multiple Vitamins-Minerals (MULTIVITAMIN ADULT PO) Take 1 tablet by mouth daily Yes Historical Provider, MD   aspirin 81 MG tablet Take 81 mg by mouth daily. Yes Historical Provider, MD   S-Adenosylmethionine (EFREN-E) 400 MG TABS Take  by mouth.  Yes Historical Provider, MD   ibuprofen (ADVIL;MOTRIN) 600 MG tablet Take 1 tablet by mouth once for 1 dose Take 1 hour prior to Botox procedure  Mar Mckeon MD       Allergies:    Prednisone, Lisinopril, Adhesive tape, Codeine, Crestor [rosuvastatin calcium], Food, Lipitor [atorvastatin calcium], Nitrofurantoin, Penicillins, Sulfa antibiotics, and Tramadol    Family History:       Problem Relation Age of Onset    Breast Cancer Mother     Cancer Mother         Bone    Diabetes Mother     Heart Disease Mother         CHF    Arthritis Mother     Heart Attack Father     Prostate Cancer Brother     Cancer Brother         kidney    Cancer Brother         bladder    Cancer Maternal Grandmother     Breast Cancer Maternal Aunt          Health Maintenance Completed:  Health Maintenance   Topic Date Due    Annual Wellness Visit (AWV)  11/09/2022    A1C test (Diabetic or Prediabetic)  02/01/2023    Flu vaccine (1) 09/20/2023 (Originally 8/1/2022)    Shingles vaccine (2 of 3) 12/06/2023 (Originally 12/29/2015)    COVID-19 Vaccine (1) 09/20/2024 (Originally 6/26/1956)    Breast cancer screen  06/28/2023    Lipids  11/28/2023    Colorectal Cancer Screen  12/26/2023    Depression Screen  01/03/2024    DTaP/Tdap/Td vaccine (2 - Td or Tdap) 07/26/2032    DEXA (modify frequency per FRAX score)  Completed    Pneumococcal 65+ years Vaccine Completed    Hepatitis C screen  Completed    Hepatitis A vaccine  Aged Out    Hib vaccine  Aged Out    Meningococcal (ACWY) vaccine  Aged Out          Immunization History   Administered Date(s) Administered    Influenza Virus Vaccine 11/03/2015    Influenza, FLUCELVAX, (age 10 mo+), MDCK, MDV, 0.5mL 09/25/2019    Pneumococcal Conjugate 13-valent ([id number]) 09/25/2019    Pneumococcal Polysaccharide ([id number]) 10/10/2020    Tdap (Boostrix, Adacel) 07/26/2022    Zoster Live (Zostavax) 11/03/2015         Review of Systems:  Review of Systems   All other systems reviewed and are negative. Physical Exam:   Vitals:    01/23/23 1359 01/23/23 1544   BP: (!) 162/92 124/78   Site: Left Upper Arm Left Upper Arm   Position: Sitting Sitting   Cuff Size: Large Adult Large Adult   Pulse: 85    Resp: 22    Temp: 98.3 °F (36.8 °C)    TempSrc: Temporal    SpO2: 97%    Weight: 233 lb 9.6 oz (106 kg)    Height: 5' 2\" (1.575 m)      Body mass index is 42.73 kg/m².     Wt Readings from Last 3 Encounters:   01/23/23 233 lb 9.6 oz (106 kg)   01/03/23 229 lb 12.8 oz (104.2 kg)   12/21/22 234 lb (106.1 kg)       BP Readings from Last 3 Encounters:   01/23/23 124/78   01/03/23 (!) 140/84   12/21/22 (!) 144/83       Physical Examination:   General appearance - oriented to person, place, and time, overweight, and in mild to moderate distress  Mental status - normal mood, behavior, speech, dress, motor activity, and thought processes  Chest - clear to auscultation, no wheezes, rales or rhonchi, symmetric air entry  Heart - normal rate, regular rhythm, normal S1, S2, no murmurs, rubs, clicks or gallops  Abdomen - soft, nontender, nondistended, no masses or organomegaly  Back exam - full range of motion, tender to palpation on right CVA    Lab Review:   Office Visit on 12/09/2022   Component Date Value    Color, UA 12/09/2022 yellow     Clarity, UA 12/09/2022 clear     Glucose, UA POC 12/09/2022 neg     Bilirubin, UA 12/09/2022 neg Ketones, UA 12/09/2022 neg     Spec Grav, UA 12/09/2022 1.015     Blood, UA POC 12/09/2022 neg     pH, UA 12/09/2022 6.5     Protein, UA POC 12/09/2022 neg     Urobilinogen, UA 12/09/2022 neg     Leukocytes, UA 12/09/2022 neg     Nitrite, UA 12/09/2022 neg    Admission on 11/28/2022, Discharged on 11/28/2022   Component Date Value    WBC 11/28/2022 9.4     RBC 11/28/2022 4.99     Hemoglobin 11/28/2022 14.1     Hematocrit 11/28/2022 42.9     MCV 11/28/2022 86.0     MCH 11/28/2022 28.3     MCHC 11/28/2022 32.9     RDW 11/28/2022 16.7 (A)     Platelets 56/02/5492 192     MPV 11/28/2022 8.1     Sodium 11/28/2022 140     Potassium reflex Magnesi* 11/28/2022 3.9     Chloride 11/28/2022 105     CO2 11/28/2022 26     Anion Gap 11/28/2022 9     Glucose 11/28/2022 102 (A)     BUN 11/28/2022 22 (A)     Creatinine 11/28/2022 0.9     Est, Glom Filt Rate 11/28/2022 >60     Calcium 11/28/2022 10.2     Cholesterol, Total 11/28/2022 269 (A)     Triglycerides 11/28/2022 315 (A)     HDL 11/28/2022 43     LDL Calculated 11/28/2022 see below     VLDL Cholesterol Calcula* 11/28/2022 see below     Protime 11/28/2022 13.2     INR 11/28/2022 1.01     Ventricular Rate 11/28/2022 70     Atrial Rate 11/28/2022 70     P-R Interval 11/28/2022 178     QRS Duration 11/28/2022 96     Q-T Interval 11/28/2022 420     QTc Calculation (Bazett) 11/28/2022 453     P Axis 11/28/2022 48     R Axis 11/28/2022 -11     T Axis 11/28/2022 95     Diagnosis 11/28/2022                      Value:Normal sinus rhythmModerate voltage criteria for LVH, may be normal variantPoor R wave progressionT wave abnormality, consider lateral ischemiaAbnormal ECGWhen compared with ECG of 14-NOV-2022 09:12,No significant change was foundConfirmed by [facility] ([id number]) on 11/28/2022 12:08:30 PM      Left Ventricular Ejectio* 11/28/2022 60     LVEF MODALITY 11/28/2022 CATH     POC ACT LR 11/28/2022 277     POC ACT LR 11/28/2022 266     POC ACT LR 11/28/2022 349     LDL Direct 11/28/2022 166 (A)    Procedure visit on 11/15/2022   Component Date Value    Color, UA 11/15/2022 yellow     Clarity, UA 11/15/2022 clear     Glucose, UA POC 11/15/2022 neg     Bilirubin, UA 11/15/2022 neg     Ketones, UA 11/15/2022 neg     Spec Grav, UA 11/15/2022 1.015     Blood, UA POC 11/15/2022 neg     pH, UA 11/15/2022 6.5     Protein, UA POC 11/15/2022 neg     Urobilinogen, UA 11/15/2022 neg     Leukocytes, UA 11/15/2022 neg     Nitrite, UA 11/15/2022 neg    Admission on 11/14/2022, Discharged on 11/14/2022   Component Date Value    WBC 11/14/2022 7.6     RBC 11/14/2022 4.86     Hemoglobin 11/14/2022 13.7     Hematocrit 11/14/2022 41.5     MCV 11/14/2022 85.5     MCH 11/14/2022 28.3     MCHC 11/14/2022 33.1     RDW 11/14/2022 16.5 (A)     Platelets 96/42/7796 183     MPV 11/14/2022 7.9     Sodium 11/14/2022 139     Potassium reflex Magnesi* 11/14/2022 3.6     Chloride 11/14/2022 105     CO2 11/14/2022 24     Anion Gap 11/14/2022 10     Glucose 11/14/2022 102 (A)     BUN 11/14/2022 21 (A)     Creatinine 11/14/2022 0.7     Est, Glom Filt Rate 11/14/2022 >60     Calcium 11/14/2022 9.2     Cholesterol, Total 11/14/2022 223 (A)     Triglycerides 11/14/2022 313 (A)     HDL 11/14/2022 36 (A)     LDL Calculated 11/14/2022 see below     VLDL Cholesterol Calcula* 11/14/2022 see below     Protime 11/14/2022 13.0     INR 11/14/2022 0.99     Ventricular Rate 11/14/2022 66     Atrial Rate 11/14/2022 66     P-R Interval 11/14/2022 182     QRS Duration 11/14/2022 94     Q-T Interval 11/14/2022 430     QTc Calculation (Bazett) 11/14/2022 450     P Axis 11/14/2022 50     R [place] 11/14/2022 -13     T Axis 11/14/2022 85     Diagnosis 11/14/2022                      Value:Normal sinus rhythmModerate voltage criteria for LVH with repolarization abnormalitySlow R wave progression in precordial leadsAbnormal ECGWhen compared with ECG of 14-SEP-2022 18:35,No significant change was foundConfirmed by Deiis Lovell ([id number]) on 11/14/2022 11:46:38 AM      LDL Direct 11/14/2022 125 (A)    Hospital Outpatient Visit on 11/05/2022   Component Date Value    Left Ventricular Ejectio* 11/05/2022 54     LVEF MODALITY 11/05/2022 Nuclear    Admission on 09/14/2022, Discharged on 09/14/2022   Component Date Value    WBC 09/14/2022 14.8 (A)     RBC 09/14/2022 5.19     Hemoglobin 09/14/2022 14.6     Hematocrit 09/14/2022 44.4     MCV 09/14/2022 85.5     MCH 09/14/2022 28.0     MCHC 09/14/2022 32.8     RDW 09/14/2022 16.2 (A)     Platelets 96/24/0649 219     MPV 09/14/2022 8.3     Neutrophils % 09/14/2022 87.7     Lymphocytes % 09/14/2022 9.4     Monocytes % 09/14/2022 2.7     Eosinophils % 09/14/2022 0.0     Basophils % 09/14/2022 0.2     Neutrophils Absolute 09/14/2022 13.0 (A)     Lymphocytes Absolute 09/14/2022 1.4     Monocytes Absolute 09/14/2022 0.4     Eosinophils Absolute 09/14/2022 0.0     Basophils Absolute 09/14/2022 0.0     Sodium 09/14/2022 135 (A)     Potassium reflex Magnesi* 09/14/2022 4.2     Chloride 09/14/2022 101     CO2 09/14/2022 20 (A)     Anion Gap 09/14/2022 14     Glucose 09/14/2022 122 (A)     BUN 09/14/2022 22 (A)     Creatinine 09/14/2022 0.8     GFR Non- 09/14/2022 >60     GFR  09/14/2022 >60     Calcium 09/14/2022 10.9 (A)     Total Protein 09/14/2022 8.0     Albumin 09/14/2022 4.6     Albumin/Globulin Ratio 09/14/2022 1.4     Total Bilirubin 09/14/2022 <0.2     Alkaline Phosphatase 09/14/2022 94     ALT 09/14/2022 42 (A)     AST 09/14/2022 23     Color, UA 09/14/2022 Yellow     Clarity, UA 09/14/2022 Clear     Glucose, Ur 09/14/2022 Negative     Bilirubin Urine 09/14/2022 Negative     Ketones, Urine 09/14/2022 Negative     Specific Gravity, UA 09/14/2022 1.020     Blood, Urine 09/14/2022 TRACE-INTACT (A)     pH, UA 09/14/2022 6.0     Protein, UA 09/14/2022 30 (A)     Urobilinogen, Urine 09/14/2022 0.2     Nitrite, Urine 09/14/2022 Negative     Leukocyte Esterase, Urine 09/14/2022 Negative Microscopic Examination 09/14/2022 YES     Urine Type 09/14/2022 NotGiven     Urine Reflex to Culture 09/14/2022 Not Indicated     Troponin 09/14/2022 <0.01     Ventricular Rate 09/14/2022 83     Atrial Rate 09/14/2022 83     P-R Interval 09/14/2022 190     QRS Duration 09/14/2022 94     Q-T Interval 09/14/2022 390     QTc Calculation (Bazett) 09/14/2022 458     P Axis 09/14/2022 68     R Axis 09/14/2022 -6     T Axis 09/14/2022 58     Diagnosis 09/14/2022 Normal sinus rhythmMinimal voltage criteria for LVH, may be normal variantBorderline ECGWhen compared with ECG of 11-JAN-2013 15:54,No significant change was foundConfirmed by Chary Enriquez MD, [address] ([id number]) on 9/15/2022 7:51:51 AM     Hyaline Casts, UA 09/14/2022 0-2     Mucus, UA 09/14/2022 1+ (A)     WBC, UA 09/14/2022 0-2     RBC, UA 09/14/2022 5-10 (A)     Epithelial Cells, UA 09/14/2022 2-5     Renal Epithelial, UA 09/14/2022 0-1    Hospital Outpatient Visit on 08/26/2022   Component Date Value    Cholesterol, Total 08/26/2022 309 (A)     Triglycerides 08/26/2022 445 (A)     HDL 08/26/2022 38 (A)     LDL Calculated 08/26/2022 see below     VLDL Cholesterol Calcula* 08/26/2022 see below     Ferritin 08/26/2022 29.7     Vitamin B-12 08/26/2022 694     Folate 08/26/2022 13.87     Vit D, 25-Hydroxy 08/26/2022 35.0     Total Syphillis IgG/IgM 08/26/2022 Non-Reactive     LDL Direct 08/26/2022 177 (A)           Assessment/Plan:  Ivonne Johnson was seen today for flank pain. Diagnoses and all orders for this visit:    Renal colic on right side  -     POCT Urinalysis no Micro  -     AZ KETOROLAC TROMETHAMINE INJ  -     Urinalysis with Microscopic  -     Urine Culture, Routine  -     CT KIDNEY WO CONTRAST; Future  -     ketorolac (TORADOL) 60 MG/2ML injection; Inject 2 mLs into the muscle once for 1 dose  -     Culture, Urine    Essential (primary) hypertension      Ms. Janee Israel is a 70 yo female who has a past medical history of Artery dissection ([facility] [address] Arthritis, Calculus of kidney, Cancer ([facility] [address] Cystocele, Gastro-esophageal reflux disease without esophagitis, Generalized anxiety disorder, Hyperlipidemia, Hypertension, Tobacco use, Urinary frequency, Urinary incontinence, Vaginal enterocele, and Vaginal vault 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. She is here today with a chief complaint of 3 days of right flank pain.     Renal colic on right side  -not controlled  -POC urine dipstick showed trace RBC  -patient given shot of 60 mg ketorolac IM in clinic, felt immediate relief  -orders placed for urinalysis, urine culture, CT renal without contrast  -Per AAFP guidelines, if nephrolithiasis confirmed <10mm, will initiate tamsulosin or doxazosin plus strainer for catching stones plus repeat CT scan  -will follow up results and plan of care within 1 week     Essential (primary) hypertension  -not controlled  -elevated each visit from previous 2 months  -continue carvedilol 12.5 mg bid, losartan 50 mg nightly  -if nephrolithiasis confirmed, may consider addition of a thiazide for BP and renal colic control  -will address renal colic acutely then readdress BP control at follow up in 1 week      Health Maintenance Due:  Health Maintenance Due   Topic Date Due    Annual Wellness Visit (AWV)  11/09/2022    A1C test (Diabetic or Prediabetic)  02/01/2023          Health care decision maker:  up to date:  already done      Health Maintenance: (USPSTF Recommendations)  (F) Breast Cancer Screen: (40-49 (C), 50-74 biennial screening mammogram (B))  (F) Cervical Cancer Screen: (21-29 q3yr cytology alone; 30-65 q3yr cytology alone, q5yr with hrHPV alone, or q5yr cytology+hrHPV (A))  (M) Prostate Cancer Screen: (54-77 yo discuss benefits/harm, does not recommend testing PSA in men >75 yo (D):   (M) AAA Screen: (men 73-69 yo who has ever smoked (B), consider in nonsmokers if high risk):  CRC/Colonoscopy Screening: (adults 39-53 (B), 50-75 (A))  Lung Ca Screening: Annual LDCT (+smoker age 49-80, smoked within 13 years, total of 20 pack yr history (B)):  DEXA Screen: (women >65 and older, <65 if at risk/postmenopausal (B))  HIV Screen: (16-65 yr old, and all pregnant patients (A)): Hep C Screen: (18-79 yr old (B)):  HCC Screen: (all pts with cirrhosis and high risk Hep B (US q6 mo)):  Immunizations:    RTC:  No follow-ups on file. EMR Dragon/transcription disclaimer:  Much of this encounter note is electronic transcription/translation of spoken language to printed texts. The electronic translation of spoken language may be erroneous, or at times, nonsensical words or phrases may be inadvertently transcribed.   Although I have reviewed the note for such errors, some may still exist.

## 2023-01-24 ENCOUNTER — HOSPITAL ENCOUNTER (OUTPATIENT)
Dept: CT IMAGING | Age: 68
Discharge: HOME | End: 2023-01-24
Payer: MEDICARE

## 2023-01-24 DIAGNOSIS — N23 RENAL COLIC ON RIGHT SIDE: ICD-10-CM

## 2023-01-24 DIAGNOSIS — N20.0 BILATERAL NEPHROLITHIASIS: ICD-10-CM

## 2023-01-24 DIAGNOSIS — J18.1 RIGHT LOWER LOBE CONSOLIDATION (HCC): Primary | ICD-10-CM

## 2023-01-24 LAB — URINE CULTURE, ROUTINE: NORMAL

## 2023-01-24 PROCEDURE — G1010 CDSM STANSON: HCPCS

## 2023-01-24 RX ORDER — DOXYCYCLINE HYCLATE 100 MG
100 TABLET ORAL 2 TIMES DAILY
Qty: 20 TABLET | Refills: 0 | Status: SHIPPED | OUTPATIENT
Start: 2023-01-24 | End: 2023-01-25 | Stop reason: CLARIF

## 2023-01-24 NOTE — PROGRESS NOTES
ketorolac (TORADOL) 60 MG/2ML injection    NDC - 0414-2773-45  Lot - -DK  Exp - 10/1/2023    Given in right hip, patient tolerated well.

## 2023-01-25 RX ORDER — DOXYCYCLINE HYCLATE 100 MG
100 TABLET ORAL 2 TIMES DAILY
Qty: 20 TABLET | Refills: 0 | Status: SHIPPED | OUTPATIENT
Start: 2023-01-25 | End: 2023-02-04

## 2023-01-30 ENCOUNTER — HOSPITAL ENCOUNTER (OUTPATIENT)
Age: 68
Discharge: HOME | End: 2023-01-30
Payer: MEDICARE

## 2023-01-30 ENCOUNTER — OFFICE VISIT (OUTPATIENT)
Dept: PRIMARY CARE CLINIC | Age: 68
End: 2023-01-30
Payer: MEDICARE

## 2023-01-30 ENCOUNTER — HOSPITAL ENCOUNTER (OUTPATIENT)
Dept: GENERAL RADIOLOGY | Age: 68
Discharge: HOME | End: 2023-01-30
Payer: MEDICARE

## 2023-01-30 VITALS
WEIGHT: 233 LBS | SYSTOLIC BLOOD PRESSURE: 142 MMHG | HEIGHT: 62 IN | BODY MASS INDEX: 42.88 KG/M2 | HEART RATE: 81 BPM | OXYGEN SATURATION: 96 % | DIASTOLIC BLOOD PRESSURE: 88 MMHG

## 2023-01-30 DIAGNOSIS — I10 ESSENTIAL (PRIMARY) HYPERTENSION: ICD-10-CM

## 2023-01-30 DIAGNOSIS — R73.9 HYPERGLYCEMIA, UNSPECIFIED: ICD-10-CM

## 2023-01-30 DIAGNOSIS — I25.42 CORONARY ARTERY DISSECTION: ICD-10-CM

## 2023-01-30 DIAGNOSIS — G58.9 MONONEUROPATHY: ICD-10-CM

## 2023-01-30 DIAGNOSIS — E78.2 MIXED HYPERLIPIDEMIA: ICD-10-CM

## 2023-01-30 DIAGNOSIS — J18.1 RIGHT LOWER LOBE CONSOLIDATION (HCC): ICD-10-CM

## 2023-01-30 DIAGNOSIS — J18.1 RIGHT LOWER LOBE CONSOLIDATION (HCC): Primary | ICD-10-CM

## 2023-01-30 LAB
ALBUMIN SERPL-MCNC: 4 G/DL (ref 3.4–5)
ALP BLD-CCNC: 89 U/L (ref 40–129)
ALT SERPL-CCNC: 20 U/L (ref 10–40)
AST SERPL-CCNC: 14 U/L (ref 15–37)
BILIRUB SERPL-MCNC: <0.2 MG/DL (ref 0–1)
BILIRUBIN DIRECT: <0.2 MG/DL (ref 0–0.3)
BILIRUBIN, INDIRECT: ABNORMAL MG/DL (ref 0–1)
CHOLESTEROL, FASTING: 243 MG/DL (ref 0–199)
HDLC SERPL-MCNC: 38 MG/DL (ref 40–60)
LDL CHOLESTEROL CALCULATED: ABNORMAL MG/DL
LDL CHOLESTEROL DIRECT: 131 MG/DL
TOTAL PROTEIN: 6.5 G/DL (ref 6.4–8.2)
TRIGLYCERIDE, FASTING: 364 MG/DL (ref 0–150)
VLDLC SERPL CALC-MCNC: ABNORMAL MG/DL

## 2023-01-30 PROCEDURE — 83036 HEMOGLOBIN GLYCOSYLATED A1C: CPT

## 2023-01-30 PROCEDURE — G8484 FLU IMMUNIZE NO ADMIN: HCPCS

## 2023-01-30 PROCEDURE — 1123F ACP DISCUSS/DSCN MKR DOCD: CPT

## 2023-01-30 PROCEDURE — 99213 OFFICE O/P EST LOW 20 MIN: CPT

## 2023-01-30 PROCEDURE — 36415 COLL VENOUS BLD VENIPUNCTURE: CPT

## 2023-01-30 PROCEDURE — 4004F PT TOBACCO SCREEN RCVD TLK: CPT

## 2023-01-30 PROCEDURE — 80076 HEPATIC FUNCTION PANEL: CPT

## 2023-01-30 PROCEDURE — 3017F COLORECTAL CA SCREEN DOC REV: CPT

## 2023-01-30 PROCEDURE — 1090F PRES/ABSN URINE INCON ASSESS: CPT

## 2023-01-30 PROCEDURE — 3078F DIAST BP <80 MM HG: CPT

## 2023-01-30 PROCEDURE — G8417 CALC BMI ABV UP PARAM F/U: HCPCS

## 2023-01-30 PROCEDURE — 80061 LIPID PANEL: CPT

## 2023-01-30 PROCEDURE — G8427 DOCREV CUR MEDS BY ELIG CLIN: HCPCS

## 2023-01-30 PROCEDURE — 71046 X-RAY EXAM CHEST 2 VIEWS: CPT

## 2023-01-30 PROCEDURE — G8400 PT W/DXA NO RESULTS DOC: HCPCS

## 2023-01-30 PROCEDURE — 3074F SYST BP LT 130 MM HG: CPT

## 2023-01-30 ASSESSMENT — PATIENT HEALTH QUESTIONNAIRE - PHQ9
8. MOVING OR SPEAKING SO SLOWLY THAT OTHER PEOPLE COULD HAVE NOTICED. OR THE OPPOSITE, BEING SO FIGETY OR RESTLESS THAT YOU HAVE BEEN MOVING AROUND A LOT MORE THAN USUAL: 0
SUM OF ALL RESPONSES TO PHQ QUESTIONS 1-9: 0
5. POOR APPETITE OR OVEREATING: 0
SUM OF ALL RESPONSES TO PHQ QUESTIONS 1-9: 0
1. LITTLE INTEREST OR PLEASURE IN DOING THINGS: 0
3. TROUBLE FALLING OR STAYING ASLEEP: 0
2. FEELING DOWN, DEPRESSED OR HOPELESS: 0
4. FEELING TIRED OR HAVING LITTLE ENERGY: 0
10. IF YOU CHECKED OFF ANY PROBLEMS, HOW DIFFICULT HAVE THESE PROBLEMS MADE IT FOR YOU TO DO YOUR WORK, TAKE CARE OF THINGS AT HOME, OR GET ALONG WITH OTHER PEOPLE: 0
SUM OF ALL RESPONSES TO PHQ QUESTIONS 1-9: 0
7. TROUBLE CONCENTRATING ON THINGS, SUCH AS READING THE NEWSPAPER OR WATCHING TELEVISION: 0
SUM OF ALL RESPONSES TO PHQ9 QUESTIONS 1 & 2: 0
6. FEELING BAD ABOUT YOURSELF - OR THAT YOU ARE A FAILURE OR HAVE LET YOURSELF OR YOUR FAMILY DOWN: 0
SUM OF ALL RESPONSES TO PHQ QUESTIONS 1-9: 0
9. THOUGHTS THAT YOU WOULD BE BETTER OFF DEAD, OR OF HURTING YOURSELF: 0

## 2023-01-30 ASSESSMENT — ANXIETY QUESTIONNAIRES
6. BECOMING EASILY ANNOYED OR IRRITABLE: 0
GAD7 TOTAL SCORE: 0
3. WORRYING TOO MUCH ABOUT DIFFERENT THINGS: 0
4. TROUBLE RELAXING: 0
1. FEELING NERVOUS, ANXIOUS, OR ON EDGE: 0
IF YOU CHECKED OFF ANY PROBLEMS ON THIS QUESTIONNAIRE, HOW DIFFICULT HAVE THESE PROBLEMS MADE IT FOR YOU TO DO YOUR WORK, TAKE CARE OF THINGS AT HOME, OR GET ALONG WITH OTHER PEOPLE: NOT DIFFICULT AT ALL
7. FEELING AFRAID AS IF SOMETHING AWFUL MIGHT HAPPEN: 0
5. BEING SO RESTLESS THAT IT IS HARD TO SIT STILL: 0
2. NOT BEING ABLE TO STOP OR CONTROL WORRYING: 0

## 2023-01-30 NOTE — PROGRESS NOTES
[address] and [facility]                                             [address] [facility], [address] [place] [address]        Phone: [phone]      Name:  Sharri Houston  :    1955    Consultants:   Patient Care Team:  Braulio Hope DO as PCP - General (Family Medicine)  Braulio Hope DO as PCP - [facility]  Daysi Castro MD as Surgeon (Orthopedic Surgery)    Chief Complaint:     Sharri Houston is a 69 y.o. female  who presents today for an established patient care visit with Personalized Prevention Plan Services as noted below. HPI:     Ms. Ti Swift is a 68 yo female who has a past medical history of Artery dissection ([address] Arthritis, Calculus of kidney, Cancer ([address] Cystocele, Gastro-esophageal reflux disease without esophagitis, Generalized anxiety disorder, Hyperlipidemia, Hypertension, Tobacco use, Urinary frequency, Urinary incontinence, Vaginal enterocele, and Vaginal vault 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. She is here today for 1 week follow up for right flank pain. Flank pain  Was here  for renal colic concerns, acute distress   Workup for renal colic and nephrolithiasis was negative  CT abdomen pelvis confirmed a right lower lobe consolidation   Patient started on doxycycline 100 mg bid  Reports symptoms almost completely gone today  Denies nausea, pain, SOB, weakness, all other ROS neg.    Has no new complaints for today        Patient Active Problem List   Diagnosis    Primary osteoarthritis of right knee    Essential (primary) hypertension    Gastro-esophageal reflux disease without esophagitis    Hyperlipidemia    Liver cyst    Old myocardial infarction    Tinnitus of both ears    Tobacco use    Urinary incontinence    Lumbar radiculopathy    Ptosis of right eyelid    Tick bite    Post-Lyme disease syndrome    Peripheral polyneuropathy    Sciatic neuropathy    Coronary artery dissection    Prediabetes    Coronary artery disease of native artery of native heart with stable angina pectoris ([facility] [address]    Bilateral sensorineural hearing loss         Past Medical History:    Past Medical History:   Diagnosis Date    Acute laryngitis     Acute laryngitis 1/29/2015    Acute lymphadenitis of face, head and neck     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    Arthritis     Calculus of kidney     Cancer (HCC)     skin    Cystocele, midline     Eustachian tube dysfunction, bilateral     Eustachian tube dysfunction, bilateral 6/15/2016    Gastro-esophageal reflux disease without esophagitis     Generalized anxiety disorder 8/25/2016    Hyperlipidemia     Hypertension     Liver cyst     Neuritis     Neuritis 6/5/2018    Nocturia     Old myocardial infarction     Osteoarthritis     Otalgia     Perennial allergic rhinitis     Pharyngeal inflammation     Primary osteoarthritis of right knee     Tinnitus of vascular origin 6/15/2016    Tinnitus, bilateral     Tobacco use     Urinary frequency     Urinary incontinence     Vaginal enterocele     Vaginal vault 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        Past Surgical History:  Past Surgical History:   Procedure Laterality Date    APPENDECTOMY      CHOLECYSTECTOMY      ELBOW DEBRIDEMENT      FOOT SURGERY      bilateral    HYSTERECTOMY, VAGINAL      IR MIDLINE CATH  11/28/2022    IR MIDLINE CATH 11/28/2022 [facility] SPECIAL PROCEDURES    KNEE ARTHROSCOPY Right 4/24/2019    RIGHT KNEE ARTHROSCOPY,, SYNOVECTOMY CHONDROPLASTY,MEDIAL AND LATERAL MENESECTOMY, REMOVAL LOOSE BODIES performed by Oni Dudley MD at [facility]      bilateral    LIVER SURGERY      cyst removal    NECK SURGERY      PAIN MANAGEMENT PROCEDURE Right 11/18/2021    RIGHT LUMBAR FOUR FIVE EPIDURAL STEROID INJECTION SITE CONFIRMED BY FLUOROSCOPY performed by Kenia Moulton MD at [address] [place] [address] Right 10/30/2020    VIDEO ARTHROSCOPY RIGHT SHOULDER SUBACROMIAL DECOMPRESSION LABRAL DEBRIDEMENT ROTATOR CUFF TEAR performed by Jessica Kapadia MD at [facility] BREAST LESION REMOVAL LEFT         Home Meds:  Prior to Visit Medications    Medication Sig Taking? Authorizing Provider   doxycycline hyclate (VIBRA-TABS) 100 MG tablet Take 1 tablet by mouth 2 times daily for 10 days Yes Alexisceferino Guillen Anna-Racdavis, DO   clindamycin (CLEOCIN T) 1 % lotion APPLY LOTION TOPICALLY TO AFFECTED AREA TWICE DAILY (DO NOT APPLY TO OPEN AREAS) Yes Historical Provider, MD Kezia An 163 MG/ML SOAJ INJECT ONE PEN UNDER THE SKIN EVERY 14 DAYS Yes Historical Provider, MD   carvedilol (COREG) 12.5 MG tablet Take 1 tablet by mouth 2 times daily Yes HUBERT German CNP   losartan (COZAAR) 50 MG tablet Take 1 tablet by mouth in the morning and at bedtime Yes HUBERT German CNP   pravastatin (PRAVACHOL) 40 MG tablet TAKE 1 TABLET EVERY DAY Yes Veronica Wagner, DO   Nutritional Supplements (VITAMIN D BOOSTER PO) Take by mouth Yes Historical Provider, MD   Omega-3 Fatty Acids (FISH OIL PO) Take by mouth Yes Historical Provider, MD   diclofenac (VOLTAREN) 75 MG EC tablet Take 1 tablet by mouth 2 times daily Yes Laith العلي MD   ezetimibe (ZETIA) 10 MG tablet Take 1 tablet by mouth daily Yes Laith العلي MD   Handicap Placard MISC by Does not apply route Yes Laith العلي MD   ascorbic acid (VITAMIN C) 100 MG tablet Take 100 mg by mouth Yes Historical Provider, MD   vitamin A 3 MG (96563 UT) capsule Take 1 capsule by mouth Yes Historical Provider, MD   Multiple Vitamins-Minerals (MULTIVITAMIN ADULT PO) Take 1 tablet by mouth daily Yes Historical Provider, MD   aspirin 81 MG tablet Take 81 mg by mouth daily. Yes Historical Provider, MD   S-Adenosylmethionine (EFREN-E) 400 MG TABS Take  by mouth.  Yes Historical Provider, MD       Allergies:    Prednisone, Lisinopril, Adhesive tape, Codeine, Crestor [rosuvastatin calcium], Food, Lipitor [atorvastatin calcium], Nitrofurantoin, Penicillins, Sulfa antibiotics, and Tramadol    Family History:       Problem Relation Age of Onset    Breast Cancer Mother     Cancer Mother         Bone    Diabetes Mother     Heart Disease Mother         CHF    Arthritis Mother     Heart Attack Father     Prostate Cancer Brother     Cancer Brother         kidney    Cancer Brother         bladder    Cancer Maternal Grandmother     Breast Cancer Maternal Aunt          Health Maintenance Completed:  Health Maintenance   Topic Date Due    Annual Wellness Visit (AWV)  11/09/2022    A1C test (Diabetic or Prediabetic)  02/01/2023    Flu vaccine (1) 09/20/2023 (Originally 8/1/2022)    Shingles vaccine (2 of 3) 12/06/2023 (Originally 12/29/2015)    COVID-19 Vaccine (1) 09/20/2024 (Originally 6/26/1956)    Breast cancer screen  06/28/2023    Colorectal Cancer Screen  12/26/2023    Lipids  01/30/2024    Depression Screen  01/30/2024    DTaP/Tdap/Td vaccine (2 - Td or Tdap) 07/26/2032    DEXA (modify frequency per FRAX score)  Completed    Pneumococcal 65+ years Vaccine  Completed    Hepatitis C screen  Completed    Hepatitis A vaccine  Aged Out    Hib vaccine  Aged Out    Meningococcal (ACWY) vaccine  Aged Out          Immunization History   Administered Date(s) Administered    Influenza Virus Vaccine 11/03/2015    Influenza, FLUCELVAX, (age 6 mo+), MDCK, MDV, 0.5mL 09/25/2019    Pneumococcal Conjugate 13-valent ([id number]) 09/25/2019    Pneumococcal Polysaccharide ([id number]) 10/10/2020    Tdap (Boostrix, Adacel) 07/26/2022    Zoster Live (Zostavax) 11/03/2015         Review of Systems:  Review of Systems   All other systems reviewed and are negative.     Physical Exam:   Vitals:    01/30/23 1109   BP: (!) 142/88   Pulse: 81   SpO2: 96%   Weight: 233 lb (105.7 kg)   Height: 5' 2\" (1.575 m)     Body mass index is 42.62  kg/m².     Wt Readings from Last 3 Encounters:   01/30/23 233 lb (105.7 kg)   01/23/23 233 lb 9.6 oz (106 kg)   01/03/23 229 lb 12.8 oz (104.2 kg)       BP Readings from Last 3 Encounters:   01/30/23 (!) 142/88   01/23/23 124/78   01/03/23 (!) 140/84       Physical Examination:   General appearance - alert, well appearing, and in no distress and oriented to person, place, and time  Mental status - normal mood, behavior, speech, dress, motor activity, and thought processes  Chest - clear to auscultation, no wheezes, rales or rhonchi, symmetric air entry  Heart - normal rate, regular rhythm, normal S1, S2, no murmurs, rubs, clicks or gallops  Abdomen - soft, nontender, nondistended, no masses or organomegaly  Back exam - full range of motion, no tenderness, palpable spasm or pain on motion  Musculoskeletal - no joint tenderness, deformity or swelling, no muscular tenderness noted, full range of motion without pain          Lab Review:   Hospital Outpatient Visit on 01/30/2023   Component Date Value    Cholesterol, Fasting 01/30/2023 243 (A)     Triglyceride, Fasting 01/30/2023 364 (A)     HDL 01/30/2023 38 (A)     LDL Calculated 01/30/2023 see below     VLDL Cholesterol Calcula* 01/30/2023 see below     Total Protein 01/30/2023 6.5     Albumin 01/30/2023 4.0     Alkaline Phosphatase 01/30/2023 89     ALT 01/30/2023 20     AST 01/30/2023 14 (A)     Total Bilirubin 01/30/2023 <0.2     Bilirubin, Direct 01/30/2023 <0.2     Bilirubin, Indirect 01/30/2023 see below     LDL Direct 01/30/2023 131 (A)    Office Visit on 01/23/2023   Component Date Value    Color, UA 01/23/2023 yellow     Clarity, UA 01/23/2023 clear     Glucose, UA POC 01/23/2023 negative     Bilirubin, UA 01/23/2023 negative     Ketones, UA 01/23/2023 negative     Spec Grav, UA 01/23/2023 1.025     Blood, UA POC 01/23/2023 trace-intact     pH, UA 01/23/2023 6.0     Protein, UA POC 01/23/2023 30mg/dL     Urobilinogen, UA 01/23/2023 0.2e.u/dL     Leukocytes, UA 01/23/2023 trace     Nitrite, UA 01/23/2023 negative     Color, UA 01/23/2023 DARK YELLOW (A)     Clarity, UA 01/23/2023 Clear     Glucose, Ur 01/23/2023 Negative     Bilirubin Urine 01/23/2023 Negative     Ketones, Urine 01/23/2023 Negative     Specific Gravity, UA 01/23/2023 1.021     Blood, Urine 01/23/2023 Negative     pH, UA 01/23/2023 6.0     Protein, UA 01/23/2023 30 (A)     Urobilinogen, Urine 01/23/2023 0.2     Nitrite, Urine 01/23/2023 Negative     Leukocyte Esterase, Urine 01/23/2023 TRACE (A)     Microscopic Examination 01/23/2023 YES     Urine Type 01/23/2023 NotGiven     Bacteria, UA 01/23/2023 Rare (A)     Hyaline Casts, UA 01/23/2023 1     WBC, UA 01/23/2023 2     RBC, UA 01/23/2023 8 (A)     Epithelial Cells, UA 01/23/2023 7 (A)     Urine Culture, Routine 01/23/2023 No growth at 18 to 36 hours    Office Visit on 12/09/2022   Component Date Value    Color, UA 12/09/2022 yellow     Clarity, UA 12/09/2022 clear     Glucose, UA POC 12/09/2022 neg     Bilirubin, UA 12/09/2022 neg     Ketones, UA 12/09/2022 neg     Spec Grav, UA 12/09/2022 1.015     Blood, UA POC 12/09/2022 neg     pH, UA 12/09/2022 6.5     Protein, UA POC 12/09/2022 neg     Urobilinogen, UA 12/09/2022 neg     Leukocytes, UA 12/09/2022 neg     Nitrite, UA 12/09/2022 neg    Admission on 11/28/2022, Discharged on 11/28/2022   Component Date Value    WBC 11/28/2022 9.4     RBC 11/28/2022 4.99     Hemoglobin 11/28/2022 14.1     Hematocrit 11/28/2022 42.9     MCV 11/28/2022 86.0     MCH 11/28/2022 28.3     MCHC 11/28/2022 32.9     RDW 11/28/2022 16.7 (A)     Platelets 15/77/8787 192     MPV 11/28/2022 8.1     Sodium 11/28/2022 140     Potassium reflex Magnesi* 11/28/2022 3.9     Chloride 11/28/2022 105     CO2 11/28/2022 26     Anion Gap 11/28/2022 9     Glucose 11/28/2022 102 (A)     BUN 11/28/2022 22 (A)     Creatinine 11/28/2022 0.9     Est, Glom Filt Rate 11/28/2022 >60     Calcium 11/28/2022 10.2     Cholesterol, Total 11/28/2022 269 (A) Triglycerides 11/28/2022 315 (A)     HDL 11/28/2022 43     LDL Calculated 11/28/2022 see below     VLDL Cholesterol Calcula* 11/28/2022 see below     Protime 11/28/2022 13.2     INR 11/28/2022 1.01     Ventricular Rate 11/28/2022 70     Atrial Rate 11/28/2022 70     P-R Interval 11/28/2022 178     QRS Duration 11/28/2022 96     Q-T Interval 11/28/2022 420     QTc Calculation (Bazett) 11/28/2022 453     P Axis 11/28/2022 48     R Axis 11/28/2022 -11     T Axis 11/28/2022 95     Diagnosis 11/28/2022                      Value:Normal sinus rhythmModerate voltage criteria for LVH, may be normal variantPoor R wave progressionT wave abnormality, consider lateral ischemiaAbnormal ECGWhen compared with ECG of 14-NOV-2022 09:12,No significant change was foundConfirmed by Tiney Counter ([id number]) on 11/28/2022 12:08:30 PM      Left Ventricular Ejectio* 11/28/2022 60     LVEF MODALITY 11/28/2022 CATH     POC ACT LR 11/28/2022 277     POC ACT LR 11/28/2022 266     POC ACT LR 11/28/2022 349     LDL Direct 11/28/2022 166 (A)    Procedure visit on 11/15/2022   Component Date Value    Color, UA 11/15/2022 yellow     Clarity, UA 11/15/2022 clear     Glucose, UA POC 11/15/2022 neg     Bilirubin, UA 11/15/2022 neg     Ketones, UA 11/15/2022 neg     Spec Grav, UA 11/15/2022 1.015     Blood, UA POC 11/15/2022 neg     pH, UA 11/15/2022 6.5     Protein, UA POC 11/15/2022 neg     Urobilinogen, UA 11/15/2022 neg     Leukocytes, UA 11/15/2022 neg     Nitrite, UA 11/15/2022 neg    Admission on 11/14/2022, Discharged on 11/14/2022   Component Date Value    WBC 11/14/2022 7.6     RBC 11/14/2022 4.86     Hemoglobin 11/14/2022 13.7     Hematocrit 11/14/2022 41.5     MCV 11/14/2022 85.5     MCH 11/14/2022 28.3     MCHC 11/14/2022 33.1     RDW 11/14/2022 16.5 (A)     Platelets 36/47/6512 183     MPV 11/14/2022 7.9     Sodium 11/14/2022 139     Potassium reflex Magnesi* 11/14/2022 3.6     Chloride 11/14/2022 105     CO2 11/14/2022 24     Anion Gap 11/14/2022 10     Glucose 11/14/2022 102 (A)     BUN 11/14/2022 21 (A)     Creatinine 11/14/2022 0.7     Est, Glom Filt Rate 11/14/2022 >60     Calcium 11/14/2022 9.2     Cholesterol, Total 11/14/2022 223 (A)     Triglycerides 11/14/2022 313 (A)     HDL 11/14/2022 36 (A)     LDL Calculated 11/14/2022 see below     VLDL Cholesterol Calcula* 11/14/2022 see below     Protime 11/14/2022 13.0     INR 11/14/2022 0.99     Ventricular Rate 11/14/2022 66     Atrial Rate 11/14/2022 66     P-R Interval 11/14/2022 182     QRS Duration 11/14/2022 94     Q-T Interval 11/14/2022 430     QTc Calculation (Bazett) 11/14/2022 450     P Axis 11/14/2022 50     R [place] 11/14/2022 -13     T Axis 11/14/2022 85     Diagnosis 11/14/2022                      Value:Normal sinus rhythmModerate voltage criteria for LVH with repolarization abnormalitySlow R wave progression in precordial leadsAbnormal ECGWhen compared with ECG of 14-SEP-2022 18:35,No significant change was foundConfirmed by Claudia Banda ([id number]) on 11/14/2022 11:46:38 AM      LDL Direct 11/14/2022 125 (A)    Hospital Outpatient Visit on 11/05/2022   Component Date Value    Left Ventricular Ejectio* 11/05/2022 54     LVEF MODALITY 11/05/2022 Nuclear    Admission on 09/14/2022, Discharged on 09/14/2022   Component Date Value    WBC 09/14/2022 14.8 (A)     RBC 09/14/2022 5.19     Hemoglobin 09/14/2022 14.6     Hematocrit 09/14/2022 44.4     MCV 09/14/2022 85.5     MCH 09/14/2022 28.0     MCHC 09/14/2022 32.8     RDW 09/14/2022 16.2 (A)     Platelets 99/33/7671 219     MPV 09/14/2022 8.3     Neutrophils % 09/14/2022 87.7     Lymphocytes % 09/14/2022 9.4     Monocytes % 09/14/2022 2.7     Eosinophils % 09/14/2022 0.0     Basophils % 09/14/2022 0.2     Neutrophils Absolute 09/14/2022 13.0 (A)     Lymphocytes Absolute 09/14/2022 1.4     Monocytes Absolute 09/14/2022 0.4     Eosinophils Absolute 09/14/2022 0.0     Basophils Absolute 09/14/2022 0.0     Sodium 09/14/2022 135 (A) Potassium reflex Magnesi* 09/14/2022 4.2     Chloride 09/14/2022 101     CO2 09/14/2022 20 (A)     Anion Gap 09/14/2022 14     Glucose 09/14/2022 122 (A)     BUN 09/14/2022 22 (A)     Creatinine 09/14/2022 0.8     GFR Non- 09/14/2022 >60     GFR  09/14/2022 >60     Calcium 09/14/2022 10.9 (A)     Total Protein 09/14/2022 8.0     Albumin 09/14/2022 4.6     Albumin/Globulin Ratio 09/14/2022 1.4     Total Bilirubin 09/14/2022 <0.2     Alkaline Phosphatase 09/14/2022 94     ALT 09/14/2022 42 (A)     AST 09/14/2022 23     Color, UA 09/14/2022 Yellow     Clarity, UA 09/14/2022 Clear     Glucose, Ur 09/14/2022 Negative     Bilirubin Urine 09/14/2022 Negative     Ketones, Urine 09/14/2022 Negative     Specific Gravity, UA 09/14/2022 1.020     Blood, Urine 09/14/2022 TRACE-INTACT (A)     pH, UA 09/14/2022 6.0     Protein, UA 09/14/2022 30 (A)     Urobilinogen, Urine 09/14/2022 0.2     Nitrite, Urine 09/14/2022 Negative     Leukocyte Esterase, Urine 09/14/2022 Negative     Microscopic Examination 09/14/2022 YES     Urine Type 09/14/2022 NotGiven     Urine Reflex to Culture 09/14/2022 Not Indicated     Troponin 09/14/2022 <0.01     Ventricular Rate 09/14/2022 83     Atrial Rate 09/14/2022 83     P-R Interval 09/14/2022 190     QRS Duration 09/14/2022 94     Q-T Interval 09/14/2022 390     QTc Calculation (Bazett) 09/14/2022 458     P Axis 09/14/2022 68     R Axis 09/14/2022 -6     T Axis 09/14/2022 58     Diagnosis 09/14/2022 Normal sinus rhythmMinimal voltage criteria for LVH, may be normal variantBorderline ECGWhen compared with ECG of 11-JAN-2013 15:54,No significant change was foundConfirmed by Cherylene Sima MD, [address] ([id number]) on 9/15/2022 7:51:51 AM     Hyaline Casts, UA 09/14/2022 0-2     Mucus, UA 09/14/2022 1+ (A)     WBC, UA 09/14/2022 0-2     RBC, UA 09/14/2022 5-10 (A)     Epithelial Cells, UA 09/14/2022 2-5     Renal Epithelial, UA 09/14/2022 0-1    Hospital Outpatient Visit on 08/26/2022 Component Date Value    Cholesterol, Total 08/26/2022 309 (A)     Triglycerides 08/26/2022 445 (A)     HDL 08/26/2022 38 (A)     LDL Calculated 08/26/2022 see below     VLDL Cholesterol Calcula* 08/26/2022 see below     Ferritin 08/26/2022 29.7     Vitamin B-12 08/26/2022 694     Folate 08/26/2022 13.87     Vit D, 25-Hydroxy 08/26/2022 35.0     Total Syphillis IgG/IgM 08/26/2022 Non-Reactive     LDL Direct 08/26/2022 177 (A)           Assessment/Plan:  Ciara Narvaez was seen today for follow-up. Diagnoses and all orders for this visit:    Right lower lobe consolidation (HCC)  -     XR CHEST STANDARD (2 VW); Future    Ms. Gary Chen is a 68 yo female who has a past medical history of Artery dissection ([address] Arthritis, Calculus of kidney, Cancer ([address] Cystocele, Gastro-esophageal reflux disease without esophagitis, Generalized anxiety disorder, Hyperlipidemia, Hypertension, Tobacco use, Urinary frequency, Urinary incontinence, Vaginal enterocele, and Vaginal vault 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. She is here today for 1 week follow up for right flank pain. Right lower lobe consolidation ([address]  Controlled. Continue doxycycline 100 mg bid for 10 days, should have 5 more days  CXR ordered for full picture of patient's chest  Informed patient to call our office or the ER if symptoms relapse or worsen  RTC 3 months for chronic care follow up. Only acute issue was addressed today. Patient had her chronic care visit on 1/4/23. F/u for it is already scheduled on 3/27/23.     Health Maintenance Due:  Health Maintenance Due   Topic Date Due    Annual Wellness Visit (AWV)  11/09/2022    A1C test (Diabetic or Prediabetic)  02/01/2023          Health care decision maker:  up to date:  already done      Health Maintenance: (USPSTF Recommendations)  (F) Breast Cancer Screen: (40-49 (C), 50-74 biennial screening mammogram (B))  (F) Cervical Cancer Screen: (21-29 q3yr cytology alone; 30-65 q3yr cytology alone, q5yr with hrHPV alone, or q5yr cytology+hrHPV (A))  (M) Prostate Cancer Screen: (54-79 yo discuss benefits/harm, does not recommend testing PSA in men >75 yo (D):   (M) AAA Screen: (men 73-69 yo who has ever smoked (B), consider in nonsmokers if high risk):  CRC/Colonoscopy Screening: (adults 39-53 (B), 50-75 (A))  Lung Ca Screening: Annual LDCT (+smoker age 49-80, smoked within 15 years, total of 20 pack yr history (B)):  DEXA Screen: (women >65 and older, <65 if at risk/postmenopausal (B))  HIV Screen: (16-65 yr old, and all pregnant patients (A)): Hep C Screen: (18-79 yr old (B)):  HCC Screen: (all pts with cirrhosis and high risk Hep B (US q6 mo)):  Immunizations:    RTC:  No follow-ups on file. EMR Dragon/transcription disclaimer:  Much of this encounter note is electronic transcription/translation of spoken language to printed texts. The electronic translation of spoken language may be erroneous, or at times, nonsensical words or phrases may be inadvertently transcribed.   Although I have reviewed the note for such errors, some may still exist.

## 2023-01-30 NOTE — PATIENT INSTRUCTIONS
We will follow up results of Chest Xray with you    Return to clinic 3/27/23 as scheduled for chronic care follow up    Call if symptoms return or other issues arise

## 2023-01-31 ENCOUNTER — TELEPHONE (OUTPATIENT)
Dept: CARDIOLOGY CLINIC | Age: 68
End: 2023-01-31

## 2023-01-31 LAB
ESTIMATED AVERAGE GLUCOSE: 119.8 MG/DL
HBA1C MFR BLD: 5.8 %

## 2023-01-31 NOTE — PROGRESS NOTES
[facility]   Cardiac Evaluation    Primary Care Doctor:  Iban Chopra DO    Chief Complaint   Patient presents with    3 Month Follow-Up    Hyperlipidemia    Hypertension    Coronary Artery Disease        Assessment:    1. Coronary artery disease of native artery of native heart with stable angina pectoris ([address]    2. Coronary artery dissection    3. Essential (primary) hypertension    4. Mixed hyperlipidemia    5. Tobacco use        Plan:   Start the Repatha every 2 weeks  Decrease the pravastatin to half tablet daily  Stop the Zetia  No change in other heart/ BP medicines  Repeat fasting cholesterol in 9-10 weeks  Will call you with results and plan for Repatha  Follow up with me or Dr. Raymundo Pearce in 6 months     Vitals:    02/01/23 1145   BP: 126/86   Site: Left Upper Arm   Position: Sitting   Cuff Size: Large Adult   Pulse: 76   SpO2: 96%   Weight: 231 lb (104.8 kg)   Height: 5' 2\" (1.575 m)       Primary Cardiologist: Dr. Cailin Ruiz     History of Present Illness:   I had the pleasure of seeing Lon Harris (69 y.o.) in follow up for moderate CAD, hx coronary dissection 2009, hypertension, HLD, smoker. The Coreg was increased to 12.5 mg bid and losartan to 50 mg bid for BP and CAD. Blood work yesterday showed . This is down from 166 since starting on statin. However, she has obtained the prescription for Repatha and was recommended to start this. She did not tolerate Imdur due to severe headache. She is being treated for PNA, had back pain -> CT - + PNA.  + cough productive of clear and green, getting better. Breathing is okay. BP was high at 140/ 80's once at home but normally runs 120-130's. No problem with medicines/ changes. No chest pain. Lon Harris describes symptoms including dyspnea, fatigue, cough but denies chest pain, palpitations, orthopnea, PND, early saiety, edema, syncope.      NYHA:   II  ACC/ AHA Stage:    C    Past Medical History: has a past medical history of Acute laryngitis, Acute laryngitis, Acute lymphadenitis of face, head and neck, Artery dissection (HCC), Arthritis, Calculus of kidney, Cancer ([facility] [address] Cystocele, midline, Eustachian tube dysfunction, bilateral, Eustachian tube dysfunction, bilateral, Gastro-esophageal reflux disease without esophagitis, Generalized anxiety disorder, Hyperlipidemia, Hypertension, Liver cyst, Neuritis, Neuritis, Nocturia, Old myocardial infarction, Osteoarthritis, Otalgia, Perennial allergic rhinitis, Pharyngeal inflammation, Primary osteoarthritis of right knee, Tinnitus of vascular origin, Tinnitus, bilateral, Tobacco use, Urinary frequency, Urinary incontinence, Vaginal enterocele, and Vaginal vault 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. Surgical History:   has a past surgical history that includes Cholecystectomy; Appendectomy; knee surgery; Foot surgery; Neck surgery; Urethra surgery; Liver surgery; Knee arthroscopy (Right, 4/24/2019); Elbow Debridement; US BREAST LESION REMOVAL LEFT; Hysterectomy, vaginal; Tonsillectomy; Tubal ligation; Rotator cuff repair (Right, 10/30/2020); Shoulder arthroscopy; Pain management procedure (Right, 11/18/2021); and IR MIDLINE CATH (11/28/2022). Social History:   reports that she has been smoking cigarettes. She has a 10.00 pack-year smoking history. She has never used smokeless tobacco. She reports current alcohol use. She reports that she does not use drugs. Family History:   Family History   Problem Relation Age of Onset    Breast Cancer Mother     Cancer Mother         Bone    Diabetes Mother     Heart Disease Mother         CHF    Arthritis Mother     Heart Attack Father     Prostate Cancer Brother     Cancer Brother         kidney    Cancer Brother         bladder    Cancer Maternal Grandmother     Breast Cancer Maternal Aunt        Home Medications:  Prior to Admission medications    Medication Sig Start Date End Date Taking?  Authorizing Provider   doxycycline hyclate (VIBRA-TABS) 100 MG tablet Take 1 tablet by mouth 2 times daily for 10 days 1/25/23 2/4/23 Yes Alexis Li, DO   clindamycin (CLEOCIN T) 1 % lotion APPLY LOTION TOPICALLY TO AFFECTED AREA TWICE DAILY (DO NOT APPLY TO OPEN AREAS) 1/11/23  Yes Historical Provider, MD Kacey Kowalski 850 MG/ML SOAJ INJECT ONE PEN UNDER THE SKIN EVERY 14 DAYS 12/13/22  Yes Historical Provider, MD   carvedilol (COREG) 12.5 MG tablet Take 1 tablet by mouth 2 times daily 12/12/22  Yes Chitra Ge Windham APRN - CNP   losartan (COZAAR) 50 MG tablet Take 1 tablet by mouth in the morning and at bedtime 12/12/22  Yes Page Medeiros APRN - CNP   pravastatin (PRAVACHOL) 40 MG tablet TAKE 1 TABLET EVERY DAY 11/28/22  Yes Ricardo Wagner,    Nutritional Supplements (VITAMIN D BOOSTER PO) Take by mouth   Yes Historical Provider, MD   Omega-3 Fatty Acids (FISH OIL PO) Take by mouth   Yes Historical Provider, MD   diclofenac (VOLTAREN) 75 MG EC tablet Take 1 tablet by mouth 2 times daily 9/20/22  Yes Christine Calhoun MD   ezetimibe (ZETIA) 10 MG tablet Take 1 tablet by mouth daily 9/6/22  Yes Christine Calhoun MD   Handneil Aragon [address] [facility] by Does not apply route 7/26/22  Yes Christine Calhoun MD   ascorbic acid (VITAMIN C) 100 MG tablet Take 100 mg by mouth   Yes Historical Provider, MD   vitamin A 3 MG (47711 UT) capsule Take 1 capsule by mouth   Yes Historical Provider, MD   Multiple Vitamins-Minerals (MULTIVITAMIN ADULT PO) Take 1 tablet by mouth daily   Yes Historical Provider, MD   aspirin 81 MG tablet Take 81 mg by mouth daily. Yes Historical Provider, MD   S-Adenosylmethionine (EFREN-E) 400 MG TABS Take  by mouth.    Yes Historical Provider, MD        Allergies:  Prednisone, Lisinopril, Adhesive tape, Codeine, Crestor [rosuvastatin calcium], Food, Lipitor [atorvastatin calcium], Nitrofurantoin, Penicillins, Sulfa antibiotics, and Tramadol     Physical Examination:    Vitals:    02/01/23 1145   BP: 126/86   Site: Left Upper Arm   Position: Sitting   Cuff Size: Large Adult   Pulse: 76   SpO2: 96%   Weight: 231 lb (104.8 kg)   Height: 5' 2\" (1.575 m)     Constitutional and General Appearance: Warm and dry, no apparent distress, normal coloration  HEENT:  Normocephalic, atraumatic  Respiratory:  Normal excursion and expansion without use of accessory muscles  Resp Auscultation: Clear to auscultation   Cardiovascular: The apical impulses not displaced  Heart tones are crisp and normal  JVP 8 cm H2O  Regular rate and rhythm, normal S1S2, no m/g/r  Peripheral pulses are symmetrical and full  There is no clubbing, cyanosis of the extremities.   No BLE edema  Pedal Pulses: 2+ and equal   Abdomen:  No masses or tenderness  Liver/Spleen: No Abnormalities Noted  Neurological/Psychiatric:  Alert and oriented in all spheres  Moves all extremities well  Exhibits normal gait balance and coordination  No abnormalities of mood, affect, memory, mentation, or behavior are noted    Lab Data:  Most recent lab results below reviewed in office    CBC:   Lab Results   Component Value Date/Time    WBC 9.4 11/28/2022 09:24 AM    WBC 7.6 11/14/2022 09:38 AM    WBC 14.8 09/14/2022 06:24 PM    RBC 4.99 11/28/2022 09:24 AM    RBC 4.86 11/14/2022 09:38 AM    RBC 5.19 09/14/2022 06:24 PM    HGB 14.1 11/28/2022 09:24 AM    HGB 13.7 11/14/2022 09:38 AM    HGB 14.6 09/14/2022 06:24 PM    HCT 42.9 11/28/2022 09:24 AM    HCT 41.5 11/14/2022 09:38 AM    HCT 44.4 09/14/2022 06:24 PM    MCV 86.0 11/28/2022 09:24 AM    MCV 85.5 11/14/2022 09:38 AM    MCV 85.5 09/14/2022 06:24 PM    RDW 16.7 11/28/2022 09:24 AM    RDW 16.5 11/14/2022 09:38 AM    RDW 16.2 09/14/2022 06:24 PM     11/28/2022 09:24 AM     11/14/2022 09:38 AM     09/14/2022 06:24 PM     BMP:  Lab Results   Component Value Date/Time     11/28/2022 09:24 AM     11/14/2022 09:38 AM     09/14/2022 06:24 PM    K 3.9 11/28/2022 09:24 AM    K 3.6 11/14/2022 09:38 AM    K 4.2 09/14/2022 06:24 PM     11/28/2022 09:24 AM     11/14/2022 09:38 AM     09/14/2022 06:24 PM    CO2 26 11/28/2022 09:24 AM    CO2 24 11/14/2022 09:38 AM    CO2 20 09/14/2022 06:24 PM    BUN 22 11/28/2022 09:24 AM    BUN 21 11/14/2022 09:38 AM    BUN 22 09/14/2022 06:24 PM    CREATININE 0.9 11/28/2022 09:24 AM    CREATININE 0.7 11/14/2022 09:38 AM    CREATININE 0.8 09/14/2022 06:24 PM     BNP: No results found for: PROBNP  LIPID:   Lab Results   Component Value Date/Time    TRIG 315 11/28/2022 09:24 AM    TRIG 313 11/14/2022 09:38 AM    HDL 38 01/30/2023 08:32 AM    HDL 43 11/28/2022 09:24 AM    LDLCALC see below 01/30/2023 08:32 AM    LDLCALC see below 11/28/2022 09:24 AM    LDLDIRECT 131 01/30/2023 08:32 AM    LDLDIRECT 166 11/28/2022 09:24 AM       Cardiac Imaging:  CARDIAC CATH 11/28/2022:  FINDINGS   LVGRAM   LVEDP 16   GRADIENT ACROSS AORTIC VALVE No significant gradient   LV FUNCTION EF 60%, difficult to fully evaluate as LV could not be fully opacified. WALL MOTION Difficult to assess on the study   MITRAL REGURGITATION Difficult to assess on the study      CORONARY ARTERIES   LM Short, less than 10% [id number]-[id number] stenosis. LAD Mild to moderately calcified, proximal-mid 40 to 50% stenosis. Distal less than 10% stenosis. LCX Large vessel, OM1 is a very small vessel with 40 to 50% wmxpvlwb-ziu-oakjmq stenosis    OM 2 is a medium to large size OM with ostial/proximal-mid 60% stenosis. Distal less than 10% stenosis. Circumflex has mid 30 to 40% stenosis. RCA Large vessel, dominant, proximal-mid 40 to 50% stenosis, distal less than 10% stenosis. Flow reserve assessment and IVUS DESCRIPTION      Heparin was used anticoagulation, a 5 [employer] EBU 3.0 guide catheter was used for flow reserve assessment as well as IVUS. [employer] comet wire was taken and this was appropriate calibrated and ultimately equalized in the ascending aorta. Wire was used to cross the lesions in the LAD as well as into OM 2. Flow reserve assessment was 0.98 in the LAD and 1.0 in the circumflex/OM 2. There was spasm noted within the circumflex and this was treated with intracoronary vasodilators and follow-up assessment then was made with IVUS of this area which showed 50% stenosis. These findings were most consistent with moderate nonobstructive CAD/ASHD and PCI was felt to be able to be deferred at this time. CONCLUSIONS:   Patient had to have midline placed before the procedure with interventional radiology due to difficult venous access  Patient had radial spasm which responded to vasodilators  Moderate CAD/ASHD  Treat medically  Add Imdur 30 mg daily        STRESS TEST 11/5/2022:        Summary    Left ventricle is mildly enlarged. Normal LV function. Left ventricular ejection fraction of 54%. There is a moderate sized, mild to moderate intensity, partially reversible    anterior wall defect, with no associated wall motion abnormality. Breast    attenuation present; however, suspect myocardium at risk in the territory of    the LAD. Recommendation    Recommend cardiac catheterization depending on clinical appropriateness. Echo:3/3/20 at [facility]  Study Conclusions     - Left ventricle: The cavity size is normal. Normal relative wall     thickness. Systolic function was normal. The estimated ejection     fraction was 53%. Doppler parameters are consistent with abnormal     left ventricular relaxation (grade 1 diastolic dysfunction). The     global longitudinal strain was -21%. - Aortic valve: The mean systolic gradient is 6mm Hg. The peak     systolic gradient is 09ZR Hg. - Inferior vena cava: The vessel was normal in size; the     respirophasic diameter changes were blunted (&lt; 50%); findings are     consistent with mildly elevated central venous pressure. - Pulmonary arteries:  The peak pressure during systole by Doppler     is 34mm Hg.        I appreciate the opportunity of cooperating in the care of this individual.    Lashon Miles, HUBERT - CNP, 2/1/2023, 12:06 PM

## 2023-01-31 NOTE — TELEPHONE ENCOUNTER
MD Funmilayo Merida MA  Caller: Unspecified (Today, 11:27 AM)  I spoke to Ms. Haritha Valdez over the phone. Her question is whether Dr. Cisco Almodovar wants her to start taking Repatha which was prescribed in December on top of Zetia and pravastatin that she is already taking. I cannot find a clear plan from Dr. Cisco Almodovar in her chart. Since this is a very nonurgent matter, I advised her to wait to start Repatha until Monday when we can have a discussion with Dr. Cisco Almodovar once he is back.      Melia Alarcon MD, [facility]   Interventional Cardiology   [facility]   [phone] (c)

## 2023-01-31 NOTE — TELEPHONE ENCOUNTER
----- Message from Whitley Figueroa MD sent at 1/30/2023  5:16 PM EST -----  Let patient know their chol test is high, rec: fish oil 2g po daily and f/u lipids/lfts in 1-2mo. Lets prescribe that if pt agreeable and not allergic to fish oil. Thanks.

## 2023-01-31 NOTE — TELEPHONE ENCOUNTER
Spoke with pt she says she purchased the Repatha to start. She wants to know if she needs to take additional fish oil plus the repatha injectable medication? Please advise.

## 2023-02-01 ENCOUNTER — OFFICE VISIT (OUTPATIENT)
Dept: CARDIOLOGY CLINIC | Age: 68
End: 2023-02-01
Payer: MEDICARE

## 2023-02-01 VITALS
SYSTOLIC BLOOD PRESSURE: 126 MMHG | WEIGHT: 231 LBS | HEART RATE: 76 BPM | BODY MASS INDEX: 42.51 KG/M2 | OXYGEN SATURATION: 96 % | HEIGHT: 62 IN | DIASTOLIC BLOOD PRESSURE: 86 MMHG

## 2023-02-01 DIAGNOSIS — I10 ESSENTIAL (PRIMARY) HYPERTENSION: ICD-10-CM

## 2023-02-01 DIAGNOSIS — I25.42 CORONARY ARTERY DISSECTION: ICD-10-CM

## 2023-02-01 DIAGNOSIS — E78.2 MIXED HYPERLIPIDEMIA: ICD-10-CM

## 2023-02-01 DIAGNOSIS — I25.118 CORONARY ARTERY DISEASE OF NATIVE ARTERY OF NATIVE HEART WITH STABLE ANGINA PECTORIS (HCC): Primary | ICD-10-CM

## 2023-02-01 DIAGNOSIS — Z72.0 TOBACCO USE: ICD-10-CM

## 2023-02-01 PROCEDURE — 3074F SYST BP LT 130 MM HG: CPT | Performed by: NURSE PRACTITIONER

## 2023-02-01 PROCEDURE — 4004F PT TOBACCO SCREEN RCVD TLK: CPT | Performed by: NURSE PRACTITIONER

## 2023-02-01 PROCEDURE — G8417 CALC BMI ABV UP PARAM F/U: HCPCS | Performed by: NURSE PRACTITIONER

## 2023-02-01 PROCEDURE — 99214 OFFICE O/P EST MOD 30 MIN: CPT | Performed by: NURSE PRACTITIONER

## 2023-02-01 PROCEDURE — G8400 PT W/DXA NO RESULTS DOC: HCPCS | Performed by: NURSE PRACTITIONER

## 2023-02-01 PROCEDURE — 1123F ACP DISCUSS/DSCN MKR DOCD: CPT | Performed by: NURSE PRACTITIONER

## 2023-02-01 PROCEDURE — 3079F DIAST BP 80-89 MM HG: CPT | Performed by: NURSE PRACTITIONER

## 2023-02-01 PROCEDURE — 1090F PRES/ABSN URINE INCON ASSESS: CPT | Performed by: NURSE PRACTITIONER

## 2023-02-01 PROCEDURE — G8427 DOCREV CUR MEDS BY ELIG CLIN: HCPCS | Performed by: NURSE PRACTITIONER

## 2023-02-01 PROCEDURE — 3017F COLORECTAL CA SCREEN DOC REV: CPT | Performed by: NURSE PRACTITIONER

## 2023-02-01 PROCEDURE — G8484 FLU IMMUNIZE NO ADMIN: HCPCS | Performed by: NURSE PRACTITIONER

## 2023-02-01 RX ORDER — PRAVASTATIN SODIUM 40 MG
20 TABLET ORAL DAILY
Qty: 90 TABLET | Refills: 1
Start: 2023-02-01

## 2023-02-01 NOTE — PATIENT INSTRUCTIONS
Start the Repatha every 2 weeks  Decrease the pravastatin to half tablet daily  Stop the Zetia  No change in other heart/ BP medicines  Repeat fasting cholesterol in 9-10 weeks  Will call you with results and plan for Repatha  Follow up with me or Dr. Rosemary Hale in 6 months

## 2023-02-01 NOTE — TELEPHONE ENCOUNTER
Pt seen in office today with NPDD. Pt already taking Repatha- but cost was 500+ for 3 months. Clarified medications and resending Repatha PA to  Home	[place].

## 2023-02-03 RX ORDER — CARVEDILOL 6.25 MG/1
TABLET ORAL
Qty: 60 TABLET | Refills: 0 | OUTPATIENT
Start: 2023-02-03

## 2023-02-03 RX ORDER — DICLOFENAC SODIUM 75 MG/1
TABLET, DELAYED RELEASE ORAL
Qty: 60 TABLET | Refills: 0 | Status: SHIPPED | OUTPATIENT
Start: 2023-02-03

## 2023-02-03 NOTE — TELEPHONE ENCOUNTER
Refill Request       Last Seen: Last Seen Department: 1/30/2023  Last Seen by PCP: 1/3/2023    Last Written: 9/20/2022 #60 with 2    Next Appointment:   Future Appointments   Date Time Provider Abhishek Velasquezi   3/27/2023 10:30 AM DO Martina Renteria [address] [facility]   5/9/2023  1:45 PM Odalis Gregory MD [facility]   7/31/2023 11:15 AM HUBERT Rojas - CNP Inell Hals [facility]             Requested Prescriptions     Pending Prescriptions Disp Refills    diclofenac (VOLTAREN) 75 MG EC tablet [Pharmacy Med Name: Diclofenac Sodium 75 MG Oral Tablet Delayed Release] 60 tablet 0     Sig: Take 1 tablet by mouth twice daily

## 2023-03-06 NOTE — TELEPHONE ENCOUNTER
Refill Request       Last Seen: Last Seen Department: 1/30/2023  Last Seen by PCP: 5/9/2022    Last Written:     Carvedilol 6.25 - 12/12/2022 #60 with 3  Diclofenac 75mg - 2/3/23 #60 with none       Next Appointment:   Future Appointments   Date Time Provider Abhishek Velasquezi   3/27/2023 10:30 AM DO Sybil Trejoisas [address] [facility]   5/9/2023  1:45 PM Selin Goetz MD [facility]   7/31/2023 11:15 AM Claire Gallagher, APRN - CNP Caryle Neve [facility]             Requested Prescriptions     Pending Prescriptions Disp Refills    carvedilol (COREG) 6.25 MG tablet [Pharmacy Med Name: Carvedilol 6.25 MG Oral Tablet] 60 tablet 0     Sig: Take 1 tablet by mouth twice daily    diclofenac (VOLTAREN) 75 MG EC tablet [Pharmacy Med Name: Diclofenac Sodium 75 MG Oral Tablet Delayed Release] 60 tablet 0     Sig: Take 1 tablet by mouth twice daily

## 2023-03-07 ENCOUNTER — TELEPHONE (OUTPATIENT)
Dept: PRIMARY CARE CLINIC | Age: 68
End: 2023-03-07

## 2023-03-07 RX ORDER — DICLOFENAC SODIUM 75 MG/1
TABLET, DELAYED RELEASE ORAL
Qty: 180 TABLET | Refills: 3 | Status: SHIPPED | OUTPATIENT
Start: 2023-03-07

## 2023-03-07 RX ORDER — CARVEDILOL 12.5 MG/1
12.5 TABLET ORAL 2 TIMES DAILY
Qty: 180 TABLET | Refills: 3 | Status: SHIPPED | OUTPATIENT
Start: 2023-03-07

## 2023-03-07 RX ORDER — CARVEDILOL 6.25 MG/1
TABLET ORAL
Qty: 60 TABLET | Refills: 0 | OUTPATIENT
Start: 2023-03-07

## 2023-03-16 ENCOUNTER — PATIENT MESSAGE (OUTPATIENT)
Dept: PRIMARY CARE CLINIC | Age: 68
End: 2023-03-16

## 2023-03-16 RX ORDER — PREDNISONE 20 MG/1
40 TABLET ORAL DAILY
Qty: 10 TABLET | Refills: 0 | Status: SHIPPED | OUTPATIENT
Start: 2023-03-16 | End: 2023-03-26

## 2023-03-16 NOTE — TELEPHONE ENCOUNTER
Pt informed and she is okay with it as long as it was not 50mg she said thank you so much I informed her that it was 10mg

## 2023-03-24 DIAGNOSIS — M54.16 LUMBAR RADICULOPATHY: Primary | ICD-10-CM

## 2023-03-24 RX ORDER — PREDNISONE 20 MG/1
40 TABLET ORAL DAILY
Qty: 10 TABLET | Refills: 0 | Status: SHIPPED | OUTPATIENT
Start: 2023-03-24 | End: 2023-03-29

## 2023-03-27 ENCOUNTER — OFFICE VISIT (OUTPATIENT)
Dept: PRIMARY CARE CLINIC | Age: 68
End: 2023-03-27
Payer: MEDICARE

## 2023-03-27 VITALS
TEMPERATURE: 97.2 F | WEIGHT: 234 LBS | RESPIRATION RATE: 18 BRPM | DIASTOLIC BLOOD PRESSURE: 78 MMHG | SYSTOLIC BLOOD PRESSURE: 124 MMHG | BODY MASS INDEX: 43.06 KG/M2 | OXYGEN SATURATION: 94 % | HEART RATE: 64 BPM | HEIGHT: 62 IN

## 2023-03-27 DIAGNOSIS — M25.551 RIGHT HIP PAIN: ICD-10-CM

## 2023-03-27 DIAGNOSIS — E78.2 MIXED HYPERLIPIDEMIA: ICD-10-CM

## 2023-03-27 DIAGNOSIS — I10 ESSENTIAL (PRIMARY) HYPERTENSION: ICD-10-CM

## 2023-03-27 DIAGNOSIS — I25.42 CORONARY ARTERY DISSECTION: ICD-10-CM

## 2023-03-27 DIAGNOSIS — I25.118 CORONARY ARTERY DISEASE OF NATIVE ARTERY OF NATIVE HEART WITH STABLE ANGINA PECTORIS (HCC): ICD-10-CM

## 2023-03-27 DIAGNOSIS — H90.3 SENSORINEURAL HEARING LOSS (SNHL) OF BOTH EARS: ICD-10-CM

## 2023-03-27 DIAGNOSIS — M54.16 LUMBAR RADICULOPATHY: Primary | ICD-10-CM

## 2023-03-27 DIAGNOSIS — R73.03 PREDIABETES: ICD-10-CM

## 2023-03-27 PROCEDURE — 1123F ACP DISCUSS/DSCN MKR DOCD: CPT | Performed by: FAMILY MEDICINE

## 2023-03-27 PROCEDURE — G8427 DOCREV CUR MEDS BY ELIG CLIN: HCPCS | Performed by: FAMILY MEDICINE

## 2023-03-27 PROCEDURE — 3078F DIAST BP <80 MM HG: CPT | Performed by: FAMILY MEDICINE

## 2023-03-27 PROCEDURE — 99214 OFFICE O/P EST MOD 30 MIN: CPT | Performed by: FAMILY MEDICINE

## 2023-03-27 PROCEDURE — G8400 PT W/DXA NO RESULTS DOC: HCPCS | Performed by: FAMILY MEDICINE

## 2023-03-27 PROCEDURE — G8417 CALC BMI ABV UP PARAM F/U: HCPCS | Performed by: FAMILY MEDICINE

## 2023-03-27 PROCEDURE — G8484 FLU IMMUNIZE NO ADMIN: HCPCS | Performed by: FAMILY MEDICINE

## 2023-03-27 PROCEDURE — 3074F SYST BP LT 130 MM HG: CPT | Performed by: FAMILY MEDICINE

## 2023-03-27 PROCEDURE — 3017F COLORECTAL CA SCREEN DOC REV: CPT | Performed by: FAMILY MEDICINE

## 2023-03-27 PROCEDURE — 1090F PRES/ABSN URINE INCON ASSESS: CPT | Performed by: FAMILY MEDICINE

## 2023-03-27 PROCEDURE — 4004F PT TOBACCO SCREEN RCVD TLK: CPT | Performed by: FAMILY MEDICINE

## 2023-03-27 SDOH — ECONOMIC STABILITY: FOOD INSECURITY: WITHIN THE PAST 12 MONTHS, YOU WORRIED THAT YOUR FOOD WOULD RUN OUT BEFORE YOU GOT MONEY TO BUY MORE.: NEVER TRUE

## 2023-03-27 SDOH — ECONOMIC STABILITY: INCOME INSECURITY: HOW HARD IS IT FOR YOU TO PAY FOR THE VERY BASICS LIKE FOOD, HOUSING, MEDICAL CARE, AND HEATING?: NOT VERY HARD

## 2023-03-27 SDOH — ECONOMIC STABILITY: HOUSING INSECURITY
IN THE LAST 12 MONTHS, WAS THERE A TIME WHEN YOU DID NOT HAVE A STEADY PLACE TO SLEEP OR SLEPT IN A SHELTER (INCLUDING NOW)?: NO

## 2023-03-27 SDOH — ECONOMIC STABILITY: FOOD INSECURITY: WITHIN THE PAST 12 MONTHS, THE FOOD YOU BOUGHT JUST DIDN'T LAST AND YOU DIDN'T HAVE MONEY TO GET MORE.: NEVER TRUE

## 2023-03-27 ASSESSMENT — ANXIETY QUESTIONNAIRES
2. NOT BEING ABLE TO STOP OR CONTROL WORRYING: 0
6. BECOMING EASILY ANNOYED OR IRRITABLE: 0
4. TROUBLE RELAXING: 0
GAD7 TOTAL SCORE: 0
5. BEING SO RESTLESS THAT IT IS HARD TO SIT STILL: 0
1. FEELING NERVOUS, ANXIOUS, OR ON EDGE: 0
7. FEELING AFRAID AS IF SOMETHING AWFUL MIGHT HAPPEN: 0
3. WORRYING TOO MUCH ABOUT DIFFERENT THINGS: 0

## 2023-03-27 ASSESSMENT — PATIENT HEALTH QUESTIONNAIRE - PHQ9
SUM OF ALL RESPONSES TO PHQ QUESTIONS 1-9: 0
9. THOUGHTS THAT YOU WOULD BE BETTER OFF DEAD, OR OF HURTING YOURSELF: 0
SUM OF ALL RESPONSES TO PHQ QUESTIONS 1-9: 0
8. MOVING OR SPEAKING SO SLOWLY THAT OTHER PEOPLE COULD HAVE NOTICED. OR THE OPPOSITE, BEING SO FIGETY OR RESTLESS THAT YOU HAVE BEEN MOVING AROUND A LOT MORE THAN USUAL: 0
6. FEELING BAD ABOUT YOURSELF - OR THAT YOU ARE A FAILURE OR HAVE LET YOURSELF OR YOUR FAMILY DOWN: 0
7. TROUBLE CONCENTRATING ON THINGS, SUCH AS READING THE NEWSPAPER OR WATCHING TELEVISION: 0
2. FEELING DOWN, DEPRESSED OR HOPELESS: 0
4. FEELING TIRED OR HAVING LITTLE ENERGY: 0
3. TROUBLE FALLING OR STAYING ASLEEP: 0
SUM OF ALL RESPONSES TO PHQ QUESTIONS 1-9: 0
5. POOR APPETITE OR OVEREATING: 0
SUM OF ALL RESPONSES TO PHQ QUESTIONS 1-9: 0
1. LITTLE INTEREST OR PLEASURE IN DOING THINGS: 0
DEPRESSION UNABLE TO ASSESS: FUNCTIONAL CAPACITY MOTIVATION LIMITS ACCURACY
SUM OF ALL RESPONSES TO PHQ9 QUESTIONS 1 & 2: 0

## 2023-03-27 ASSESSMENT — ENCOUNTER SYMPTOMS
BACK PAIN: 1
DIARRHEA: 0
ABDOMINAL PAIN: 0
SHORTNESS OF BREATH: 0
CONSTIPATION: 0

## 2023-03-27 NOTE — PROGRESS NOTES
pain and how her day is going. Patient also has a history of sensorineural hearing loss but states she cannot afford the $3700 that she was quoted for hearing aids.       Patient Active Problem List   Diagnosis    Primary osteoarthritis of right knee    Essential (primary) hypertension    Gastro-esophageal reflux disease without esophagitis    Hyperlipidemia    Liver cyst    Old myocardial infarction    Tinnitus of both ears    Tobacco use    Urinary incontinence    Lumbar radiculopathy    Ptosis of right eyelid    Tick bite    Post-Lyme disease syndrome    Peripheral polyneuropathy    Sciatic neuropathy    Coronary artery dissection    Prediabetes    Coronary artery disease of native artery of native heart with stable angina pectoris (HCC)    Sensorineural hearing loss (SNHL) of both ears         Past Medical History:    Past Medical History:   Diagnosis Date    Acute laryngitis     Acute laryngitis 1/29/2015    Acute lymphadenitis of face, head and neck     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    Arthritis     Calculus of kidney     Cancer (HCC)     skin    Cystocele, midline     Eustachian tube dysfunction, bilateral     Eustachian tube dysfunction, bilateral 6/15/2016    Gastro-esophageal reflux disease without esophagitis     Generalized anxiety disorder 8/25/2016    Hyperlipidemia     Hypertension     Liver cyst     Neuritis     Neuritis 6/5/2018    Nocturia     Old myocardial infarction     Osteoarthritis     Otalgia     Perennial allergic rhinitis     Pharyngeal inflammation     Primary osteoarthritis of right knee     Tinnitus of vascular origin 6/15/2016    Tinnitus, bilateral     Tobacco use     Urinary frequency     Urinary incontinence     Vaginal enterocele     Vaginal vault 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        Past Surgical History:  Past Surgical History:   Procedure Laterality Date    APPENDECTOMY      CHOLECYSTECTOMY      ELBOW DEBRIDEMENT      FOOT SURGERY      bilateral

## 2023-04-03 SDOH — HEALTH STABILITY: PHYSICAL HEALTH: ON AVERAGE, HOW MANY MINUTES DO YOU ENGAGE IN EXERCISE AT THIS LEVEL?: 10 MIN

## 2023-04-03 ASSESSMENT — SOCIAL DETERMINANTS OF HEALTH (SDOH)

## 2023-04-04 ENCOUNTER — HOSPITAL ENCOUNTER (OUTPATIENT)
Dept: MRI IMAGING | Age: 68
Discharge: HOME | End: 2023-04-04
Payer: MEDICARE

## 2023-04-04 DIAGNOSIS — M25.551 RIGHT HIP PAIN: ICD-10-CM

## 2023-04-04 PROCEDURE — 73721 MRI JNT OF LWR EXTRE W/O DYE: CPT

## 2023-04-06 ENCOUNTER — OFFICE VISIT (OUTPATIENT)
Dept: ORTHOPEDIC SURGERY | Age: 68
End: 2023-04-06

## 2023-04-06 VITALS — BODY MASS INDEX: 43.06 KG/M2 | WEIGHT: 234 LBS | HEIGHT: 62 IN

## 2023-04-06 DIAGNOSIS — M46.1 SACROILIITIS (HCC): ICD-10-CM

## 2023-04-06 DIAGNOSIS — M16.11 PRIMARY OSTEOARTHRITIS OF RIGHT HIP: ICD-10-CM

## 2023-04-06 DIAGNOSIS — M25.551 PAIN OF RIGHT HIP: Primary | ICD-10-CM

## 2023-04-06 DIAGNOSIS — M70.61 TROCHANTERIC BURSITIS OF RIGHT HIP: ICD-10-CM

## 2023-04-06 RX ORDER — BUPIVACAINE HYDROCHLORIDE 2.5 MG/ML
30 INJECTION, SOLUTION INFILTRATION; PERINEURAL ONCE
Status: COMPLETED | OUTPATIENT
Start: 2023-04-06 | End: 2023-04-06

## 2023-04-06 RX ORDER — LIDOCAINE HYDROCHLORIDE 10 MG/ML
20 INJECTION, SOLUTION INFILTRATION; PERINEURAL ONCE
Status: COMPLETED | OUTPATIENT
Start: 2023-04-06 | End: 2023-04-06

## 2023-04-06 RX ORDER — TRIAMCINOLONE ACETONIDE 40 MG/ML
40 INJECTION, SUSPENSION INTRA-ARTICULAR; INTRAMUSCULAR ONCE
Status: COMPLETED | OUTPATIENT
Start: 2023-04-06 | End: 2023-04-06

## 2023-04-06 RX ADMIN — BUPIVACAINE HYDROCHLORIDE 75 MG: 2.5 INJECTION, SOLUTION INFILTRATION; PERINEURAL at 14:07

## 2023-04-06 RX ADMIN — TRIAMCINOLONE ACETONIDE 40 MG: 40 INJECTION, SUSPENSION INTRA-ARTICULAR; INTRAMUSCULAR at 14:14

## 2023-04-06 RX ADMIN — LIDOCAINE HYDROCHLORIDE 20 ML: 10 INJECTION, SOLUTION INFILTRATION; PERINEURAL at 14:13

## 2023-04-06 NOTE — PROGRESS NOTES
vault 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      Past Surgical History:   Procedure Laterality Date    APPENDECTOMY      CHOLECYSTECTOMY      ELBOW DEBRIDEMENT      FOOT SURGERY      bilateral    HYSTERECTOMY, VAGINAL      IR MIDLINE CATH  11/28/2022    IR MIDLINE CATH 11/28/2022 [facility] SPECIAL PROCEDURES    KNEE ARTHROSCOPY Right 4/24/2019    RIGHT KNEE ARTHROSCOPY,, SYNOVECTOMY CHONDROPLASTY,MEDIAL AND LATERAL MENESECTOMY, REMOVAL LOOSE BODIES [facility] [address]      bilateral    LIVER SURGERY      cyst removal    NECK SURGERY      PAIN MANAGEMENT PROCEDURE Right 11/18/2021    RIGHT LUMBAR FOUR FIVE EPIDURAL STEROID INJECTION SITE CONFIRMED BY FLUOROSCOPY performed by Gerald Cantrell MD at [address] [facility] Right 10/30/2020    VIDEO ARTHROSCOPY RIGHT SHOULDER SUBACROMIAL DECOMPRESSION LABRAL DEBRIDEMENT ROTATOR CUFF TEAR performed by Vidya Berman MD at [address] ARTHROSCOPY      TONSILLECTOMY      TUBAL LIGATION      URETHRA SURGERY      US BREAST LESION REMOVAL LEFT       Family History   Problem Relation Age of Onset    Breast Cancer Mother     Cancer Mother         Bone    Diabetes Mother     Heart Disease Mother         CHF    Arthritis Mother     Heart Attack Father     Prostate Cancer Brother     Cancer Brother         kidney    Cancer Brother         bladder    Cancer Maternal Grandmother     Breast Cancer Maternal Aunt      Social History     Tobacco Use    Smoking status: Every Day     Packs/day: 0.25     Years: 40.00     Pack years: 10.00     Types: Cigarettes    Smokeless tobacco: Never    Tobacco comments:     quit March 4, 2019   Substance Use Topics    Alcohol use: Yes     Comment: occ      Current Outpatient Medications on File Prior to Visit   Medication Sig Dispense Refill    diclofenac (VOLTAREN) 75 MG EC tablet Take 1 tablet by mouth twice daily 180 tablet 3    carvedilol (COREG) 12.5 MG tablet Take 1 tablet by mouth 2 times

## 2023-04-07 NOTE — RESULT ENCOUNTER NOTE
MRI shows moderate right  hip degeneration and lumbar spine degeneration. Continue follow up with spine and ortho for ongoing management.

## 2023-04-24 ENCOUNTER — HOSPITAL ENCOUNTER (OUTPATIENT)
Dept: PHYSICAL THERAPY | Age: 68
Setting detail: THERAPIES SERIES
Discharge: HOME | End: 2023-04-24

## 2023-04-24 NOTE — FLOWSHEET NOTE
[facility],  [address]        Physical Therapy  Cancellation/No-show Note  Patient Name:  Adam Lefort  :  1955   Date:  2023  Cancelled visits to date: 0  No-shows to date: 1    For today's appointment patient:  []  Cancelled  []  Rescheduled appointment  [x]  No-show     Reason given by patient:  []  Patient ill  []  Conflicting appointment  []  No transportation    []  Conflict with work  []  No reason given  []  Other:     Comments:      Electronically signed by:  Marquis Rojas, PT

## 2023-05-09 ENCOUNTER — OFFICE VISIT (OUTPATIENT)
Dept: UROGYNECOLOGY | Age: 68
End: 2023-05-09
Payer: MEDICARE

## 2023-05-09 VITALS
SYSTOLIC BLOOD PRESSURE: 160 MMHG | OXYGEN SATURATION: 99 % | HEART RATE: 76 BPM | RESPIRATION RATE: 16 BRPM | DIASTOLIC BLOOD PRESSURE: 93 MMHG | TEMPERATURE: 98.7 F

## 2023-05-09 DIAGNOSIS — R39.15 URGENCY OF URINATION: ICD-10-CM

## 2023-05-09 DIAGNOSIS — R39.15 URINARY URGENCY: Primary | ICD-10-CM

## 2023-05-09 DIAGNOSIS — N39.41 URGE INCONTINENCE: ICD-10-CM

## 2023-05-09 DIAGNOSIS — R35.0 URINARY FREQUENCY: ICD-10-CM

## 2023-05-09 PROCEDURE — 1090F PRES/ABSN URINE INCON ASSESS: CPT | Performed by: OBSTETRICS & GYNECOLOGY

## 2023-05-09 PROCEDURE — 0509F URINE INCON PLAN DOCD: CPT | Performed by: OBSTETRICS & GYNECOLOGY

## 2023-05-09 PROCEDURE — 4004F PT TOBACCO SCREEN RCVD TLK: CPT | Performed by: OBSTETRICS & GYNECOLOGY

## 2023-05-09 PROCEDURE — 3017F COLORECTAL CA SCREEN DOC REV: CPT | Performed by: OBSTETRICS & GYNECOLOGY

## 2023-05-09 PROCEDURE — G8417 CALC BMI ABV UP PARAM F/U: HCPCS | Performed by: OBSTETRICS & GYNECOLOGY

## 2023-05-09 PROCEDURE — 1123F ACP DISCUSS/DSCN MKR DOCD: CPT | Performed by: OBSTETRICS & GYNECOLOGY

## 2023-05-09 PROCEDURE — 3080F DIAST BP >= 90 MM HG: CPT | Performed by: OBSTETRICS & GYNECOLOGY

## 2023-05-09 PROCEDURE — 3077F SYST BP >= 140 MM HG: CPT | Performed by: OBSTETRICS & GYNECOLOGY

## 2023-05-09 PROCEDURE — 99214 OFFICE O/P EST MOD 30 MIN: CPT | Performed by: OBSTETRICS & GYNECOLOGY

## 2023-05-09 PROCEDURE — G8427 DOCREV CUR MEDS BY ELIG CLIN: HCPCS | Performed by: OBSTETRICS & GYNECOLOGY

## 2023-05-09 PROCEDURE — G8400 PT W/DXA NO RESULTS DOC: HCPCS | Performed by: OBSTETRICS & GYNECOLOGY

## 2023-05-09 RX ORDER — CIPROFLOXACIN 500 MG/1
500 TABLET, FILM COATED ORAL 2 TIMES DAILY
Qty: 14 TABLET | Refills: 0 | Status: SHIPPED | OUTPATIENT
Start: 2023-05-09 | End: 2023-05-16

## 2023-05-09 NOTE — PROGRESS NOTES
2023       HPI:     Name: Brissa Monte  YOB: 1955    CC: Patient is a 69 y.o. presenting for evaluation of  OAB .   HPI: How long have you had this problem? years  Please rate the severity of your problem: moderate  Anything make it better? Patient continues to find Botox helpful, but definitely thinks it is time for her next round.     Ob/Gyn History:    OB History    Para Term  AB Living   1 1 1         SAB IAB Ectopic Molar Multiple Live Births                    # Outcome Date GA Lbr Jorge L/2nd Weight Sex Delivery Anes PTL Lv   1 Term              Past Medical History:   Past Medical History:   Diagnosis Date    Acute laryngitis     Acute laryngitis 2015    Acute lymphadenitis of face, head and neck     Artery dissection ([facility])     triple dissection obtuse marginal    Arthritis     Calculus of kidney     Cancer ([facility] [address]     skin    Cystocele, midline     Eustachian tube dysfunction, bilateral     Eustachian tube dysfunction, bilateral 6/15/2016    Gastro-esophageal reflux disease without esophagitis     Generalized anxiety disorder 2016    Hyperlipidemia     Hypertension     Liver cyst     Neuritis     Neuritis 2018    Nocturia     Old myocardial infarction     Osteoarthritis     Otalgia     Perennial allergic rhinitis     Pharyngeal inflammation     Primary osteoarthritis of right knee     Tinnitus of vascular origin 6/15/2016    Tinnitus, bilateral     Tobacco use     Urinary frequency     Urinary incontinence     Vaginal enterocele     Vaginal vault 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      Past Surgical History:   Past Surgical History:   Procedure Laterality Date    APPENDECTOMY      CHOLECYSTECTOMY      ELBOW DEBRIDEMENT      FOOT SURGERY      bilateral    HYSTERECTOMY, VAGINAL      IR MIDLINE CATH  2022    IR MIDLINE CATH 2022 [facility] SPECIAL PROCEDURES    KNEE ARTHROSCOPY Right 2019    RIGHT KNEE ARTHROSCOPY,, SYNOVECTOMY CHONDROPLASTY,MEDIAL AND

## 2023-05-10 ENCOUNTER — PROCEDURE VISIT (OUTPATIENT)
Dept: UROGYNECOLOGY | Age: 68
End: 2023-05-10
Payer: MEDICARE

## 2023-05-10 ENCOUNTER — CLINICAL DOCUMENTATION (OUTPATIENT)
Dept: UROGYNECOLOGY | Age: 68
End: 2023-05-10

## 2023-05-10 VITALS — HEART RATE: 70 BPM | TEMPERATURE: 98 F | OXYGEN SATURATION: 99 % | RESPIRATION RATE: 16 BRPM

## 2023-05-10 DIAGNOSIS — N39.41 URGE INCONTINENCE: ICD-10-CM

## 2023-05-10 DIAGNOSIS — R39.15 URINARY URGENCY: Primary | ICD-10-CM

## 2023-05-10 DIAGNOSIS — R35.0 URINARY FREQUENCY: ICD-10-CM

## 2023-05-10 PROCEDURE — 52287 CYSTOSCOPY CHEMODENERVATION: CPT | Performed by: OBSTETRICS & GYNECOLOGY

## 2023-05-10 PROCEDURE — 81002 URINALYSIS NONAUTO W/O SCOPE: CPT | Performed by: OBSTETRICS & GYNECOLOGY

## 2023-05-10 RX ORDER — LIDOCAINE HYDROCHLORIDE 10 MG/ML
50 INJECTION, SOLUTION INFILTRATION; PERINEURAL ONCE
Status: COMPLETED | OUTPATIENT
Start: 2023-05-10 | End: 2023-05-10

## 2023-05-10 RX ADMIN — LIDOCAINE HYDROCHLORIDE 50 ML: 10 INJECTION, SOLUTION INFILTRATION; PERINEURAL at 10:39

## 2023-05-10 NOTE — PROGRESS NOTES
5/10/2023       HPI:     Name: Love Marcus  YOB: 1955    CC: Patient with urinary urgency, frequency, overactive bladder. HPI: Love Marcus is a 69 y.o. female who is here for intravesical botulinum toxin A injection.       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    Arthritis     Calculus of kidney     Cancer (HCC)     skin    Cystocele, midline     Eustachian tube dysfunction, bilateral     Eustachian tube dysfunction, bilateral 6/15/2016    Gastro-esophageal reflux disease without esophagitis     Generalized anxiety disorder 8/25/2016    Hyperlipidemia     Hypertension     Liver cyst     Neuritis     Neuritis 6/5/2018    Nocturia     Old myocardial infarction     Osteoarthritis     Otalgia     Perennial allergic rhinitis     Pharyngeal inflammation     Primary osteoarthritis of right knee     Tinnitus of vascular origin 6/15/2016    Tinnitus, bilateral     Tobacco use     Urinary frequency     Urinary incontinence     Vaginal enterocele     Vaginal vault 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      Past Surgical History:   Past Surgical History:   Procedure Laterality Date    APPENDECTOMY      CHOLECYSTECTOMY      ELBOW DEBRIDEMENT      FOOT SURGERY      bilateral    HYSTERECTOMY, VAGINAL      IR MIDLINE CATH  11/28/2022    IR MIDLINE CATH 11/28/2022 [facility] SPECIAL PROCEDURES    KNEE ARTHROSCOPY Right 4/24/2019    RIGHT KNEE ARTHROSCOPY,, SYNOVECTOMY CHONDROPLASTY,MEDIAL AND LATERAL MENESECTOMY, REMOVAL LOOSE BODIES performed by Jimbo Witt MD at [place] [address]      bilateral    LIVER SURGERY      cyst removal    NECK SURGERY      PAIN MANAGEMENT PROCEDURE Right 11/18/2021    RIGHT LUMBAR FOUR FIVE EPIDURAL STEROID INJECTION SITE CONFIRMED BY FLUOROSCOPY performed by Beatrice Morris MD at [address] [facility]

## 2023-05-10 NOTE — PROGRESS NOTES
Antibiotic sent to preferred pharmacy yesterday after patient was seen to sign Botox consent. Botox appointment today.

## 2023-05-11 ENCOUNTER — TELEPHONE (OUTPATIENT)
Dept: UROGYNECOLOGY | Age: 68
End: 2023-05-11

## 2023-05-15 ENCOUNTER — TELEPHONE (OUTPATIENT)
Dept: CARDIOLOGY CLINIC | Age: 68
End: 2023-05-15

## 2023-05-15 NOTE — TELEPHONE ENCOUNTER
----- Message from HUBERT Alcocer CNP sent at 3/16/2023  4:56 PM EDT -----  Regarding: RE: Nikhil Truong through [facility],   Do we need to send a new prescription for her Repatha to [address] [place] [address]? Thanks, Damien Bates  ----- Message -----  From: Damien Almonte MA  Sent: 3/6/2023   8:00 AM EDT  To: Damien Almonte MA, Antonio Marvin MA, #  Subject: Nikhil Truong through [place] [address] responded to our attempt to decrease repatha price sying they cannot enroll pt with [facility] until her next refill in March. Prescription started 12/13/2022, so it may be mid-month. I will be on maternity leave so I am sending to Saumya Shetty and Kvng Garcia fax scanned into media for review if needed.

## 2023-05-15 NOTE — TELEPHONE ENCOUNTER
Spoke to patient, she will call us after having labs done so that we can start a new PA for the repatha.

## 2023-05-17 ENCOUNTER — HOSPITAL ENCOUNTER (OUTPATIENT)
Age: 68
Discharge: HOME | End: 2023-05-17
Payer: MEDICARE

## 2023-05-17 DIAGNOSIS — I25.118 CORONARY ARTERY DISEASE OF NATIVE ARTERY OF NATIVE HEART WITH STABLE ANGINA PECTORIS (HCC): ICD-10-CM

## 2023-05-17 DIAGNOSIS — E78.2 MIXED HYPERLIPIDEMIA: ICD-10-CM

## 2023-05-17 LAB
ALBUMIN SERPL-MCNC: 3.8 G/DL (ref 3.4–5)
ALP SERPL-CCNC: 67 U/L (ref 40–129)
ALT SERPL-CCNC: 14 U/L (ref 10–40)
AST SERPL-CCNC: 13 U/L (ref 15–37)
BILIRUB DIRECT SERPL-MCNC: <0.2 MG/DL (ref 0–0.3)
BILIRUB INDIRECT SERPL-MCNC: ABNORMAL MG/DL (ref 0–1)
BILIRUB SERPL-MCNC: <0.2 MG/DL (ref 0–1)
CHOLEST SERPL-MCNC: 151 MG/DL (ref 0–199)
HDLC SERPL-MCNC: 49 MG/DL (ref 40–60)
LDLC SERPL CALC-MCNC: 53 MG/DL
PROT SERPL-MCNC: 6.6 G/DL (ref 6.4–8.2)
TRIGL SERPL-MCNC: 246 MG/DL (ref 0–150)
VLDLC SERPL CALC-MCNC: 49 MG/DL

## 2023-05-17 PROCEDURE — 80076 HEPATIC FUNCTION PANEL: CPT

## 2023-05-17 PROCEDURE — 36415 COLL VENOUS BLD VENIPUNCTURE: CPT

## 2023-05-17 PROCEDURE — 80061 LIPID PANEL: CPT

## 2023-05-18 ENCOUNTER — TELEPHONE (OUTPATIENT)
Dept: CARDIOLOGY CLINIC | Age: 68
End: 2023-05-18

## 2023-05-18 NOTE — TELEPHONE ENCOUNTER
----- Message from HUBERT German - CNP sent at 5/18/2023  8:32 AM EDT -----  Kidney and liver function panel stable. Lipids good excepting elevated triglycerides - though this is better tan 3 months ago. I think we need to start a new PA for her Repatha which I want her to continue.    Thank you, Joanie Christina

## 2023-05-24 ENCOUNTER — OFFICE VISIT (OUTPATIENT)
Dept: UROGYNECOLOGY | Age: 68
End: 2023-05-24
Payer: MEDICARE

## 2023-05-24 VITALS
SYSTOLIC BLOOD PRESSURE: 164 MMHG | HEART RATE: 66 BPM | TEMPERATURE: 98.7 F | OXYGEN SATURATION: 99 % | DIASTOLIC BLOOD PRESSURE: 88 MMHG | RESPIRATION RATE: 16 BRPM

## 2023-05-24 DIAGNOSIS — R35.0 URINARY FREQUENCY: ICD-10-CM

## 2023-05-24 DIAGNOSIS — R39.15 URINARY URGENCY: Primary | ICD-10-CM

## 2023-05-24 DIAGNOSIS — N39.41 URGE INCONTINENCE: ICD-10-CM

## 2023-05-24 PROCEDURE — 0509F URINE INCON PLAN DOCD: CPT | Performed by: NURSE PRACTITIONER

## 2023-05-24 PROCEDURE — G8400 PT W/DXA NO RESULTS DOC: HCPCS | Performed by: NURSE PRACTITIONER

## 2023-05-24 PROCEDURE — 1123F ACP DISCUSS/DSCN MKR DOCD: CPT | Performed by: NURSE PRACTITIONER

## 2023-05-24 PROCEDURE — 51701 INSERT BLADDER CATHETER: CPT | Performed by: NURSE PRACTITIONER

## 2023-05-24 PROCEDURE — 3077F SYST BP >= 140 MM HG: CPT | Performed by: NURSE PRACTITIONER

## 2023-05-24 PROCEDURE — 3017F COLORECTAL CA SCREEN DOC REV: CPT | Performed by: NURSE PRACTITIONER

## 2023-05-24 PROCEDURE — 3079F DIAST BP 80-89 MM HG: CPT | Performed by: NURSE PRACTITIONER

## 2023-05-24 PROCEDURE — 4004F PT TOBACCO SCREEN RCVD TLK: CPT | Performed by: NURSE PRACTITIONER

## 2023-05-24 PROCEDURE — G8417 CALC BMI ABV UP PARAM F/U: HCPCS | Performed by: NURSE PRACTITIONER

## 2023-05-24 PROCEDURE — G8427 DOCREV CUR MEDS BY ELIG CLIN: HCPCS | Performed by: NURSE PRACTITIONER

## 2023-05-24 PROCEDURE — 99213 OFFICE O/P EST LOW 20 MIN: CPT | Performed by: NURSE PRACTITIONER

## 2023-05-24 PROCEDURE — 1090F PRES/ABSN URINE INCON ASSESS: CPT | Performed by: NURSE PRACTITIONER

## 2023-05-24 NOTE — PROGRESS NOTES
2023       HPI:     Name: Geovany Mosquera  YOB: 1955    CC: Patient is a 69 y.o. presenting for evaluation of  OAB . Received Botox Injections 100 units on 5/10/2023 - Here for PVR check today. HPI: How long have you had this problem? years  Please rate the severity of your problem: moderate  Anything make it better? Botox    She is again pleased with botox outcomes.   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    Arthritis     Calculus of kidney     Cancer ([facility] [address]     skin    Cystocele, midline     Eustachian tube dysfunction, bilateral     Eustachian tube dysfunction, bilateral 6/15/2016    Gastro-esophageal reflux disease without esophagitis     Generalized anxiety disorder 2016    Hyperlipidemia     Hypertension     Liver cyst     Neuritis     Neuritis 2018    Nocturia     Old myocardial infarction     Osteoarthritis     Otalgia     Perennial allergic rhinitis     Pharyngeal inflammation     Primary osteoarthritis of right knee     Tinnitus of vascular origin 6/15/2016    Tinnitus, bilateral     Tobacco use     Urinary frequency     Urinary incontinence     Vaginal enterocele     Vaginal vault 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      Past Surgical History:   Past Surgical History:   Procedure Laterality Date    APPENDECTOMY      CHOLECYSTECTOMY      ELBOW DEBRIDEMENT      FOOT SURGERY      bilateral    HYSTERECTOMY, VAGINAL      IR MIDLINE CATH  2022    IR MIDLINE CATH 2022 [facility] SPECIAL PROCEDURES    KNEE ARTHROSCOPY Right

## 2023-05-30 ENCOUNTER — OFFICE VISIT (OUTPATIENT)
Dept: ORTHOPEDIC SURGERY | Age: 68
End: 2023-05-30
Payer: MEDICARE

## 2023-05-30 VITALS — WEIGHT: 234 LBS | BODY MASS INDEX: 43.06 KG/M2 | HEIGHT: 62 IN

## 2023-05-30 DIAGNOSIS — M54.16 RIGHT LUMBAR RADICULITIS: ICD-10-CM

## 2023-05-30 DIAGNOSIS — M16.11 PRIMARY OSTEOARTHRITIS OF RIGHT HIP: Primary | ICD-10-CM

## 2023-05-30 PROCEDURE — 4004F PT TOBACCO SCREEN RCVD TLK: CPT | Performed by: PHYSICIAN ASSISTANT

## 2023-05-30 PROCEDURE — 1090F PRES/ABSN URINE INCON ASSESS: CPT | Performed by: PHYSICIAN ASSISTANT

## 2023-05-30 PROCEDURE — G8417 CALC BMI ABV UP PARAM F/U: HCPCS | Performed by: PHYSICIAN ASSISTANT

## 2023-05-30 PROCEDURE — 3017F COLORECTAL CA SCREEN DOC REV: CPT | Performed by: PHYSICIAN ASSISTANT

## 2023-05-30 PROCEDURE — G8428 CUR MEDS NOT DOCUMENT: HCPCS | Performed by: PHYSICIAN ASSISTANT

## 2023-05-30 PROCEDURE — G8400 PT W/DXA NO RESULTS DOC: HCPCS | Performed by: PHYSICIAN ASSISTANT

## 2023-05-30 PROCEDURE — 1123F ACP DISCUSS/DSCN MKR DOCD: CPT | Performed by: PHYSICIAN ASSISTANT

## 2023-05-30 PROCEDURE — 99213 OFFICE O/P EST LOW 20 MIN: CPT | Performed by: PHYSICIAN ASSISTANT

## 2023-05-30 NOTE — PROGRESS NOTES
Dr Ryan Fisher      Date /Time 5/30/2023       1:30 PM EDT  Name Yoel Solorio             1955   Location  [facility]  MRN [id number]                Chief Complaint   Patient presents with    Follow-up     Right Hip (Cortisone 04/06/2023)         History of Present Illness    Yoel Solorio is a 69 y.o. female who presents with  right hip pain. Injury Mechanism:  none. Worker's Comp. & legal issues:   none. Previous Treatments: Ice, Heat, and NSAIDs    Patient presents to the office today for follow-up visit. We are treating her for combination of hip arthritis, lumbar radiculopathy, and hip bursitis. She was given an epidural injection on 4/6/2023. This injection did help her tremendously. She was then given a lateral hip injection on 4/25/2023 which also helped her tremendously. Her pain has returned. It is concentrated in her lumbar spine, lateral right hip, and into her groin. \Previous history: Patient presents to the office today for new problem. Patient here with a chief complaint of right hip pain. Patient's right hip has been painful for 3 months. Majority of her pain is concentrated in the right lower lumbar spine and SI joint. She does have occasional buttocks pain. The second most painful area is laterally and she only gets occasional groin pain. She has had an MRI which will be commented on later. No direct injury or trauma. She does have a history of lumbar stenosis.     Past History  Past Medical History:   Diagnosis Date    Acute laryngitis     Acute laryngitis 1/29/2015    Acute lymphadenitis of face, head and neck     Artery dissection ([facility])     triple dissection obtuse marginal    Arthritis     Calculus of kidney     Cancer ([facility])     skin    Cystocele, midline     Eustachian tube dysfunction, bilateral     Eustachian tube dysfunction, bilateral 6/15/2016    Gastro-esophageal reflux disease without esophagitis     Generalized anxiety

## 2023-06-01 ENCOUNTER — OFFICE VISIT (OUTPATIENT)
Dept: ORTHOPEDIC SURGERY | Age: 68
End: 2023-06-01

## 2023-06-01 VITALS — WEIGHT: 230 LBS | BODY MASS INDEX: 40.75 KG/M2 | HEIGHT: 63 IN

## 2023-06-01 DIAGNOSIS — M54.16 RIGHT LUMBAR RADICULITIS: ICD-10-CM

## 2023-06-01 DIAGNOSIS — M70.61 TROCHANTERIC BURSITIS OF RIGHT HIP: ICD-10-CM

## 2023-06-01 DIAGNOSIS — M16.11 PRIMARY OSTEOARTHRITIS OF RIGHT HIP: Primary | ICD-10-CM

## 2023-06-01 RX ORDER — LIDOCAINE HYDROCHLORIDE 10 MG/ML
20 INJECTION, SOLUTION INFILTRATION; PERINEURAL ONCE
Status: COMPLETED | OUTPATIENT
Start: 2023-06-01 | End: 2023-06-01

## 2023-06-01 RX ORDER — BUPIVACAINE HYDROCHLORIDE 2.5 MG/ML
30 INJECTION, SOLUTION INFILTRATION; PERINEURAL ONCE
Status: COMPLETED | OUTPATIENT
Start: 2023-06-01 | End: 2023-06-01

## 2023-06-01 RX ORDER — BETAMETHASONE SODIUM PHOSPHATE AND BETAMETHASONE ACETATE 3; 3 MG/ML; MG/ML
12 INJECTION, SUSPENSION INTRA-ARTICULAR; INTRALESIONAL; INTRAMUSCULAR; SOFT TISSUE ONCE
Status: COMPLETED | OUTPATIENT
Start: 2023-06-01 | End: 2023-06-01

## 2023-06-01 RX ADMIN — BUPIVACAINE HYDROCHLORIDE 75 MG: 2.5 INJECTION, SOLUTION INFILTRATION; PERINEURAL at 11:06

## 2023-06-01 RX ADMIN — BETAMETHASONE SODIUM PHOSPHATE AND BETAMETHASONE ACETATE 12 MG: 3; 3 INJECTION, SUSPENSION INTRA-ARTICULAR; INTRALESIONAL; INTRAMUSCULAR; SOFT TISSUE at 11:05

## 2023-06-01 RX ADMIN — LIDOCAINE HYDROCHLORIDE 20 ML: 10 INJECTION, SOLUTION INFILTRATION; PERINEURAL at 11:07

## 2023-06-01 NOTE — PROGRESS NOTES
formulation of 2cc of Celestone, 1cc of 1% lidocaine, 1cc of 0.25% sensoricaine was injected into the hip. Appropriate insufflation deep to the hip capsule was visualized. The site was cleaned and dressed with a band aid. Images were taken and saved for permanent record. Assessment and Plan  Liyah Dacosta was seen today for follow-up. Diagnoses and all orders for this visit:    Primary osteoarthritis of right hip    Right lumbar radiculitis    Trochanteric bursitis of right hip            I do believe patient has a combination of lumbar and hip pathology. He was given an intra-articular cortisone injection today for both diagnostic and therapeutic purposes. I discussed with Brissa Monte that her history, symptoms, signs, and imaging are most consistent with  right lateral hip bursitis, right sacroiliitis, right hip osteoarthritis    We reviewed the natural history of these conditions and treatment options ranging from conservative measures (rest, icing, activity modification, physical therapy, pain meds, cortisone injection) to surgical options. In terms of treatment, I recommended continuing with rest, icing, avoidance of painful activities, NSAIDs or pain meds as tolerated, and physical therapy. If these are not effective, cortisone injection can be considered. We discussed surgical options as well, should conservative measures fail. Electronically signed by José Antonio Day MD on 6/1/2023 at 11:01 AM  This dictation was generated by voice recognition computer software. Although all attempts are made to edit the dictation for accuracy, there may be errors in the transcription that are not intended.

## 2023-06-07 ENCOUNTER — CARE COORDINATION (OUTPATIENT)
Dept: CARE COORDINATION | Age: 68
End: 2023-06-07

## 2023-07-03 ENCOUNTER — OFFICE VISIT (OUTPATIENT)
Dept: PRIMARY CARE CLINIC | Age: 68
End: 2023-07-03

## 2023-07-03 VITALS
TEMPERATURE: 97.9 F | WEIGHT: 230 LBS | SYSTOLIC BLOOD PRESSURE: 130 MMHG | DIASTOLIC BLOOD PRESSURE: 98 MMHG | OXYGEN SATURATION: 92 % | BODY MASS INDEX: 40.74 KG/M2 | HEART RATE: 79 BPM

## 2023-07-03 DIAGNOSIS — R73.03 PREDIABETES: ICD-10-CM

## 2023-07-03 DIAGNOSIS — M54.16 LUMBAR RADICULOPATHY: ICD-10-CM

## 2023-07-03 DIAGNOSIS — B35.1 ONYCHOMYCOSIS: Primary | ICD-10-CM

## 2023-07-03 DIAGNOSIS — I25.118 CORONARY ARTERY DISEASE OF NATIVE ARTERY OF NATIVE HEART WITH STABLE ANGINA PECTORIS (HCC): ICD-10-CM

## 2023-07-03 DIAGNOSIS — Z72.0 TOBACCO USE: ICD-10-CM

## 2023-07-03 DIAGNOSIS — I10 ESSENTIAL (PRIMARY) HYPERTENSION: ICD-10-CM

## 2023-07-03 RX ORDER — TERBINAFINE HYDROCHLORIDE 250 MG/1
250 TABLET ORAL DAILY
Qty: 84 TABLET | Refills: 0 | Status: SHIPPED | OUTPATIENT
Start: 2023-07-03 | End: 2023-09-25

## 2023-07-03 RX ORDER — PRAVASTATIN SODIUM 40 MG
20 TABLET ORAL DAILY
Qty: 90 TABLET | Refills: 3 | Status: SHIPPED | OUTPATIENT
Start: 2023-07-03

## 2023-07-03 ASSESSMENT — ANXIETY QUESTIONNAIRES
4. TROUBLE RELAXING: 0
IF YOU CHECKED OFF ANY PROBLEMS ON THIS QUESTIONNAIRE, HOW DIFFICULT HAVE THESE PROBLEMS MADE IT FOR YOU TO DO YOUR WORK, TAKE CARE OF THINGS AT HOME, OR GET ALONG WITH OTHER PEOPLE: NOT DIFFICULT AT ALL
5. BEING SO RESTLESS THAT IT IS HARD TO SIT STILL: 0
7. FEELING AFRAID AS IF SOMETHING AWFUL MIGHT HAPPEN: 0
GAD7 TOTAL SCORE: 0
1. FEELING NERVOUS, ANXIOUS, OR ON EDGE: 0
3. WORRYING TOO MUCH ABOUT DIFFERENT THINGS: 0
6. BECOMING EASILY ANNOYED OR IRRITABLE: 0
2. NOT BEING ABLE TO STOP OR CONTROL WORRYING: 0

## 2023-07-03 ASSESSMENT — ENCOUNTER SYMPTOMS
COUGH: 0
RESPIRATORY NEGATIVE: 1
SHORTNESS OF BREATH: 0
GASTROINTESTINAL NEGATIVE: 1
TROUBLE SWALLOWING: 0
EYES NEGATIVE: 1
BACK PAIN: 1
ALLERGIC/IMMUNOLOGIC NEGATIVE: 1
WHEEZING: 0

## 2023-07-03 NOTE — PROGRESS NOTES
(DO NOT APPLY TO OPEN AREAS)      REPATHA SURECLICK 819 MG/ML SOAJ INJECT ONE PEN UNDER THE SKIN EVERY 14 DAYS      losartan (COZAAR) 50 MG tablet Take 1 tablet by mouth in the morning and at bedtime 180 tablet 3    Nutritional Supplements (VITAMIN D BOOSTER PO) Take by mouth      Omega-3 Fatty Acids (FISH OIL PO) Take by mouth      Handicap Placard MISC by Does not apply route 1 each 0    ascorbic acid (VITAMIN C) 100 MG tablet Take 1 tablet by mouth      vitamin A 3 MG (43236 UT) capsule Take 1 capsule by mouth      Multiple Vitamins-Minerals (MULTIVITAMIN ADULT PO) Take 1 tablet by mouth daily      aspirin 81 MG tablet Take 1 tablet by mouth daily      S-Adenosylmethionine (EFREN-E) 400 MG TABS Take  by mouth. Objective:   Constitutional:   Reviewed vitals above  Well Nourished, well developed, no distress       HENT:  Normal external nose without lesions  Bilateral TMs translucent with normal light reflex and bony landmarks  Normal oropharynx without erythema or exudate  Normal nasal mucosa without swelling or erythema  Neck:  Symmetric and without masses  No thyromegaly  Resp:  Normal effort  Clear to auscultation bilaterally without rhonchi, wheezing or crackles  Cardiovascular: On auscultation, normal S1 and S2 without murmurs, rubs or gallops  No bruits of bilateral carotids and no JVD  Gastrointestinal:  Nontender, nondistended, and no masses  No hepatosplenomegaly  Musculoskeletal:  Normal Gait  All extremities without clubbing, cyanosis or edema  Skin:  No rashes on inspection  No areas of increased heat or induration on palpation  Psych:  Normal mood and affect  Normal insight and judgement      RESIDENT/ATTENDING ATTESTATION:    After medical student evaluation and exam, I independently gathered patients history, independently did a physical, and agree with A/P as written in medical student's note (other than clarified below).       Please see below for additional information documented by the

## 2023-07-10 ENCOUNTER — OFFICE VISIT (OUTPATIENT)
Dept: ORTHOPEDIC SURGERY | Age: 68
End: 2023-07-10
Payer: MEDICARE

## 2023-07-10 VITALS — HEIGHT: 63 IN | BODY MASS INDEX: 40.75 KG/M2 | WEIGHT: 230 LBS

## 2023-07-10 DIAGNOSIS — M16.11 PRIMARY OSTEOARTHRITIS OF RIGHT HIP: Primary | ICD-10-CM

## 2023-07-10 DIAGNOSIS — M70.61 TROCHANTERIC BURSITIS OF RIGHT HIP: ICD-10-CM

## 2023-07-10 DIAGNOSIS — M54.16 RIGHT LUMBAR RADICULITIS: ICD-10-CM

## 2023-07-10 PROCEDURE — 1090F PRES/ABSN URINE INCON ASSESS: CPT | Performed by: PHYSICIAN ASSISTANT

## 2023-07-10 PROCEDURE — 3017F COLORECTAL CA SCREEN DOC REV: CPT | Performed by: PHYSICIAN ASSISTANT

## 2023-07-10 PROCEDURE — G8427 DOCREV CUR MEDS BY ELIG CLIN: HCPCS | Performed by: PHYSICIAN ASSISTANT

## 2023-07-10 PROCEDURE — 1123F ACP DISCUSS/DSCN MKR DOCD: CPT | Performed by: PHYSICIAN ASSISTANT

## 2023-07-10 PROCEDURE — G8400 PT W/DXA NO RESULTS DOC: HCPCS | Performed by: PHYSICIAN ASSISTANT

## 2023-07-10 PROCEDURE — 99213 OFFICE O/P EST LOW 20 MIN: CPT | Performed by: PHYSICIAN ASSISTANT

## 2023-07-10 PROCEDURE — G8417 CALC BMI ABV UP PARAM F/U: HCPCS | Performed by: PHYSICIAN ASSISTANT

## 2023-07-10 PROCEDURE — 4004F PT TOBACCO SCREEN RCVD TLK: CPT | Performed by: PHYSICIAN ASSISTANT

## 2023-07-10 NOTE — PROGRESS NOTES
Dr Jenny Michele      Date /Time 7/10/2023       1:30 PM EDT  Name Yasmin Chopra             1955   Location  [facility] SURG  MRN [id number]                Chief Complaint   Patient presents with    Follow-up     Right Hip/ Cortisone 06/01/2023        History of Present Illness    Yasmin Chopra is a 69 y.o. female who presents with  right hip pain. Injury Mechanism:  none. Worker's Comp. & legal issues:   none. Previous Treatments: Ice, Heat, and NSAIDs    Patient presents to the office today for follow-up visit. We are treating her for combination of hip arthritis, lumbar radiculopathy, and hip bursitis. She was given an epidural injection on 4/6/2023. This injection did help her tremendously. She was then given a lateral hip injection on 4/25/2023 which also helped her tremendously. Her pain has returned. It is concentrated in her lumbar spine, lateral right hip, and into her groin. Patient then received a right hip intra-articular cortisone injection on 6/1/2023. This injection almost relieved 100% of her pain for 4 weeks. Her pain then returned without new injury or trauma. \Previous history: Patient presents to the office today for new problem. Patient here with a chief complaint of right hip pain. Patient's right hip has been painful for 3 months. Majority of her pain is concentrated in the right lower lumbar spine and SI joint. She does have occasional buttocks pain. The second most painful area is laterally and she only gets occasional groin pain. She has had an MRI which will be commented on later. No direct injury or trauma. She does have a history of lumbar stenosis.     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

## 2023-07-11 ENCOUNTER — OFFICE VISIT (OUTPATIENT)
Dept: ORTHOPEDIC SURGERY | Age: 68
End: 2023-07-11
Payer: MEDICARE

## 2023-07-11 VITALS — BODY MASS INDEX: 40.75 KG/M2 | WEIGHT: 230 LBS | HEIGHT: 63 IN

## 2023-07-11 DIAGNOSIS — M16.11 PRIMARY OSTEOARTHRITIS OF RIGHT HIP: Primary | ICD-10-CM

## 2023-07-11 PROCEDURE — 1090F PRES/ABSN URINE INCON ASSESS: CPT | Performed by: ORTHOPAEDIC SURGERY

## 2023-07-11 PROCEDURE — 4004F PT TOBACCO SCREEN RCVD TLK: CPT | Performed by: ORTHOPAEDIC SURGERY

## 2023-07-11 PROCEDURE — G8417 CALC BMI ABV UP PARAM F/U: HCPCS | Performed by: ORTHOPAEDIC SURGERY

## 2023-07-11 PROCEDURE — G8427 DOCREV CUR MEDS BY ELIG CLIN: HCPCS | Performed by: ORTHOPAEDIC SURGERY

## 2023-07-11 PROCEDURE — G8400 PT W/DXA NO RESULTS DOC: HCPCS | Performed by: ORTHOPAEDIC SURGERY

## 2023-07-11 PROCEDURE — 3017F COLORECTAL CA SCREEN DOC REV: CPT | Performed by: ORTHOPAEDIC SURGERY

## 2023-07-11 PROCEDURE — 1123F ACP DISCUSS/DSCN MKR DOCD: CPT | Performed by: ORTHOPAEDIC SURGERY

## 2023-07-11 PROCEDURE — 99214 OFFICE O/P EST MOD 30 MIN: CPT | Performed by: ORTHOPAEDIC SURGERY

## 2023-07-11 RX ORDER — CYCLOBENZAPRINE HCL 10 MG
10 TABLET ORAL 3 TIMES DAILY PRN
Qty: 30 TABLET | Refills: 0 | Status: SHIPPED | OUTPATIENT
Start: 2023-07-11 | End: 2023-07-21

## 2023-07-11 NOTE — PROGRESS NOTES
Dr Keli Velazquez      Date /Time 7/11/2023       1:30 PM EDT  Name Mau Durham             1955   Location  [facility]  MRN [id number]                Chief Complaint   Patient presents with    Follow-up     Right Hip         History of Present Illness    Mau Durham is a 69 y.o. female who presents with  right hip pain. Injury Mechanism:  none. Worker's Comp. & legal issues:   none. Previous Treatments: Ice, Heat, and NSAIDs    Patient presents the office today for a follow-up visit. Patient being treated for combination of lumbar radiculopathy and right hip arthritis. She has received an intra-articular cortisone injection by myself on June 1, 2023. The injection did work well with nearly 100% relief for 4 weeks. Her symptoms have returned. She saw my physician assistant yesterday. They were discussing total hip arthroplasty and she is brought in for further discussion today. Further history as below. Previous history: Patient presents to the office today for follow-up visit. We are treating her for combination of hip arthritis, lumbar radiculopathy, and hip bursitis. She was given an epidural injection on 4/6/2023. This injection did help her tremendously. She was then given a lateral hip injection on 4/25/2023 which also helped her tremendously. Her pain has returned. It is concentrated in her lumbar spine, lateral right hip, and into her groin. Patient then received a right hip intra-articular cortisone injection on 6/1/2023. This injection almost relieved 100% of her pain for 4 weeks. Her pain then returned without new injury or trauma. \Previous history: Patient presents to the office today for new problem. Patient here with a chief complaint of right hip pain. Patient's right hip has been painful for 3 months. Majority of her pain is concentrated in the right lower lumbar spine and SI joint. She does have occasional buttocks pain.   The

## 2023-07-31 ENCOUNTER — OFFICE VISIT (OUTPATIENT)
Dept: CARDIOLOGY CLINIC | Age: 68
End: 2023-07-31
Payer: MEDICARE

## 2023-07-31 VITALS
HEIGHT: 63 IN | WEIGHT: 230 LBS | HEART RATE: 72 BPM | DIASTOLIC BLOOD PRESSURE: 80 MMHG | SYSTOLIC BLOOD PRESSURE: 140 MMHG | OXYGEN SATURATION: 95 % | BODY MASS INDEX: 40.75 KG/M2

## 2023-07-31 DIAGNOSIS — E78.2 MIXED HYPERLIPIDEMIA: ICD-10-CM

## 2023-07-31 DIAGNOSIS — I25.118 CORONARY ARTERY DISEASE OF NATIVE ARTERY OF NATIVE HEART WITH STABLE ANGINA PECTORIS (HCC): Primary | ICD-10-CM

## 2023-07-31 DIAGNOSIS — I25.42 CORONARY ARTERY DISSECTION: ICD-10-CM

## 2023-07-31 DIAGNOSIS — I10 ESSENTIAL (PRIMARY) HYPERTENSION: ICD-10-CM

## 2023-07-31 DIAGNOSIS — Z72.0 TOBACCO USE: ICD-10-CM

## 2023-07-31 PROCEDURE — G8400 PT W/DXA NO RESULTS DOC: HCPCS | Performed by: NURSE PRACTITIONER

## 2023-07-31 PROCEDURE — 99214 OFFICE O/P EST MOD 30 MIN: CPT | Performed by: NURSE PRACTITIONER

## 2023-07-31 PROCEDURE — 1123F ACP DISCUSS/DSCN MKR DOCD: CPT | Performed by: NURSE PRACTITIONER

## 2023-07-31 PROCEDURE — 1090F PRES/ABSN URINE INCON ASSESS: CPT | Performed by: NURSE PRACTITIONER

## 2023-07-31 PROCEDURE — 3074F SYST BP LT 130 MM HG: CPT | Performed by: NURSE PRACTITIONER

## 2023-07-31 PROCEDURE — G8417 CALC BMI ABV UP PARAM F/U: HCPCS | Performed by: NURSE PRACTITIONER

## 2023-07-31 PROCEDURE — 3017F COLORECTAL CA SCREEN DOC REV: CPT | Performed by: NURSE PRACTITIONER

## 2023-07-31 PROCEDURE — 1036F TOBACCO NON-USER: CPT | Performed by: NURSE PRACTITIONER

## 2023-07-31 PROCEDURE — G8427 DOCREV CUR MEDS BY ELIG CLIN: HCPCS | Performed by: NURSE PRACTITIONER

## 2023-07-31 PROCEDURE — 3078F DIAST BP <80 MM HG: CPT | Performed by: NURSE PRACTITIONER

## 2023-07-31 NOTE — PROGRESS NOTES
has mid 30 to 40% stenosis. RCA Large vessel, dominant, proximal-mid 40 to 50% stenosis, distal less than 10% stenosis. Flow reserve assessment and IVUS DESCRIPTION      Heparin was used anticoagulation, a 5 [place] EBU 3.0 guide catheter was used for flow reserve assessment as well as IVUS. [employer] comet wire was taken and this was appropriate calibrated and ultimately equalized in the ascending aorta. Wire was used to cross the lesions in the LAD as well as into OM 2. Flow reserve assessment was 0.98 in the LAD and 1.0 in the circumflex/OM 2. There was spasm noted within the circumflex and this was treated with intracoronary vasodilators and follow-up assessment then was made with IVUS of this area which showed 50% stenosis. These findings were most consistent with moderate nonobstructive CAD/ASHD and PCI was felt to be able to be deferred at this time. CONCLUSIONS:   Patient had to have midline placed before the procedure with interventional radiology due to difficult venous access  Patient had radial spasm which responded to vasodilators  Moderate CAD/ASHD  Treat medically  Add Imdur 30 mg daily        STRESS TEST 11/5/2022:        Summary    Left ventricle is mildly enlarged. Normal LV function. Left ventricular ejection fraction of 54%. There is a moderate sized, mild to moderate intensity, partially reversible    anterior wall defect, with no associated wall motion abnormality. Breast    attenuation present; however, suspect myocardium at risk in the territory of    the LAD. Recommendation    Recommend cardiac catheterization depending on clinical appropriateness. Echo:3/3/20 at [facility]  Study Conclusions   - Left ventricle: The cavity size is normal. Normal relative wall     thickness. Systolic function was normal. The estimated ejection     fraction was 53%.  Doppler parameters are consistent with abnormal     left ventricular relaxation

## 2023-07-31 NOTE — PATIENT INSTRUCTIONS
No change in current heart medicines  No need for blood work at this time  Let us know when you have the hip surgery scheduled  Follow up with me or Dr. Jamal Ruth in 6 months

## 2023-08-03 ENCOUNTER — OFFICE VISIT (OUTPATIENT)
Dept: ORTHOPEDIC SURGERY | Age: 68
End: 2023-08-03
Payer: MEDICARE

## 2023-08-03 VITALS — BODY MASS INDEX: 40.75 KG/M2 | HEIGHT: 63 IN | WEIGHT: 230 LBS

## 2023-08-03 DIAGNOSIS — M16.11 PRIMARY OSTEOARTHRITIS OF RIGHT HIP: Primary | ICD-10-CM

## 2023-08-03 PROCEDURE — 1036F TOBACCO NON-USER: CPT | Performed by: ORTHOPAEDIC SURGERY

## 2023-08-03 PROCEDURE — 1123F ACP DISCUSS/DSCN MKR DOCD: CPT | Performed by: ORTHOPAEDIC SURGERY

## 2023-08-03 PROCEDURE — G8417 CALC BMI ABV UP PARAM F/U: HCPCS | Performed by: ORTHOPAEDIC SURGERY

## 2023-08-03 PROCEDURE — 3017F COLORECTAL CA SCREEN DOC REV: CPT | Performed by: ORTHOPAEDIC SURGERY

## 2023-08-03 PROCEDURE — G8400 PT W/DXA NO RESULTS DOC: HCPCS | Performed by: ORTHOPAEDIC SURGERY

## 2023-08-03 PROCEDURE — 1090F PRES/ABSN URINE INCON ASSESS: CPT | Performed by: ORTHOPAEDIC SURGERY

## 2023-08-03 PROCEDURE — 99213 OFFICE O/P EST LOW 20 MIN: CPT | Performed by: ORTHOPAEDIC SURGERY

## 2023-08-03 PROCEDURE — G8427 DOCREV CUR MEDS BY ELIG CLIN: HCPCS | Performed by: ORTHOPAEDIC SURGERY

## 2023-08-03 NOTE — PROGRESS NOTES
[x] Normal  []Decreased   [x] Normal []Decreased   Pelvis       Scoliosis  [x] Nml  [] Present     Leg-length discrepency  [x] Equal  [] Right longer   [] Left longer   Range of Motion Active Passive Active Passive   Hip Flexion 120  120    Abduction 50  50    External Rotation @ 90 flex 65  65    Internal Rotation @ 90 flex 10  20           Hip Impingement / Dysplasia  [] All Neg  [] Not tested   [x] All Neg  [] Not tested    Hip impingement test  [x]  []Not tested   []  []Not tested    C-sign  [x]  []Not tested   []  []Not tested    Anterior instability apprehension  []  []Not tested   []  []Not tested    Posterior instability apprehension  []  []Not tested   []  []Not tested    Uncontained Internal rotation  []  []Not tested  []  []Not tested          Abductors  [] All Neg  [] Not tested   [x] All Neg  [] Not tested    Medius strength  []  []Not tested   []  []Not tested    Minimum strength  []  []Not tested   []  []Not tested    IT band tendonitis  []  []Not tested   []  []Not tested    Trochanteric tenderness  [x]  []Not tested  []  []Not tested   Sciatic neuropathic pain  []  []Not tested   []  []Not tested           Post-arthroplasty  [] All Neg  [] Not tested   [] All Neg  [] Not tested    Rectus tendonitis  []  []Not tested   []  []Not tested    Iliopsoas tendonitis       Start-up pain  []  []Not tested   []  []Not tested          Imaging    Right Hip: [facility] [place]  Previous Radiographs: X-rays were ordered today reviewed of the right hip.  3 views. AP pelvis, lateral, and false profile. They demonstrate no evidence of fractures or dislocations with well-maintained joint space. MRI was also reviewed dated 4/4/2023. MRI was ordered by outside physician. I do believe there is at least moderate degenerative changes. Abductors muscles do appear attached appropriately.         Procedure:  Orders Placed This Encounter   Procedures    [facility]     Referral

## 2023-08-15 ENCOUNTER — HOSPITAL ENCOUNTER (OUTPATIENT)
Dept: PHYSICAL THERAPY | Age: 68
Setting detail: THERAPIES SERIES
Discharge: HOME | End: 2023-08-15
Attending: ORTHOPAEDIC SURGERY

## 2023-08-15 NOTE — PROGRESS NOTES
Physical Therapy  Cancellation/No-show Note  Patient Name:  Berenice Benedict  :  1955   Date:  8/15/2023  Cancels to date: 1  No-shows to date: 0    For today's appointment patient:  [x] Cancelled  [] Rescheduled appointment  [] No-show     Reason given by patient:  [] Patient ill  [] Conflicting appointment  [] No transportation    [] Conflict with work  [] No reason given  [x] Other:  Pt called in and reported being unable to ambulate this morning. First cancellation of an evaluation.     Comments:      Electronically signed by:  Miguel Colbert PT

## 2023-08-18 ENCOUNTER — OFFICE VISIT (OUTPATIENT)
Dept: PRIMARY CARE CLINIC | Age: 68
End: 2023-08-18

## 2023-08-18 ENCOUNTER — HOSPITAL ENCOUNTER (OUTPATIENT)
Age: 68
Setting detail: SPECIMEN
Discharge: HOME | End: 2023-08-18
Payer: MEDICARE

## 2023-08-18 VITALS
RESPIRATION RATE: 16 BRPM | OXYGEN SATURATION: 97 % | WEIGHT: 236.2 LBS | HEIGHT: 62 IN | DIASTOLIC BLOOD PRESSURE: 110 MMHG | BODY MASS INDEX: 43.47 KG/M2 | SYSTOLIC BLOOD PRESSURE: 173 MMHG | HEART RATE: 76 BPM | TEMPERATURE: 98.1 F

## 2023-08-18 DIAGNOSIS — I16.9 HYPERTENSIVE CRISIS: Primary | ICD-10-CM

## 2023-08-18 DIAGNOSIS — I16.9 HYPERTENSIVE CRISIS: ICD-10-CM

## 2023-08-18 DIAGNOSIS — I10 ESSENTIAL (PRIMARY) HYPERTENSION: ICD-10-CM

## 2023-08-18 LAB
ALBUMIN SERPL-MCNC: 3.8 G/DL (ref 3.4–5)
ALBUMIN/GLOB SERPL: 1.3 {RATIO} (ref 1.1–2.2)
ALP SERPL-CCNC: 66 U/L (ref 40–129)
ALT SERPL-CCNC: 20 U/L (ref 10–40)
ANION GAP SERPL CALCULATED.3IONS-SCNC: 13 MMOL/L (ref 3–16)
AST SERPL-CCNC: 16 U/L (ref 15–37)
BASOPHILS # BLD: 0.1 K/UL (ref 0–0.2)
BASOPHILS NFR BLD: 0.8 %
BILIRUB SERPL-MCNC: 0.3 MG/DL (ref 0–1)
BUN SERPL-MCNC: 20 MG/DL (ref 7–20)
CALCIUM SERPL-MCNC: 9.3 MG/DL (ref 8.3–10.6)
CHLORIDE SERPL-SCNC: 104 MMOL/L (ref 99–110)
CO2 SERPL-SCNC: 24 MMOL/L (ref 21–32)
CREAT SERPL-MCNC: 1 MG/DL (ref 0.6–1.2)
DEPRECATED RDW RBC AUTO: 15.1 % (ref 12.4–15.4)
EOSINOPHIL # BLD: 0.2 K/UL (ref 0–0.6)
EOSINOPHIL NFR BLD: 1.9 %
GFR SERPLBLD CREATININE-BSD FMLA CKD-EPI: >60 ML/MIN/{1.73_M2}
GLUCOSE SERPL-MCNC: 95 MG/DL (ref 70–99)
HCT VFR BLD AUTO: 39.5 % (ref 36–48)
HGB BLD-MCNC: 13.1 G/DL (ref 12–16)
LYMPHOCYTES # BLD: 2 K/UL (ref 1–5.1)
LYMPHOCYTES NFR BLD: 21.5 %
MCH RBC QN AUTO: 29.6 PG (ref 26–34)
MCHC RBC AUTO-ENTMCNC: 33.2 G/DL (ref 31–36)
MCV RBC AUTO: 89.1 FL (ref 80–100)
MONOCYTES # BLD: 0.6 K/UL (ref 0–1.3)
MONOCYTES NFR BLD: 6.1 %
NEUTROPHILS # BLD: 6.4 K/UL (ref 1.7–7.7)
NEUTROPHILS NFR BLD: 69.7 %
NT-PROBNP SERPL-MCNC: 736 PG/ML (ref 0–124)
PLATELET # BLD AUTO: 211 K/UL (ref 135–450)
PMV BLD AUTO: 8.9 FL (ref 5–10.5)
POTASSIUM SERPL-SCNC: 5.2 MMOL/L (ref 3.5–5.1)
PROT SERPL-MCNC: 6.8 G/DL (ref 6.4–8.2)
RBC # BLD AUTO: 4.44 M/UL (ref 4–5.2)
SODIUM SERPL-SCNC: 141 MMOL/L (ref 136–145)
TSH SERPL DL<=0.005 MIU/L-ACNC: 1.76 UIU/ML (ref 0.27–4.2)
WBC # BLD AUTO: 9.2 K/UL (ref 4–11)

## 2023-08-18 PROCEDURE — 36415 COLL VENOUS BLD VENIPUNCTURE: CPT

## 2023-08-18 PROCEDURE — 83880 ASSAY OF NATRIURETIC PEPTIDE: CPT

## 2023-08-18 PROCEDURE — 84443 ASSAY THYROID STIM HORMONE: CPT

## 2023-08-18 PROCEDURE — 85025 COMPLETE CBC W/AUTO DIFF WBC: CPT

## 2023-08-18 PROCEDURE — 80053 COMPREHEN METABOLIC PANEL: CPT

## 2023-08-18 RX ORDER — CLONIDINE HYDROCHLORIDE 0.1 MG/1
0.1 TABLET ORAL 2 TIMES DAILY
Qty: 60 TABLET | Refills: 3 | Status: SHIPPED | OUTPATIENT
Start: 2023-08-18 | End: 2023-08-18

## 2023-08-18 RX ORDER — CHLORTHALIDONE 25 MG/1
25 TABLET ORAL DAILY
Qty: 30 TABLET | Refills: 3 | Status: SHIPPED | OUTPATIENT
Start: 2023-08-18

## 2023-08-18 RX ORDER — CLONIDINE HYDROCHLORIDE 0.1 MG/1
0.1 TABLET ORAL ONCE
Status: SHIPPED | OUTPATIENT
Start: 2023-08-18

## 2023-08-19 LAB
BACTERIA URNS QL MICRO: ABNORMAL /HPF
BILIRUB UR QL STRIP.AUTO: NEGATIVE
CLARITY UR: CLEAR
COLOR UR: YELLOW
EPI CELLS #/AREA URNS HPF: ABNORMAL /HPF (ref 0–5)
GLUCOSE UR STRIP.AUTO-MCNC: NEGATIVE MG/DL
HGB UR QL STRIP.AUTO: NEGATIVE
KETONES UR STRIP.AUTO-MCNC: NEGATIVE MG/DL
LEUKOCYTE ESTERASE UR QL STRIP.AUTO: NEGATIVE
NITRITE UR QL STRIP.AUTO: NEGATIVE
PH UR STRIP.AUTO: 6.5 [PH] (ref 5–8)
PROT UR STRIP.AUTO-MCNC: NEGATIVE MG/DL
RBC #/AREA URNS HPF: ABNORMAL /HPF (ref 0–4)
SP GR UR STRIP.AUTO: 1.01 (ref 1–1.03)
UA DIPSTICK W REFLEX MICRO PNL UR: ABNORMAL
URN SPEC COLLECT METH UR: ABNORMAL
UROBILINOGEN UR STRIP-ACNC: 0.2 E.U./DL
WBC #/AREA URNS HPF: ABNORMAL /HPF (ref 0–5)

## 2023-08-23 ENCOUNTER — HOSPITAL ENCOUNTER (OUTPATIENT)
Dept: PHYSICAL THERAPY | Age: 68
Setting detail: THERAPIES SERIES
Discharge: HOME | End: 2023-08-23
Attending: ORTHOPAEDIC SURGERY
Payer: MEDICARE

## 2023-08-23 PROCEDURE — 97162 PT EVAL MOD COMPLEX 30 MIN: CPT

## 2023-08-23 NOTE — FLOWSHEET NOTE
[facility], [address] [facility]  Phone: [phone]  Fax [phone]    Physical Therapy Daily Treatment Note    Date:  2023    Patient Name:  Dave Arredondo    :  1955  MRN: [id number]  Restrictions/Precautions:  None  Medical/Treatment Diagnosis Information:    LBP Hip pain   Insurance/Certification information:   Medicare  Physician Information:   Serina Bethea MD  Plan of care signed (Y/N):    Visit# / total visits:      G-Code (if applicable):          LEFS     Medicare Cap (if applicable):  = total amount used, updated 2023    Time in:   1:00    Timed Treatment: 0 Total Treatment Time:  45  Time out: 1:45  ________________________________________________________________________________________    Pain Level:    /10  SUBJECTIVE:  Pt presents with c/o R hip and LBP pain that has progressively worsened since . Pt has a diagnosis of moderate hip osteoarthritis. Pt reports pain that travels from the LBP into the anterior thigh during prolonged standing and walking. S Transfers reproduce pain, sit to stand, rolling in bed etc. Pt reports pain is much worse in the AM and takes around an hour for movement to feel better. In the morning, pt is unable to walk for more than 5 minutes without having to sit, and can only stand for about 3 minutes without having to sit for relief. Pt wants to return to being able clean her house and walk for exercise without limitations to pain.       OBJECTIVE:     Range of Motion/Strength Testing     [x] All ROM and strength WNL except as marked below   * Denotes limitation by pain              Range Tested AROM PROM MMT/Resisted     Left Right Left Right Left Right   Hip Flexion               Hip Extension               Hip Abduction         3- 3-   Hip Adduction               Hip IR 15 10     4- 4-   Hip ER 25 25     4- 4-   Knee Flexion         5 5

## 2023-08-29 ENCOUNTER — HOSPITAL ENCOUNTER (OUTPATIENT)
Dept: PHYSICAL THERAPY | Age: 68
Setting detail: THERAPIES SERIES
Discharge: HOME | End: 2023-08-29
Attending: ORTHOPAEDIC SURGERY
Payer: MEDICARE

## 2023-08-29 PROCEDURE — 97140 MANUAL THERAPY 1/> REGIONS: CPT

## 2023-08-29 PROCEDURE — 97110 THERAPEUTIC EXERCISES: CPT

## 2023-08-29 PROCEDURE — 97112 NEUROMUSCULAR REEDUCATION: CPT

## 2023-08-29 NOTE — FLOWSHEET NOTE
[facility] [address], [address] [facility]  Phone: [phone]  Fax [phone]    Physical Therapy Daily Treatment Note    Date:  2023    Patient Name:  Lana Daigle    :  1955  MRN: [id number]  Restrictions/Precautions:  None  Medical/Treatment Diagnosis Information:    LBP Hip pain   Insurance/Certification information:   Medicare  Physician Information:   Helen Greco MD  Plan of care signed (Y/N):    Visit# / total visits:      G-Code (if applicable):          LEFS     Medicare Cap (if applicable):  = total amount used, updated 2023    Time in:   1:30   Timed Treatment: 0 Total Treatment Time:  45  Time out: 2:15  ________________________________________________________________________________________    Pain Level:    4/10      SUBJECTIVE:  Pt presents with c/o R hip and LBP pain that has progressively worsened since . Pt has a diagnosis of moderate hip osteoarthritis. Pt reports pain that travels from the LBP into the anterior thigh during prolonged standing and walking. S Transfers reproduce pain, sit to stand, rolling in bed etc. Pt reports pain is much worse in the AM and takes around an hour for movement to feel better. In the morning, pt is unable to walk for more than 5 minutes without having to sit, and can only stand for about 3 minutes without having to sit for relief. Pt wants to return to being able clean her house and walk for exercise without limitations to pain.       OBJECTIVE:     Range of Motion/Strength Testing     [x] All ROM and strength WNL except as marked below   * Denotes limitation by pain              Range Tested AROM PROM MMT/Resisted     Left Right Left Right Left Right   Hip Flexion               Hip Extension               Hip Abduction         3- 3-   Hip Adduction               Hip IR 15 10     4- 4-   Hip ER 25 25     4- 4-   Knee Flexion         5 5

## 2023-09-01 ENCOUNTER — HOSPITAL ENCOUNTER (OUTPATIENT)
Dept: PHYSICAL THERAPY | Age: 68
Setting detail: THERAPIES SERIES
Discharge: HOME | End: 2023-09-01
Attending: ORTHOPAEDIC SURGERY
Payer: MEDICARE

## 2023-09-01 PROCEDURE — 97110 THERAPEUTIC EXERCISES: CPT

## 2023-09-01 PROCEDURE — 97140 MANUAL THERAPY 1/> REGIONS: CPT

## 2023-09-05 ENCOUNTER — HOSPITAL ENCOUNTER (OUTPATIENT)
Dept: PHYSICAL THERAPY | Age: 68
Setting detail: THERAPIES SERIES
Discharge: HOME | End: 2023-09-05
Attending: ORTHOPAEDIC SURGERY
Payer: MEDICARE

## 2023-09-05 PROCEDURE — 97110 THERAPEUTIC EXERCISES: CPT

## 2023-09-05 PROCEDURE — 97140 MANUAL THERAPY 1/> REGIONS: CPT

## 2023-09-05 PROCEDURE — 97112 NEUROMUSCULAR REEDUCATION: CPT

## 2023-09-05 NOTE — FLOWSHEET NOTE
[facility]  Phone: [phone]  Fax [phone]    Physical Therapy Daily Treatment Note    Date:  2023    Patient Name:  Jyoti Viera    :  1955  MRN: [id number]  Restrictions/Precautions:  None  Medical/Treatment Diagnosis Information:    LBP Hip pain   Insurance/Certification information:   Medicare  Physician Information:   Anahi Raman MD  Plan of care signed (Y/N):    Visit# / total visits:      G-Code (if applicable):          LEFS     Medicare Cap (if applicable):  = total amount used, updated 2023    Time in:   2:15   Timed Treatment: 45  Total Treatment Time:  45  Time out: 3:00  ________________________________________________________________________________________    Pain Level:    10  SUBJECTIVE:  Pt reports that she has been very sore and with difficulty walking over the weekend due to pain and tightness in her R hip. Not sure if this was due to working in that area during the last session. Pt reports that she has been able to curb the pain with Advil and muscle relaxers.      OBJECTIVE:     Range of Motion/Strength Testing     [x] All ROM and strength WNL except as marked below   * Denotes limitation by pain              Range Tested AROM PROM MMT/Resisted     Left Right Left Right Left Right   Hip Flexion               Hip Extension               Hip Abduction         3- 3-   Hip Adduction               Hip IR 15 10     4- 4-   Hip ER 25 25     4- 4-   Knee Flexion         5 5   Knee Extension               Ankle Dorsiflex               Ankle Plantarflex               Ankle Inversion               Ankle Eversion                  Flexibility     [x] All flexibility WNL except as marked below  Muscle Left Right   Hamstrings (90/90) []  WNL  [] Tight  [] NT []  WNL  [] Tight  [] NT   Gastroc []  WNL  [] Tight  [] NT []  WNL  [] Tight  [] NT   TFL/ITB

## 2023-09-07 ENCOUNTER — HOSPITAL ENCOUNTER (OUTPATIENT)
Dept: PHYSICAL THERAPY | Age: 68
Setting detail: THERAPIES SERIES
Discharge: HOME | End: 2023-09-07
Attending: ORTHOPAEDIC SURGERY
Payer: MEDICARE

## 2023-09-07 NOTE — PROGRESS NOTES
Physical Therapy  Cancellation/No-show Note  Patient Name:  yTshawn Guzmán  :  1955   Date:  2023  Cancels to date: 1  No-shows to date: 0    For today's appointment patient:  [x] Cancelled  [] Rescheduled appointment  [] No-show     Reason given by patient:  [] Patient ill  [x] Conflicting appointment  [] No transportation    [] Conflict with work  [] No reason given  [] Other:     Comments:      Electronically signed by:  Kamila Jules PT

## 2023-09-08 ENCOUNTER — HOSPITAL ENCOUNTER (OUTPATIENT)
Dept: PHYSICAL THERAPY | Age: 68
Setting detail: THERAPIES SERIES
Discharge: HOME | End: 2023-09-08
Attending: ORTHOPAEDIC SURGERY
Payer: MEDICARE

## 2023-09-08 PROCEDURE — 97112 NEUROMUSCULAR REEDUCATION: CPT

## 2023-09-08 PROCEDURE — 97110 THERAPEUTIC EXERCISES: CPT

## 2023-09-08 PROCEDURE — 97140 MANUAL THERAPY 1/> REGIONS: CPT

## 2023-09-08 NOTE — FLOWSHEET NOTE
[facility]  Phone: [phone]  Fax [phone]    Physical Therapy Daily Treatment Note    Date:  2023    Patient Name:  Tom De La Rosa    :  1955  MRN: [id number]  Restrictions/Precautions:  None  Medical/Treatment Diagnosis Information:    LBP Hip pain   Insurance/Certification information:   Medicare  Physician Information:   Natalie Mahan MD  Plan of care signed (Y/N):    Visit# / total visits:      G-Code (if applicable):          LEFS     Medicare Cap (if applicable):  = total amount used, updated 2023    Time in:   2:30  Timed Treatment: 45  Total Treatment Time:  45  Time out: 3:15  ________________________________________________________________________________________    Pain Level:    4/10  SUBJECTIVE:  Pt reports that she has been very sore and with difficulty walking over the weekend due to pain and tightness in her R hip. Not sure if this was due to working in that area during the last session. Pt reports that she has been able to curb the pain with Advil and muscle relaxers.      OBJECTIVE:     Range of Motion/Strength Testing     [x] All ROM and strength WNL except as marked below   * Denotes limitation by pain              Range Tested AROM PROM MMT/Resisted     Left Right Left Right Left Right   Hip Flexion               Hip Extension               Hip Abduction         3- 3-   Hip Adduction               Hip IR 15 10     4- 4-   Hip ER 25 25     4- 4-   Knee Flexion         5 5   Knee Extension               Ankle Dorsiflex               Ankle Plantarflex               Ankle Inversion               Ankle Eversion                  Flexibility     [x] All flexibility WNL except as marked below  Muscle Left Right   Hamstrings (90/90) []  WNL  [] Tight  [] NT []  WNL  [] Tight  [] NT   Gastroc []  WNL  [] Tight  [] NT []  WNL  [] Tight  [] NT   TFL/ITB

## 2023-09-12 ENCOUNTER — HOSPITAL ENCOUNTER (OUTPATIENT)
Dept: PHYSICAL THERAPY | Age: 68
Setting detail: THERAPIES SERIES
Discharge: HOME | End: 2023-09-12
Attending: ORTHOPAEDIC SURGERY
Payer: MEDICARE

## 2023-09-12 NOTE — PROGRESS NOTES
Physical Therapy  Cancellation/No-show Note  Patient Name:  Js Brock  :  1955   Date:  2023  Cancels to date: 2  No-shows to date: 0    For today's appointment patient:  [x] Cancelled  [] Rescheduled appointment  [] No-show     Reason given by patient:  [] Patient ill  [] Conflicting appointment  [] No transportation    [] Conflict with work  [] No reason given  [x] Other:  \"just isn't feeling it today\"   Comments:      Electronically signed by:  Olga Lidia Sutton PT

## 2023-09-14 ENCOUNTER — HOSPITAL ENCOUNTER (OUTPATIENT)
Dept: PHYSICAL THERAPY | Age: 68
Setting detail: THERAPIES SERIES
Discharge: HOME | End: 2023-09-14
Attending: ORTHOPAEDIC SURGERY
Payer: MEDICARE

## 2023-09-14 PROCEDURE — 97140 MANUAL THERAPY 1/> REGIONS: CPT

## 2023-09-14 PROCEDURE — 97112 NEUROMUSCULAR REEDUCATION: CPT

## 2023-09-14 PROCEDURE — 97110 THERAPEUTIC EXERCISES: CPT

## 2023-09-19 ENCOUNTER — HOSPITAL ENCOUNTER (OUTPATIENT)
Dept: PHYSICAL THERAPY | Age: 68
Setting detail: THERAPIES SERIES
Discharge: HOME | End: 2023-09-19
Attending: ORTHOPAEDIC SURGERY
Payer: MEDICARE

## 2023-09-19 NOTE — PROGRESS NOTES
Physical Therapy  Cancellation/No-show Note  Patient Name:  Mg Hernandez  :  1955   Date:  2023  Cancels to date: 2  No-shows to date: 1    For today's appointment patient:  [] Cancelled  [] Rescheduled appointment  [x] No-show     Reason given by patient:  [] Patient ill  [] Conflicting appointment  [] No transportation    [] Conflict with work  [] No reason given  [] Other:     Comments:      Electronically signed by:  Mauricio Nick, PT

## 2023-09-21 ENCOUNTER — HOSPITAL ENCOUNTER (OUTPATIENT)
Dept: PHYSICAL THERAPY | Age: 68
Setting detail: THERAPIES SERIES
Discharge: HOME | End: 2023-09-21
Attending: ORTHOPAEDIC SURGERY
Payer: MEDICARE

## 2023-09-21 NOTE — PROGRESS NOTES
Physical Therapy  Cancellation/No-show Note  Patient Name:  Lizz Gregory  :  1955   Date:  2023  Cancels to date: 3  No-shows to date: 1    For today's appointment patient:  [x] Cancelled  [] Rescheduled appointment  [] No-show     Reason given by patient:  [] Patient ill  [] Conflicting appointment  [] No transportation    [] Conflict with work  [] No reason given  [x] Other: Pt arrived for therapy and declined treatment this date due to pain. Therapist discussed following up with spine doctor for further POC. Pt would like to keep visits on for next week in case she begins to feel better.     Comments:      Electronically signed by:  Juan Dominguez PT

## 2023-09-26 ENCOUNTER — HOSPITAL ENCOUNTER (OUTPATIENT)
Dept: PHYSICAL THERAPY | Age: 68
Setting detail: THERAPIES SERIES
Discharge: HOME | End: 2023-09-26
Attending: ORTHOPAEDIC SURGERY
Payer: MEDICARE

## 2023-09-26 NOTE — PROGRESS NOTES
Physical Therapy  Cancellation/No-show Note  Patient Name:  Kishore Andrade  :  1955   Date:  2023  Cancels to date: 3  No-shows to date: 2    For today's appointment patient:  [] Cancelled  [] Rescheduled appointment  [x] No-show     Reason given by patient:  [] Patient ill  [] Conflicting appointment  [] No transportation    [] Conflict with work  [] No reason given  [] Other:    Comments:      Electronically signed by:  Dwayne Vance PT

## 2023-09-28 ENCOUNTER — HOSPITAL ENCOUNTER (OUTPATIENT)
Dept: PHYSICAL THERAPY | Age: 68
Setting detail: THERAPIES SERIES
Discharge: HOME | End: 2023-09-28
Attending: ORTHOPAEDIC SURGERY
Payer: MEDICARE

## 2023-09-28 NOTE — PROGRESS NOTES
Physical Therapy  Cancellation/No-show Note  Patient Name:  Adam Jang  :  1955   Date:  2023  Cancels to date: 4  No-shows to date: 2    For today's appointment patient:  [x] Cancelled  [] Rescheduled appointment  [] No-show     Reason given by patient:  [] Patient ill  [] Conflicting appointment  [] No transportation    [] Conflict with work  [] No reason given  [x] Other:    Comments:  \"too busy to come to therapy\"    Electronically signed by:  Michael Toney, PT

## 2023-10-10 ENCOUNTER — TELEPHONE (OUTPATIENT)
Dept: ORTHOPEDIC SURGERY | Age: 68
End: 2023-10-10

## 2023-10-10 NOTE — TELEPHONE ENCOUNTER
Pushed the images of right hip from 4/6/23 and 4/4/23 to Dr. Jose A Kumar with [facility] thru [facility]

## 2023-10-31 ENCOUNTER — TELEPHONE (OUTPATIENT)
Dept: CARDIOLOGY CLINIC | Age: 68
End: 2023-10-31

## 2023-10-31 NOTE — TELEPHONE ENCOUNTER
[id number] called to see if we want to see if pt should apply for pt assistance for Repatha. Pharmacy stated pt cannot afford medication. Please advise.

## 2023-11-03 NOTE — TELEPHONE ENCOUNTER
Spoke with pt, placed PA up at the main desk for pt to .  Pt states she will stop in to grab paperwork

## 2023-11-08 ENCOUNTER — TELEPHONE (OUTPATIENT)
Dept: UROGYNECOLOGY | Age: 68
End: 2023-11-08

## 2023-11-08 ENCOUNTER — CLINICAL DOCUMENTATION (OUTPATIENT)
Dept: UROGYNECOLOGY | Age: 68
End: 2023-11-08

## 2023-11-08 NOTE — PROGRESS NOTES
Per provider, this patient is able to sign the Botox consent and get the Botox procedure done in the same day due to her mobility issues and the distance between her home and our office. She will not take a valium prior to the procedure.  Electronically signed by Laisha Mauricio RN on 11/8/2023 at 1:13 PM

## 2023-11-08 NOTE — TELEPHONE ENCOUNTER
Spoke with patient over the phone in regards to her upcoming Botox appointment. She opted to reschedule her appt because she is having a hip replacement. Patient states she was informed that she is able to have the consent and Botox done in one visit due to her mobility problems and she lives far out in [place]. Patient was rescheduled. Will ask provider if she is able to have an appointment in which she is able to sign the consent and have the injections done as well. As of now, she has been rescheduled as such, but will verify with provider. Patient aware.  Electronically signed by Gloria Lowe RN on 11/8/2023 at 11:57 AM

## 2023-11-15 DIAGNOSIS — I10 ESSENTIAL (PRIMARY) HYPERTENSION: ICD-10-CM

## 2023-11-16 RX ORDER — CHLORTHALIDONE 25 MG/1
25 TABLET ORAL DAILY
Qty: 30 TABLET | Refills: 3 | Status: SHIPPED | OUTPATIENT
Start: 2023-11-16

## 2023-11-16 NOTE — TELEPHONE ENCOUNTER
I tried to pend the clonidine but it didn't give a way to reorder. Refill Request     CONFIRM preferred pharmacy with the patient. If Mail Order Rx - Pend for 90 day refill. Last Seen: Last Seen Department: 8/18/2023  Last Seen by PCP: 7/3/2023    Last Written: 8/18/23    If no future appointment scheduled:  Review the last OV with PCP and review information for follow-up visit,  Route STAFF MESSAGE with patient name to the [facility] for scheduling with the following information:            -  Timing of next visit           -  Visit type ie Physical, OV, etc           -  Diagnoses/Reason ie. COPD, HTN - Do not use MEDICATION, Follow-up or CHECK UP - Give reason for visit      Next Appointment:   Future Appointments   Date Time Provider [address]   12/1/2023  1:30 PM MD PhD Rubén Conti   2/15/2024  1:00 PM MD Adithya Hoskins [facility]       Message sent to [address] [place] [address] to schedule appt with patient?   N/A      Requested Prescriptions     Pending Prescriptions Disp Refills    chlorthalidone (HYGROTON) 25 MG tablet 30 tablet 3     Sig: Take 1 tablet by mouth daily

## 2023-12-01 ENCOUNTER — OFFICE VISIT (OUTPATIENT)
Dept: PRIMARY CARE CLINIC | Age: 68
End: 2023-12-01

## 2023-12-01 ENCOUNTER — HOSPITAL ENCOUNTER (OUTPATIENT)
Age: 68
Discharge: HOME | End: 2023-12-01
Payer: MEDICARE

## 2023-12-01 VITALS
BODY MASS INDEX: 45.54 KG/M2 | DIASTOLIC BLOOD PRESSURE: 86 MMHG | WEIGHT: 249 LBS | HEART RATE: 73 BPM | OXYGEN SATURATION: 97 % | SYSTOLIC BLOOD PRESSURE: 128 MMHG | TEMPERATURE: 97.7 F

## 2023-12-01 DIAGNOSIS — R07.82 INTERCOSTAL PAIN: ICD-10-CM

## 2023-12-01 DIAGNOSIS — E16.1 OTHER HYPOGLYCEMIA: ICD-10-CM

## 2023-12-01 DIAGNOSIS — Z01.818 PRE-OP EXAMINATION: ICD-10-CM

## 2023-12-01 DIAGNOSIS — R73.03 PREDIABETES: ICD-10-CM

## 2023-12-01 DIAGNOSIS — Z01.818 PRE-OP EXAMINATION: Primary | ICD-10-CM

## 2023-12-01 PROBLEM — E66.3 OVERWEIGHT: Status: ACTIVE | Noted: 2023-02-22

## 2023-12-01 PROBLEM — M51.16 HERNIATION OF LUMBAR INTERVERTEBRAL DISC WITH RADICULOPATHY: Status: ACTIVE | Noted: 2023-03-07

## 2023-12-01 PROBLEM — I10 HYPERTENSIVE DISORDER: Status: ACTIVE | Noted: 2023-12-01

## 2023-12-01 PROBLEM — M25.551 HIP PAIN, RIGHT: Status: ACTIVE | Noted: 2023-02-22

## 2023-12-01 PROBLEM — K21.9 GASTROESOPHAGEAL REFLUX DISEASE: Status: ACTIVE | Noted: 2023-12-01

## 2023-12-01 LAB
ALBUMIN SERPL-MCNC: 4.3 G/DL (ref 3.4–5)
ANION GAP SERPL CALCULATED.3IONS-SCNC: 12 MMOL/L (ref 3–16)
APTT BLD: 34.9 SEC (ref 22.7–35.9)
BACTERIA URNS QL MICRO: ABNORMAL /HPF
BASOPHILS # BLD: 0 K/UL (ref 0–0.2)
BASOPHILS NFR BLD: 0.3 %
BILIRUB UR QL STRIP.AUTO: NEGATIVE
BUN SERPL-MCNC: 22 MG/DL (ref 7–20)
CALCIUM SERPL-MCNC: 10.2 MG/DL (ref 8.3–10.6)
CHLORIDE SERPL-SCNC: 105 MMOL/L (ref 99–110)
CLARITY UR: CLEAR
CO2 SERPL-SCNC: 27 MMOL/L (ref 21–32)
COLOR UR: YELLOW
CREAT SERPL-MCNC: 1 MG/DL (ref 0.6–1.2)
DEPRECATED RDW RBC AUTO: 14.6 % (ref 12.4–15.4)
EOSINOPHIL # BLD: 0.2 K/UL (ref 0–0.6)
EOSINOPHIL NFR BLD: 1.6 %
EPI CELLS #/AREA URNS HPF: ABNORMAL /HPF (ref 0–5)
GFR SERPLBLD CREATININE-BSD FMLA CKD-EPI: >60 ML/MIN/{1.73_M2}
GLUCOSE SERPL-MCNC: 89 MG/DL (ref 70–99)
GLUCOSE UR STRIP.AUTO-MCNC: NEGATIVE MG/DL
HCT VFR BLD AUTO: 37.3 % (ref 36–48)
HGB BLD-MCNC: 12.1 G/DL (ref 12–16)
HGB UR QL STRIP.AUTO: ABNORMAL
INR PPP: 1.02 (ref 0.84–1.16)
KETONES UR STRIP.AUTO-MCNC: NEGATIVE MG/DL
LEUKOCYTE ESTERASE UR QL STRIP.AUTO: NEGATIVE
LYMPHOCYTES # BLD: 2.7 K/UL (ref 1–5.1)
LYMPHOCYTES NFR BLD: 22.5 %
MCH RBC QN AUTO: 29.3 PG (ref 26–34)
MCHC RBC AUTO-ENTMCNC: 32.4 G/DL (ref 31–36)
MCV RBC AUTO: 90.4 FL (ref 80–100)
MONOCYTES # BLD: 0.7 K/UL (ref 0–1.3)
MONOCYTES NFR BLD: 5.6 %
NEUTROPHILS # BLD: 8.3 K/UL (ref 1.7–7.7)
NEUTROPHILS NFR BLD: 70 %
NITRITE UR QL STRIP.AUTO: NEGATIVE
PH UR STRIP.AUTO: 6 [PH] (ref 5–8)
PLATELET # BLD AUTO: 272 K/UL (ref 135–450)
PMV BLD AUTO: 8.3 FL (ref 5–10.5)
POTASSIUM SERPL-SCNC: 5.1 MMOL/L (ref 3.5–5.1)
PROT UR STRIP.AUTO-MCNC: NEGATIVE MG/DL
PROTHROMBIN TIME: 13.4 SEC (ref 11.5–14.8)
RBC # BLD AUTO: 4.13 M/UL (ref 4–5.2)
RBC #/AREA URNS HPF: ABNORMAL /HPF (ref 0–4)
SODIUM SERPL-SCNC: 144 MMOL/L (ref 136–145)
SP GR UR STRIP.AUTO: 1.02 (ref 1–1.03)
UA DIPSTICK W REFLEX MICRO PNL UR: YES
URN SPEC COLLECT METH UR: ABNORMAL
UROBILINOGEN UR STRIP-ACNC: 0.2 E.U./DL
WBC # BLD AUTO: 11.9 K/UL (ref 4–11)
WBC #/AREA URNS HPF: ABNORMAL /HPF (ref 0–5)

## 2023-12-01 PROCEDURE — 87086 URINE CULTURE/COLONY COUNT: CPT

## 2023-12-01 PROCEDURE — 36415 COLL VENOUS BLD VENIPUNCTURE: CPT

## 2023-12-01 PROCEDURE — 85610 PROTHROMBIN TIME: CPT

## 2023-12-01 PROCEDURE — 82040 ASSAY OF SERUM ALBUMIN: CPT

## 2023-12-01 PROCEDURE — 85025 COMPLETE CBC W/AUTO DIFF WBC: CPT

## 2023-12-01 PROCEDURE — 81001 URINALYSIS AUTO W/SCOPE: CPT

## 2023-12-01 PROCEDURE — 85730 THROMBOPLASTIN TIME PARTIAL: CPT

## 2023-12-01 PROCEDURE — 83036 HEMOGLOBIN GLYCOSYLATED A1C: CPT

## 2023-12-01 PROCEDURE — 80048 BASIC METABOLIC PNL TOTAL CA: CPT

## 2023-12-01 RX ORDER — LOSARTAN POTASSIUM 50 MG/1
50 TABLET ORAL 2 TIMES DAILY
Qty: 180 TABLET | Refills: 0 | Status: SHIPPED | OUTPATIENT
Start: 2023-12-01

## 2023-12-01 ASSESSMENT — PATIENT HEALTH QUESTIONNAIRE - PHQ9
7. TROUBLE CONCENTRATING ON THINGS, SUCH AS READING THE NEWSPAPER OR WATCHING TELEVISION: 0
4. FEELING TIRED OR HAVING LITTLE ENERGY: 0
10. IF YOU CHECKED OFF ANY PROBLEMS, HOW DIFFICULT HAVE THESE PROBLEMS MADE IT FOR YOU TO DO YOUR WORK, TAKE CARE OF THINGS AT HOME, OR GET ALONG WITH OTHER PEOPLE: 0
SUM OF ALL RESPONSES TO PHQ9 QUESTIONS 1 & 2: 0
2. FEELING DOWN, DEPRESSED OR HOPELESS: 0
SUM OF ALL RESPONSES TO PHQ QUESTIONS 1-9: 0
5. POOR APPETITE OR OVEREATING: 0
9. THOUGHTS THAT YOU WOULD BE BETTER OFF DEAD, OR OF HURTING YOURSELF: 0
3. TROUBLE FALLING OR STAYING ASLEEP: 0
8. MOVING OR SPEAKING SO SLOWLY THAT OTHER PEOPLE COULD HAVE NOTICED. OR THE OPPOSITE, BEING SO FIGETY OR RESTLESS THAT YOU HAVE BEEN MOVING AROUND A LOT MORE THAN USUAL: 0
6. FEELING BAD ABOUT YOURSELF - OR THAT YOU ARE A FAILURE OR HAVE LET YOURSELF OR YOUR FAMILY DOWN: 0
SUM OF ALL RESPONSES TO PHQ QUESTIONS 1-9: 0
1. LITTLE INTEREST OR PLEASURE IN DOING THINGS: 0
SUM OF ALL RESPONSES TO PHQ QUESTIONS 1-9: 0
SUM OF ALL RESPONSES TO PHQ QUESTIONS 1-9: 0

## 2023-12-01 ASSESSMENT — ANXIETY QUESTIONNAIRES
7. FEELING AFRAID AS IF SOMETHING AWFUL MIGHT HAPPEN: 0
3. WORRYING TOO MUCH ABOUT DIFFERENT THINGS: 0
1. FEELING NERVOUS, ANXIOUS, OR ON EDGE: 0
4. TROUBLE RELAXING: 0
GAD7 TOTAL SCORE: 0
IF YOU CHECKED OFF ANY PROBLEMS ON THIS QUESTIONNAIRE, HOW DIFFICULT HAVE THESE PROBLEMS MADE IT FOR YOU TO DO YOUR WORK, TAKE CARE OF THINGS AT HOME, OR GET ALONG WITH OTHER PEOPLE: NOT DIFFICULT AT ALL
6. BECOMING EASILY ANNOYED OR IRRITABLE: 0
2. NOT BEING ABLE TO STOP OR CONTROL WORRYING: 0
5. BEING SO RESTLESS THAT IT IS HARD TO SIT STILL: 0

## 2023-12-02 LAB
EST. AVERAGE GLUCOSE BLD GHB EST-MCNC: 114 MG/DL
HBA1C MFR BLD: 5.6 %

## 2023-12-03 LAB — BACTERIA UR CULT: NORMAL

## 2023-12-08 ENCOUNTER — TELEPHONE (OUTPATIENT)
Dept: PRIMARY CARE CLINIC | Age: 68
End: 2023-12-08

## 2023-12-08 NOTE — TELEPHONE ENCOUNTER
All pre-op paperwork has been faxed to [facility]. Call placed to this patient to notify her. Patient voices understanding.

## 2024-02-06 RX ORDER — CIPROFLOXACIN 500 MG/1
500 TABLET, FILM COATED ORAL 2 TIMES DAILY
Qty: 14 TABLET | Refills: 0 | Status: SHIPPED | OUTPATIENT
Start: 2024-02-06 | End: 2024-02-13

## 2024-02-06 NOTE — PROGRESS NOTES
Spoke with patient over the phone. Medication allergies reviewed. Sent in Cipro 500 mg to take twice a day for 7 days starting 3 days prior to the procedure. Per provider, patient is able to sign consent same day. No valium offered. Patient did not want ibuprofen. Electronically signed by Sagar Peterson RN on 2/6/2024 at 10:18 AM

## 2024-02-15 ENCOUNTER — PROCEDURE VISIT (OUTPATIENT)
Dept: UROGYNECOLOGY | Age: 69
End: 2024-02-15

## 2024-02-15 VITALS
RESPIRATION RATE: 16 BRPM | SYSTOLIC BLOOD PRESSURE: 139 MMHG | TEMPERATURE: 97.5 F | HEART RATE: 79 BPM | DIASTOLIC BLOOD PRESSURE: 88 MMHG | OXYGEN SATURATION: 99 %

## 2024-02-15 DIAGNOSIS — N32.81 OVERACTIVE BLADDER: ICD-10-CM

## 2024-02-15 DIAGNOSIS — N39.41 URGE INCONTINENCE: ICD-10-CM

## 2024-02-15 DIAGNOSIS — R39.15 URINARY URGENCY: Primary | ICD-10-CM

## 2024-02-15 DIAGNOSIS — R35.0 URINARY FREQUENCY: ICD-10-CM

## 2024-02-15 LAB
BILIRUBIN, POC: NORMAL
BLOOD URINE, POC: NORMAL
CLARITY, POC: CLEAR
COLOR, POC: YELLOW
GLUCOSE URINE, POC: NORMAL
KETONES, POC: NORMAL
LEUKOCYTE EST, POC: NORMAL
NITRITE, POC: NORMAL
PH, POC: 6.5
PROTEIN, POC: NORMAL
SPECIFIC GRAVITY, POC: 1.01
UROBILINOGEN, POC: 0.2

## 2024-02-15 RX ORDER — LIDOCAINE HYDROCHLORIDE 20 MG/ML
20 INJECTION, SOLUTION INFILTRATION; PERINEURAL ONCE
Status: COMPLETED | OUTPATIENT
Start: 2024-02-15 | End: 2024-02-15

## 2024-02-15 RX ADMIN — LIDOCAINE HYDROCHLORIDE 20 ML: 20 INJECTION, SOLUTION INFILTRATION; PERINEURAL at 13:30

## 2024-02-15 NOTE — PROGRESS NOTES
with 50ml of 1 % lidocaine. The patient remained in the supine position for 20 minutes followed by alternating to the right and left side for 10 additional minutes each. Cystoscope was placed. The bladder wall and trigone were normal in appearance. Botox was injected through the bladder wall taking care to avoid the detrusor.   Total units: 100 Total number of injections: 21    Results for POC orders placed in visit on 02/15/24   POCT Urinalysis no Micro   Result Value Ref Range    Color, UA yellow     Clarity, UA clear     Glucose, UA POC neg     Bilirubin, UA neg     Ketones, UA neg     Spec Grav, UA 1.015     Blood, UA POC neg     pH, UA 6.5     Protein, UA POC neg     Urobilinogen, UA 0.2     Leukocytes, UA neg     Nitrite, UA neg        Assessment/Plan:     Joselyn Barrett is a 68 y.o. female with urinary urgency, frequency and overactive bladder.  Patient tolerated the procedure well.  Patient counseled on risk of UTI and urinary retention. She was educated on signs and symptoms of both. She is to call the office with any concerns. The patient will follow up in 2 weeks for a post void residual.     Orders Placed This Encounter   Procedures    POCT Urinalysis no Micro    VT INSJ NON-NDWELLG BLADDER CATHETER     Orders Placed This Encounter   Medications    lidocaine 2 % injection 20 mL    Onabotulinumtoxin A (BOTOX) injection 100 Units       NETTIE RODRIGUEZ MD

## 2024-02-27 ENCOUNTER — TELEPHONE (OUTPATIENT)
Dept: UROGYNECOLOGY | Age: 69
End: 2024-02-27

## 2024-02-27 NOTE — TELEPHONE ENCOUNTER
Patient called in stating she needs to cancel her PVR follow up appointment because she had hip surgery and she is unable to put her legs into the stirrups. Her healing will be delayed due to additional damage to the hips found during surgery. She reports her healing will take approximately 9 months to 1 year. Advised patient to call us back when she feels healed enough to get her emptying abilities evaluated. If she feels like she is in retention, and does not want to use stirrups, then she will have to go to the hospital, as they can use a bladder scanner. Patient is agreeable and is thankful. No further needs identified at this time. Electronically signed by Sagar Peterson RN on 2/27/2024 at 10:34 AM

## 2024-02-29 ENCOUNTER — OFFICE VISIT (OUTPATIENT)
Dept: CARDIOLOGY CLINIC | Age: 69
End: 2024-02-29
Payer: MEDICARE

## 2024-02-29 VITALS
OXYGEN SATURATION: 93 % | WEIGHT: 257 LBS | HEART RATE: 80 BPM | SYSTOLIC BLOOD PRESSURE: 138 MMHG | HEIGHT: 62 IN | BODY MASS INDEX: 47.29 KG/M2 | DIASTOLIC BLOOD PRESSURE: 88 MMHG

## 2024-02-29 DIAGNOSIS — G62.9 PERIPHERAL POLYNEUROPATHY: ICD-10-CM

## 2024-02-29 DIAGNOSIS — I10 ESSENTIAL (PRIMARY) HYPERTENSION: ICD-10-CM

## 2024-02-29 DIAGNOSIS — I25.42 CORONARY ARTERY DISSECTION: ICD-10-CM

## 2024-02-29 DIAGNOSIS — E78.2 MIXED HYPERLIPIDEMIA: ICD-10-CM

## 2024-02-29 DIAGNOSIS — I25.118 CORONARY ARTERY DISEASE OF NATIVE ARTERY OF NATIVE HEART WITH STABLE ANGINA PECTORIS (HCC): Primary | ICD-10-CM

## 2024-02-29 PROCEDURE — 3079F DIAST BP 80-89 MM HG: CPT | Performed by: NURSE PRACTITIONER

## 2024-02-29 PROCEDURE — G8484 FLU IMMUNIZE NO ADMIN: HCPCS | Performed by: NURSE PRACTITIONER

## 2024-02-29 PROCEDURE — 1090F PRES/ABSN URINE INCON ASSESS: CPT | Performed by: NURSE PRACTITIONER

## 2024-02-29 PROCEDURE — G8427 DOCREV CUR MEDS BY ELIG CLIN: HCPCS | Performed by: NURSE PRACTITIONER

## 2024-02-29 PROCEDURE — G8400 PT W/DXA NO RESULTS DOC: HCPCS | Performed by: NURSE PRACTITIONER

## 2024-02-29 PROCEDURE — 3017F COLORECTAL CA SCREEN DOC REV: CPT | Performed by: NURSE PRACTITIONER

## 2024-02-29 PROCEDURE — 1123F ACP DISCUSS/DSCN MKR DOCD: CPT | Performed by: NURSE PRACTITIONER

## 2024-02-29 PROCEDURE — 3075F SYST BP GE 130 - 139MM HG: CPT | Performed by: NURSE PRACTITIONER

## 2024-02-29 PROCEDURE — 1036F TOBACCO NON-USER: CPT | Performed by: NURSE PRACTITIONER

## 2024-02-29 PROCEDURE — 99214 OFFICE O/P EST MOD 30 MIN: CPT | Performed by: NURSE PRACTITIONER

## 2024-02-29 PROCEDURE — G8417 CALC BMI ABV UP PARAM F/U: HCPCS | Performed by: NURSE PRACTITIONER

## 2024-02-29 RX ORDER — CHLORAL HYDRATE 500 MG
3000 CAPSULE ORAL DAILY
Qty: 270 CAPSULE | Refills: 1 | Status: SHIPPED | OUTPATIENT
Start: 2024-02-29

## 2024-02-29 RX ORDER — CYCLOBENZAPRINE HCL 10 MG
10 TABLET ORAL 3 TIMES DAILY PRN
COMMUNITY
Start: 2024-01-04

## 2024-02-29 RX ORDER — PRAVASTATIN SODIUM 40 MG
40 TABLET ORAL DAILY
Qty: 90 TABLET | Refills: 3 | Status: SHIPPED | OUTPATIENT
Start: 2024-02-29

## 2024-02-29 RX ORDER — FUROSEMIDE 20 MG/1
20 TABLET ORAL DAILY PRN
Qty: 90 TABLET | Refills: 0 | Status: SHIPPED | OUTPATIENT
Start: 2024-02-29

## 2024-02-29 RX ORDER — OXYCODONE HYDROCHLORIDE AND ACETAMINOPHEN 5; 325 MG/1; MG/1
1 TABLET ORAL EVERY 4 HOURS PRN
COMMUNITY
Start: 2023-12-22

## 2024-02-29 NOTE — PROGRESS NOTES
Noted  Neurological/Psychiatric:  Alert and oriented in all spheres  Moves all extremities well  Exhibits normal gait balance and coordination  No abnormalities of mood, affect, memory, mentation, or behavior are noted    Lab Data:  Most recent lab results below reviewed in office    CBC:   Lab Results   Component Value Date/Time    WBC 11.9 12/01/2023 03:14 PM    WBC 9.2 08/18/2023 11:52 AM    WBC 9.4 11/28/2022 09:24 AM    RBC 4.13 12/01/2023 03:14 PM    RBC 4.44 08/18/2023 11:52 AM    RBC 4.99 11/28/2022 09:24 AM    HGB 12.1 12/01/2023 03:14 PM    HGB 13.1 08/18/2023 11:52 AM    HGB 14.1 11/28/2022 09:24 AM    HCT 37.3 12/01/2023 03:14 PM    HCT 39.5 08/18/2023 11:52 AM    HCT 42.9 11/28/2022 09:24 AM    MCV 90.4 12/01/2023 03:14 PM    MCV 89.1 08/18/2023 11:52 AM    MCV 86.0 11/28/2022 09:24 AM    RDW 14.6 12/01/2023 03:14 PM    RDW 15.1 08/18/2023 11:52 AM    RDW 16.7 11/28/2022 09:24 AM     12/01/2023 03:14 PM     08/18/2023 11:52 AM     11/28/2022 09:24 AM     BMP:  Lab Results   Component Value Date/Time     12/01/2023 03:14 PM     08/18/2023 11:52 AM     11/28/2022 09:24 AM    K 5.1 12/01/2023 03:14 PM    K 5.2 08/18/2023 11:52 AM    K 3.9 11/28/2022 09:24 AM    K 3.6 11/14/2022 09:38 AM    K 4.2 09/14/2022 06:24 PM     12/01/2023 03:14 PM     08/18/2023 11:52 AM     11/28/2022 09:24 AM    CO2 27 12/01/2023 03:14 PM    CO2 24 08/18/2023 11:52 AM    CO2 26 11/28/2022 09:24 AM    BUN 22 12/01/2023 03:14 PM    BUN 20 08/18/2023 11:52 AM    BUN 22 11/28/2022 09:24 AM    CREATININE 1.0 12/01/2023 03:14 PM    CREATININE 1.0 08/18/2023 11:52 AM    CREATININE 0.9 11/28/2022 09:24 AM     BNP:   Lab Results   Component Value Date/Time    PROBNP 736 08/18/2023 11:52 AM     LIPID:   Lab Results   Component Value Date/Time    TRIG 246 05/17/2023 07:52 AM    TRIG 315 11/28/2022 09:24 AM    HDL 49 05/17/2023 07:52 AM    HDL 38 01/30/2023 08:32 AM    LDLCALC 53

## 2024-02-29 NOTE — PATIENT INSTRUCTIONS
Stay on the pravastatin 40 mg daily  Continue the Immune supplement for the vitamin D  Restart the fish oil 3,000 mg daily  Start CoQ 10, one daily  Start furosemide (Lasix) 20 mg daily for leg swelling - take for 3-5 days then use as needed  No change in other heart medicines  Follow up with me in 6 months

## 2024-03-04 ENCOUNTER — OFFICE VISIT (OUTPATIENT)
Dept: PRIMARY CARE CLINIC | Age: 69
End: 2024-03-04
Payer: MEDICARE

## 2024-03-04 VITALS
WEIGHT: 263.2 LBS | TEMPERATURE: 97.3 F | SYSTOLIC BLOOD PRESSURE: 156 MMHG | OXYGEN SATURATION: 97 % | HEART RATE: 91 BPM | DIASTOLIC BLOOD PRESSURE: 98 MMHG | BODY MASS INDEX: 48.14 KG/M2

## 2024-03-04 DIAGNOSIS — R06.02 SHORTNESS OF BREATH: ICD-10-CM

## 2024-03-04 DIAGNOSIS — I25.118 CORONARY ARTERY DISEASE OF NATIVE ARTERY OF NATIVE HEART WITH STABLE ANGINA PECTORIS (HCC): ICD-10-CM

## 2024-03-04 DIAGNOSIS — R63.5 WEIGHT GAIN: ICD-10-CM

## 2024-03-04 DIAGNOSIS — E66.01 OBESITY, CLASS III, BMI 40-49.9 (MORBID OBESITY) (HCC): Primary | ICD-10-CM

## 2024-03-04 DIAGNOSIS — M79.89 LEG SWELLING: ICD-10-CM

## 2024-03-04 PROCEDURE — 1123F ACP DISCUSS/DSCN MKR DOCD: CPT

## 2024-03-04 PROCEDURE — 3017F COLORECTAL CA SCREEN DOC REV: CPT

## 2024-03-04 PROCEDURE — 1036F TOBACCO NON-USER: CPT

## 2024-03-04 PROCEDURE — 3080F DIAST BP >= 90 MM HG: CPT

## 2024-03-04 PROCEDURE — 3077F SYST BP >= 140 MM HG: CPT

## 2024-03-04 PROCEDURE — G8400 PT W/DXA NO RESULTS DOC: HCPCS

## 2024-03-04 PROCEDURE — 99214 OFFICE O/P EST MOD 30 MIN: CPT

## 2024-03-04 PROCEDURE — G8484 FLU IMMUNIZE NO ADMIN: HCPCS

## 2024-03-04 PROCEDURE — G8417 CALC BMI ABV UP PARAM F/U: HCPCS

## 2024-03-04 PROCEDURE — 1090F PRES/ABSN URINE INCON ASSESS: CPT

## 2024-03-04 PROCEDURE — G8427 DOCREV CUR MEDS BY ELIG CLIN: HCPCS

## 2024-03-04 RX ORDER — DICLOFENAC SODIUM 75 MG/1
75 TABLET, DELAYED RELEASE ORAL 2 TIMES DAILY
Qty: 180 TABLET | Refills: 3 | Status: SHIPPED | OUTPATIENT
Start: 2024-03-04

## 2024-03-04 RX ORDER — LOSARTAN POTASSIUM 50 MG/1
50 TABLET ORAL 2 TIMES DAILY
Qty: 180 TABLET | Refills: 1 | Status: SHIPPED | OUTPATIENT
Start: 2024-03-04

## 2024-03-04 ASSESSMENT — PATIENT HEALTH QUESTIONNAIRE - PHQ9
SUM OF ALL RESPONSES TO PHQ QUESTIONS 1-9: 0
SUM OF ALL RESPONSES TO PHQ QUESTIONS 1-9: 0
1. LITTLE INTEREST OR PLEASURE IN DOING THINGS: NOT AT ALL
SUM OF ALL RESPONSES TO PHQ9 QUESTIONS 1 & 2: 0
SUM OF ALL RESPONSES TO PHQ QUESTIONS 1-9: 0
2. FEELING DOWN, DEPRESSED OR HOPELESS: 0
SUM OF ALL RESPONSES TO PHQ9 QUESTIONS 1 & 2: 0
2. FEELING DOWN, DEPRESSED OR HOPELESS: NOT AT ALL
1. LITTLE INTEREST OR PLEASURE IN DOING THINGS: 0
SUM OF ALL RESPONSES TO PHQ QUESTIONS 1-9: 0

## 2024-03-04 NOTE — TELEPHONE ENCOUNTER
Refill Request       Last Seen: Last Seen Department: 12/22/2023  Last Seen by PCP: 12/22/2023    Last Written: 3/7/2023 180 tabs 3 refills     Next Appointment:   Future Appointments   Date Time Provider Department Center   8/29/2024  1:00 PM Dixie Acosta, APRN - [facility]             Requested Prescriptions     Pending Prescriptions Disp Refills    diclofenac (VOLTAREN) 75 MG EC tablet 180 tablet 3     Sig: Take 1 tablet by mouth 2 times daily

## 2024-03-04 NOTE — PROGRESS NOTES
from surgery.  As well as try and improve her fluid balance.  - Patient should return to the office if there has been no improvement in her leg swelling within 5 days.        EMR Dragon/transcription disclaimer:  Much of this encounter note is electronic transcription/translation of spoken language to printed texts.  The electronic translation of spoken language may be erroneous, or at times, nonsensical words or phrases may be inadvertently transcribed.  Although I have reviewed the note for such errors, some may still exist.       Milagros Keller, DO   PGY-3  [facility]

## 2024-03-07 DIAGNOSIS — I16.9 HYPERTENSIVE CRISIS: ICD-10-CM

## 2024-03-07 NOTE — TELEPHONE ENCOUNTER
Refill Request       Last Seen: Last Seen Department: 3/4/2024  Last Seen by PCP: 3/4/2024    Last Written: 12/21/23 30 with 1    Next Appointment:   Future Appointments   Date Time Provider Department Center   6/4/2024 11:00 AM Milagros Keller, DO [facility]   8/29/2024  1:00 PM Dixie Acosta, APRN - [facility]         Requested Prescriptions     Pending Prescriptions Disp Refills    cloNIDine (CATAPRES) 0.1 MG tablet 30 tablet 1     Sig: Take 1 tablet by mouth as needed for High Blood Pressure (if SBP > 160)

## 2024-03-08 RX ORDER — CLONIDINE HYDROCHLORIDE 0.1 MG/1
0.1 TABLET ORAL PRN
Qty: 30 TABLET | Refills: 1 | Status: SHIPPED | OUTPATIENT
Start: 2024-03-08

## 2024-03-25 DIAGNOSIS — I10 ESSENTIAL (PRIMARY) HYPERTENSION: ICD-10-CM

## 2024-03-25 RX ORDER — CHLORTHALIDONE 25 MG/1
25 TABLET ORAL DAILY
Qty: 30 TABLET | Refills: 3 | Status: SHIPPED | OUTPATIENT
Start: 2024-03-25

## 2024-03-25 NOTE — TELEPHONE ENCOUNTER
Refill Request       Last Seen: Last Seen Department: 3/4/2024  Last Seen by PCP: 3/4/2024    Last Written: 11/16/2023 30 with 3 refills     Next Appointment:   Future Appointments   Date Time Provider Department Center   5/14/2024  1:30 PM [facility]   6/4/2024 11:00 AM Milagros Keller, DO [facility]   8/29/2024  1:00 PM Dixie Acosta, APRN - [facility]             Requested Prescriptions     Pending Prescriptions Disp Refills    chlorthalidone (HYGROTON) 25 MG tablet 30 tablet 3     Sig: Take 1 tablet by mouth daily

## 2024-05-10 RX ORDER — CARVEDILOL 12.5 MG/1
12.5 TABLET ORAL 2 TIMES DAILY
Qty: 180 TABLET | Refills: 3 | Status: SHIPPED | OUTPATIENT
Start: 2024-05-10

## 2024-05-10 NOTE — TELEPHONE ENCOUNTER
Pt is requesting refill of Coreg 12.5mg bid. Preferred pharmacy is    [facility] [place] [id number] [facility] - P [phone]     Last ov 02/29/2024 npdd  Next ov 08/29/2024 npdd

## 2024-05-14 ENCOUNTER — HOSPITAL ENCOUNTER (OUTPATIENT)
Dept: CARDIOLOGY | Age: 69
Discharge: HOME | End: 2024-05-16
Payer: MEDICARE

## 2024-05-14 VITALS
WEIGHT: 255 LBS | SYSTOLIC BLOOD PRESSURE: 152 MMHG | HEIGHT: 64 IN | DIASTOLIC BLOOD PRESSURE: 96 MMHG | BODY MASS INDEX: 43.54 KG/M2

## 2024-05-14 DIAGNOSIS — R63.5 WEIGHT GAIN: ICD-10-CM

## 2024-05-14 DIAGNOSIS — I25.118 CORONARY ARTERY DISEASE OF NATIVE ARTERY OF NATIVE HEART WITH STABLE ANGINA PECTORIS (HCC): ICD-10-CM

## 2024-05-14 DIAGNOSIS — R06.02 SHORTNESS OF BREATH: ICD-10-CM

## 2024-05-14 DIAGNOSIS — M79.89 LEG SWELLING: ICD-10-CM

## 2024-05-14 LAB
ECHO AO ROOT DIAM: 3 CM
ECHO AO ROOT INDEX: 1.38 CM/M2
ECHO AV AREA PEAK VELOCITY: 1.6 CM2
ECHO AV AREA VTI: 1.8 CM2
ECHO AV AREA/BSA PEAK VELOCITY: 0.7 CM2/M2
ECHO AV AREA/BSA VTI: 0.8 CM2/M2
ECHO AV CUSP MM: 1.6 CM
ECHO AV MEAN GRADIENT: 10 MMHG
ECHO AV MEAN VELOCITY: 1.4 M/S
ECHO AV PEAK GRADIENT: 17 MMHG
ECHO AV PEAK VELOCITY: 2.1 M/S
ECHO AV VELOCITY RATIO: 0.43
ECHO AV VTI: 48.9 CM
ECHO BSA: 2.29 M2
ECHO LA AREA 2C: 23 CM2
ECHO LA AREA 4C: 20.6 CM2
ECHO LA DIAMETER INDEX: 2.03 CM/M2
ECHO LA DIAMETER: 4.4 CM
ECHO LA MAJOR AXIS: 5.5 CM
ECHO LA MINOR AXIS: 5.8 CM
ECHO LA TO AORTIC ROOT RATIO: 1.47
ECHO LA VOL BP: 70 ML (ref 22–52)
ECHO LA VOL MOD A2C: 74 ML (ref 22–52)
ECHO LA VOL MOD A4C: 63 ML (ref 22–52)
ECHO LA VOL/BSA BIPLANE: 32 ML/M2 (ref 16–34)
ECHO LA VOLUME INDEX MOD A2C: 34 ML/M2 (ref 16–34)
ECHO LA VOLUME INDEX MOD A4C: 29 ML/M2 (ref 16–34)
ECHO LV E' LATERAL VELOCITY: 8 CM/S
ECHO LV E' SEPTAL VELOCITY: 5 CM/S
ECHO LV FRACTIONAL SHORTENING: 31 % (ref 28–44)
ECHO LV INTERNAL DIMENSION DIASTOLE INDEX: 2.4 CM/M2
ECHO LV INTERNAL DIMENSION DIASTOLIC: 5.2 CM (ref 3.9–5.3)
ECHO LV INTERNAL DIMENSION SYSTOLIC INDEX: 1.66 CM/M2
ECHO LV INTERNAL DIMENSION SYSTOLIC: 3.6 CM
ECHO LV ISOVOLUMETRIC RELAXATION TIME (IVRT): 88 MS
ECHO LV IVSD: 1.2 CM (ref 0.6–0.9)
ECHO LV MASS 2D: 234.6 G (ref 67–162)
ECHO LV MASS INDEX 2D: 108.1 G/M2 (ref 43–95)
ECHO LV POSTERIOR WALL DIASTOLIC: 1.1 CM (ref 0.6–0.9)
ECHO LV RELATIVE WALL THICKNESS RATIO: 0.42
ECHO LVOT AREA: 3.8 CM2
ECHO LVOT AV VTI INDEX: 0.46
ECHO LVOT DIAM: 2.2 CM
ECHO LVOT MEAN GRADIENT: 1 MMHG
ECHO LVOT PEAK GRADIENT: 3 MMHG
ECHO LVOT PEAK VELOCITY: 0.9 M/S
ECHO LVOT STROKE VOLUME INDEX: 39.7 ML/M2
ECHO LVOT SV: 86.2 ML
ECHO LVOT VTI: 22.7 CM
ECHO MV A VELOCITY: 0.96 M/S
ECHO MV E VELOCITY: 0.66 M/S
ECHO MV E/A RATIO: 0.69
ECHO MV E/E' LATERAL: 8.25
ECHO MV E/E' RATIO (AVERAGED): 10.73
ECHO MV E/E' SEPTAL: 13.2

## 2024-05-14 PROCEDURE — 93306 TTE W/DOPPLER COMPLETE: CPT | Performed by: INTERNAL MEDICINE

## 2024-05-14 PROCEDURE — 93306 TTE W/DOPPLER COMPLETE: CPT

## 2024-05-29 RX ORDER — FUROSEMIDE 20 MG/1
TABLET ORAL
Qty: 90 TABLET | Refills: 0 | Status: SHIPPED | OUTPATIENT
Start: 2024-05-29

## 2024-06-04 ENCOUNTER — OFFICE VISIT (OUTPATIENT)
Dept: PRIMARY CARE CLINIC | Age: 69
End: 2024-06-04

## 2024-06-04 ENCOUNTER — TELEPHONE (OUTPATIENT)
Dept: PRIMARY CARE CLINIC | Age: 69
End: 2024-06-04

## 2024-06-04 VITALS
BODY MASS INDEX: 42.23 KG/M2 | TEMPERATURE: 97.3 F | HEART RATE: 79 BPM | DIASTOLIC BLOOD PRESSURE: 64 MMHG | SYSTOLIC BLOOD PRESSURE: 132 MMHG | WEIGHT: 246 LBS | OXYGEN SATURATION: 98 %

## 2024-06-04 DIAGNOSIS — I10 ESSENTIAL (PRIMARY) HYPERTENSION: ICD-10-CM

## 2024-06-04 DIAGNOSIS — E66.01 OBESITY, CLASS III, BMI 40-49.9 (MORBID OBESITY) (HCC): Primary | ICD-10-CM

## 2024-06-04 DIAGNOSIS — I25.118 CORONARY ARTERY DISEASE OF NATIVE ARTERY OF NATIVE HEART WITH STABLE ANGINA PECTORIS (HCC): ICD-10-CM

## 2024-06-04 DIAGNOSIS — E78.2 MIXED HYPERLIPIDEMIA: ICD-10-CM

## 2024-06-04 DIAGNOSIS — Z12.31 ENCOUNTER FOR SCREENING MAMMOGRAM FOR MALIGNANT NEOPLASM OF BREAST: ICD-10-CM

## 2024-06-04 DIAGNOSIS — R73.03 PREDIABETES: ICD-10-CM

## 2024-06-04 PROBLEM — E66.3 OVERWEIGHT: Status: RESOLVED | Noted: 2023-02-22 | Resolved: 2024-06-04

## 2024-06-04 RX ORDER — SEMAGLUTIDE 0.25 MG/.5ML
0.25 INJECTION, SOLUTION SUBCUTANEOUS
Qty: 0.5 ML | Refills: 3 | Status: SHIPPED | OUTPATIENT
Start: 2024-06-04

## 2024-06-04 SDOH — ECONOMIC STABILITY: INCOME INSECURITY: HOW HARD IS IT FOR YOU TO PAY FOR THE VERY BASICS LIKE FOOD, HOUSING, MEDICAL CARE, AND HEATING?: NOT HARD AT ALL

## 2024-06-04 SDOH — ECONOMIC STABILITY: FOOD INSECURITY: WITHIN THE PAST 12 MONTHS, YOU WORRIED THAT YOUR FOOD WOULD RUN OUT BEFORE YOU GOT MONEY TO BUY MORE.: NEVER TRUE

## 2024-06-04 SDOH — ECONOMIC STABILITY: FOOD INSECURITY: WITHIN THE PAST 12 MONTHS, THE FOOD YOU BOUGHT JUST DIDN'T LAST AND YOU DIDN'T HAVE MONEY TO GET MORE.: NEVER TRUE

## 2024-06-04 NOTE — PROGRESS NOTES
PROGRESS NOTE   [facility]                                  [address], [address], [place] [address]         Phone: [phone]    Date of Service:  6/4/2024     Patient ID: Penelope Barrett is a 68 y.o. female      Subjective:     CC: Weight Loss    HPI  Patient is a 68 year old female who presents today to discuss starting Wegovy.     Patient states that she has tried for many years to lose weight and keep it off. She states that it is getting to the point where it is causing back pain. She is struggling to maintain a healthy weight because she recently had her hip replaced.      She states that she is complaint with her medications. She denies any side effects. She denies any chest pain, shortness of breath, headaches or visions changes.     ROS:    Negative expect for pertinent positives listed in the HPI        Vitals:    06/04/24 1100   BP: 132/64   Site: Left Upper Arm   Position: Sitting   Cuff Size: Large Adult   Pulse: 79   Temp: 97.3 °F (36.3 °C)   TempSrc: Temporal   SpO2: 98%   Weight: 111.6 kg (246 lb)       Allergies:  Prednisone, Lisinopril, Adhesive tape, Codeine, Crestor [rosuvastatin calcium], Food, Lipitor [atorvastatin calcium], Nitrofurantoin, Oxycodone, Penicillins, Sulfa antibiotics, Tramadol, and Clindamycin    Outpatient Medications Marked as Taking for the 6/4/24 encounter (Office Visit) with Milagros Keller, DO   Medication Sig Dispense Refill    Semaglutide-Weight Management (WEGOVY) 0.25 MG/0.5ML SOAJ SC injection Inject 0.25 mg into the skin every 7 days 0.5 mL 3    furosemide (LASIX) 20 MG tablet TAKE 1 TABLET BY MOUTH ONCE DAILY AS NEEDED FOR  SWELLING 90 tablet 0    carvedilol (COREG) 12.5 MG tablet Take 1 tablet by mouth 2 times daily 180 tablet 3    chlorthalidone (HYGROTON) 25 MG tablet Take 1 tablet by mouth daily 30 tablet 3    cloNIDine (CATAPRES) 0.1 MG tablet Take 1 tablet by mouth as

## 2024-06-04 NOTE — TELEPHONE ENCOUNTER
Patient is calling and stating that RX   Semaglutide-Weight Management (WEGOVY) 0.25 MG/0.5ML SOAJ SC injection  is going to cost 1600. She stated the she will go to [facility] for the other medication that was discussed.

## 2024-07-02 ENCOUNTER — OFFICE VISIT (OUTPATIENT)
Dept: PRIMARY CARE CLINIC | Age: 69
End: 2024-07-02
Payer: MEDICARE

## 2024-07-02 VITALS
SYSTOLIC BLOOD PRESSURE: 130 MMHG | WEIGHT: 246 LBS | OXYGEN SATURATION: 95 % | BODY MASS INDEX: 42.23 KG/M2 | HEART RATE: 83 BPM | DIASTOLIC BLOOD PRESSURE: 86 MMHG

## 2024-07-02 DIAGNOSIS — R73.03 PREDIABETES: ICD-10-CM

## 2024-07-02 DIAGNOSIS — Z98.890 S/P LUMPECTOMY, LEFT BREAST: ICD-10-CM

## 2024-07-02 DIAGNOSIS — Z12.31 ENCOUNTER FOR SCREENING MAMMOGRAM FOR MALIGNANT NEOPLASM OF BREAST: ICD-10-CM

## 2024-07-02 DIAGNOSIS — E66.01 OBESITY, CLASS III, BMI 40-49.9 (MORBID OBESITY) (HCC): Primary | ICD-10-CM

## 2024-07-02 DIAGNOSIS — M62.830 MUSCLE SPASM OF BACK: ICD-10-CM

## 2024-07-02 DIAGNOSIS — I10 ESSENTIAL (PRIMARY) HYPERTENSION: ICD-10-CM

## 2024-07-02 DIAGNOSIS — Z96.641 S/P TOTAL RIGHT HIP ARTHROPLASTY: ICD-10-CM

## 2024-07-02 PROBLEM — E66.813 OBESITY, CLASS III, BMI 40-49.9 (MORBID OBESITY): Status: ACTIVE | Noted: 2024-07-02

## 2024-07-02 PROCEDURE — 1123F ACP DISCUSS/DSCN MKR DOCD: CPT

## 2024-07-02 PROCEDURE — G8400 PT W/DXA NO RESULTS DOC: HCPCS

## 2024-07-02 PROCEDURE — 3079F DIAST BP 80-89 MM HG: CPT

## 2024-07-02 PROCEDURE — G8417 CALC BMI ABV UP PARAM F/U: HCPCS

## 2024-07-02 PROCEDURE — 99214 OFFICE O/P EST MOD 30 MIN: CPT

## 2024-07-02 PROCEDURE — 3075F SYST BP GE 130 - 139MM HG: CPT

## 2024-07-02 PROCEDURE — G8428 CUR MEDS NOT DOCUMENT: HCPCS

## 2024-07-02 PROCEDURE — 1036F TOBACCO NON-USER: CPT

## 2024-07-02 PROCEDURE — 1090F PRES/ABSN URINE INCON ASSESS: CPT

## 2024-07-02 PROCEDURE — 3017F COLORECTAL CA SCREEN DOC REV: CPT

## 2024-07-02 RX ORDER — CLONIDINE HYDROCHLORIDE 0.1 MG/1
0.1 TABLET ORAL PRN
Qty: 30 TABLET | Refills: 1 | Status: SHIPPED | OUTPATIENT
Start: 2024-07-02

## 2024-07-02 RX ORDER — CYCLOBENZAPRINE HCL 10 MG
10 TABLET ORAL 3 TIMES DAILY PRN
Qty: 90 TABLET | Refills: 0 | Status: SHIPPED | OUTPATIENT
Start: 2024-07-02 | End: 2024-08-01

## 2024-07-02 RX ORDER — SEMAGLUTIDE 0.5 MG/.5ML
0.5 INJECTION, SOLUTION SUBCUTANEOUS
Qty: 2 ML | Refills: 1 | Status: SHIPPED | OUTPATIENT
Start: 2024-07-02 | End: 2024-08-31

## 2024-07-02 NOTE — PROGRESS NOTES
PROGRESS NOTE   [facility]                                  [address], [address], [place] [address]         Phone: [phone]    Date of Service:  7/2/2024     Patient ID: Penelope Barrett is a 68 y.o. female      Subjective:     CC:   Chief Complaint   Patient presents with    Hypertension    Hyperlipidemia     HPI  Patient is a 68-year-old female who presents to the office today after starting Wegovy previous office visit    Patient was started on Wegovy 0.25 mg every 7 days.  Her weight has been stable, she has not lost any weight over the last month.  Notes she gets the Wegovy through [employer] [facility] in [place].. Denies any side effects including GI upset    Patient with history of hypertension, she takes carvedilol, losartan, chlorthalidone, and clonidine.  Patient notes she has not taken the clonidine over the last month and a half.  She only takes it if her systolic blood pressures greater than 160.    Patient notes needs replacement of handicap card, has chronic low back pain which leads to difficulty walking long distances. Patient also has history of right hip arthroplasty and uses a cane to walk around.  Replacement back in December 2023.     Takes Flexeril as needed for muscle spasms, notes a 90-day supply will last for the whole year.      ROS:    Review of Systems   All other systems reviewed and are negative.    Vitals:    07/02/24 1428   BP: 130/86   Site: Left Upper Arm   Position: Sitting   Cuff Size: Large Adult   Pulse: 83   SpO2: 95%   Weight: 111.6 kg (246 lb)     Allergies:  Prednisone, Lisinopril, Adhesive tape, Codeine, Crestor [rosuvastatin calcium], Food, Lipitor [atorvastatin calcium], Nitrofurantoin, Oxycodone, Penicillins, Sulfa antibiotics, Tramadol, and Clindamycin    Outpatient Medications Marked as Taking for the 7/2/24 encounter (Office Visit) with Cristóbal Eric, DO   Medication Sig Dispense

## 2024-08-01 ENCOUNTER — TELEPHONE (OUTPATIENT)
Dept: PRIMARY CARE CLINIC | Age: 69
End: 2024-08-01

## 2024-08-01 DIAGNOSIS — R73.03 PREDIABETES: ICD-10-CM

## 2024-08-01 DIAGNOSIS — E66.01 OBESITY, CLASS III, BMI 40-49.9 (MORBID OBESITY): ICD-10-CM

## 2024-08-01 NOTE — TELEPHONE ENCOUNTER
Patient is calling requesting a refill of Semaglutide-Weight Management (WEGOVY) 0.5 MG/0.5ML SOAJ SC injection  please send to [facility]  patients last OV 07.02.24 and next OV 08.06.24.     please advise patient once medication is sent

## 2024-08-05 RX ORDER — SEMAGLUTIDE 0.5 MG/.5ML
0.5 INJECTION, SOLUTION SUBCUTANEOUS
Qty: 2 ML | Refills: 1 | Status: SHIPPED | OUTPATIENT
Start: 2024-08-05 | End: 2024-10-04

## 2024-08-06 ENCOUNTER — OFFICE VISIT (OUTPATIENT)
Dept: PRIMARY CARE CLINIC | Age: 69
End: 2024-08-06
Payer: MEDICARE

## 2024-08-06 VITALS
WEIGHT: 253 LBS | BODY MASS INDEX: 43.19 KG/M2 | TEMPERATURE: 97.3 F | HEART RATE: 85 BPM | OXYGEN SATURATION: 96 % | DIASTOLIC BLOOD PRESSURE: 76 MMHG | SYSTOLIC BLOOD PRESSURE: 138 MMHG | HEIGHT: 64 IN | RESPIRATION RATE: 16 BRPM

## 2024-08-06 DIAGNOSIS — R73.03 PREDIABETES: ICD-10-CM

## 2024-08-06 DIAGNOSIS — E66.01 OBESITY, CLASS III, BMI 40-49.9 (MORBID OBESITY) (HCC): Primary | ICD-10-CM

## 2024-08-06 PROBLEM — M46.1 SACROILIITIS (HCC): Status: ACTIVE | Noted: 2024-08-06

## 2024-08-06 PROCEDURE — 3075F SYST BP GE 130 - 139MM HG: CPT

## 2024-08-06 PROCEDURE — 3078F DIAST BP <80 MM HG: CPT

## 2024-08-06 PROCEDURE — 1090F PRES/ABSN URINE INCON ASSESS: CPT

## 2024-08-06 PROCEDURE — 1123F ACP DISCUSS/DSCN MKR DOCD: CPT

## 2024-08-06 PROCEDURE — G8400 PT W/DXA NO RESULTS DOC: HCPCS

## 2024-08-06 PROCEDURE — 1036F TOBACCO NON-USER: CPT

## 2024-08-06 PROCEDURE — 99213 OFFICE O/P EST LOW 20 MIN: CPT

## 2024-08-06 PROCEDURE — G8417 CALC BMI ABV UP PARAM F/U: HCPCS

## 2024-08-06 PROCEDURE — G8427 DOCREV CUR MEDS BY ELIG CLIN: HCPCS

## 2024-08-06 PROCEDURE — 3017F COLORECTAL CA SCREEN DOC REV: CPT

## 2024-08-06 RX ORDER — TIRZEPATIDE 2.5 MG/.5ML
0.5 INJECTION, SOLUTION SUBCUTANEOUS
Qty: 2 ML | Refills: 1 | Status: SHIPPED | OUTPATIENT
Start: 2024-08-06

## 2024-08-06 RX ORDER — TIRZEPATIDE 2.5 MG/.5ML
2 INJECTION, SOLUTION SUBCUTANEOUS
Qty: 2 ML | Refills: 1 | Status: SHIPPED | OUTPATIENT
Start: 2024-08-06 | End: 2024-08-06

## 2024-08-06 NOTE — TELEPHONE ENCOUNTER
Refill Request       Last Seen: Last Seen Department: 8/6/2024  Last Seen by PCP: 8/6/2024    Last Written: 8/6/24 2mL with 1-Duplicate    Next Appointment:   Future Appointments   Date Time Provider Department Center   8/29/2024  1:00 PM Dixie Acosta, APRN - [facility]   9/27/2024  4:20 PM Cristóbal Eric, DO [facility]       Requested Prescriptions     Pending Prescriptions Disp Refills    ZEPBOUND 2.5 MG/0.5ML SOAJ [Pharmacy Med Name: ZEPBOUND 2.5MG/0.5ML    INJ] 4 mL 1     Sig: INJECT 2 ML SUBCUTANEOUSLY  ONCE A WEEK

## 2024-08-06 NOTE — PROGRESS NOTES
PROGRESS NOTE   [facility]                                  [address], [address], [place] [address]         Phone: [phone]    Date of Service:  8/6/2024     Patient ID: Penelope Barrett is a 68 y.o. female      Subjective:     CC:   Chief Complaint   Patient presents with    Follow-up     For weight          HPI  Patient is a 68-year-old female who presents to the office today for a weight loss check in     Patient has been on Wegovy since May 2024 however she has been unable to lose much weight while taking it.  She has actually gained 7 pounds since then.  She has been on the 0.5 mg dose last month.  She does go through compounding pharmacy through [facility] and has to pay around $200.  Notes she is starting to become unable to afford this medicine especially since it is not providing her any benefit.  She is open to trying another medication for weight loss.  Denies any side effects such as nausea or vomiting      ROS:    Review of Systems   All other systems reviewed and are negative.          Vitals:    08/06/24 1335   BP: 138/76   Site: Right Upper Arm   Position: Sitting   Cuff Size: Large Adult   Pulse: 85   Resp: 16   Temp: 97.3 °F (36.3 °C)   TempSrc: Oral   SpO2: 96%   Weight: 114.8 kg (253 lb)   Height: 1.626 m (5' 4\")       Allergies:  Prednisone, Lisinopril, Adhesive tape, Codeine, Crestor [rosuvastatin calcium], Food, Lipitor [atorvastatin calcium], Nitrofurantoin, Oxycodone, Penicillins, Sulfa antibiotics, Tramadol, and Clindamycin    Outpatient Medications Marked as Taking for the 8/6/24 encounter (Office Visit) with Cristóbal Eric, DO   Medication Sig Dispense Refill    Tirzepatide-Weight Management (ZEPBOUND) 2.5 MG/0.5ML SOAJ Inject 2 mLs into the skin every 7 days 2 mL 1    Semaglutide-Weight Management (WEGOVY) 0.5 MG/0.5ML SOAJ SC injection Inject 0.5 mg into the skin every 7 days 2 mL 1    Handicap

## 2024-08-27 ENCOUNTER — OFFICE VISIT (OUTPATIENT)
Dept: ENT CLINIC | Age: 69
End: 2024-08-27
Payer: MEDICARE

## 2024-08-27 VITALS
BODY MASS INDEX: 43.59 KG/M2 | HEIGHT: 63 IN | SYSTOLIC BLOOD PRESSURE: 161 MMHG | WEIGHT: 246 LBS | HEART RATE: 80 BPM | DIASTOLIC BLOOD PRESSURE: 108 MMHG

## 2024-08-27 DIAGNOSIS — K13.21 LEUKOPLAKIA OF ORAL CAVITY: Primary | ICD-10-CM

## 2024-08-27 PROCEDURE — 3077F SYST BP >= 140 MM HG: CPT | Performed by: OTOLARYNGOLOGY

## 2024-08-27 PROCEDURE — G8417 CALC BMI ABV UP PARAM F/U: HCPCS | Performed by: OTOLARYNGOLOGY

## 2024-08-27 PROCEDURE — G8427 DOCREV CUR MEDS BY ELIG CLIN: HCPCS | Performed by: OTOLARYNGOLOGY

## 2024-08-27 PROCEDURE — 3080F DIAST BP >= 90 MM HG: CPT | Performed by: OTOLARYNGOLOGY

## 2024-08-27 PROCEDURE — 3017F COLORECTAL CA SCREEN DOC REV: CPT | Performed by: OTOLARYNGOLOGY

## 2024-08-27 PROCEDURE — 1036F TOBACCO NON-USER: CPT | Performed by: OTOLARYNGOLOGY

## 2024-08-27 PROCEDURE — 1090F PRES/ABSN URINE INCON ASSESS: CPT | Performed by: OTOLARYNGOLOGY

## 2024-08-27 PROCEDURE — 1123F ACP DISCUSS/DSCN MKR DOCD: CPT | Performed by: OTOLARYNGOLOGY

## 2024-08-27 PROCEDURE — 99213 OFFICE O/P EST LOW 20 MIN: CPT | Performed by: OTOLARYNGOLOGY

## 2024-08-27 PROCEDURE — G8400 PT W/DXA NO RESULTS DOC: HCPCS | Performed by: OTOLARYNGOLOGY

## 2024-08-27 NOTE — PROGRESS NOTES
submandibular adenopathy.      Left side of head: No submental or submandibular adenopathy.      Cervical: No cervical adenopathy.      Right cervical: No superficial, deep or posterior cervical adenopathy.     Left cervical: No superficial, deep or posterior cervical adenopathy.   Skin:     General: Skin is warm and dry.   Neurological:      Mental Status: She is alert and oriented to person, place, and time.      Cranial Nerves: No cranial nerve deficit.      Coordination: Coordination normal.      Gait: Gait normal.   Psychiatric:         Thought Content: Thought content normal.           Assessment and Plan     1. Leukoplakia of oral cavity  Present for years, current smoker has never been biopsied recommend biopsy does not wish to do today as she is having a family reunion will return for biopsy        April Riddle DO  8/28/24      Portions of this note were dictated using Dragon. There may be linguistic errors secondary to the use of this program.

## 2024-08-28 ASSESSMENT — ENCOUNTER SYMPTOMS
COUGH: 0
FACIAL SWELLING: 0
APNEA: 0
VOICE CHANGE: 0
SHORTNESS OF BREATH: 0
TROUBLE SWALLOWING: 0
SINUS PRESSURE: 0
SORE THROAT: 0
EYE ITCHING: 0

## 2024-08-29 ENCOUNTER — OFFICE VISIT (OUTPATIENT)
Dept: CARDIOLOGY CLINIC | Age: 69
End: 2024-08-29
Payer: MEDICARE

## 2024-08-29 VITALS
DIASTOLIC BLOOD PRESSURE: 90 MMHG | WEIGHT: 250 LBS | HEIGHT: 63 IN | OXYGEN SATURATION: 96 % | SYSTOLIC BLOOD PRESSURE: 138 MMHG | BODY MASS INDEX: 44.3 KG/M2 | HEART RATE: 81 BPM

## 2024-08-29 DIAGNOSIS — E78.2 MIXED HYPERLIPIDEMIA: ICD-10-CM

## 2024-08-29 DIAGNOSIS — I10 ESSENTIAL (PRIMARY) HYPERTENSION: ICD-10-CM

## 2024-08-29 DIAGNOSIS — I25.42 CORONARY ARTERY DISSECTION: ICD-10-CM

## 2024-08-29 DIAGNOSIS — I25.118 CORONARY ARTERY DISEASE OF NATIVE ARTERY OF NATIVE HEART WITH STABLE ANGINA PECTORIS (HCC): Primary | ICD-10-CM

## 2024-08-29 PROCEDURE — 3075F SYST BP GE 130 - 139MM HG: CPT | Performed by: NURSE PRACTITIONER

## 2024-08-29 PROCEDURE — G8400 PT W/DXA NO RESULTS DOC: HCPCS | Performed by: NURSE PRACTITIONER

## 2024-08-29 PROCEDURE — G8427 DOCREV CUR MEDS BY ELIG CLIN: HCPCS | Performed by: NURSE PRACTITIONER

## 2024-08-29 PROCEDURE — 99214 OFFICE O/P EST MOD 30 MIN: CPT | Performed by: NURSE PRACTITIONER

## 2024-08-29 PROCEDURE — 1123F ACP DISCUSS/DSCN MKR DOCD: CPT | Performed by: NURSE PRACTITIONER

## 2024-08-29 PROCEDURE — 1036F TOBACCO NON-USER: CPT | Performed by: NURSE PRACTITIONER

## 2024-08-29 PROCEDURE — G8417 CALC BMI ABV UP PARAM F/U: HCPCS | Performed by: NURSE PRACTITIONER

## 2024-08-29 PROCEDURE — 3017F COLORECTAL CA SCREEN DOC REV: CPT | Performed by: NURSE PRACTITIONER

## 2024-08-29 PROCEDURE — 3080F DIAST BP >= 90 MM HG: CPT | Performed by: NURSE PRACTITIONER

## 2024-08-29 PROCEDURE — 1090F PRES/ABSN URINE INCON ASSESS: CPT | Performed by: NURSE PRACTITIONER

## 2024-08-29 RX ORDER — LOSARTAN POTASSIUM 50 MG/1
50 TABLET ORAL 2 TIMES DAILY
Qty: 180 TABLET | Refills: 3 | Status: SHIPPED | OUTPATIENT
Start: 2024-08-29

## 2024-08-29 RX ORDER — CHLORTHALIDONE 25 MG/1
25 TABLET ORAL DAILY
Qty: 90 TABLET | Refills: 3 | Status: SHIPPED | OUTPATIENT
Start: 2024-08-29

## 2024-08-29 NOTE — PROGRESS NOTES
[facility]   Cardiac Evaluation    Primary Care Doctor:  Cristóbal Eric DO    Chief Complaint   Patient presents with    Follow-up    Coronary Artery Disease    Hyperlipidemia        Assessment:    1. Coronary artery disease of native artery of native heart with stable angina pectoris (HCC)    2. Coronary artery dissection    3. Essential (primary) hypertension    4. Mixed hyperlipidemia        Plan:   Restart the chlorthalidone 25 mg once daily  Keep the carvedilol and losartan at same doses  Continue the aspirin, pravastatin and fish oil  Have fasting blood work in 2 weeks - [facility]  Follow up with me in 3 months     Vitals:    08/29/24 1258   BP: (!) 138/90   Pulse: 81   SpO2: 96%   Weight: 113.4 kg (250 lb)   Height: 1.6 m (5' 2.99\")       Primary Cardiologist: Dr. Wali Kebede     History of Present Illness:   I had the pleasure of seeing Joselyn AGUILAR Barrett (68 y.o.) in follow up for moderate CAD, hx coronary dissection 2009, hypertension, HLD, and prior smoker.  At last visit we continued her on pravastatin but added fish oil and co-Q10.  Due to weight gain and edema she was started on Lasix 20 mg daily for 3 to 5 days then to use as needed.  She underwent echocardiography in May per PCP for edema.  This revealed normal LVEF but with mild LVH and grade 1 diastolic dysfunction.    She was trialed on Wygovy for weight loss without success.  Then referred to weight management specialty and given Rx for Zepbound.     The Lasix did not make any difference in swelling or weight.  This caused increased urinary urgency for which she already receives botox.     She has lost 7 lbs since February but feels at a standstill.   Her hip is doing good but now the back pain is flaring up.  Now able to walk around the house without the cane but uses cane outside of house.  Denies any chest pain.    Joselyn Barrett describes symptoms including dyspnea, fatigue, edema but denies chest pain,

## 2024-08-29 NOTE — PATIENT INSTRUCTIONS
Restart the chlorthalidone 25 mg once daily  Keep the carvedilol and losartan at same doses  Continue the aspirin, pravastatin and fish oil  Have fasting blood work in 2 weeks - [facility]  Follow up with me in 3 months

## 2024-09-06 ENCOUNTER — PROCEDURE VISIT (OUTPATIENT)
Dept: ENT CLINIC | Age: 69
End: 2024-09-06
Payer: MEDICARE

## 2024-09-06 VITALS
SYSTOLIC BLOOD PRESSURE: 174 MMHG | BODY MASS INDEX: 43.41 KG/M2 | DIASTOLIC BLOOD PRESSURE: 116 MMHG | HEIGHT: 63 IN | WEIGHT: 245 LBS | HEART RATE: 90 BPM

## 2024-09-06 DIAGNOSIS — K13.21 LEUKOPLAKIA OF ORAL CAVITY: Primary | ICD-10-CM

## 2024-09-06 PROCEDURE — 41100 BIOPSY OF TONGUE: CPT | Performed by: OTOLARYNGOLOGY

## 2024-09-06 RX ORDER — LIDOCAINE HYDROCHLORIDE AND EPINEPHRINE 10; 10 MG/ML; UG/ML
2 INJECTION, SOLUTION INFILTRATION; PERINEURAL ONCE
Status: COMPLETED | OUTPATIENT
Start: 2024-09-06 | End: 2024-09-06

## 2024-09-06 RX ADMIN — LIDOCAINE HYDROCHLORIDE AND EPINEPHRINE 2 ML: 10; 10 INJECTION, SOLUTION INFILTRATION; PERINEURAL at 12:05

## 2024-09-26 ENCOUNTER — TELEPHONE (OUTPATIENT)
Dept: PRIMARY CARE CLINIC | Age: 69
End: 2024-09-26

## 2024-10-01 ENCOUNTER — TELEPHONE (OUTPATIENT)
Dept: BARIATRICS/WEIGHT MGMT | Age: 69
End: 2024-10-01

## 2024-10-03 ENCOUNTER — TELEPHONE (OUTPATIENT)
Dept: PRIMARY CARE CLINIC | Age: 69
End: 2024-10-03

## 2024-10-03 DIAGNOSIS — N64.52 DISCHARGE FROM LEFT NIPPLE: ICD-10-CM

## 2024-10-03 DIAGNOSIS — N64.4 PAIN OF LEFT BREAST: Primary | ICD-10-CM

## 2024-10-03 NOTE — TELEPHONE ENCOUNTER
Please let patient know order has been placed. Please fax to desired number. If she wants to come in to office for evaluation she can also set up an appointment as requested

## 2024-10-03 NOTE — TELEPHONE ENCOUNTER
Patient is calling and needing to get a referral to get a diagnostic mammogram at [facility]. She has thick whitish yellow and has an odder coming from her left nipple. Please fax the referral to [phone]. Please let the patient know when the referral is placed.

## 2024-10-03 NOTE — TELEPHONE ENCOUNTER
Order has been faxed and patient has been informed. Patient did not want to come in to be evaluated at this time.

## 2024-10-04 ENCOUNTER — OFFICE VISIT (OUTPATIENT)
Dept: ENT CLINIC | Age: 69
End: 2024-10-04
Payer: MEDICARE

## 2024-10-04 VITALS
HEART RATE: 73 BPM | BODY MASS INDEX: 43.59 KG/M2 | DIASTOLIC BLOOD PRESSURE: 91 MMHG | WEIGHT: 246 LBS | SYSTOLIC BLOOD PRESSURE: 161 MMHG | HEIGHT: 63 IN

## 2024-10-04 DIAGNOSIS — K13.21 LEUKOPLAKIA OF ORAL CAVITY: Primary | ICD-10-CM

## 2024-10-04 PROCEDURE — G8427 DOCREV CUR MEDS BY ELIG CLIN: HCPCS | Performed by: OTOLARYNGOLOGY

## 2024-10-04 PROCEDURE — 1123F ACP DISCUSS/DSCN MKR DOCD: CPT | Performed by: OTOLARYNGOLOGY

## 2024-10-04 PROCEDURE — G8484 FLU IMMUNIZE NO ADMIN: HCPCS | Performed by: OTOLARYNGOLOGY

## 2024-10-04 PROCEDURE — 1036F TOBACCO NON-USER: CPT | Performed by: OTOLARYNGOLOGY

## 2024-10-04 PROCEDURE — 1090F PRES/ABSN URINE INCON ASSESS: CPT | Performed by: OTOLARYNGOLOGY

## 2024-10-04 PROCEDURE — 3077F SYST BP >= 140 MM HG: CPT | Performed by: OTOLARYNGOLOGY

## 2024-10-04 PROCEDURE — G8417 CALC BMI ABV UP PARAM F/U: HCPCS | Performed by: OTOLARYNGOLOGY

## 2024-10-04 PROCEDURE — 3080F DIAST BP >= 90 MM HG: CPT | Performed by: OTOLARYNGOLOGY

## 2024-10-04 PROCEDURE — 3017F COLORECTAL CA SCREEN DOC REV: CPT | Performed by: OTOLARYNGOLOGY

## 2024-10-04 PROCEDURE — G8400 PT W/DXA NO RESULTS DOC: HCPCS | Performed by: OTOLARYNGOLOGY

## 2024-10-04 PROCEDURE — 99213 OFFICE O/P EST LOW 20 MIN: CPT | Performed by: OTOLARYNGOLOGY

## 2024-10-08 ASSESSMENT — ENCOUNTER SYMPTOMS
VOICE CHANGE: 0
COUGH: 0
FACIAL SWELLING: 0
SINUS PRESSURE: 0
SORE THROAT: 0
SHORTNESS OF BREATH: 0
APNEA: 0
EYE ITCHING: 0
TROUBLE SWALLOWING: 0

## 2024-10-08 NOTE — PROGRESS NOTES
[facility]  [address], [address]  [place] [address]  P: [phone]       Patient     Joselyn Barrett  1955    ChiefComplaint     Chief Complaint   Patient presents with    Follow-up     Right tongue biopsy 2 week follow up. Is feeling better & does not hurt.       History of Present Illness     Joselyn a 68-year-old female here today for follow-up regarding right oral tongue lesion.  States uncomplicated recovery from biopsy.  No questions or concerns.    Past Medical History     Past Medical History:   Diagnosis Date    Acute laryngitis     Acute laryngitis 01/29/2015    Acute lymphadenitis of face, head and neck     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    Arthritis     Calculus of kidney     Cancer (HCC)     skin    Cystocele, midline     Eustachian tube dysfunction, bilateral     Eustachian tube dysfunction, bilateral 06/15/2016    Gastro-esophageal reflux disease without esophagitis     Generalized anxiety disorder 08/25/2016    Hyperlipidemia     Hypertension     Liver cyst     Neuritis     Neuritis 06/05/2018    Nocturia     Old myocardial infarction     Osteoarthritis     Otalgia     Perennial allergic rhinitis     Pharyngeal inflammation     Primary osteoarthritis of right knee     Tinnitus of vascular origin 06/15/2016    Tinnitus, bilateral     Tobacco use     Urinary frequency     Urinary incontinence     Vaginal enterocele     Vaginal vault 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        Past Surgical History     Past Surgical History:   Procedure Laterality Date    APPENDECTOMY      CHOLECYSTECTOMY      ELBOW DEBRIDEMENT      FOOT SURGERY      bilateral    HYSTERECTOMY, VAGINAL      IR MIDLINE CATH  11/28/2022    IR MIDLINE CATH 11/28/2022 [facility] SPECIAL PROCEDURES    KNEE ARTHROSCOPY Right 4/24/2019    RIGHT KNEE ARTHROSCOPY,, SYNOVECTOMY CHONDROPLASTY,MEDIAL AND LATERAL MENESECTOMY, REMOVAL LOOSE BODIES performed by Rolan Long MD at [facility] OR

## 2024-10-18 ENCOUNTER — OFFICE VISIT (OUTPATIENT)
Dept: PRIMARY CARE CLINIC | Age: 69
End: 2024-10-18

## 2024-10-18 VITALS
HEART RATE: 92 BPM | WEIGHT: 249.8 LBS | BODY MASS INDEX: 44.25 KG/M2 | DIASTOLIC BLOOD PRESSURE: 86 MMHG | SYSTOLIC BLOOD PRESSURE: 138 MMHG | OXYGEN SATURATION: 95 %

## 2024-10-18 DIAGNOSIS — M25.642 FINGER STIFFNESS, LEFT: ICD-10-CM

## 2024-10-18 DIAGNOSIS — R22.2 ABDOMINAL WALL LUMP: ICD-10-CM

## 2024-10-18 DIAGNOSIS — E66.01 OBESITY, CLASS III, BMI 40-49.9 (MORBID OBESITY): Primary | ICD-10-CM

## 2024-10-18 NOTE — PROGRESS NOTES
PROGRESS NOTE   [facility]                                  [address], [address], [facility] [address]         Phone: [phone]    Date of Service:  10/18/2024     Patient ID: Penelope Barrett is a 68 y.o. female      Subjective:     CC:   Chief Complaint   Patient presents with    Follow-up     6 week f/u on Wegovy       HPI  Patient is a 68-year-old female who presents to the office today for follow-up on multiple complaints    Weight management  Patient was previously placed on Wegovy to help with weight loss however patient notes has not been working.  She did take for about a month and noted no change in weight.  The medication also costs too much money out of pocket for her to continue.  She has unfortunately Zepbound in the past as well with it being too pricey.  Patient does not have diabetes so would not qualify for Ozempic.  She notes she does do some walking but that is about it for exercise.  Has tried keto diet and weight watchers in the past.  No interest in any bariatric medicine.    Stiff hand  Patient notes having trouble opening left hand in mornings  Has been going on for 4-5 weeks, worse in the morning but still happens throughout the day. Sleeps with fist closed. Has to stretch fingers throughout the day to keep open  She attributes it to holding the cane with walking throughout the day.   No hx of arthritis in the hands  No hx of RA    Abdominal lump  Patient noticed small lump in abdomen over last few months.  Not painful, not aggravating.  She just noticed it as she can notice it through her shirt sometimes.  Does not increase in size with coughing or induce any pain.  No history of any hernia in the past.    ROS:    Constitutional:  Negative for activity or appetite change, fever or fatigue  HENT:  Negative for congestion, sinus pressure, or rhinorrhea  Eyes:  Negative for eye pain or visual changes  Resp:

## 2024-11-09 ENCOUNTER — APPOINTMENT (OUTPATIENT)
Dept: CT IMAGING | Age: 69
End: 2024-11-09
Payer: OTHER MISCELLANEOUS

## 2024-11-09 ENCOUNTER — HOSPITAL ENCOUNTER (EMERGENCY)
Age: 69
Discharge: HOME | End: 2024-11-09
Payer: OTHER MISCELLANEOUS

## 2024-11-09 ENCOUNTER — APPOINTMENT (OUTPATIENT)
Dept: GENERAL RADIOLOGY | Age: 69
End: 2024-11-09
Payer: OTHER MISCELLANEOUS

## 2024-11-09 VITALS
OXYGEN SATURATION: 96 % | SYSTOLIC BLOOD PRESSURE: 187 MMHG | HEART RATE: 75 BPM | RESPIRATION RATE: 16 BRPM | DIASTOLIC BLOOD PRESSURE: 106 MMHG | TEMPERATURE: 97.4 F
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
## 2024-11-09 DIAGNOSIS — K43.9 VENTRAL HERNIA WITHOUT OBSTRUCTION OR GANGRENE: ICD-10-CM

## 2024-11-09 DIAGNOSIS — S39.012A STRAIN OF LUMBAR REGION, INITIAL ENCOUNTER: ICD-10-CM

## 2024-11-09 DIAGNOSIS — N20.1 URETEROLITHIASIS: ICD-10-CM

## 2024-11-09 DIAGNOSIS — T07.XXXA MULTIPLE CONTUSIONS: ICD-10-CM

## 2024-11-09 DIAGNOSIS — S43.102A AC SEPARATION, LEFT, INITIAL ENCOUNTER: Primary | ICD-10-CM

## 2024-11-09 DIAGNOSIS — N20.0 NEPHROLITHIASIS: ICD-10-CM

## 2024-11-09 DIAGNOSIS — M25.551 RIGHT HIP PAIN: ICD-10-CM

## 2024-11-09 PROCEDURE — 99284 EMERGENCY DEPT VISIT MOD MDM: CPT

## 2024-11-09 PROCEDURE — 73030 X-RAY EXAM OF SHOULDER: CPT

## 2024-11-09 PROCEDURE — 6370000000 HC RX 637 (ALT 250 FOR IP): Performed by: PHYSICIAN ASSISTANT

## 2024-11-09 PROCEDURE — 72192 CT PELVIS W/O DYE: CPT

## 2024-11-09 PROCEDURE — 6370000000 HC RX 637 (ALT 250 FOR IP)

## 2024-11-09 PROCEDURE — 72131 CT LUMBAR SPINE W/O DYE: CPT

## 2024-11-09 RX ORDER — ORPHENADRINE CITRATE 100 MG/1
100 TABLET ORAL EVERY 12 HOURS PRN
Qty: 10 TABLET | Refills: 0 | Status: SHIPPED | OUTPATIENT
Start: 2024-11-09 | End: 2024-11-14

## 2024-11-09 RX ORDER — IBUPROFEN 600 MG/1
600 TABLET, FILM COATED ORAL ONCE
Status: COMPLETED | OUTPATIENT
Start: 2024-11-09 | End: 2024-11-09

## 2024-11-09 RX ORDER — ORPHENADRINE CITRATE 100 MG/1
100 TABLET ORAL 2 TIMES DAILY
Status: DISCONTINUED | OUTPATIENT
Start: 2024-11-09 | End: 2024-11-09

## 2024-11-09 RX ORDER — ORPHENADRINE CITRATE 100 MG/1
100 TABLET ORAL ONCE
Status: COMPLETED | OUTPATIENT
Start: 2024-11-09 | End: 2024-11-09

## 2024-11-09 RX ORDER — ACETAMINOPHEN 500 MG
1000 TABLET ORAL ONCE
Status: COMPLETED | OUTPATIENT
Start: 2024-11-09 | End: 2024-11-09

## 2024-11-09 RX ORDER — ORPHENADRINE CITRATE 100 MG/1
TABLET ORAL
Status: COMPLETED
Start: 2024-11-09 | End: 2024-11-09

## 2024-11-09 RX ADMIN — ACETAMINOPHEN 1000 MG: 500 TABLET ORAL at 16:21

## 2024-11-09 RX ADMIN — ORPHENADRINE CITRATE 100 MG: 100 TABLET ORAL at 16:23

## 2024-11-09 RX ADMIN — IBUPROFEN 600 MG: 600 TABLET, FILM COATED ORAL at 16:21

## 2024-11-09 RX ADMIN — ORPHENADRINE CITRATE 100 MG: 100 TABLET, EXTENDED RELEASE ORAL at 16:23

## 2024-11-09 ASSESSMENT — PAIN SCALES - GENERAL
PAINLEVEL_OUTOF10: 6
PAINLEVEL_OUTOF10: 7

## 2024-11-09 ASSESSMENT — PAIN - FUNCTIONAL ASSESSMENT: PAIN_FUNCTIONAL_ASSESSMENT: 0-10
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
## 2024-11-09 NOTE — DISCHARGE INSTRUCTIONS
X-ray showed evidence of a left AC separation.  Mild tenderness noted on exam.  Discussed sling but is the arm that you utilize your cane with ambulating assistance.  In emergency room I gave you Tylenol 1000 mg, Motrin 600 mg and Norflex 100 mg.  At home I do recommend that you take Tylenol 1000 mg and ibuprofen 600 mg 3 times a day for the next 4 to 5 days.  I sent a prescription for Norflex-muscle relaxer to your local pharmacy.  Ice to the shoulder.  Contact orthopedic Monday for an appointment next week.  CT of the pelvis and hips were negative.  CT lumbar spine negative for fracture or dislocation.  Incidental finding of a 4 mm distal left ureteral stone.  In addition bilateral kidney stones are noted.  Also noting a ventral hernia.

## 2024-11-10 NOTE — ED NOTES
Patient has remained hypertensive in ED.  States she is on bp rx's but missed the am dose.  Will take it as soon as she gets home.  Counseled on importance of follow up to have bp checked daily and notify her pcp for ongoing hypertension. ANIA Chance in agreement with this plan.

## 2024-12-04 ENCOUNTER — OFFICE VISIT (OUTPATIENT)
Dept: CARDIOLOGY CLINIC | Age: 69
End: 2024-12-04
Payer: MEDICARE

## 2024-12-04 VITALS
WEIGHT: 244.5 LBS | SYSTOLIC BLOOD PRESSURE: 134 MMHG | OXYGEN SATURATION: 94 % | DIASTOLIC BLOOD PRESSURE: 82 MMHG | HEIGHT: 63 IN | HEART RATE: 78 BPM | BODY MASS INDEX: 43.32 KG/M2

## 2024-12-04 DIAGNOSIS — E78.2 MIXED HYPERLIPIDEMIA: ICD-10-CM

## 2024-12-04 DIAGNOSIS — I25.42 CORONARY ARTERY DISSECTION: ICD-10-CM

## 2024-12-04 DIAGNOSIS — I10 ESSENTIAL (PRIMARY) HYPERTENSION: ICD-10-CM

## 2024-12-04 DIAGNOSIS — I25.118 CORONARY ARTERY DISEASE OF NATIVE ARTERY OF NATIVE HEART WITH STABLE ANGINA PECTORIS (HCC): Primary | ICD-10-CM

## 2024-12-04 PROCEDURE — 99214 OFFICE O/P EST MOD 30 MIN: CPT | Performed by: NURSE PRACTITIONER

## 2024-12-04 PROCEDURE — 3075F SYST BP GE 130 - 139MM HG: CPT | Performed by: NURSE PRACTITIONER

## 2024-12-04 PROCEDURE — G8417 CALC BMI ABV UP PARAM F/U: HCPCS | Performed by: NURSE PRACTITIONER

## 2024-12-04 PROCEDURE — 1159F MED LIST DOCD IN RCRD: CPT | Performed by: NURSE PRACTITIONER

## 2024-12-04 PROCEDURE — 3017F COLORECTAL CA SCREEN DOC REV: CPT | Performed by: NURSE PRACTITIONER

## 2024-12-04 PROCEDURE — G8400 PT W/DXA NO RESULTS DOC: HCPCS | Performed by: NURSE PRACTITIONER

## 2024-12-04 PROCEDURE — G8484 FLU IMMUNIZE NO ADMIN: HCPCS | Performed by: NURSE PRACTITIONER

## 2024-12-04 PROCEDURE — 1160F RVW MEDS BY RX/DR IN RCRD: CPT | Performed by: NURSE PRACTITIONER

## 2024-12-04 PROCEDURE — 3079F DIAST BP 80-89 MM HG: CPT | Performed by: NURSE PRACTITIONER

## 2024-12-04 PROCEDURE — G8427 DOCREV CUR MEDS BY ELIG CLIN: HCPCS | Performed by: NURSE PRACTITIONER

## 2024-12-04 PROCEDURE — 1123F ACP DISCUSS/DSCN MKR DOCD: CPT | Performed by: NURSE PRACTITIONER

## 2024-12-04 PROCEDURE — 1090F PRES/ABSN URINE INCON ASSESS: CPT | Performed by: NURSE PRACTITIONER

## 2024-12-04 PROCEDURE — 1036F TOBACCO NON-USER: CPT | Performed by: NURSE PRACTITIONER

## 2024-12-04 RX ORDER — CHLORTHALIDONE 25 MG/1
12.5 TABLET ORAL DAILY
Qty: 90 TABLET | Refills: 3
Start: 2024-12-04

## 2024-12-04 RX ORDER — ORPHENADRINE CITRATE 100 MG/1
100 TABLET ORAL 2 TIMES DAILY PRN
COMMUNITY
Start: 2024-11-09

## 2024-12-04 NOTE — PATIENT INSTRUCTIONS
Change the chlorthalidone 25 mg to half tablet once daily  Try taking the pravastatin in applesauce  No change in other heart/ BP medicines  Use the furosemide (Lasix) sparingly - only if increase in swelling  Follow up with me in 6 months   Have the fasting blood work at earliest convenience
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
## 2025-01-14 RX ORDER — CIPROFLOXACIN 500 MG/1
500 TABLET, FILM COATED ORAL 2 TIMES DAILY
Qty: 14 TABLET | Refills: 0 | Status: SHIPPED | OUTPATIENT
Start: 2025-01-14 | End: 2025-01-21

## 2025-01-14 NOTE — PROGRESS NOTES
Pre-medications for Botox    Allergies, previous cultures, and medications reviewed. Patient given Cipro, no valium offered/pt signing consent prior to procedure.    Patient is aware that she will need to start Antibiotic 3 days prior to procedure. Confirmed patient pharmacy, med sent.     Patient verbalizes understanding of all of the above, and has no further questions or concerns at this time. Encouraged to call back the office should any questions/concerns arise. 1/14/2025 at 11:47 AM.

## 2025-01-21 SDOH — HEALTH STABILITY: PHYSICAL HEALTH: ON AVERAGE, HOW MANY DAYS PER WEEK DO YOU ENGAGE IN MODERATE TO STRENUOUS EXERCISE (LIKE A BRISK WALK)?: 0 DAYS

## 2025-01-21 SDOH — ECONOMIC STABILITY: INCOME INSECURITY: IN THE LAST 12 MONTHS, WAS THERE A TIME WHEN YOU WERE NOT ABLE TO PAY THE MORTGAGE OR RENT ON TIME?: NO

## 2025-01-21 SDOH — ECONOMIC STABILITY: FOOD INSECURITY: WITHIN THE PAST 12 MONTHS, YOU WORRIED THAT YOUR FOOD WOULD RUN OUT BEFORE YOU GOT MONEY TO BUY MORE.: NEVER TRUE

## 2025-01-21 SDOH — ECONOMIC STABILITY: FOOD INSECURITY: WITHIN THE PAST 12 MONTHS, THE FOOD YOU BOUGHT JUST DIDN'T LAST AND YOU DIDN'T HAVE MONEY TO GET MORE.: NEVER TRUE

## 2025-01-21 ASSESSMENT — LIFESTYLE VARIABLES
HOW OFTEN DO YOU HAVE A DRINK CONTAINING ALCOHOL: MONTHLY OR LESS
HOW MANY STANDARD DRINKS CONTAINING ALCOHOL DO YOU HAVE ON A TYPICAL DAY: 0
HOW OFTEN DO YOU HAVE A DRINK CONTAINING ALCOHOL: 2
HOW MANY STANDARD DRINKS CONTAINING ALCOHOL DO YOU HAVE ON A TYPICAL DAY: PATIENT DOES NOT DRINK
HOW OFTEN DO YOU HAVE SIX OR MORE DRINKS ON ONE OCCASION: 1

## 2025-01-21 ASSESSMENT — PATIENT HEALTH QUESTIONNAIRE - PHQ9
1. LITTLE INTEREST OR PLEASURE IN DOING THINGS: NOT AT ALL
2. FEELING DOWN, DEPRESSED OR HOPELESS: NOT AT ALL
SUM OF ALL RESPONSES TO PHQ QUESTIONS 1-9: 0
SUM OF ALL RESPONSES TO PHQ QUESTIONS 1-9: 0
SUM OF ALL RESPONSES TO PHQ9 QUESTIONS 1 & 2: 0
SUM OF ALL RESPONSES TO PHQ QUESTIONS 1-9: 0
SUM OF ALL RESPONSES TO PHQ QUESTIONS 1-9: 0

## 2025-01-22 ENCOUNTER — PROCEDURE VISIT (OUTPATIENT)
Dept: UROGYNECOLOGY | Age: 70
End: 2025-01-22
Payer: MEDICARE

## 2025-01-22 VITALS
RESPIRATION RATE: 18 BRPM | HEART RATE: 76 BPM | SYSTOLIC BLOOD PRESSURE: 153 MMHG | TEMPERATURE: 98 F | OXYGEN SATURATION: 98 % | DIASTOLIC BLOOD PRESSURE: 95 MMHG

## 2025-01-22 DIAGNOSIS — N32.81 OVERACTIVE BLADDER: ICD-10-CM

## 2025-01-22 DIAGNOSIS — R35.0 URINARY FREQUENCY: Primary | ICD-10-CM

## 2025-01-22 DIAGNOSIS — R39.15 URINARY URGENCY: ICD-10-CM

## 2025-01-22 LAB
BILIRUBIN, POC: NORMAL
BLOOD URINE, POC: NORMAL
CLARITY, POC: CLEAR
COLOR, POC: YELLOW
GLUCOSE URINE, POC: NORMAL MG/DL
KETONES, POC: NORMAL MG/DL
LEUKOCYTE EST, POC: NORMAL
NITRITE, POC: NORMAL
PH, POC: 6.5
PROTEIN, POC: NORMAL MG/DL
SPECIFIC GRAVITY, POC: 1.01
UROBILINOGEN, POC: NORMAL MG/DL

## 2025-01-22 PROCEDURE — 81002 URINALYSIS NONAUTO W/O SCOPE: CPT | Performed by: OBSTETRICS & GYNECOLOGY

## 2025-01-22 PROCEDURE — 52287 CYSTOSCOPY CHEMODENERVATION: CPT | Performed by: OBSTETRICS & GYNECOLOGY

## 2025-01-22 RX ORDER — SODIUM CHLORIDE 0.9 % (FLUSH) 0.9 %
10 SYRINGE (ML) INJECTION ONCE
Status: COMPLETED | OUTPATIENT
Start: 2025-01-22 | End: 2025-01-22

## 2025-01-22 RX ORDER — LIDOCAINE HYDROCHLORIDE 10 MG/ML
50 INJECTION, SOLUTION INFILTRATION; PERINEURAL ONCE
Status: COMPLETED | OUTPATIENT
Start: 2025-01-22 | End: 2025-01-22

## 2025-01-22 RX ORDER — WATER 10 ML/10ML
1000 INJECTION INTRAMUSCULAR; INTRAVENOUS; SUBCUTANEOUS ONCE
Status: SHIPPED | OUTPATIENT
Start: 2025-01-22

## 2025-01-22 RX ORDER — LIDOCAINE HYDROCHLORIDE 20 MG/ML
JELLY TOPICAL ONCE
Status: COMPLETED | OUTPATIENT
Start: 2025-01-22 | End: 2025-01-22

## 2025-01-22 RX ADMIN — LIDOCAINE HYDROCHLORIDE 50 ML: 10 INJECTION, SOLUTION INFILTRATION; PERINEURAL at 14:27

## 2025-01-22 RX ADMIN — Medication 10 ML: at 14:32

## 2025-01-22 RX ADMIN — Medication 1 ML: at 14:34

## 2025-01-22 RX ADMIN — LIDOCAINE HYDROCHLORIDE: 20 JELLY TOPICAL at 14:30

## 2025-01-22 RX ADMIN — WATER 200 ML: 10 INJECTION INTRAMUSCULAR; INTRAVENOUS; SUBCUTANEOUS at 15:10

## 2025-01-22 NOTE — PROGRESS NOTES
ache      Nitrofurantoin Itching    Oxycodone     Penicillins Swelling     \"Throat closes\"    Sulfa Antibiotics Nausea Only     heartburn    Tramadol Nausea Only    Clindamycin Other (See Comments)     chest pain/indigestion     Social History:   Social History     Socioeconomic History    Marital status:      Spouse name: Not on file    Number of children: Not on file    Years of education: Not on file    Highest education level: Not on file   Occupational History    Not on file   Tobacco Use    Smoking status: Former     Current packs/day: 0.00     Average packs/day: 0.3 packs/day for 40.0 years (10.0 ttl pk-yrs)     Types: Cigarettes     Start date: 1983     Quit date: 2023     Years since quittin.5    Smokeless tobacco: Never    Tobacco comments:     quit 2019   Vaping Use    Vaping status: Never Used   Substance and Sexual Activity    Alcohol use: Yes     Comment: occ    Drug use: No    Sexual activity: Not Currently   Other Topics Concern    Not on file   Social History Narrative    Not on file     Social Determinants of Health     Financial Resource Strain: Low Risk  (2024)    Overall Financial Resource Strain (CARDIA)     Difficulty of Paying Living Expenses: Not hard at all   Food Insecurity: Not on file (2025)   Transportation Needs: Unknown (2024)    PRAPARE - Transportation     Lack of Transportation (Medical): Not on file     Lack of Transportation (Non-Medical): No   Physical Activity: Unknown (2025)    Exercise Vital Sign     Days of Exercise per Week: 0 days     Minutes of Exercise per Session: Not on file   Stress: Not on file   Social Connections: Not on file   Intimate Partner Violence: Unknown (2024)    Received from [facility], [facility]    Interpersonal Safety     Feel physically or emotionally unsafe where currently live: Not on file     Harm by anyone: Not on file     Emotionally

## 2025-01-23 ENCOUNTER — OFFICE VISIT (OUTPATIENT)
Dept: PRIMARY CARE CLINIC | Age: 70
End: 2025-01-23

## 2025-01-23 VITALS
BODY MASS INDEX: 42.03 KG/M2 | HEART RATE: 82 BPM | DIASTOLIC BLOOD PRESSURE: 70 MMHG | HEIGHT: 63 IN | WEIGHT: 237.2 LBS | OXYGEN SATURATION: 98 % | SYSTOLIC BLOOD PRESSURE: 130 MMHG

## 2025-01-23 DIAGNOSIS — E66.01 MORBID (SEVERE) OBESITY DUE TO EXCESS CALORIES: ICD-10-CM

## 2025-01-23 DIAGNOSIS — M54.16 LUMBAR RADICULOPATHY: ICD-10-CM

## 2025-01-23 DIAGNOSIS — Z00.00 MEDICARE ANNUAL WELLNESS VISIT, SUBSEQUENT: Primary | ICD-10-CM

## 2025-01-23 DIAGNOSIS — I10 ESSENTIAL (PRIMARY) HYPERTENSION: ICD-10-CM

## 2025-01-23 RX ORDER — LOSARTAN POTASSIUM 50 MG/1
50 TABLET ORAL 2 TIMES DAILY
Qty: 180 TABLET | Refills: 3 | Status: SHIPPED | OUTPATIENT
Start: 2025-01-23

## 2025-01-23 RX ORDER — LOSARTAN POTASSIUM 50 MG/1
50 TABLET ORAL 2 TIMES DAILY
Qty: 180 TABLET | Refills: 3 | Status: CANCELLED | OUTPATIENT
Start: 2025-01-23

## 2025-01-23 ASSESSMENT — LIFESTYLE VARIABLES
HOW OFTEN DO YOU HAVE A DRINK CONTAINING ALCOHOL: MONTHLY OR LESS
HOW MANY STANDARD DRINKS CONTAINING ALCOHOL DO YOU HAVE ON A TYPICAL DAY: 1 OR 2

## 2025-01-23 ASSESSMENT — PATIENT HEALTH QUESTIONNAIRE - PHQ9
2. FEELING DOWN, DEPRESSED OR HOPELESS: SEVERAL DAYS
1. LITTLE INTEREST OR PLEASURE IN DOING THINGS: NOT AT ALL
SUM OF ALL RESPONSES TO PHQ QUESTIONS 1-9: 1
SUM OF ALL RESPONSES TO PHQ9 QUESTIONS 1 & 2: 1
SUM OF ALL RESPONSES TO PHQ QUESTIONS 1-9: 1
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
## 2025-01-23 NOTE — TELEPHONE ENCOUNTER
Refill Request     Last Seen: 1/23/2025    Last Written: 08/29/2024    Next Appointment:   Future Appointments   Date Time Provider Department Center   2/17/2025 11:00 AM Twila Dash APRN - CNP [facility]   6/4/2025  1:00 PM Dixie Acosta APRN - CNP [facility]             Requested Prescriptions     Pending Prescriptions Disp Refills    losartan (COZAAR) 50 MG tablet 180 tablet 3     Sig: Take 1 tablet by mouth in the morning and at bedtime       
Yes - the patient is able to be screened

## 2025-02-26 ENCOUNTER — OFFICE VISIT (OUTPATIENT)
Dept: UROGYNECOLOGY | Age: 70
End: 2025-02-26
Payer: MEDICARE

## 2025-02-26 VITALS
SYSTOLIC BLOOD PRESSURE: 145 MMHG | DIASTOLIC BLOOD PRESSURE: 85 MMHG | HEART RATE: 67 BPM | RESPIRATION RATE: 16 BRPM | TEMPERATURE: 97.6 F | OXYGEN SATURATION: 100 %

## 2025-02-26 DIAGNOSIS — R39.15 URINARY URGENCY: ICD-10-CM

## 2025-02-26 DIAGNOSIS — N32.81 OVERACTIVE BLADDER: ICD-10-CM

## 2025-02-26 DIAGNOSIS — L29.2 VULVAR ITCHING: ICD-10-CM

## 2025-02-26 DIAGNOSIS — R35.0 URINARY FREQUENCY: Primary | ICD-10-CM

## 2025-02-26 PROCEDURE — 3079F DIAST BP 80-89 MM HG: CPT | Performed by: NURSE PRACTITIONER

## 2025-02-26 PROCEDURE — 81002 URINALYSIS NONAUTO W/O SCOPE: CPT | Performed by: NURSE PRACTITIONER

## 2025-02-26 PROCEDURE — 1160F RVW MEDS BY RX/DR IN RCRD: CPT | Performed by: NURSE PRACTITIONER

## 2025-02-26 PROCEDURE — 1090F PRES/ABSN URINE INCON ASSESS: CPT | Performed by: NURSE PRACTITIONER

## 2025-02-26 PROCEDURE — G8400 PT W/DXA NO RESULTS DOC: HCPCS | Performed by: NURSE PRACTITIONER

## 2025-02-26 PROCEDURE — 3077F SYST BP >= 140 MM HG: CPT | Performed by: NURSE PRACTITIONER

## 2025-02-26 PROCEDURE — 51701 INSERT BLADDER CATHETER: CPT | Performed by: NURSE PRACTITIONER

## 2025-02-26 PROCEDURE — G8427 DOCREV CUR MEDS BY ELIG CLIN: HCPCS | Performed by: NURSE PRACTITIONER

## 2025-02-26 PROCEDURE — 99213 OFFICE O/P EST LOW 20 MIN: CPT | Performed by: NURSE PRACTITIONER

## 2025-02-26 PROCEDURE — G8417 CALC BMI ABV UP PARAM F/U: HCPCS | Performed by: NURSE PRACTITIONER

## 2025-02-26 PROCEDURE — 3017F COLORECTAL CA SCREEN DOC REV: CPT | Performed by: NURSE PRACTITIONER

## 2025-02-26 PROCEDURE — 1159F MED LIST DOCD IN RCRD: CPT | Performed by: NURSE PRACTITIONER

## 2025-02-26 PROCEDURE — 1036F TOBACCO NON-USER: CPT | Performed by: NURSE PRACTITIONER

## 2025-02-26 PROCEDURE — 1123F ACP DISCUSS/DSCN MKR DOCD: CPT | Performed by: NURSE PRACTITIONER

## 2025-02-26 RX ORDER — CLOTRIMAZOLE AND BETAMETHASONE DIPROPIONATE 10; .64 MG/G; MG/G
CREAM TOPICAL
Qty: 45 G | Refills: 1 | Status: SHIPPED | OUTPATIENT
Start: 2025-02-26

## 2025-02-26 NOTE — PROGRESS NOTES
2025       HPI:     Name: Joselyn Barrett  YOB: 1955    CC: Patient is a 69 y.o. presenting for evaluation of PVR post botox on 2025 100 units.       HPI: How long have you had this problem?  years  Please rate the severity of your problem: moderate  Anything make it better?   Doing well with botox    C/O external vaginal/vulvar itching.  Present for about a year, getting no relief.  Denies vaginal discharge or internal itching      Ob/Gyn History:    OB History    Para Term  AB Living   1 1 1         SAB IAB Ectopic Molar Multiple Live Births                    # Outcome Date GA Lbr Jorge L/2nd Weight Sex Type Anes PTL Lv   1 Term              Past Medical History:   Past Medical History:   Diagnosis Date    Acute laryngitis     Acute laryngitis 2015    Acute lymphadenitis of face, head and neck     Artery dissection     triple dissection obtuse marginal    Arthritis     Calculus of kidney     Cancer (HCC)     skin    Cystocele, midline     Eustachian tube dysfunction, bilateral     Eustachian tube dysfunction, bilateral 06/15/2016    Gastro-esophageal reflux disease without esophagitis     Generalized anxiety disorder 2016    Hyperlipidemia     Hypertension     Liver cyst     Neuritis     Neuritis 2018    Nocturia     Old myocardial infarction     Osteoarthritis     Otalgia     Perennial allergic rhinitis     Pharyngeal inflammation     Primary osteoarthritis of right knee     Tinnitus of vascular origin 06/15/2016    Tinnitus, bilateral     Tobacco use     Urinary frequency     Urinary incontinence     Vaginal enterocele     Vaginal vault 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      Past Surgical History:   Past Surgical History:   Procedure Laterality Date    APPENDECTOMY      CHOLECYSTECTOMY      ELBOW DEBRIDEMENT      FOOT SURGERY      bilateral    HYSTERECTOMY, VAGINAL      IR MIDLINE CATH  2022    IR MIDLINE CATH 2022 [facility] SPECIAL PROCEDURES    KNEE

## 2025-04-12 DIAGNOSIS — I25.118 CORONARY ARTERY DISEASE OF NATIVE ARTERY OF NATIVE HEART WITH STABLE ANGINA PECTORIS: ICD-10-CM

## 2025-04-12 DIAGNOSIS — Z79.899 MEDICATION MANAGEMENT: Primary | ICD-10-CM

## 2025-04-14 RX ORDER — PRAVASTATIN SODIUM 40 MG
40 TABLET ORAL DAILY
Qty: 30 TABLET | Refills: 0 | Status: SHIPPED | OUTPATIENT
Start: 2025-04-14

## 2025-04-14 NOTE — TELEPHONE ENCOUNTER
Last Office Visit: 12/4/2024 Provider: MAX  **Is provider OOT? No    Next Office Visit: 6/4/2025 Provider: MAX    LAST LABS:   CMP:   Lab Results   Component Value Date     12/01/2023    K 5.1 12/01/2023     12/01/2023    CO2 27 12/01/2023    BUN 22 (H) 12/01/2023    CREATININE 1.0 12/01/2023    GLUCOSE 89 12/01/2023    CALCIUM 10.2 12/01/2023    BILITOT 0.3 08/18/2023    ALKPHOS 66 08/18/2023    AST 16 08/18/2023    ALT 20 08/18/2023    LABGLOM >60 12/01/2023    GFRAA >60 09/14/2022    AGRATIO 1.3 08/18/2023          Lipid:   Lab Results   Component Value Date    CHOL 151 05/17/2023    TRIG 246 (H) 05/17/2023    HDL 49 05/17/2023    LDL 53 05/17/2023    VLDL 49 05/17/2023     Lab orders needed? Yes, lab orders placed  Called and spoke to pt, relayed to complete labs. Pt v/u

## 2025-04-28 ENCOUNTER — HOSPITAL ENCOUNTER (OUTPATIENT)
Age: 70
Discharge: HOME | End: 2025-04-28
Payer: MEDICARE

## 2025-04-28 ENCOUNTER — OFFICE VISIT (OUTPATIENT)
Dept: PRIMARY CARE CLINIC | Age: 70
End: 2025-04-28

## 2025-04-28 VITALS
HEART RATE: 87 BPM | SYSTOLIC BLOOD PRESSURE: 142 MMHG | WEIGHT: 233.2 LBS | DIASTOLIC BLOOD PRESSURE: 86 MMHG | OXYGEN SATURATION: 98 % | BODY MASS INDEX: 41.32 KG/M2

## 2025-04-28 DIAGNOSIS — I10 PRIMARY HYPERTENSION: ICD-10-CM

## 2025-04-28 DIAGNOSIS — I10 PRIMARY HYPERTENSION: Primary | ICD-10-CM

## 2025-04-28 DIAGNOSIS — Z79.899 MEDICATION MANAGEMENT: ICD-10-CM

## 2025-04-28 DIAGNOSIS — R73.01 IMPAIRED FASTING GLUCOSE: ICD-10-CM

## 2025-04-28 DIAGNOSIS — I25.118 CORONARY ARTERY DISEASE OF NATIVE ARTERY OF NATIVE HEART WITH STABLE ANGINA PECTORIS: ICD-10-CM

## 2025-04-28 LAB
ALBUMIN SERPL-MCNC: 4 G/DL (ref 3.4–5)
ALBUMIN/GLOB SERPL: 1.1 {RATIO} (ref 1.1–2.2)
ALP SERPL-CCNC: 73 U/L (ref 40–129)
ALT SERPL-CCNC: ABNORMAL U/L (ref 10–40)
ANION GAP SERPL CALCULATED.3IONS-SCNC: 10 MMOL/L (ref 3–16)
AST SERPL-CCNC: 50 U/L (ref 15–37)
BILIRUB SERPL-MCNC: 0.4 MG/DL (ref 0–1)
BUN SERPL-MCNC: 28 MG/DL (ref 7–20)
CALCIUM SERPL-MCNC: 9.8 MG/DL (ref 8.3–10.6)
CHLORIDE SERPL-SCNC: 104 MMOL/L (ref 99–110)
CHOLEST SERPL-MCNC: 274 MG/DL (ref 0–199)
CO2 SERPL-SCNC: 24 MMOL/L (ref 21–32)
CREAT SERPL-MCNC: 1.2 MG/DL (ref 0.6–1.2)
EST. AVERAGE GLUCOSE BLD GHB EST-MCNC: 114 MG/DL
GFR SERPLBLD CREATININE-BSD FMLA CKD-EPI: 48 ML/MIN/{1.73_M2}
GLUCOSE SERPL-MCNC: 89 MG/DL (ref 70–99)
HBA1C MFR BLD: 5.6 %
HDLC SERPL-MCNC: 53 MG/DL (ref 40–60)
LDLC SERPL CALC-MCNC: 174 MG/DL
POTASSIUM SERPL-SCNC: ABNORMAL MMOL/L (ref 3.5–5.1)
PROT SERPL-MCNC: 7.5 G/DL (ref 6.4–8.2)
REASON FOR REJECTION: NORMAL
REJECTED TEST: NORMAL
SODIUM SERPL-SCNC: 138 MMOL/L (ref 136–145)
TRIGL SERPL-MCNC: 236 MG/DL (ref 0–150)
VLDLC SERPL CALC-MCNC: 47 MG/DL

## 2025-04-28 PROCEDURE — 80053 COMPREHEN METABOLIC PANEL: CPT

## 2025-04-28 PROCEDURE — 83036 HEMOGLOBIN GLYCOSYLATED A1C: CPT

## 2025-04-28 PROCEDURE — 80061 LIPID PANEL: CPT

## 2025-04-28 RX ORDER — LOSARTAN POTASSIUM 100 MG/1
100 TABLET ORAL DAILY
Qty: 90 TABLET | Refills: 0 | Status: SHIPPED | OUTPATIENT
Start: 2025-05-28

## 2025-04-28 NOTE — PROGRESS NOTES
Handicap Placard MISC by Does not apply route 1 each 0    carvedilol (COREG) 12.5 MG tablet Take 1 tablet by mouth 2 times daily 180 tablet 3    diclofenac (VOLTAREN) 75 MG EC tablet Take 1 tablet by mouth 2 times daily (Patient taking differently: Take 1 tablet by mouth as needed) 180 tablet 3    MELOXICAM PO Take by mouth as needed      Omega-3 Fatty Acids (FISH OIL) 1000 MG capsule Take 3 capsules by mouth daily 270 capsule 1    Nutritional Supplements (VITAMIN D BOOSTER PO) Take by mouth      ascorbic acid (VITAMIN C) 100 MG tablet Take 1 tablet by mouth      aspirin 81 MG tablet Take 1 tablet by mouth daily           Objective:   Physical Exam  Constitutional:       Appearance: Normal appearance.   HENT:      Head: Normocephalic and atraumatic.      Nose: Nose normal.   Eyes:      Conjunctiva/sclera: Conjunctivae normal.   Cardiovascular:      Rate and Rhythm: Normal rate and regular rhythm.      Pulses: Normal pulses.      Heart sounds: Normal heart sounds.   Pulmonary:      Effort: Pulmonary effort is normal.      Breath sounds: Normal breath sounds.   Musculoskeletal:         General: Normal range of motion.   Skin:     General: Skin is warm and dry.   Neurological:      General: No focal deficit present.      Mental Status: She is alert and oriented to person, place, and time.   Psychiatric:         Mood and Affect: Mood normal.         Behavior: Behavior normal.         Assessment / Plan:     1. Primary hypertension  -Not at goal  -Will increase losartan to 100 mg daily  -Follow-up in office in 2 weeks for BP check  -Can increase chlorthalidone from half tablet to whole tablet if blood pressure still remains low  - Hemoglobin A1C; Future  - LIPID PANEL; Future  - Comprehensive Metabolic Panel; Future    2. Coronary artery disease of native artery of native heart with stable angina pectoris  -Stable  -Following with cardiology, continue on half tablet chlorthalidone 25 mg, Coreg 12.5 mg, aspirin 81 mg

## 2025-04-29 ENCOUNTER — RESULTS FOLLOW-UP (OUTPATIENT)
Dept: PRIMARY CARE CLINIC | Age: 70
End: 2025-04-29

## 2025-05-15 ENCOUNTER — OFFICE VISIT (OUTPATIENT)
Dept: PRIMARY CARE CLINIC | Age: 70
End: 2025-05-15

## 2025-05-15 VITALS
OXYGEN SATURATION: 97 % | DIASTOLIC BLOOD PRESSURE: 90 MMHG | HEART RATE: 64 BPM | WEIGHT: 234 LBS | SYSTOLIC BLOOD PRESSURE: 140 MMHG | BODY MASS INDEX: 41.46 KG/M2

## 2025-05-15 DIAGNOSIS — E78.2 MIXED HYPERLIPIDEMIA: ICD-10-CM

## 2025-05-15 DIAGNOSIS — R79.89 LFT ELEVATION: ICD-10-CM

## 2025-05-15 DIAGNOSIS — G72.0 STATIN MYOPATHY: ICD-10-CM

## 2025-05-15 DIAGNOSIS — I10 ESSENTIAL (PRIMARY) HYPERTENSION: Primary | ICD-10-CM

## 2025-05-15 DIAGNOSIS — T46.6X5A STATIN MYOPATHY: ICD-10-CM
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
## 2025-05-15 RX ORDER — PRAVASTATIN SODIUM 80 MG/1
80 TABLET ORAL DAILY
Qty: 30 TABLET | Refills: 0 | Status: CANCELLED | OUTPATIENT
Start: 2025-05-15

## 2025-05-15 RX ORDER — CHLORTHALIDONE 25 MG/1
25 TABLET ORAL DAILY
Qty: 90 TABLET | Refills: 3
Start: 2025-05-15

## 2025-05-15 RX ORDER — SIMVASTATIN 40 MG
40 TABLET ORAL NIGHTLY
Qty: 90 TABLET | Refills: 1 | Status: CANCELLED | OUTPATIENT
Start: 2025-05-15

## 2025-05-15 RX ORDER — PRAVASTATIN SODIUM 40 MG
80 TABLET ORAL DAILY
Qty: 60 TABLET | Refills: 2 | Status: SHIPPED | OUTPATIENT
Start: 2025-05-15 | End: 2025-08-13

## 2025-05-15 RX ORDER — PRAVASTATIN SODIUM 40 MG
80 TABLET ORAL DAILY
Qty: 180 TABLET | Refills: 1
Start: 2025-05-15 | End: 2025-05-15 | Stop reason: CLARIF

## 2025-05-15 ASSESSMENT — ENCOUNTER SYMPTOMS
NAUSEA: 0
SHORTNESS OF BREATH: 0
DIARRHEA: 0
VOMITING: 0
CONSTIPATION: 0

## 2025-05-15 NOTE — PROGRESS NOTES
PROGRESS NOTE   [facility]                                  [address], [address], [facility] [address]         Phone: [phone]    Date of Service:  5/15/2025     Patient ID: .Joselyn Barrett is a 69 y.o. female PMH, coronary artery disease, essential hypertension and MI presenting for BP follow up.       Subjective:     CC: BP follow up    HPI:   HTN:   Patient is not currently monitoring her BP at home. She has a cuff but has not put batteries in it for use. Medications are chlorthalidone 1/2 tablet (12.5mg) daily, losartan 100mg daily, and carvedilol 12.5 mg daily. She follows with cardiology and the next appointment is scheduled in three weeks. Patient has not had any side effects from the medication and denies any headache, dizziness, lightheadedness or vision changes.    HLD:   Patient is currently taking pravastatin 40 mg daily. She has not had any side effects but dislikes the chalky feeling and bad taste of the pill. Her labs three weeks ago showed ,  and elevated AST of 50. ALT and potassium were unable to be resulted because of hemolysis. She was previously on repatha in 2023 and had not side effects with better cholesterol control (). She wants to continue the repatha instead of statins but had issues because of cost ($500+ a month). Past trials of atorvastatin and rosuvastatin gave her myalgias so she is now on prosovastin and has no muscle aches.     ROS:  Review of Systems   Constitutional:  Negative for fever.   HENT:  Negative for congestion.    Respiratory:  Negative for shortness of breath.    Cardiovascular:  Negative for chest pain.   Gastrointestinal:  Negative for constipation, diarrhea, nausea and vomiting.   Musculoskeletal:  Negative for myalgias.   Neurological:  Negative for dizziness, light-headedness and headaches.     There were no vitals filed for this 
PROGRESS NOTE   [facility]                                  [address], [address], [place] [address]         Phone: [phone]    Date of Service:  5/15/2025     Patient ID: Penelope Barrett is a 69 y.o. female      Subjective:     CC:   Chief Complaint   Patient presents with    Blood Pressure Check     HPI    HTN  HLD  LFT      ROS:    {ROS FCMRP:99195}        Vitals:    05/15/25 1049   BP: (!) 140/90   BP Site: Left Upper Arm   Patient Position: Sitting   BP Cuff Size: Medium Adult   Pulse: 64   SpO2: 97%   Weight: 106.1 kg (234 lb)       Allergies:  Prednisone, Lisinopril, Adhesive tape, Codeine, Crestor [rosuvastatin calcium], Food, Lipitor [atorvastatin calcium], Nitrofurantoin, Oxycodone, Penicillins, Sulfa antibiotics, Tramadol, Atorvastatin, and Clindamycin    Outpatient Medications Marked as Taking for the 5/15/25 encounter (Office Visit) with Cristóbal Eric, DO   Medication Sig Dispense Refill    Evolocumab (REPATHA) SOSY syringe Inject 1 mL into the skin every 14 days 2.1 mL 1    chlorthalidone (HYGROTON) 25 MG tablet Take 1 tablet by mouth daily 90 tablet 3    pravastatin (PRAVACHOL) 40 MG tablet Take 2 tablets by mouth daily 60 tablet 2    [START ON 5/28/2025] losartan (COZAAR) 100 MG tablet Take 1 tablet by mouth daily 90 tablet 0    clotrimazole-betamethasone (LOTRISONE) 1-0.05 % cream Apply topically 2 times daily. 45 g 1    orphenadrine (NORFLEX) 100 MG extended release tablet 1 tablet 2 times daily as needed      Handicap Placard MISC by Does not apply route 1 each 0    carvedilol (COREG) 12.5 MG tablet Take 1 tablet by mouth 2 times daily 180 tablet 3    diclofenac (VOLTAREN) 75 MG EC tablet Take 1 tablet by mouth 2 times daily (Patient taking differently: Take 1 tablet by mouth as needed) 180 tablet 3    MELOXICAM PO Take by mouth as needed      Omega-3 Fatty Acids (FISH OIL) 1000 MG capsule Take 3 capsules 
patients history, independently did a physical, and agree with A/P as written in medical student's note (other than clarified below).      Please see below for additional information documented by the resident/attending including the A/P.    Assessment/Plan:  Please see HPI above and medical student note.  Personally performed physical exam in addition to medical student, no abnormal findings, cardiovascular rate and rhythm normal, no murmurs.  Lungs clear to auscultation bilaterally no wheezing or rales.  Neurologically intact and psychiatric intact.    1. Essential (primary) hypertension  -BP not at goal today remains borderline elevated  -Encouraged to increase chlorthalidone to 25 mg daily instead of 12.5 mg daily  -Continue on losartan 100 mg daily and Coreg 12.5 mg daily per cardiology  - chlorthalidone (HYGROTON) 25 MG tablet; Take 1 tablet by mouth daily  Dispense: 90 tablet; Refill: 3    2. Mixed hyperlipidemia  -Not at goal  -Cholesterol has elevated on lab work, LDL now 170s and total cholesterol in the 270s.  She has been taking pravastatin 40 mg daily however notes she does not like the chalky taste of this and is wondering if there is something else she can do.  Encouraged to take a total of 80 mg of pravastatin daily.  Will take 240 mg tablets daily until seen by cardiology.  Also placed order for Repatha as patient was previously on in the past due to statin intolerance.  - Evolocumab (REPATHA) SOSY syringe; Inject 1 mL into the skin every 14 days  Dispense: 2.1 mL; Refill: 1    3. LFT elevation  -Will recheck LFTs  - Comprehensive Metabolic Panel; Future    4. Statin myopathy  -Has had myopathy with both atorvastatin and rosuvastatin.  Pravastatin has not led to any myopathy however she does not tolerate the medication well orally due to the size of the pill and the chalkiness of the medicine.  -Was previously on Repatha in the past and will attempt this prescription again.  May need to go through

## 2025-06-03 ENCOUNTER — TELEPHONE (OUTPATIENT)
Dept: ADMINISTRATIVE | Age: 70
End: 2025-06-03

## 2025-06-03 NOTE — TELEPHONE ENCOUNTER
The medication Repatha 140MG/ML syringes  is APPROVED from 01/01/25 to 12/31/25.    Please notify the patient.    If this requires a response please respond to the pool ( P MHCX [facility] MEDICATION PRE-AUTH).

## 2025-06-03 NOTE — TELEPHONE ENCOUNTER
Submitted PA for Repatha 140MG/ML syringes   Via CMM (Key: [id number]) STATUS: PENDING.    Follow up done daily; if no decision with in three days we will refax.  If another three days goes by with no decision will call the insurance for status.

## 2025-06-04 ENCOUNTER — OFFICE VISIT (OUTPATIENT)
Dept: CARDIOLOGY CLINIC | Age: 70
End: 2025-06-04
Payer: MEDICARE

## 2025-06-04 VITALS
OXYGEN SATURATION: 97 % | HEART RATE: 70 BPM | WEIGHT: 235 LBS | BODY MASS INDEX: 43.24 KG/M2 | SYSTOLIC BLOOD PRESSURE: 134 MMHG | DIASTOLIC BLOOD PRESSURE: 70 MMHG | HEIGHT: 62 IN

## 2025-06-04 DIAGNOSIS — I25.118 CORONARY ARTERY DISEASE OF NATIVE ARTERY OF NATIVE HEART WITH STABLE ANGINA PECTORIS: Primary | ICD-10-CM

## 2025-06-04 DIAGNOSIS — I10 ESSENTIAL (PRIMARY) HYPERTENSION: ICD-10-CM

## 2025-06-04 DIAGNOSIS — E78.2 MIXED HYPERLIPIDEMIA: ICD-10-CM

## 2025-06-04 DIAGNOSIS — I25.42 CORONARY ARTERY DISSECTION: ICD-10-CM

## 2025-06-04 DIAGNOSIS — Z72.0 TOBACCO USE: ICD-10-CM

## 2025-06-04 PROCEDURE — 99214 OFFICE O/P EST MOD 30 MIN: CPT | Performed by: NURSE PRACTITIONER

## 2025-06-04 PROCEDURE — G8427 DOCREV CUR MEDS BY ELIG CLIN: HCPCS | Performed by: NURSE PRACTITIONER

## 2025-06-04 PROCEDURE — 1123F ACP DISCUSS/DSCN MKR DOCD: CPT | Performed by: NURSE PRACTITIONER

## 2025-06-04 PROCEDURE — 3078F DIAST BP <80 MM HG: CPT | Performed by: NURSE PRACTITIONER

## 2025-06-04 PROCEDURE — 1090F PRES/ABSN URINE INCON ASSESS: CPT | Performed by: NURSE PRACTITIONER

## 2025-06-04 PROCEDURE — G8400 PT W/DXA NO RESULTS DOC: HCPCS | Performed by: NURSE PRACTITIONER

## 2025-06-04 PROCEDURE — 1160F RVW MEDS BY RX/DR IN RCRD: CPT | Performed by: NURSE PRACTITIONER

## 2025-06-04 PROCEDURE — 1036F TOBACCO NON-USER: CPT | Performed by: NURSE PRACTITIONER

## 2025-06-04 PROCEDURE — 3075F SYST BP GE 130 - 139MM HG: CPT | Performed by: NURSE PRACTITIONER

## 2025-06-04 PROCEDURE — 3017F COLORECTAL CA SCREEN DOC REV: CPT | Performed by: NURSE PRACTITIONER

## 2025-06-04 PROCEDURE — 1159F MED LIST DOCD IN RCRD: CPT | Performed by: NURSE PRACTITIONER

## 2025-06-04 PROCEDURE — G8417 CALC BMI ABV UP PARAM F/U: HCPCS | Performed by: NURSE PRACTITIONER

## 2025-06-04 PROCEDURE — G2211 COMPLEX E/M VISIT ADD ON: HCPCS | Performed by: NURSE PRACTITIONER

## 2025-06-04 RX ORDER — CARVEDILOL 12.5 MG/1
12.5 TABLET ORAL 2 TIMES DAILY
Qty: 180 TABLET | Refills: 3 | Status: SHIPPED | OUTPATIENT
Start: 2025-06-04

## 2025-06-04 RX ORDER — INCLISIRAN 284 MG/1.5ML
284 INJECTION, SOLUTION SUBCUTANEOUS ONCE
Qty: 1.5 ML | Refills: 0 | Status: SHIPPED | OUTPATIENT
Start: 2025-06-04 | End: 2025-06-04

## 2025-06-04 RX ORDER — GABAPENTIN 100 MG/1
100 CAPSULE ORAL 2 TIMES DAILY
COMMUNITY

## 2025-06-04 RX ORDER — CHLORAL HYDRATE 500 MG
3000 CAPSULE ORAL DAILY
Qty: 270 CAPSULE | Refills: 1 | Status: SHIPPED | OUTPATIENT
Start: 2025-06-04

## 2025-06-04 NOTE — PROGRESS NOTES
carvedilol (COREG) 12.5 MG tablet Take 1 tablet by mouth 2 times daily 5/10/24  Yes Milagros Naidu APRN - CNP   diclofenac (VOLTAREN) 75 MG EC tablet Take 1 tablet by mouth 2 times daily 3/4/24  Yes Milagros Keller DO   MELOXICAM PO Take by mouth as needed   Yes Lauri Cerda MD   Nutritional Supplements (VITAMIN D BOOSTER PO) Take by mouth   Yes ProviderLauri MD   aspirin 81 MG tablet Take 1 tablet by mouth daily   Yes ProviderLauri MD   Evolocumab (REPATHA) SOSY syringe Inject 1 mL into the skin every 14 days  Patient not taking: Reported on 6/4/2025 5/15/25   Cristóbal Eric DO   orphenadrine (NORFLEX) 100 MG extended release tablet 1 tablet 2 times daily as needed  Patient not taking: Reported on 6/4/2025 11/9/24   Lauri Cerda MD   Omega-3 Fatty Acids (FISH OIL) 1000 MG capsule Take 3 capsules by mouth daily  Patient not taking: Reported on 6/4/2025 2/29/24   Dixie Acosta APRN - CNP   ascorbic acid (VITAMIN C) 100 MG tablet Take 1 tablet by mouth  Patient not taking: Reported on 6/4/2025    ProviderLauri MD        Allergies:  Prednisone, Lisinopril, Adhesive tape, Codeine, Crestor [rosuvastatin calcium], Food, Lipitor [atorvastatin calcium], Nitrofurantoin, Oxycodone, Penicillins, Sulfa antibiotics, Tramadol, Atorvastatin, and Clindamycin     Cardiac Imaging:  ECHO 5/14/2024:     Image quality is technically difficult.    Left Ventricle: Normal left ventricular systolic function with a visually estimated EF of 55 - 60%. Left ventricle size is normal. Findings consistent with mild concentric hypertrophy. Normal wall motion. Grade I diastolic dysfunction with normal LAP.    Aortic Valve: Not well visualized, cusps appears thickened/calcified.Very mild stenosis of the aortic valve. AV mean gradient is 10 mmHg. AV area by continuity VTI is 1.8 cm2.    CARDIAC CATH 11/28/2022:  FINDINGS   LVGRAM   LVEDP 16   GRADIENT ACROSS AORTIC VALVE No significant

## 2025-06-04 NOTE — PATIENT INSTRUCTIONS
Stop the pravastatin   Disregard the Repatha prescription.  Elected to proceed with Leqvio for cholesterol  Will let you know when we get prior authorization and send an order to the infusion clinic [facility]  No change in other heart/ BP medicines  Follow up with Dr. Kebede in 6 months - then me in 1 year       Latest Reference Range & Units 02/01/22 14:54 08/26/22 10:52 11/14/22 09:38 11/28/22 09:24 01/30/23 08:32 05/17/23 07:52 04/28/25 12:45   Cholesterol, Total 0 - 199 mg/dL 354 (H) 309 (H) 223 (H) 269 (H)  151 274 (H)   Cholesterol, Fasting 0 - 199 mg/dL     243 (H)     HDL Cholesterol >40 mg/dL 39 (L) 38 (L) 36 (L) 43 38 (L) 49 53   LDL, Direct <70 mg/dL 245 (H) 177 (H) 125 (H) 166 (H) 131 (H)  174   Triglycerides 0 - 150 mg/dL 414 (H) 445 (H) 313 (H) 315 (H)  246 (H) 236 (H)

## 2025-06-06 ENCOUNTER — TELEPHONE (OUTPATIENT)
Dept: CARDIOLOGY CLINIC | Age: 70
End: 2025-06-06

## 2025-06-06 NOTE — TELEPHONE ENCOUNTER
[facility] insurance called office and stated that the medication, Inclisiran Sodium (LEQVIO) 284 MG/1.5ML [[id number]] is not cover by insurance. Julia would like to know if a alternative medication could be used, I was given 4 options over the phone by the [facility] representative; Atorvastatin, rosuvastatin, ezetimibe, and Repatha Sureclick. [facility] would like [employer] to give them a call back if any of those can be used as an alternative since they are covered. Thank you!    Best call back for [facility]: [phone]

## 2025-06-09 RX ORDER — HYDROCORTISONE SODIUM SUCCINATE 100 MG/2ML
100 INJECTION INTRAMUSCULAR; INTRAVENOUS
OUTPATIENT
Start: 2025-06-09

## 2025-06-09 RX ORDER — SODIUM CHLORIDE 9 MG/ML
INJECTION, SOLUTION INTRAVENOUS CONTINUOUS
OUTPATIENT
Start: 2025-06-09

## 2025-06-09 RX ORDER — EPINEPHRINE 1 MG/ML
0.3 INJECTION, SOLUTION INTRAMUSCULAR; SUBCUTANEOUS PRN
OUTPATIENT
Start: 2025-06-09

## 2025-06-09 RX ORDER — DIPHENHYDRAMINE HYDROCHLORIDE 50 MG/ML
50 INJECTION, SOLUTION INTRAMUSCULAR; INTRAVENOUS
OUTPATIENT
Start: 2025-06-09

## 2025-06-20 ENCOUNTER — HOSPITAL ENCOUNTER (OUTPATIENT)
Dept: NURSING | Age: 70
Setting detail: INFUSION SERIES
Discharge: HOME | End: 2025-06-20
Payer: MEDICARE

## 2025-06-20 VITALS
SYSTOLIC BLOOD PRESSURE: 183 MMHG | HEIGHT: 62 IN | RESPIRATION RATE: 20 BRPM | OXYGEN SATURATION: 96 % | BODY MASS INDEX: 43.25 KG/M2 | TEMPERATURE: 97.9 F | HEART RATE: 65 BPM | DIASTOLIC BLOOD PRESSURE: 95 MMHG | WEIGHT: 235.01 LBS

## 2025-06-20 DIAGNOSIS — I25.118 CORONARY ARTERY DISEASE OF NATIVE ARTERY OF NATIVE HEART WITH STABLE ANGINA PECTORIS: Primary | ICD-10-CM

## 2025-06-20 DIAGNOSIS — E78.5 HYPERLIPIDEMIA, UNSPECIFIED HYPERLIPIDEMIA TYPE: ICD-10-CM

## 2025-06-20 PROCEDURE — 99203 OFFICE O/P NEW LOW 30 MIN: CPT

## 2025-06-20 PROCEDURE — 96372 THER/PROPH/DIAG INJ SC/IM: CPT

## 2025-06-20 PROCEDURE — 6360000002 HC RX W HCPCS: Performed by: NURSE PRACTITIONER

## 2025-06-20 RX ORDER — SODIUM CHLORIDE 9 MG/ML
INJECTION, SOLUTION INTRAVENOUS CONTINUOUS
OUTPATIENT
Start: 2025-09-18

## 2025-06-20 RX ORDER — DIPHENHYDRAMINE HYDROCHLORIDE 50 MG/ML
50 INJECTION, SOLUTION INTRAMUSCULAR; INTRAVENOUS
OUTPATIENT
Start: 2025-09-18

## 2025-06-20 RX ORDER — EPINEPHRINE 1 MG/ML
0.3 INJECTION, SOLUTION INTRAMUSCULAR; SUBCUTANEOUS PRN
OUTPATIENT
Start: 2025-09-18

## 2025-06-20 RX ORDER — INCLISIRAN 284 MG/1.5ML
284 INJECTION, SOLUTION SUBCUTANEOUS ONCE
COMMUNITY

## 2025-06-20 RX ORDER — HYDROCORTISONE SODIUM SUCCINATE 100 MG/2ML
100 INJECTION INTRAMUSCULAR; INTRAVENOUS
OUTPATIENT
Start: 2025-09-18

## 2025-06-20 RX ADMIN — INCLISIRAN 284 MG: 284 INJECTION, SOLUTION SUBCUTANEOUS at 11:34

## 2025-06-20 ASSESSMENT — PAIN SCALES - GENERAL: PAINLEVEL_OUTOF10: 0

## 2025-06-20 NOTE — PROGRESS NOTES
Pt continues to report that she feels fine  Resp are easy and even  Lungs CTA  No signs of adverse reactions noted  Discharge instructions reviewed with pt and copy was given  Understanding was verbalized  Pt was discharged via w/c  in stable condition to her car per this RN

## 2025-06-20 NOTE — PROGRESS NOTES
Pt here ambulatory using a cane for first dose of SC Leqvio  Pt reports she has read about this medication and has talked with her provider about it as well and she is willing to try it Leqvio pamphlet given to pt for her to review  I discussed benefits as well as possible adverse reactions with pt and she verbalized understanding  Pt denies any further questions or concerns  Pt is agreeable to be monitored for 30 mins after the injection is given today   Leqvio  284 mg was given SC in righ arm  Injection site unremarkable  Bandaid applied  Pt flor well  Will monitor

## 2025-07-29 ENCOUNTER — OFFICE VISIT (OUTPATIENT)
Dept: UROGYNECOLOGY | Age: 70
End: 2025-07-29
Payer: MEDICARE

## 2025-07-29 VITALS
SYSTOLIC BLOOD PRESSURE: 140 MMHG | RESPIRATION RATE: 18 BRPM | TEMPERATURE: 97.8 F | OXYGEN SATURATION: 100 % | DIASTOLIC BLOOD PRESSURE: 89 MMHG | HEART RATE: 77 BPM

## 2025-07-29 DIAGNOSIS — R35.0 URINARY FREQUENCY: Primary | ICD-10-CM

## 2025-07-29 DIAGNOSIS — N39.41 URGE INCONTINENCE: ICD-10-CM

## 2025-07-29 DIAGNOSIS — N32.81 OVERACTIVE BLADDER: ICD-10-CM

## 2025-07-29 DIAGNOSIS — R39.15 URINARY URGENCY: ICD-10-CM

## 2025-07-29 PROCEDURE — 99214 OFFICE O/P EST MOD 30 MIN: CPT | Performed by: OBSTETRICS & GYNECOLOGY

## 2025-07-29 PROCEDURE — 1090F PRES/ABSN URINE INCON ASSESS: CPT | Performed by: OBSTETRICS & GYNECOLOGY

## 2025-07-29 PROCEDURE — G8417 CALC BMI ABV UP PARAM F/U: HCPCS | Performed by: OBSTETRICS & GYNECOLOGY

## 2025-07-29 PROCEDURE — 3079F DIAST BP 80-89 MM HG: CPT | Performed by: OBSTETRICS & GYNECOLOGY

## 2025-07-29 PROCEDURE — G8428 CUR MEDS NOT DOCUMENT: HCPCS | Performed by: OBSTETRICS & GYNECOLOGY

## 2025-07-29 PROCEDURE — G8400 PT W/DXA NO RESULTS DOC: HCPCS | Performed by: OBSTETRICS & GYNECOLOGY

## 2025-07-29 PROCEDURE — 3017F COLORECTAL CA SCREEN DOC REV: CPT | Performed by: OBSTETRICS & GYNECOLOGY

## 2025-07-29 PROCEDURE — 0509F URINE INCON PLAN DOCD: CPT | Performed by: OBSTETRICS & GYNECOLOGY

## 2025-07-29 PROCEDURE — 1123F ACP DISCUSS/DSCN MKR DOCD: CPT | Performed by: OBSTETRICS & GYNECOLOGY

## 2025-07-29 PROCEDURE — 1036F TOBACCO NON-USER: CPT | Performed by: OBSTETRICS & GYNECOLOGY

## 2025-07-29 PROCEDURE — 3077F SYST BP >= 140 MM HG: CPT | Performed by: OBSTETRICS & GYNECOLOGY

## 2025-07-29 NOTE — PROGRESS NOTES
3    losartan (COZAAR) 100 MG tablet Take 1 tablet by mouth daily 90 tablet 0    clotrimazole-betamethasone (LOTRISONE) 1-0.05 % cream Apply topically 2 times daily. 45 g 1    Handicap Placard MISC by Does not apply route 1 each 0    diclofenac (VOLTAREN) 75 MG EC tablet Take 1 tablet by mouth 2 times daily 180 tablet 3    MELOXICAM PO Take by mouth as needed      Nutritional Supplements (VITAMIN D BOOSTER PO) Take by mouth      aspirin 81 MG tablet Take 1 tablet by mouth daily       Current Facility-Administered Medications   Medication Dose Route Frequency Provider Last Rate Last Admin    sterile water injection 1,000 mL  1,000 mL Bladder Once          Social History:   Social History     Socioeconomic History    Marital status:      Spouse name: Not on file    Number of children: Not on file    Years of education: Not on file    Highest education level: Not on file   Occupational History    Not on file   Tobacco Use    Smoking status: Former     Current packs/day: 0.00     Average packs/day: 0.3 packs/day for 47.1 years (11.8 ttl pk-yrs)     Types: Cigarettes, E-Cigarettes     Start date: 1976     Quit date: 2023     Years since quittin.0    Smokeless tobacco: Never    Tobacco comments:     Maybe seven per week   Vaping Use    Vaping status: Never Used   Substance and Sexual Activity    Alcohol use: Yes     Comment: occ    Drug use: No    Sexual activity: Not Currently     Partners: Male   Other Topics Concern    Not on file   Social History Narrative    Not on file     Social Drivers of Health     Financial Resource Strain: Low Risk  (2024)    Overall Financial Resource Strain (CARDIA)     Difficulty of Paying Living Expenses: Not hard at all   Food Insecurity: Not on file (2025)   Transportation Needs: Unknown (2024)    PRAPARE - Transportation     Lack of Transportation (Medical): Not on file     Lack of Transportation (Non-Medical): No   Physical Activity: Inactive (2025)

## 2025-07-30 ENCOUNTER — TELEPHONE (OUTPATIENT)
Dept: UROGYNECOLOGY | Age: 70
End: 2025-07-30

## 2025-07-30 RX ORDER — CIPROFLOXACIN 500 MG/1
500 TABLET, FILM COATED ORAL 2 TIMES DAILY
Qty: 14 TABLET | Refills: 0 | Status: SHIPPED | OUTPATIENT
Start: 2025-07-30 | End: 2025-08-06

## 2025-07-30 NOTE — TELEPHONE ENCOUNTER
Pre-medications for Botox and Anticoagulant Pause    Attempted to call patient to go over premeds for  Botox and Anticoagulant Pause. No answer. Left  requesting callback.

## 2025-07-30 NOTE — TELEPHONE ENCOUNTER
Pre-medications for Botox and Anticoagulant Pause    Allergies, previous cultures, and medications reviewed. Patient given Cipro per protocol.     Patient is aware that she will need to start Antibiotic 3 days prior to procedure. Patient declines anxiety related to procedure and educated on when to pause/restart anticoagulant(s) States she will hold Asprin for 5 days prior    Patient verbalizes understanding of all of the above, and has no further questions or concerns at this time. Encouraged to call back the office should any questions/concerns arise. 7/30/2025 at 11:47 AM.

## 2025-08-04 ENCOUNTER — OFFICE VISIT (OUTPATIENT)
Dept: PRIMARY CARE CLINIC | Age: 70
End: 2025-08-04

## 2025-08-04 VITALS
DIASTOLIC BLOOD PRESSURE: 100 MMHG | SYSTOLIC BLOOD PRESSURE: 140 MMHG | TEMPERATURE: 98.7 F | HEART RATE: 92 BPM | OXYGEN SATURATION: 97 %

## 2025-08-04 DIAGNOSIS — M25.512 ACUTE PAIN OF BOTH SHOULDERS: ICD-10-CM

## 2025-08-04 DIAGNOSIS — M54.2 NECK PAIN: ICD-10-CM

## 2025-08-04 DIAGNOSIS — M43.6 NECK STIFFNESS: Primary | ICD-10-CM

## 2025-08-04 DIAGNOSIS — M25.511 ACUTE PAIN OF BOTH SHOULDERS: ICD-10-CM

## 2025-08-04 RX ORDER — METHOCARBAMOL 750 MG/1
750 TABLET, FILM COATED ORAL 4 TIMES DAILY
Qty: 40 TABLET | Refills: 0 | Status: SHIPPED | OUTPATIENT
Start: 2025-08-04 | End: 2025-08-14

## 2025-08-04 RX ORDER — LIDOCAINE 4 G/G
1 PATCH TOPICAL DAILY
Qty: 30 PATCH | Refills: 0 | Status: SHIPPED | OUTPATIENT
Start: 2025-08-04 | End: 2025-09-03

## 2025-08-12 ENCOUNTER — PROCEDURE VISIT (OUTPATIENT)
Dept: UROGYNECOLOGY | Age: 70
End: 2025-08-12
Payer: MEDICARE

## 2025-08-12 VITALS
SYSTOLIC BLOOD PRESSURE: 153 MMHG | TEMPERATURE: 97.9 F | DIASTOLIC BLOOD PRESSURE: 91 MMHG | HEART RATE: 73 BPM | OXYGEN SATURATION: 100 % | RESPIRATION RATE: 16 BRPM

## 2025-08-12 DIAGNOSIS — R39.15 URINARY URGENCY: ICD-10-CM

## 2025-08-12 DIAGNOSIS — N32.81 OVERACTIVE BLADDER: Primary | ICD-10-CM

## 2025-08-12 LAB
BILIRUBIN, POC: NORMAL
BLOOD URINE, POC: NORMAL
CLARITY, POC: CLEAR
COLOR, POC: YELLOW
GLUCOSE URINE, POC: NORMAL MG/DL
KETONES, POC: NORMAL MG/DL
LEUKOCYTE EST, POC: NORMAL
NITRITE, POC: NORMAL
PH, POC: 5
PROTEIN, POC: NORMAL MG/DL
SPECIFIC GRAVITY, POC: 1.01
UROBILINOGEN, POC: NORMAL MG/DL

## 2025-08-12 PROCEDURE — 52287 CYSTOSCOPY CHEMODENERVATION: CPT | Performed by: OBSTETRICS & GYNECOLOGY

## 2025-08-12 PROCEDURE — 81002 URINALYSIS NONAUTO W/O SCOPE: CPT | Performed by: OBSTETRICS & GYNECOLOGY

## 2025-08-12 RX ORDER — LIDOCAINE HYDROCHLORIDE 20 MG/ML
JELLY TOPICAL ONCE
Status: COMPLETED | OUTPATIENT
Start: 2025-08-12 | End: 2025-08-12

## 2025-08-12 RX ORDER — SODIUM CHLORIDE 9 MG/ML
0-1000 INJECTION, SOLUTION INTRAVENOUS ONCE
Status: COMPLETED | OUTPATIENT
Start: 2025-08-12 | End: 2025-08-12

## 2025-08-12 RX ORDER — LIDOCAINE HYDROCHLORIDE 10 MG/ML
50 INJECTION, SOLUTION INFILTRATION; PERINEURAL ONCE
Status: COMPLETED | OUTPATIENT
Start: 2025-08-12 | End: 2025-08-12

## 2025-08-12 RX ADMIN — LIDOCAINE HYDROCHLORIDE 50 ML: 10 INJECTION, SOLUTION INFILTRATION; PERINEURAL at 12:57

## 2025-08-12 RX ADMIN — LIDOCAINE HYDROCHLORIDE: 20 JELLY TOPICAL at 12:57

## 2025-08-12 RX ADMIN — SODIUM CHLORIDE 1000 ML: 9 INJECTION, SOLUTION INTRAVENOUS at 13:30

## 2025-08-27 ENCOUNTER — OFFICE VISIT (OUTPATIENT)
Dept: UROGYNECOLOGY | Age: 70
End: 2025-08-27
Payer: MEDICARE

## 2025-08-27 VITALS
RESPIRATION RATE: 18 BRPM | DIASTOLIC BLOOD PRESSURE: 97 MMHG | HEART RATE: 65 BPM | SYSTOLIC BLOOD PRESSURE: 156 MMHG | TEMPERATURE: 97.5 F | OXYGEN SATURATION: 100 %

## 2025-08-27 DIAGNOSIS — R39.89 URETHRAL PAIN: ICD-10-CM

## 2025-08-27 DIAGNOSIS — R39.15 URINARY URGENCY: Primary | ICD-10-CM

## 2025-08-27 DIAGNOSIS — R35.0 URINARY FREQUENCY: ICD-10-CM

## 2025-08-27 PROCEDURE — 51701 INSERT BLADDER CATHETER: CPT | Performed by: NURSE PRACTITIONER

## 2025-08-27 PROCEDURE — G8427 DOCREV CUR MEDS BY ELIG CLIN: HCPCS | Performed by: NURSE PRACTITIONER

## 2025-08-27 PROCEDURE — 1159F MED LIST DOCD IN RCRD: CPT | Performed by: NURSE PRACTITIONER

## 2025-08-27 PROCEDURE — 3077F SYST BP >= 140 MM HG: CPT | Performed by: NURSE PRACTITIONER

## 2025-08-27 PROCEDURE — G8400 PT W/DXA NO RESULTS DOC: HCPCS | Performed by: NURSE PRACTITIONER

## 2025-08-27 PROCEDURE — 1123F ACP DISCUSS/DSCN MKR DOCD: CPT | Performed by: NURSE PRACTITIONER

## 2025-08-27 PROCEDURE — G8417 CALC BMI ABV UP PARAM F/U: HCPCS | Performed by: NURSE PRACTITIONER

## 2025-08-27 PROCEDURE — 3017F COLORECTAL CA SCREEN DOC REV: CPT | Performed by: NURSE PRACTITIONER

## 2025-08-27 PROCEDURE — 3080F DIAST BP >= 90 MM HG: CPT | Performed by: NURSE PRACTITIONER

## 2025-08-27 PROCEDURE — 81002 URINALYSIS NONAUTO W/O SCOPE: CPT | Performed by: NURSE PRACTITIONER

## 2025-08-27 PROCEDURE — 99212 OFFICE O/P EST SF 10 MIN: CPT | Performed by: NURSE PRACTITIONER

## 2025-08-27 PROCEDURE — 1090F PRES/ABSN URINE INCON ASSESS: CPT | Performed by: NURSE PRACTITIONER

## 2025-08-27 PROCEDURE — 1036F TOBACCO NON-USER: CPT | Performed by: NURSE PRACTITIONER

## 2025-08-27 RX ORDER — PHENAZOPYRIDINE HYDROCHLORIDE 100 MG/1
100 TABLET, FILM COATED ORAL 3 TIMES DAILY PRN
Qty: 9 TABLET | Refills: 0 | Status: SHIPPED | OUTPATIENT
Start: 2025-08-27 | End: 2025-08-30

## 2025-08-30 LAB — BACTERIA UR CULT: NORMAL

## 2025-09-02 DIAGNOSIS — I10 ESSENTIAL (PRIMARY) HYPERTENSION: ICD-10-CM

## 2025-09-02 RX ORDER — CHLORTHALIDONE 25 MG/1
25 TABLET ORAL DAILY
Qty: 90 TABLET | Refills: 0 | Status: SHIPPED | OUTPATIENT
Start: 2025-09-02

## (undated) DEVICE — TUBING FLD MGMT Y DBL SPIK DUALWAVE

## (undated) DEVICE — COVER,MAYO STAND,XL,STERILE: Brand: MEDLINE

## (undated) DEVICE — 3M™ TEGADERM™ TRANSPARENT FILM DRESSING FRAME STYLE, 1626W, 4 IN X 4-3/4 IN (10 CM X 12 CM), 50/CT 4CT/CASE: Brand: 3M™ TEGADERM™

## (undated) DEVICE — 40763 BEACH CHAIR HEAD RESTRAINT: Brand: 40763 BEACH CHAIR HEAD RESTRAINT

## (undated) DEVICE — STERILE POLYISOPRENE POWDER-FREE SURGICAL GLOVES WITH EMOLLIENT COATING: Brand: PROTEXIS

## (undated) DEVICE — SOLUTION IV IRRIG 500ML 0.9% SODIUM CHL 2F7123

## (undated) DEVICE — GLOVE SURG SZ 8 L12IN FNGR THK94MIL STD WHT LTX FREE

## (undated) DEVICE — TOWEL OR BLUEE 16X26IN ST 8 PACK ORB08 16X26ORTWL

## (undated) DEVICE — GLOVE SURG SZ 75 L12IN FNGR THK94MIL STD WHT LTX FREE

## (undated) DEVICE — GLOVE SURG SZ 65 L12IN FNGR THK87MIL WHT LTX FREE

## (undated) DEVICE — BLADE SHV L13CM DIA4MM EXCALIBUR AGG COOLCUT

## (undated) DEVICE — UNIVERSAL BLOCK TRAY: Brand: MEDLINE INDUSTRIES, INC.

## (undated) DEVICE — SUTURE VCRL SZ 0 L27IN ABSRB UD L26MM CT-2 1/2 CIR J270H

## (undated) DEVICE — TIBURON BEACH CHAIR SHOULDER DRAPE: Brand: CONVERTORS

## (undated) DEVICE — SOLUTION IV 1000ML LAC RINGERS PH 6.5 INJ USP VIAFLX PLAS

## (undated) DEVICE — SET ADMIN PRIMING 7ML L30IN 7.35LB 20 GTT 2ND RLER CLMP

## (undated) DEVICE — TOWEL,OR,DSP,ST,BLUE,STD,8/PK,10PK/CS: Brand: MEDLINE

## (undated) DEVICE — PACK PROCEDURE SURG ARTHSCP CUST

## (undated) DEVICE — CHLORAPREP 26ML ORANGE

## (undated) DEVICE — SOLUTION IRRIG 3000ML LAC R FLX CONT

## (undated) DEVICE — AMBIENT SUPER TURBOVAC 90: Brand: COBLATION

## (undated) DEVICE — 3M™ STERI-DRAPE™ U-DRAPE 1015: Brand: STERI-DRAPE™

## (undated) DEVICE — STERILE POLYISOPRENE POWDER-FREE SURGICAL GLOVES: Brand: PROTEXIS

## (undated) DEVICE — ELECTRODE PT RET AD L9FT HI MOIST COND ADH HYDRGEL CORDED

## (undated) DEVICE — TUBE IRRIG L8IN LNG PT W/ CONN FOR PMP SYS REDEUCE

## (undated) DEVICE — 5.5 CM ACROMIOBLASTER STRAIGHT                                    BURRS, POWER/EP-1, BRICK RED, 8000                                    MAXIMUM RPM, PACKAGED 6 PER BOX, STERILE

## (undated) DEVICE — SURE SET-DOUBLE BASIN-LF: Brand: MEDLINE INDUSTRIES, INC.

## (undated) DEVICE — CATHETER IV 20GA L1.25IN PNK FEP SFTY STR HUB RADPQ DISP

## (undated) DEVICE — TUBING PMP L16FT MAIN DISP FOR AR-6400 AR-6475

## (undated) DEVICE — GOWN,SIRUS,POLYRNF,BRTHSLV,XLN/XXL,18/CS: Brand: MEDLINE

## (undated) DEVICE — SUTURE MCRYL SZ 4-0 L27IN ABSRB UD L19MM PS-2 1/2 CIR PRIM Y426H

## (undated) DEVICE — MEDI-VAC NON-CONDUCTIVE SUCTION TUBING: Brand: CARDINAL HEALTH

## (undated) DEVICE — GOWN,SIRUS,NON REINFRCD,LARGE,SET IN SL: Brand: MEDLINE

## (undated) DEVICE — COVER LT HNDL BLU PLAS

## (undated) DEVICE — DRAPE,U/ SHT,SPLIT,PLAS,STERIL: Brand: MEDLINE

## (undated) DEVICE — 1810 FOAM BLOCK NEEDLE COUNTER: Brand: DEVON

## (undated) DEVICE — 1010 S-DRAPE TOWEL DRAPE 10/BX: Brand: STERI-DRAPE™

## (undated) DEVICE — ALCOHOL RUBBING 16OZ 70% ISO

## (undated) DEVICE — GLOVE SURG SZ 9 THK91MIL LTX FREE SYN POLYISOPRENE ANTI

## (undated) DEVICE — 3.5 MM INCISOR STRAIGHT BLADES,                                    POWER/EP-1, MEDIUM GRAY, PACKAGED 6                                    PER BOX, STERILE

## (undated) DEVICE — GAUZE,SPONGE,4"X4",16PLY,STRL,LF,10/TRAY: Brand: MEDLINE

## (undated) DEVICE — APPLICATOR PREP 26ML 0.7% IOD POVACRYLEX 74% ISO ALC ST

## (undated) DEVICE — SET GRAV VENT NVENT CK VLV 3 NDL FREE PRT 10 GTT

## (undated) DEVICE — SENSOR PLSE OXMTR AD CBL L36IN ADH FRM FIT SPO2 DISP

## (undated) DEVICE — PAD DRY FLOOR ABS 32X58IN GRN

## (undated) DEVICE — DRAPE 54X23IN MAYO STAND COVER REINF

## (undated) DEVICE — SKIN AFFIX SURG ADHESIVE 72/CS 0.55ML: Brand: MEDLINE

## (undated) DEVICE — SKIN MARKER,REGULAR TIP WITH RULER AND LABELS: Brand: DEVON

## (undated) DEVICE — PAD,NON-ADHERENT,3X8,STERILE,LF,1/PK: Brand: MEDLINE

## (undated) DEVICE — 3M™ COBAN™ NL STERILE NON-LATEX SELF-ADHERENT WRAP, 2086S, 6 IN X 5 YD (15 CM X 4,5 M), 12 ROLLS/CASE: Brand: 3M™ COBAN™

## (undated) DEVICE — SOLUTION IRRIG 5L LAC R BG

## (undated) DEVICE — GLOVE SURG SZ 85 L12IN FNGR THK94MIL STD WHT LTX FREE

## (undated) DEVICE — GOWN,SIRUS,POLYRNF,BRTHSLV,XL,30/CS: Brand: MEDLINE

## (undated) DEVICE — GLOVE ORANGE PI 7   MSG9070

## (undated) DEVICE — GLOVE SURG SZ 65 THK91MIL LTX FREE SYN POLYISOPRENE

## (undated) DEVICE — 3M™ STERI-DRAPE™ U-DRAPE, LONG 1019: Brand: STERI-DRAPE™

## (undated) DEVICE — TUBING PMP L6FT CONT WAVE EXTN

## (undated) DEVICE — 3M™ STERI-STRIP™ COMPOUND BENZOIN TINCTURE 40 BAGS/CARTON 4 CARTONS/CASE C1544: Brand: 3M™ STERI-STRIP™

## (undated) DEVICE — BLADE SHV L13CM DIA4MM TAPR TIP SCIS LIKE CUT OVL OUTER

## (undated) DEVICE — SUTURE MCRYL SZ 2-0 L18IN ABSRB VLT L36MM CT-1 1/2 CIR Y739D

## (undated) DEVICE — 3M™ TEGADERM™ TRANSPARENT FILM DRESSING FRAME STYLE, 1624W, 2-3/8 IN X 2-3/4 IN (6 CM X 7 CM), 100/CT 4CT/CASE: Brand: 3M™ TEGADERM™

## (undated) DEVICE — PACK PROCEDURE SURG ARTHROSCOPY

## (undated) DEVICE — INTENDED FOR TISSUE SEPARATION, AND OTHER PROCEDURES THAT REQUIRE A SHARP SURGICAL BLADE TO PUNCTURE OR CUT.: Brand: BARD-PARKER ® STAINLESS STEEL BLADES

## (undated) DEVICE — LIGHT HANDLE: Brand: DEVON

## (undated) DEVICE — SUTURE MCRYL SZ 4-0 L18IN ABSRB UD L19MM PS-2 3/8 CIR PRIM Y496G

## (undated) DEVICE — 3M™ STERI-STRIP™ REINFORCED ADHESIVE SKIN CLOSURES, R1547, 1/2 IN X 4 IN (12 MM X 100 MM), 6 STRIPS/ENVELOPE: Brand: 3M™ STERI-STRIP™

## (undated) DEVICE — PENCIL ES L3M BTTN SWCH S STL HEX LOK BLDE ELECTRD HOLSTER

## (undated) DEVICE — TURNOVER KIT RM INF CTRL TECH